# Patient Record
Sex: MALE | Race: WHITE | NOT HISPANIC OR LATINO | Employment: OTHER | URBAN - METROPOLITAN AREA
[De-identification: names, ages, dates, MRNs, and addresses within clinical notes are randomized per-mention and may not be internally consistent; named-entity substitution may affect disease eponyms.]

---

## 2017-04-15 ENCOUNTER — HOSPITAL ENCOUNTER (EMERGENCY)
Facility: HOSPITAL | Age: 72
Discharge: HOME | End: 2017-04-16
Attending: EMERGENCY MEDICINE
Payer: MEDICARE

## 2017-04-15 DIAGNOSIS — I10 ESSENTIAL HYPERTENSION: Primary | ICD-10-CM

## 2017-04-15 PROCEDURE — 83880 ASSAY OF NATRIURETIC PEPTIDE: CPT

## 2017-04-15 PROCEDURE — 93005 ELECTROCARDIOGRAM TRACING: CPT

## 2017-04-15 PROCEDURE — 99284 EMERGENCY DEPT VISIT MOD MDM: CPT | Mod: 25

## 2017-04-15 PROCEDURE — 84484 ASSAY OF TROPONIN QUANT: CPT

## 2017-04-15 PROCEDURE — 85025 COMPLETE CBC W/AUTO DIFF WBC: CPT

## 2017-04-15 PROCEDURE — 96374 THER/PROPH/DIAG INJ IV PUSH: CPT

## 2017-04-15 PROCEDURE — 96375 TX/PRO/DX INJ NEW DRUG ADDON: CPT

## 2017-04-15 PROCEDURE — 80053 COMPREHEN METABOLIC PANEL: CPT

## 2017-04-15 PROCEDURE — 93010 ELECTROCARDIOGRAM REPORT: CPT | Mod: ,,, | Performed by: INTERNAL MEDICINE

## 2017-04-15 RX ORDER — EZETIMIBE 10 MG/1
10 TABLET ORAL DAILY
COMMUNITY
End: 2018-08-08 | Stop reason: SDUPTHER

## 2017-04-15 RX ORDER — IRBESARTAN 300 MG/1
300 TABLET ORAL NIGHTLY
COMMUNITY
End: 2018-08-08 | Stop reason: SDUPTHER

## 2017-04-15 RX ORDER — LEVOTHYROXINE SODIUM 25 UG/1
25 TABLET ORAL DAILY
COMMUNITY
End: 2018-11-23 | Stop reason: SDUPTHER

## 2017-04-15 NOTE — ED AVS SNAPSHOT
[facility]  [address] [place] [address]               Julius Baker   4/15/2017 11:06 PM   ED    Description:  Male : 1945   Department:  [facility]           Your Care was Coordinated By:     Provider Role From To    Latanya Vega MD Attending Provider 04/15/17 9676 --      Reason for Visit     Hypertension           Diagnoses this Visit        Comments    Essential hypertension    -  Primary       ED Disposition     None           To Do List           Follow-up Information     Follow up with Darrion Delarosa MD In 2 days.    Specialty:  Family Medicine    Contact information:    [address]  [place] [address]  [phone]         These Medications        Disp Refills Start End    carvedilol (COREG) 6.25 MG tablet 60 tablet 0 2017    Take 1 tablet (6.25 mg total) by mouth 2 (two) times daily with meals. - Oral      [facility]     [facility] -  Assistance  Unless otherwise directed by your provider, please contact [facility], our nurse care line that is available for  assistance.     Registered nurses in the [facility] provide: appointment scheduling, clinical advisement, health education, and other advisory services.  Call: [phone] (toll free)               Medications           Message regarding Medications     Verify the changes and/or additions to your medication regime listed below are the same as discussed with your clinician today.  If any of these changes or additions are incorrect, please notify your healthcare provider.        START taking these NEW medications        Refills    carvedilol (COREG) 6.25 MG tablet 0    Sig: Take 1 tablet (6.25 mg total) by mouth 2 (two) times daily with meals.    Class: Print    Route: Oral      These medications were administered today        Dose Freq    hydrALAZINE injection 10 mg 10 mg ED 1 Time    Sig: Inject  "0.5 mLs (10 mg total) into the vein ED 1 Time.    Class: Normal    Route: Intravenous    metoprolol injection 5 mg 5 mg ED 1 Time    Sig: Inject 5 mLs (5 mg total) into the vein ED 1 Time.    Class: Normal    Route: Intravenous           Verify that the below list of medications is an accurate representation of the medications you are currently taking.  If none reported, the list may be blank. If incorrect, please contact your healthcare provider. Carry this list with you in case of emergency.           Current Medications     ezetimibe (ZETIA) 10 mg tablet Take 10 mg by mouth once daily.    irbesartan (AVAPRO) 300 MG tablet Take 300 mg by mouth every evening.    levothyroxine (SYNTHROID) 25 MCG tablet Take 25 mcg by mouth once daily.    carvedilol (COREG) 6.25 MG tablet Take 1 tablet (6.25 mg total) by mouth 2 (two) times daily with meals.           Clinical Reference Information           Your Vitals Were     BP Pulse Temp Resp Height Weight    178/89 71 98 °F (36.7 °C) (Oral) 16 5' 10" (1.778 m) 113.4 kg (250 lb)    SpO2 BMI             100% 35.87 kg/m2         Allergies as of 4/16/2017     No Known Allergies      Immunizations Administered on Date of Encounter - 4/16/2017     None      ED Micro, Lab, POCT     Start Ordered       Status Ordering Provider    04/15/17 2343 04/15/17 2342  Troponin I  STAT      Final result     04/15/17 2342 04/15/17 2342  CBC auto differential  STAT      Final result     04/15/17 2342 04/15/17 2342  Comprehensive metabolic panel  STAT      Final result     04/15/17 2342 04/15/17 2342  Brain natriuretic peptide  STAT      Final result       ED Imaging Orders     Start Ordered       Status Ordering Provider    04/16/17 0003 04/16/17 0002  CT Head Without Contrast  1 time imaging      Final result     04/16/17 0002 04/16/17 0002  X-Ray Chest PA And Lateral  1 time imaging      Final result         Discharge Instructions         Eating Heart-Healthy Foods  Eating has a big impact on " your heart health. In fact, eating healthier can improve several of your heart risks at once. For instance, it helps you manage weight, cholesterol, and blood pressure. Here are ideas to help you make heart-healthy changes without giving up all the foods and flavors you love.  Getting started  · Talk with your health care provider about eating plans, such as the DASH or Mediterranean diet. You may also be referred to a dietitian.  · Change a few things at a time. Give yourself time to get used to a few eating changes before adding more.  · Work to create a tasty, healthy eating plan that you can stick to for the rest of your life.    Goals for healthy eating  Below are some tips to improve your eating habits:  · Limit saturated fats and trans fats. Saturated fats raise your levels of cholesterol, so keep these fats to a minimum. They are found in foods such as fatty meats, whole milk, cheese, and palm and coconut oils. Avoid trans fats because they lower good cholesterol as well as raise bad cholesterol. Trans fats are most often found in processed foods.  · Reduce sodium (salt) intake. Eating too much salt may increase your blood pressure. Limit your sodium intake to 2,300 milligrams (mg) per day, or less if your health care provider recommends it. Dining out less often and eating fewer processed foods are two great ways to decrease the amount of salt you consume.  · Managing calories. A calorie is a unit of energy. Your body burns calories for fuel, but if you eat more calories than your body burns, the extras are stored as fat. Your health care provider can help you create a diet plan to manage your calories. This will likely include eating healthier foods as well as exercising regularly. To help you track your progress, keep a diary to record what you eat and how often you exercise.  Choose the right foods  Aim to make these foods staples of your diet. If you have diabetes, you may have different recommendations  than what is listed here:  · Fruits and vegetable provide plenty of nutrients without a lot of calories. At meals, fill half your plate with these foods. Split the other half of your plate between whole grains and lean protein.  · Whole grains are high in fiber and rich in vitamins and nutrients. Good choices include whole-wheat bread, pasta, and brown rice.  · Lean proteins give you nutrition with less fat. Good choices include fish, skinless chicken, and beans.  · Low-fat or nonfat dairy provides nutrients without a lot of fat. Try low-fat or nonfat milk, cheese, or yogurt.  · Healthy fats can be good for you in small amounts. These are unsaturated fats, such as olive oil, nuts, and fish. Try to have at least 2 servings per week of fatty fish such as salmon, sardines, mackerel, rainbow trout, and albacore tuna. These contain omega-3 fatty acids, which are good for your heart. Flaxseed is another source of a heart-healthy fat.  More on heart healthy eating    Read food labels  Healthy eating starts at the grocery store. Be sure to pay attention to food labels on packaged foods. Look for products that are high in fiber and protein, and low in saturated fat, cholesterol, and sodium. Avoid products that contain trans fat. And pay close attention to serving size. For instance, if you plan to eat two servings, double all the numbers on the label.  Prepare food right  A key part of healthy cooking is cutting down on added fat and salt. Look on the internet for lower-fat, lower-sodium recipes. Also, try these tips:  · Remove fat from meat and skin from poultry before cooking.  · Skim fat from the surface of soups and sauces.  · Broil, boil, bake, steam, grill, and microwave food without added fats.  · Choose ingredients that spice up your food without adding calories, fat, or sodium. Try these items: horseradish, hot sauce, lemon, mustard, nonfat salad dressings, and vinegar. For salt-free herbs and spices, try basil,  cilantro, cinnamon, pepper, and rosemary.  Date Last Reviewed: 6/25/2015  © 8153-2051 [employer]. [address] [place] [address]. All rights reserved. This information is not intended as a substitute for professional medical care. Always follow your healthcare professional's instructions.          Step-by-Step  Checking Your Blood Pressure    Date Last Reviewed: 4/27/2016  © 1148-6492 [employer]. [address] [place] [address]. All rights reserved. This information is not intended as a substitute for professional medical care. Always follow your healthcare professional's instructions.          Discharge Instructions: Taking Your Blood Pressure  Blood pressure is the force of blood as it moves from the heart through the blood vessels. You can take your own blood pressure reading using a digital monitor. Take readings as often as your healthcare provider instructs. Take your readings each time in the same way, using the same arm. Here are guidelines for taking your blood pressure.  The American Heart Association (AHA) recommends purchasing a blood pressure monitor that is validated and approved by the Association for the Advancement of Medical Instrumentation, the [place] Hypertension Society, and the International Protocol for the Validation of Automated BP Measuring Devices. If the blood pressure monitor is for a senior adult, a pregnant woman, or a child, make certain it is validated for use with such a population. For the most reliable readings, the AHA recommends an automatic, cuff-style, upper arm (bicep) monitor. The readings from finger and wrist monitors are not as reliable as the upper arm monitor.        Step 1. Relax    · Wait at least a half hour after smoking, eating, or exercising. Do not drink coffee, tea, soda, or other caffeinated beverages before checking your blood pressure.   · Sit comfortably at a table. Place the monitor near you.  · Rest for a  few minutes before you begin.        Step 2. Wrap the cuff    · Place your arm on the table, palm up. Put your arm in a position that is level with your heart. Wrap the cuff around your upper arm, about an inch above your elbow. Its best to wrap the cuff on bare skin, not over clothing.  · Make sure your cuff fits. If it doesnt wrap around your upper arm, order a larger cuff. A cuff that is too large or too small can result in an inaccurate blood pressure reading.           Step 3. Inflate the cuff    · Pump the cuff until the scale reads 200. If you have a self-inflating cuff, push the button that starts the pump.  · The cuff will tighten, then loosen.  · The numbers will change. When they stop changing, your blood pressure reading will appear.  · If you get a reading that is too high or too low for you, relax for a few minutes. Then do the test again.    Step 4. Write down the results  · Write down your blood pressure numbers. Aaron the date and time. Keep your results in one place, such as a notebook.  · Remove the cuff from your arm. Turn off the machine.  · Take the readings with you to your medical appointments.  · If you start a new blood pressure medicine, or change a blood pressure medicine dose, note the day you started the new drug or dosage on your blood pressure recording sheet. This will help your healthcare provider monitor the effect of medication changes.     Date Last Reviewed: 4/27/2016  © 8218-5898 [employer]. [address] [place] [address], [place] [address]. All rights reserved. This information is not intended as a substitute for professional medical care. Always follow your healthcare professional's instructions.          Out of Control High Blood Pressure (Established)    Your blood pressure was unusually high today. This can occur if youve missed doses of your blood pressure medicine. Or it can happen if you are taking other medicines. These include some asthma inhalers,  decongestants, diet pills, and street drugs like cocaine and amphetamine.  Other causes include:  · Weight gain  · More salt in your diet  · Smoking  · Caffeine  Your blood pressure can also rise if you are emotionally upset or in intense pain. It may go back to normal after a period of rest.  A blood pressure reading is made up of 2 numbers. There is a top number over a bottom number. The top number is the systolic pressure. The bottom number is the diastolic pressure. A normal blood pressure is less than 120 over less than 80. High blood pressure (hypertension) is when the top number is 140 or higher. Or it is when the bottom number is 90 or higher. To be high blood pressure, the numbers must be higher when tested over a period of time. The blood pressures between normal and hypertension are called prehypertension.  Home care  Its important to take steps to lower your blood pressure. If you are taking blood pressure medicine, the guidelines below may help you need less or no medicines in the future.  · Begin a weight-loss program if you are overweight.  · Cut back on the amount of salt in your diet:  ¨ Avoid high-salt foods like olives, pickles, smoked meats, and salted potato chips.  ¨ Dont add salt to your food at the table.  ¨ Use only small amounts of salt when cooking.  · Begin an exercise program. Talk with your health care provider about what exercise program is best for you. It doesnt have to be difficult. Even brisk walking for 20 minutes 3 times a week is a good form of exercise.  · Avoid medicines that have heart stimulants in them. This includes many cold and sinus decongestant pills and sprays, as well as diet pills. Check the warnings about hypertension on the label. Stimulants such as amphetamine or cocaine could be lethal for someone with hypertension. Never take these.  · Limit how much caffeine you drink. Or switch to noncaffeinated beverages.  · Stop smoking. If you are a long-time smoker,  this can be hard. Enroll in a stop-smoking program to make it more likely that you will succeed. Talk with your provider about ways to quit.  · Learn how to handle stress better. This is an important part of any program to lower blood pressure. Learn ways to relax. These include meditation, yoga, and biofeedback.  · If medicines were prescribed, take them exactly as directed. Missing doses may cause your blood pressure to get out of control.  · Consider buying an automatic blood pressure machine. These are available at many drugstores. Use this to monitor your blood pressure. Report the results to your provider.  Follow-up care  Regular visits to your own health care provider for blood pressure and medicine checks are an important part of your care. Make a follow-up appointment as directed.  When to seek medical advice  Call your health care provider right away if any of these occur:  · Chest, arm, shoulder, neck, or upper back pain  · Shortness of breath  · Severe headache  · Throbbing or rushing sound in the ears  · Nosebleed  · Extreme drowsiness, confusion, or fainting  · Dizziness or dizziness with spinning sensation (vertigo)  · Weakness in an arm or leg or on one side of the face  · Difficulty speaking or seeing   Date Last Reviewed: 11/25/2014  © 6707-1793 [employer]. [address] [place] [address], [place] [address]. All rights reserved. This information is not intended as a substitute for professional medical care. Always follow your healthcare professional's instructions.          Discharge References/Attachments     DIET, LOW-SALT (ENGLISH)      [facility] Sign-Up     Activating your [facility] account is as easy as 1-2-3!     1) Visit [email or web address], select Sign Up Now, enter this activation code and your date of birth, then select Next.  [email or web address]  Expires: 5/31/2017  3:11 AM      2) Create a username and password to use when you visit [facility] in the future and select a security question  in case you lose your password and select Next.    3) Enter your e-mail address and click Sign Up!    Additional Information  If you have questions, please e-mail [email or web address] or call [phone] to talk to our [facility] staff. Remember, [facility] is NOT to be used for urgent needs. For medical emergencies, dial 911.          [facility] complies with applicable Federal civil rights laws and does not discriminate on the basis of race, color, national origin, age, disability, or sex.        Language Assistance Services     ATTENTION: Language assistance services are available, free of charge. Please call [phone].      ATENCIÓN: Si habla eddie, tiene a nelson disposición servicios gratuitos de asistencia lingüística. Llame al [phone].     CHÚ Ý: N?u b?n nói Ti?ng Vi?t, có các d?ch v? h? tr? ngôn ng? mi?n phí dành cho b?n. G?i s? [phone].

## 2017-04-16 VITALS
SYSTOLIC BLOOD PRESSURE: 178 MMHG | BODY MASS INDEX: 35.79 KG/M2 | WEIGHT: 250 LBS | OXYGEN SATURATION: 100 % | TEMPERATURE: 98 F | DIASTOLIC BLOOD PRESSURE: 89 MMHG | HEIGHT: 70 IN | RESPIRATION RATE: 16 BRPM | HEART RATE: 71 BPM

## 2017-04-16 LAB
ALBUMIN SERPL BCP-MCNC: 4.1 G/DL
ALP SERPL-CCNC: 64 U/L
ALT SERPL W/O P-5'-P-CCNC: 24 U/L
ANION GAP SERPL CALC-SCNC: 12 MMOL/L
AST SERPL-CCNC: 22 U/L
BASOPHILS # BLD AUTO: 0.02 K/UL
BASOPHILS NFR BLD: 0.2 %
BILIRUB SERPL-MCNC: 1.5 MG/DL
BNP SERPL-MCNC: 27 PG/ML
BUN SERPL-MCNC: 14 MG/DL
CALCIUM SERPL-MCNC: 9.8 MG/DL
CHLORIDE SERPL-SCNC: 105 MMOL/L
CO2 SERPL-SCNC: 22 MMOL/L
CREAT SERPL-MCNC: 0.9 MG/DL
DIFFERENTIAL METHOD: ABNORMAL
EOSINOPHIL # BLD AUTO: 0.3 K/UL
EOSINOPHIL NFR BLD: 3.5 %
ERYTHROCYTE [DISTWIDTH] IN BLOOD BY AUTOMATED COUNT: 13.3 %
EST. GFR  (AFRICAN AMERICAN): >60 ML/MIN/1.73 M^2
EST. GFR  (NON AFRICAN AMERICAN): >60 ML/MIN/1.73 M^2
GLUCOSE SERPL-MCNC: 91 MG/DL
HCT VFR BLD AUTO: 42.6 %
HGB BLD-MCNC: 14.7 G/DL
LYMPHOCYTES # BLD AUTO: 2.1 K/UL
LYMPHOCYTES NFR BLD: 24 %
MCH RBC QN AUTO: 30.8 PG
MCHC RBC AUTO-ENTMCNC: 34.5 %
MCV RBC AUTO: 89 FL
MONOCYTES # BLD AUTO: 1 K/UL
MONOCYTES NFR BLD: 11.6 %
NEUTROPHILS # BLD AUTO: 5.2 K/UL
NEUTROPHILS NFR BLD: 60.4 %
PLATELET # BLD AUTO: 268 K/UL
PMV BLD AUTO: 9.1 FL
POTASSIUM SERPL-SCNC: 4 MMOL/L
PROT SERPL-MCNC: 7.2 G/DL
RBC # BLD AUTO: 4.78 M/UL
SODIUM SERPL-SCNC: 139 MMOL/L
TROPONIN I SERPL DL<=0.01 NG/ML-MCNC: <0.006 NG/ML
WBC # BLD AUTO: 8.61 K/UL

## 2017-04-16 PROCEDURE — 63600175 PHARM REV CODE 636 W HCPCS: Performed by: EMERGENCY MEDICINE

## 2017-04-16 PROCEDURE — 25000003 PHARM REV CODE 250: Performed by: EMERGENCY MEDICINE

## 2017-04-16 RX ORDER — CARVEDILOL 6.25 MG/1
6.25 TABLET ORAL 2 TIMES DAILY WITH MEALS
Qty: 60 TABLET | Refills: 0 | Status: ON HOLD | OUTPATIENT
Start: 2017-04-16 | End: 2018-05-21 | Stop reason: CLARIF

## 2017-04-16 RX ORDER — METOPROLOL TARTRATE 1 MG/ML
5 INJECTION, SOLUTION INTRAVENOUS
Status: COMPLETED | OUTPATIENT
Start: 2017-04-16 | End: 2017-04-16

## 2017-04-16 RX ORDER — HYDRALAZINE HYDROCHLORIDE 20 MG/ML
10 INJECTION INTRAMUSCULAR; INTRAVENOUS
Status: COMPLETED | OUTPATIENT
Start: 2017-04-16 | End: 2017-04-16

## 2017-04-16 RX ADMIN — METOPROLOL TARTRATE 5 MG: 5 INJECTION INTRAVENOUS at 01:04

## 2017-04-16 RX ADMIN — HYDRALAZINE HYDROCHLORIDE 10 MG: 20 INJECTION INTRAMUSCULAR; INTRAVENOUS at 12:04

## 2017-04-16 NOTE — DISCHARGE INSTRUCTIONS
Eating Heart-Healthy Foods  Eating has a big impact on your heart health. In fact, eating healthier can improve several of your heart risks at once. For instance, it helps you manage weight, cholesterol, and blood pressure. Here are ideas to help you make heart-healthy changes without giving up all the foods and flavors you love.  Getting started  · Talk with your health care provider about eating plans, such as the DASH or Mediterranean diet. You may also be referred to a dietitian.  · Change a few things at a time. Give yourself time to get used to a few eating changes before adding more.  · Work to create a tasty, healthy eating plan that you can stick to for the rest of your life.    Goals for healthy eating  Below are some tips to improve your eating habits:  · Limit saturated fats and trans fats. Saturated fats raise your levels of cholesterol, so keep these fats to a minimum. They are found in foods such as fatty meats, whole milk, cheese, and palm and coconut oils. Avoid trans fats because they lower good cholesterol as well as raise bad cholesterol. Trans fats are most often found in processed foods.  · Reduce sodium (salt) intake. Eating too much salt may increase your blood pressure. Limit your sodium intake to 2,300 milligrams (mg) per day, or less if your health care provider recommends it. Dining out less often and eating fewer processed foods are two great ways to decrease the amount of salt you consume.  · Managing calories. A calorie is a unit of energy. Your body burns calories for fuel, but if you eat more calories than your body burns, the extras are stored as fat. Your health care provider can help you create a diet plan to manage your calories. This will likely include eating healthier foods as well as exercising regularly. To help you track your progress, keep a diary to record what you eat and how often you exercise.  Choose the right foods  Aim to make these foods staples of your diet. If  you have diabetes, you may have different recommendations than what is listed here:  · Fruits and vegetable provide plenty of nutrients without a lot of calories. At meals, fill half your plate with these foods. Split the other half of your plate between whole grains and lean protein.  · Whole grains are high in fiber and rich in vitamins and nutrients. Good choices include whole-wheat bread, pasta, and brown rice.  · Lean proteins give you nutrition with less fat. Good choices include fish, skinless chicken, and beans.  · Low-fat or nonfat dairy provides nutrients without a lot of fat. Try low-fat or nonfat milk, cheese, or yogurt.  · Healthy fats can be good for you in small amounts. These are unsaturated fats, such as olive oil, nuts, and fish. Try to have at least 2 servings per week of fatty fish such as salmon, sardines, mackerel, rainbow trout, and albacore tuna. These contain omega-3 fatty acids, which are good for your heart. Flaxseed is another source of a heart-healthy fat.  More on heart healthy eating    Read food labels  Healthy eating starts at the grocery store. Be sure to pay attention to food labels on packaged foods. Look for products that are high in fiber and protein, and low in saturated fat, cholesterol, and sodium. Avoid products that contain trans fat. And pay close attention to serving size. For instance, if you plan to eat two servings, double all the numbers on the label.  Prepare food right  A key part of healthy cooking is cutting down on added fat and salt. Look on the internet for lower-fat, lower-sodium recipes. Also, try these tips:  · Remove fat from meat and skin from poultry before cooking.  · Skim fat from the surface of soups and sauces.  · Broil, boil, bake, steam, grill, and microwave food without added fats.  · Choose ingredients that spice up your food without adding calories, fat, or sodium. Try these items: horseradish, hot sauce, lemon, mustard, nonfat salad dressings,  and vinegar. For salt-free herbs and spices, try basil, cilantro, cinnamon, pepper, and rosemary.  Date Last Reviewed: 6/25/2015  © 2114-2842 [employer]. [address] [place] [address]. All rights reserved. This information is not intended as a substitute for professional medical care. Always follow your healthcare professional's instructions.          Step-by-Step  Checking Your Blood Pressure    Date Last Reviewed: 4/27/2016  © 5751-1959 [employer]. [address] [place] [address]. All rights reserved. This information is not intended as a substitute for professional medical care. Always follow your healthcare professional's instructions.          Discharge Instructions: Taking Your Blood Pressure  Blood pressure is the force of blood as it moves from the heart through the blood vessels. You can take your own blood pressure reading using a digital monitor. Take readings as often as your healthcare provider instructs. Take your readings each time in the same way, using the same arm. Here are guidelines for taking your blood pressure.  The American Heart Association (AHA) recommends purchasing a blood pressure monitor that is validated and approved by the Association for the Advancement of Medical Instrumentation, the [place] Hypertension Society, and the International Protocol for the Validation of Automated BP Measuring Devices. If the blood pressure monitor is for a senior adult, a pregnant woman, or a child, make certain it is validated for use with such a population. For the most reliable readings, the AHA recommends an automatic, cuff-style, upper arm (bicep) monitor. The readings from finger and wrist monitors are not as reliable as the upper arm monitor.        Step 1. Relax    · Wait at least a half hour after smoking, eating, or exercising. Do not drink coffee, tea, soda, or other caffeinated beverages before checking your blood pressure.   · Sit  comfortably at a table. Place the monitor near you.  · Rest for a few minutes before you begin.        Step 2. Wrap the cuff    · Place your arm on the table, palm up. Put your arm in a position that is level with your heart. Wrap the cuff around your upper arm, about an inch above your elbow. Its best to wrap the cuff on bare skin, not over clothing.  · Make sure your cuff fits. If it doesnt wrap around your upper arm, order a larger cuff. A cuff that is too large or too small can result in an inaccurate blood pressure reading.           Step 3. Inflate the cuff    · Pump the cuff until the scale reads 200. If you have a self-inflating cuff, push the button that starts the pump.  · The cuff will tighten, then loosen.  · The numbers will change. When they stop changing, your blood pressure reading will appear.  · If you get a reading that is too high or too low for you, relax for a few minutes. Then do the test again.    Step 4. Write down the results  · Write down your blood pressure numbers. Aaron the date and time. Keep your results in one place, such as a notebook.  · Remove the cuff from your arm. Turn off the machine.  · Take the readings with you to your medical appointments.  · If you start a new blood pressure medicine, or change a blood pressure medicine dose, note the day you started the new drug or dosage on your blood pressure recording sheet. This will help your healthcare provider monitor the effect of medication changes.     Date Last Reviewed: 4/27/2016  © 6057-9372 [employer]. [address] [place] [address], [place] [address]. All rights reserved. This information is not intended as a substitute for professional medical care. Always follow your healthcare professional's instructions.          Out of Control High Blood Pressure (Established)    Your blood pressure was unusually high today. This can occur if youve missed doses of your blood pressure medicine. Or it can happen if you are  taking other medicines. These include some asthma inhalers, decongestants, diet pills, and street drugs like cocaine and amphetamine.  Other causes include:  · Weight gain  · More salt in your diet  · Smoking  · Caffeine  Your blood pressure can also rise if you are emotionally upset or in intense pain. It may go back to normal after a period of rest.  A blood pressure reading is made up of 2 numbers. There is a top number over a bottom number. The top number is the systolic pressure. The bottom number is the diastolic pressure. A normal blood pressure is less than 120 over less than 80. High blood pressure (hypertension) is when the top number is 140 or higher. Or it is when the bottom number is 90 or higher. To be high blood pressure, the numbers must be higher when tested over a period of time. The blood pressures between normal and hypertension are called prehypertension.  Home care  Its important to take steps to lower your blood pressure. If you are taking blood pressure medicine, the guidelines below may help you need less or no medicines in the future.  · Begin a weight-loss program if you are overweight.  · Cut back on the amount of salt in your diet:  ¨ Avoid high-salt foods like olives, pickles, smoked meats, and salted potato chips.  ¨ Dont add salt to your food at the table.  ¨ Use only small amounts of salt when cooking.  · Begin an exercise program. Talk with your health care provider about what exercise program is best for you. It doesnt have to be difficult. Even brisk walking for 20 minutes 3 times a week is a good form of exercise.  · Avoid medicines that have heart stimulants in them. This includes many cold and sinus decongestant pills and sprays, as well as diet pills. Check the warnings about hypertension on the label. Stimulants such as amphetamine or cocaine could be lethal for someone with hypertension. Never take these.  · Limit how much caffeine you drink. Or switch to noncaffeinated  beverages.  · Stop smoking. If you are a long-time smoker, this can be hard. Enroll in a stop-smoking program to make it more likely that you will succeed. Talk with your provider about ways to quit.  · Learn how to handle stress better. This is an important part of any program to lower blood pressure. Learn ways to relax. These include meditation, yoga, and biofeedback.  · If medicines were prescribed, take them exactly as directed. Missing doses may cause your blood pressure to get out of control.  · Consider buying an automatic blood pressure machine. These are available at many [employer]. Use this to monitor your blood pressure. Report the results to your provider.  Follow-up care  Regular visits to your own health care provider for blood pressure and medicine checks are an important part of your care. Make a follow-up appointment as directed.  When to seek medical advice  Call your health care provider right away if any of these occur:  · Chest, arm, shoulder, neck, or upper back pain  · Shortness of breath  · Severe headache  · Throbbing or rushing sound in the ears  · Nosebleed  · Extreme drowsiness, confusion, or fainting  · Dizziness or dizziness with spinning sensation (vertigo)  · Weakness in an arm or leg or on one side of the face  · Difficulty speaking or seeing   Date Last Reviewed: 11/25/2014  © 3510-9659 [employer]. [address] [place] [address], [place] [address]. All rights reserved. This information is not intended as a substitute for professional medical care. Always follow your healthcare professional's instructions.

## 2017-04-16 NOTE — ED NOTES
Patient identifiers for Wharton Black checked and correct.  LOC: The patient is awake, alert and aware of environment with an appropriate affect, the patient is oriented x 3 and speaking appropriately.  APPEARANCE: Patient uncomfortable, patient is clean and well groomed, patient's clothing are properly fastened.  SKIN: The skin is warm and dry, patient has normal skin turgor and moist mucus membranes, skin intact, no breakdown or brusing noted.  MUSKULOSKELETAL: Patient moving all extremities well, no obvious swelling or deformities noted.  RESPIRATORY: Airway is open and patent, respirations are spontaneous, patient has a normal effort and rate.  CARDIAC: Patient has a normal rate and rhythm, no periphreal edema noted, capillary refill < 3 seconds.

## 2017-04-17 DIAGNOSIS — I10 HTN (HYPERTENSION): Primary | ICD-10-CM

## 2017-04-26 NOTE — ED PROVIDER NOTES
Encounter Date: 4/15/2017       History     Chief Complaint   Patient presents with    Hypertension     Has taken BP multiple times tonight and found to be elevated. c/o feeling flushed. No dizziness, nosebleeds, pain. States PCP has been talking recently about changing meds. Diet recently high in sodium.      Review of patient's allergies indicates:  No Known Allergies  HPI Comments: This is a 72 y/o M who presents to the ED c/o hypertension tonight took BP several times and increased.  Reports increased stress recently, his PCP told him that he thought he would need to increase meds at last visit but has not done so yet.  Denies headache.  Reports feeling flushed.  Denies nausea, vomiting, visual changes, chest pain or shortness of breath.  No focal numbness or weakness.     The history is provided by the patient.     Past Medical History:   Diagnosis Date    Hypertension     Thyroid disease      Past Surgical History:   Procedure Laterality Date    KNEE ARTHROSCOPY Bilateral     uvula removal       History reviewed. No pertinent family history.  Social History   Substance Use Topics    Smoking status: Never Smoker    Smokeless tobacco: None    Alcohol use Yes     Review of Systems   Constitutional: Negative for fever.   HENT: Negative for sore throat.    Respiratory: Negative for shortness of breath.    Cardiovascular: Negative for chest pain.   Gastrointestinal: Negative for nausea.   Genitourinary: Negative for dysuria.   Musculoskeletal: Negative for back pain.   Skin: Negative for rash.   Neurological: Negative for weakness.   Hematological: Does not bruise/bleed easily.   All other systems reviewed and are negative.      Physical Exam   Initial Vitals   BP Pulse Resp Temp SpO2   04/15/17 2301 04/15/17 2301 04/15/17 2301 04/15/17 2301 04/15/17 2301   252/121 83 15 97.6 °F (36.4 °C) 96 %     Physical Exam    Nursing note and vitals reviewed.  Constitutional: He appears well-developed and  well-nourished.   HENT:   Head: Normocephalic and atraumatic.   Eyes: Conjunctivae and EOM are normal. Pupils are equal, round, and reactive to light.   Neck: Normal range of motion. Neck supple.   Cardiovascular: Normal rate, regular rhythm, normal heart sounds and intact distal pulses. Exam reveals no gallop and no friction rub.    No murmur heard.  Pulmonary/Chest: Breath sounds normal. No stridor. No respiratory distress. He has no wheezes. He has no rhonchi. He has no rales. He exhibits no tenderness.   Abdominal: Soft. Bowel sounds are normal. He exhibits no distension. There is no tenderness. There is no rebound and no guarding.   Musculoskeletal: Normal range of motion.   Neurological: He is alert and oriented to person, place, and time. He has normal strength. No cranial nerve deficit or sensory deficit. He displays a negative Romberg sign. Coordination and gait normal. GCS eye subscore is 4. GCS verbal subscore is 5. GCS motor subscore is 6.   Reflex Scores:       Tricep reflexes are 2+ on the right side and 2+ on the left side.       Bicep reflexes are 2+ on the right side and 2+ on the left side.       Brachioradialis reflexes are 2+ on the right side and 2+ on the left side.       Patellar reflexes are 2+ on the right side and 2+ on the left side.       Achilles reflexes are 2+ on the right side and 2+ on the left side.  Skin: Skin is warm and dry.   Psychiatric: He has a normal mood and affect.         ED Course   Procedures  Labs Reviewed   CBC W/ AUTO DIFFERENTIAL - Abnormal; Notable for the following:        Result Value    MPV 9.1 (*)     All other components within normal limits   COMPREHENSIVE METABOLIC PANEL - Abnormal; Notable for the following:     CO2 22 (*)     Total Bilirubin 1.5 (*)     All other components within normal limits   B-TYPE NATRIURETIC PEPTIDE   TROPONIN I        Imaging Results         CT Head Without Contrast (Final result) Result time:  04/16/17 01:05:56    Final result by  Vicky Duran MD (04/16/17 01:05:56)    Impression:      No CT evidence of acute intracranial abnormality.  Clinical correlation and further evaluation as warranted.      Electronically signed by: VICKY DURAN  Date:     04/16/17  Time:    01:05     Narrative:    Exam: [id number]  04/16/17  00:53:44 [id number] ([facility]) : CT HEAD WITHOUT CONTRAST    Technique:    Multiple contiguous axial images of the brain were obtained from base to the vertex without the use of intravenous contrast. Sagittal and coronal reconstruction images were formatted in postprocessing.    Comparison:     None     Findings:      There is no acute intracranial hemorrhage, hydrocephalus, midline shift or mass effect. Gray-white matter differentiation appears maintained. The basal cisterns are patent. The mastoid air cells are essentially clear.  Paranasal sinuses are clear of acute process noting possible use retention cyst versus polyp in a hypoplastic right maxillary sinus. The visualized bones of the calvarium demonstrate no acute osseous abnormality.            X-Ray Chest PA And Lateral (Final result) Result time:  04/16/17 01:08:57    Final result by Harris Pierre MD (04/16/17 01:08:57)    Impression:       No acute process.            Electronically signed by: HARRIS PIERRE MD  Date:     04/16/17  Time:    01:08     Narrative:    Exam: [id number]  04/16/17  00:53:36 IMG36 ([facility]) : XR CHEST PA AND LATERAL    Technique:    Frontal and lateral chest x-ray    Comparison:    9/16/16    Findings:      Monitoring EKG leads are present.  The trachea is unremarkable.  There is stable prominence of the cardiomediastinal silhouette.  The hemidiaphragms are unremarkable.  There is no evidence of free air beneath the hemidiaphragms.  There are no pleural effusions.  There is no evidence of pneumothorax.  No airspace opacities are present.There are degenerative changes in the osseous structures.                 Medical Decision Making:   Initial Assessment:    Elevated blood pressure and flushed feeling.  Very elevated BP.  Will w/u for hypertensive urgency/emergency and slowly lower BP, monitor closely   Differential Diagnosis:   Hypertension, hypertensive urgency or emergency, electrolyte abnormality, anxiety, ICH, renovascular disease, secondary HTN, medication side effect   ED Management:  Patient's workup unremarkable and BP slowly improved with resolution of symptoms.  Recommended close outpatient follow up.                    ED Course     Clinical Impression:   The encounter diagnosis was Essential hypertension.    Disposition:   Disposition: Discharged  Condition: Stable       Latanya Vega MD  04/26/17 1526

## 2018-05-07 ENCOUNTER — ANESTHESIA EVENT (OUTPATIENT)
Dept: SURGERY | Facility: OTHER | Age: 73
End: 2018-05-07
Payer: MEDICARE

## 2018-05-07 ENCOUNTER — HOSPITAL ENCOUNTER (OUTPATIENT)
Dept: PREADMISSION TESTING | Facility: OTHER | Age: 73
Discharge: HOME | End: 2018-05-07
Attending: ORTHOPAEDIC SURGERY
Payer: MEDICARE

## 2018-05-07 VITALS
BODY MASS INDEX: 36.51 KG/M2 | RESPIRATION RATE: 16 BRPM | HEART RATE: 68 BPM | OXYGEN SATURATION: 97 % | HEIGHT: 70 IN | WEIGHT: 255 LBS | TEMPERATURE: 99 F | DIASTOLIC BLOOD PRESSURE: 90 MMHG | SYSTOLIC BLOOD PRESSURE: 162 MMHG

## 2018-05-07 DIAGNOSIS — S83.241A ACUTE MEDIAL MENISCAL TEAR, RIGHT, INITIAL ENCOUNTER: ICD-10-CM

## 2018-05-07 DIAGNOSIS — S83.241A ACUTE MEDIAL MENISCUS TEAR OF RIGHT KNEE, INITIAL ENCOUNTER: Primary | ICD-10-CM

## 2018-05-07 LAB
ANION GAP SERPL CALC-SCNC: 11 MMOL/L
BUN SERPL-MCNC: 15 MG/DL
CALCIUM SERPL-MCNC: 10 MG/DL
CHLORIDE SERPL-SCNC: 103 MMOL/L
CO2 SERPL-SCNC: 25 MMOL/L
CREAT SERPL-MCNC: 0.8 MG/DL
EST. GFR  (AFRICAN AMERICAN): >60 ML/MIN/1.73 M^2
EST. GFR  (NON AFRICAN AMERICAN): >60 ML/MIN/1.73 M^2
GLUCOSE SERPL-MCNC: 96 MG/DL
HCT VFR BLD AUTO: 45.2 %
HGB BLD-MCNC: 15.3 G/DL
POTASSIUM SERPL-SCNC: 4.2 MMOL/L
SODIUM SERPL-SCNC: 139 MMOL/L

## 2018-05-07 PROCEDURE — 80048 BASIC METABOLIC PNL TOTAL CA: CPT

## 2018-05-07 PROCEDURE — 36415 COLL VENOUS BLD VENIPUNCTURE: CPT

## 2018-05-07 PROCEDURE — 85018 HEMOGLOBIN: CPT

## 2018-05-07 PROCEDURE — 85014 HEMATOCRIT: CPT

## 2018-05-07 RX ORDER — ALBUTEROL SULFATE 90 UG/1
2 AEROSOL, METERED RESPIRATORY (INHALATION) EVERY 6 HOURS PRN
COMMUNITY
End: 2023-04-27

## 2018-05-07 RX ORDER — CHLORTHALIDONE 25 MG/1
25 TABLET ORAL DAILY
COMMUNITY
End: 2019-02-12 | Stop reason: SDUPTHER

## 2018-05-07 RX ORDER — FAMOTIDINE 20 MG/1
20 TABLET, FILM COATED ORAL
Status: CANCELLED | OUTPATIENT
Start: 2018-05-07 | End: 2018-05-07

## 2018-05-07 RX ORDER — SODIUM CHLORIDE, SODIUM LACTATE, POTASSIUM CHLORIDE, CALCIUM CHLORIDE 600; 310; 30; 20 MG/100ML; MG/100ML; MG/100ML; MG/100ML
INJECTION, SOLUTION INTRAVENOUS CONTINUOUS
Status: CANCELLED | OUTPATIENT
Start: 2018-05-07

## 2018-05-07 RX ORDER — ALBUTEROL SULFATE 0.83 MG/ML
2.5 SOLUTION RESPIRATORY (INHALATION)
Status: CANCELLED | OUTPATIENT
Start: 2018-05-07 | End: 2018-05-07

## 2018-05-07 RX ORDER — LIDOCAINE HYDROCHLORIDE 10 MG/ML
0.5 INJECTION, SOLUTION EPIDURAL; INFILTRATION; INTRACAUDAL; PERINEURAL ONCE
Status: CANCELLED | OUTPATIENT
Start: 2018-05-07 | End: 2018-05-07

## 2018-05-07 RX ORDER — NEBIVOLOL 10 MG/1
10 TABLET ORAL DAILY
COMMUNITY
End: 2018-08-08 | Stop reason: SDUPTHER

## 2018-05-07 RX ORDER — DEXTROMETHORPHAN HYDROBROMIDE AND GUAIFENESIN 10; 200 MG/1; MG/1
CAPSULE, GELATIN COATED ORAL
COMMUNITY
End: 2021-11-10

## 2018-05-07 NOTE — ANESTHESIA PREPROCEDURE EVALUATION
05/07/2018  Julius Baker is a 72 y.o., male.    Anesthesia Evaluation    I have reviewed the Patient Summary Reports.    I have reviewed the Nursing Notes.   I have reviewed the Medications.     Review of Systems  Anesthesia Hx:  Denies Family Hx of Anesthesia complications.   Denies Personal Hx of Anesthesia complications.   Social:  Non-Smoker    Hematology/Oncology:  Hematology Normal   Oncology Normal     EENT/Dental:EENT/Dental Normal   Cardiovascular:   Exercise tolerance: good Hypertension    Pulmonary:   Denies Asthma. (rare inhaler??) Sleep Apnea, CPAP    Renal/:  Renal/ Normal     Hepatic/GI:   GERD    Musculoskeletal:  Musculoskeletal Normal    Neurological:  Neurology Normal    Endocrine:  Endocrine Normal    Dermatological:  Skin Normal    Psych:  Psychiatric Normal           Physical Exam  General:  Obesity    Airway/Jaw/Neck:  Airway Findings: Mouth Opening: Normal Mallampati: III  TM Distance: 4 - 6 cm  Jaw/Neck Findings:  Neck ROM: Normal ROM      Dental:  Dental Findings: In tact        Mental Status:  Mental Status Findings:  Cooperative, Alert and Oriented         Anesthesia Plan  Type of Anesthesia, risks & benefits discussed:  Anesthesia Type:  general  Patient's Preference:   Intra-op Monitoring Plan: standard ASA monitors  Intra-op Monitoring Plan Comments:   Post Op Pain Control Plan: multimodal analgesia  Post Op Pain Control Plan Comments:   Induction:   IV  Beta Blocker:         Informed Consent: Patient understands risks and agrees with Anesthesia plan.  Questions answered. Anesthesia consent signed with patient.  ASA Score: 3     Day of Surgery Review of History & Physical:    H&P update referred to the surgeon.     Anesthesia Plan Notes: Labs ordered today, need review        Ready For Surgery From Anesthesia Perspective.

## 2018-05-07 NOTE — DISCHARGE INSTRUCTIONS
PRE-ADMIT TESTING -  [phone]    [address]  [place] [facility]          Your surgery has been scheduled at [facility]. We are pleased to have the opportunity to serve you. For Further Information please call [phone].    On the day of surgery please report to the Information Desk on the 1st floor.    · CONTACT YOUR PHYSICIAN'S OFFICE THE DAY PRIOR TO YOUR SURGERY TO OBTAIN YOUR ARRIVAL TIME.     · The evening before surgery do not eat anything after 9 p.m. ( this includes hard candy, chewing gum and mints).  You may only have GATORADE, POWERADE AND WATER  from 9 p.m. until you leave your home.   DO NOT DRINK ANY LIQUIDS ON THE WAY TO THE HOSPITAL.      SPECIAL MEDICATION INSTRUCTIONS: TAKE medications checked off by the Anesthesiologist on your Medication List.    Angiogram Patients: Take medications as instructed by your physician, including aspirin.     Surgery Patients:    If you take ASPIRIN - Your PHYSICIAN/SURGEON will need to inform you IF/OR when you need to stop taking aspirin prior to your surgery.     Do Not take any medications containing IBUPROFEN.  Do Not Wear any make-up or dark nail polish   (especially eye make-up) to surgery. If you come to surgery with makeup on you will be required to remove the makeup or nail polish.    Do not shave your surgical area at least 5 days prior to your surgery. The surgical prep will be performed at the hospital according to Infection Control regulations.    Leave all valuables at home.   Do Not wear any jewelry or watches, including any metal in body piercings.  Contact Lens must be removed before surgery. Either do not wear the contact lens or bring a case and solution for storage.  Please bring a container for eyeglasses or dentures as required.  Bring any paperwork your physician has provided, such as consent forms,  history and physicals, doctor's orders, etc.   Bring comfortable clothes that are loose fitting to wear upon  discharge. Take into consideration the type of surgery being performed.  Maintain your diet as advised per your physician the day prior to surgery.      Adequate rest the night before surgery is advised.   Park in the Parking lot behind the hospital or in the [facility] across the street from the parking lot. Parking is complimentary.  If you will be discharged the same day as your procedure, please arrange for a responsible adult to drive you home or to accompany you if traveling by taxi.   YOU WILL NOT BE PERMITTED TO DRIVE OR TO LEAVE THE HOSPITAL ALONE AFTER SURGERY.   It is strongly recommended that you arrange for someone to remain with you for the first 24 hrs following your surgery.       Thank you for your cooperation.  The Staff of [facility].        Bathing Instructions                                                                 Please shower the evening before and morning of your procedure with    ANTIBACTERIAL SOAP. ( DIAL, etc )  Concentrate on the surgical area   for at least 3 minutes and rinse completely. Dry off as usual.   Do not use any deodorant, powder, body lotions, perfume, after shave or    cologne.

## 2018-05-21 ENCOUNTER — HOSPITAL ENCOUNTER (OUTPATIENT)
Facility: OTHER | Age: 73
Discharge: HOME | End: 2018-05-21
Attending: ORTHOPAEDIC SURGERY | Admitting: ORTHOPAEDIC SURGERY
Payer: MEDICARE

## 2018-05-21 ENCOUNTER — ANESTHESIA (OUTPATIENT)
Dept: SURGERY | Facility: OTHER | Age: 73
End: 2018-05-21
Payer: MEDICARE

## 2018-05-21 VITALS
DIASTOLIC BLOOD PRESSURE: 72 MMHG | SYSTOLIC BLOOD PRESSURE: 168 MMHG | BODY MASS INDEX: 36.51 KG/M2 | TEMPERATURE: 98 F | RESPIRATION RATE: 16 BRPM | OXYGEN SATURATION: 96 % | WEIGHT: 255 LBS | HEART RATE: 82 BPM | HEIGHT: 70 IN

## 2018-05-21 DIAGNOSIS — S83.241A ACUTE MEDIAL MENISCUS TEAR OF RIGHT KNEE, INITIAL ENCOUNTER: ICD-10-CM

## 2018-05-21 DIAGNOSIS — S83.241A ACUTE MEDIAL MENISCAL TEAR, RIGHT, INITIAL ENCOUNTER: ICD-10-CM

## 2018-05-21 PROCEDURE — 25000242 PHARM REV CODE 250 ALT 637 W/ HCPCS: Performed by: ANESTHESIOLOGY

## 2018-05-21 PROCEDURE — 25000003 PHARM REV CODE 250: Performed by: ANESTHESIOLOGY

## 2018-05-21 PROCEDURE — 71000033 HC RECOVERY, INTIAL HOUR: Performed by: ORTHOPAEDIC SURGERY

## 2018-05-21 PROCEDURE — 36000710: Performed by: ORTHOPAEDIC SURGERY

## 2018-05-21 PROCEDURE — 63600175 PHARM REV CODE 636 W HCPCS: Performed by: ORTHOPAEDIC SURGERY

## 2018-05-21 PROCEDURE — 71000016 HC POSTOP RECOV ADDL HR: Performed by: ORTHOPAEDIC SURGERY

## 2018-05-21 PROCEDURE — 25000003 PHARM REV CODE 250: Performed by: ORTHOPAEDIC SURGERY

## 2018-05-21 PROCEDURE — 63600175 PHARM REV CODE 636 W HCPCS: Performed by: NURSE ANESTHETIST, CERTIFIED REGISTERED

## 2018-05-21 PROCEDURE — 71000015 HC POSTOP RECOV 1ST HR: Performed by: ORTHOPAEDIC SURGERY

## 2018-05-21 PROCEDURE — 27201423 OPTIME MED/SURG SUP & DEVICES STERILE SUPPLY: Performed by: ORTHOPAEDIC SURGERY

## 2018-05-21 PROCEDURE — 94640 AIRWAY INHALATION TREATMENT: CPT

## 2018-05-21 PROCEDURE — 36000711: Performed by: ORTHOPAEDIC SURGERY

## 2018-05-21 PROCEDURE — 37000009 HC ANESTHESIA EA ADD 15 MINS: Performed by: ORTHOPAEDIC SURGERY

## 2018-05-21 PROCEDURE — 25000003 PHARM REV CODE 250: Performed by: NURSE ANESTHETIST, CERTIFIED REGISTERED

## 2018-05-21 PROCEDURE — 37000008 HC ANESTHESIA 1ST 15 MINUTES: Performed by: ORTHOPAEDIC SURGERY

## 2018-05-21 RX ORDER — MORPHINE SULFATE 10 MG/ML
INJECTION, SOLUTION INTRAMUSCULAR; INTRAVENOUS
Status: DISCONTINUED | OUTPATIENT
Start: 2018-05-21 | End: 2018-05-21 | Stop reason: HOSPADM

## 2018-05-21 RX ORDER — FENTANYL CITRATE 50 UG/ML
25 INJECTION, SOLUTION INTRAMUSCULAR; INTRAVENOUS EVERY 5 MIN PRN
Status: DISCONTINUED | OUTPATIENT
Start: 2018-05-21 | End: 2018-05-21 | Stop reason: HOSPADM

## 2018-05-21 RX ORDER — FENTANYL CITRATE 50 UG/ML
INJECTION, SOLUTION INTRAMUSCULAR; INTRAVENOUS
Status: DISCONTINUED | OUTPATIENT
Start: 2018-05-21 | End: 2018-05-21

## 2018-05-21 RX ORDER — ACETAMINOPHEN 10 MG/ML
INJECTION, SOLUTION INTRAVENOUS
Status: DISCONTINUED | OUTPATIENT
Start: 2018-05-21 | End: 2018-05-21

## 2018-05-21 RX ORDER — ONDANSETRON 2 MG/ML
INJECTION INTRAMUSCULAR; INTRAVENOUS
Status: DISCONTINUED | OUTPATIENT
Start: 2018-05-21 | End: 2018-05-21

## 2018-05-21 RX ORDER — SODIUM CHLORIDE 0.9 % (FLUSH) 0.9 %
5 SYRINGE (ML) INJECTION
Status: DISCONTINUED | OUTPATIENT
Start: 2018-05-21 | End: 2018-05-21 | Stop reason: HOSPADM

## 2018-05-21 RX ORDER — BUPIVACAINE HCL/EPINEPHRINE 0.25-.0005
VIAL (ML) INJECTION
Status: DISCONTINUED | OUTPATIENT
Start: 2018-05-21 | End: 2018-05-21 | Stop reason: HOSPADM

## 2018-05-21 RX ORDER — ONDANSETRON 4 MG/1
8 TABLET, ORALLY DISINTEGRATING ORAL EVERY 8 HOURS PRN
Qty: 20 TABLET | Refills: 0 | Status: SHIPPED | OUTPATIENT
Start: 2018-05-21 | End: 2018-05-31

## 2018-05-21 RX ORDER — OXYCODONE HYDROCHLORIDE 5 MG/1
5 TABLET ORAL ONCE
Status: COMPLETED | OUTPATIENT
Start: 2018-05-21 | End: 2018-05-21

## 2018-05-21 RX ORDER — PROPOFOL 10 MG/ML
VIAL (ML) INTRAVENOUS
Status: DISCONTINUED | OUTPATIENT
Start: 2018-05-21 | End: 2018-05-21

## 2018-05-21 RX ORDER — ONDANSETRON 2 MG/ML
4 INJECTION INTRAMUSCULAR; INTRAVENOUS DAILY PRN
Status: DISCONTINUED | OUTPATIENT
Start: 2018-05-21 | End: 2018-05-21 | Stop reason: HOSPADM

## 2018-05-21 RX ORDER — MEPERIDINE HYDROCHLORIDE 50 MG/ML
12.5 INJECTION INTRAMUSCULAR; INTRAVENOUS; SUBCUTANEOUS ONCE AS NEEDED
Status: DISCONTINUED | OUTPATIENT
Start: 2018-05-21 | End: 2018-05-21 | Stop reason: HOSPADM

## 2018-05-21 RX ORDER — HYDROCODONE BITARTRATE AND ACETAMINOPHEN 5; 325 MG/1; MG/1
1 TABLET ORAL EVERY 6 HOURS PRN
Qty: 30 TABLET | Refills: 0 | Status: SHIPPED | OUTPATIENT
Start: 2018-05-21 | End: 2018-05-31

## 2018-05-21 RX ORDER — CEFAZOLIN SODIUM 1 G/3ML
3 INJECTION, POWDER, FOR SOLUTION INTRAMUSCULAR; INTRAVENOUS
Status: DISCONTINUED | OUTPATIENT
Start: 2018-05-21 | End: 2018-05-21 | Stop reason: HOSPADM

## 2018-05-21 RX ORDER — HYDROCODONE BITARTRATE AND ACETAMINOPHEN 5; 325 MG/1; MG/1
1 TABLET ORAL EVERY 4 HOURS PRN
Status: DISCONTINUED | OUTPATIENT
Start: 2018-05-21 | End: 2018-05-21 | Stop reason: HOSPADM

## 2018-05-21 RX ORDER — FAMOTIDINE 20 MG/1
20 TABLET, FILM COATED ORAL
Status: COMPLETED | OUTPATIENT
Start: 2018-05-21 | End: 2018-05-21

## 2018-05-21 RX ORDER — MIDAZOLAM HYDROCHLORIDE 1 MG/ML
INJECTION INTRAMUSCULAR; INTRAVENOUS
Status: DISCONTINUED | OUTPATIENT
Start: 2018-05-21 | End: 2018-05-21

## 2018-05-21 RX ORDER — OXYCODONE HYDROCHLORIDE 5 MG/1
5 TABLET ORAL
Status: DISCONTINUED | OUTPATIENT
Start: 2018-05-21 | End: 2018-05-21 | Stop reason: HOSPADM

## 2018-05-21 RX ORDER — SODIUM CHLORIDE, SODIUM LACTATE, POTASSIUM CHLORIDE, CALCIUM CHLORIDE 600; 310; 30; 20 MG/100ML; MG/100ML; MG/100ML; MG/100ML
INJECTION, SOLUTION INTRAVENOUS CONTINUOUS
Status: DISCONTINUED | OUTPATIENT
Start: 2018-05-21 | End: 2018-05-21 | Stop reason: HOSPADM

## 2018-05-21 RX ORDER — LIDOCAINE HYDROCHLORIDE 10 MG/ML
0.5 INJECTION, SOLUTION EPIDURAL; INFILTRATION; INTRACAUDAL; PERINEURAL ONCE
Status: DISCONTINUED | OUTPATIENT
Start: 2018-05-21 | End: 2018-05-21 | Stop reason: HOSPADM

## 2018-05-21 RX ORDER — DEXTROSE MONOHYDRATE AND SODIUM CHLORIDE 5; .45 G/100ML; G/100ML
INJECTION, SOLUTION INTRAVENOUS CONTINUOUS
Status: DISCONTINUED | OUTPATIENT
Start: 2018-05-21 | End: 2018-05-21 | Stop reason: HOSPADM

## 2018-05-21 RX ORDER — HYDROCODONE BITARTRATE AND ACETAMINOPHEN 5; 325 MG/1; MG/1
1 TABLET ORAL EVERY 6 HOURS PRN
Qty: 40 TABLET | Refills: 0 | Status: SHIPPED | OUTPATIENT
Start: 2018-05-21 | End: 2018-09-15

## 2018-05-21 RX ORDER — GLYCOPYRROLATE 0.2 MG/ML
INJECTION INTRAMUSCULAR; INTRAVENOUS
Status: DISCONTINUED | OUTPATIENT
Start: 2018-05-21 | End: 2018-05-21

## 2018-05-21 RX ORDER — LIDOCAINE HCL/PF 100 MG/5ML
SYRINGE (ML) INTRAVENOUS
Status: DISCONTINUED | OUTPATIENT
Start: 2018-05-21 | End: 2018-05-21

## 2018-05-21 RX ORDER — METHYLPREDNISOLONE ACETATE 40 MG/ML
INJECTION, SUSPENSION INTRA-ARTICULAR; INTRALESIONAL; INTRAMUSCULAR; SOFT TISSUE
Status: DISCONTINUED | OUTPATIENT
Start: 2018-05-21 | End: 2018-05-21 | Stop reason: HOSPADM

## 2018-05-21 RX ORDER — EPHEDRINE SULFATE 50 MG/ML
INJECTION, SOLUTION INTRAVENOUS
Status: DISCONTINUED | OUTPATIENT
Start: 2018-05-21 | End: 2018-05-21

## 2018-05-21 RX ORDER — HYDROCODONE BITARTRATE AND ACETAMINOPHEN 10; 325 MG/1; MG/1
1 TABLET ORAL EVERY 4 HOURS PRN
Status: DISCONTINUED | OUTPATIENT
Start: 2018-05-21 | End: 2018-05-21 | Stop reason: HOSPADM

## 2018-05-21 RX ORDER — ALBUTEROL SULFATE 0.83 MG/ML
2.5 SOLUTION RESPIRATORY (INHALATION)
Status: COMPLETED | OUTPATIENT
Start: 2018-05-21 | End: 2018-05-21

## 2018-05-21 RX ORDER — ATROPINE SULFATE 0.4 MG/ML
INJECTION, SOLUTION ENDOTRACHEAL; INTRAMEDULLARY; INTRAMUSCULAR; INTRAVENOUS; SUBCUTANEOUS
Status: DISCONTINUED | OUTPATIENT
Start: 2018-05-21 | End: 2018-05-21

## 2018-05-21 RX ORDER — SODIUM CHLORIDE 0.9 % (FLUSH) 0.9 %
3 SYRINGE (ML) INJECTION
Status: DISCONTINUED | OUTPATIENT
Start: 2018-05-21 | End: 2018-05-21 | Stop reason: HOSPADM

## 2018-05-21 RX ADMIN — EPHEDRINE SULFATE 5 MG: 50 INJECTION INTRAMUSCULAR; INTRAVENOUS; SUBCUTANEOUS at 03:05

## 2018-05-21 RX ADMIN — ONDANSETRON 4 MG: 2 INJECTION INTRAMUSCULAR; INTRAVENOUS at 03:05

## 2018-05-21 RX ADMIN — FENTANYL CITRATE 50 MCG: 50 INJECTION, SOLUTION INTRAMUSCULAR; INTRAVENOUS at 03:05

## 2018-05-21 RX ADMIN — LIDOCAINE HYDROCHLORIDE 75 MG: 20 INJECTION, SOLUTION INTRAVENOUS at 03:05

## 2018-05-21 RX ADMIN — OXYCODONE HYDROCHLORIDE 5 MG: 5 TABLET ORAL at 04:05

## 2018-05-21 RX ADMIN — ATROPINE SULFATE 0.2 MG: 0.4 INJECTION, SOLUTION INTRAMUSCULAR; INTRAVENOUS; SUBCUTANEOUS at 03:05

## 2018-05-21 RX ADMIN — MIDAZOLAM HYDROCHLORIDE 2 MG: 1 INJECTION, SOLUTION INTRAMUSCULAR; INTRAVENOUS at 03:05

## 2018-05-21 RX ADMIN — OXYCODONE HYDROCHLORIDE 5 MG: 5 TABLET ORAL at 05:05

## 2018-05-21 RX ADMIN — ALBUTEROL SULFATE 2.5 MG: 2.5 SOLUTION RESPIRATORY (INHALATION) at 12:05

## 2018-05-21 RX ADMIN — ACETAMINOPHEN 1000 MG: 10 INJECTION, SOLUTION INTRAVENOUS at 03:05

## 2018-05-21 RX ADMIN — SODIUM CHLORIDE, SODIUM LACTATE, POTASSIUM CHLORIDE, AND CALCIUM CHLORIDE: 600; 310; 30; 20 INJECTION, SOLUTION INTRAVENOUS at 02:05

## 2018-05-21 RX ADMIN — GLYCOPYRROLATE 0.2 MG: 0.2 INJECTION, SOLUTION INTRAMUSCULAR; INTRAVENOUS at 03:05

## 2018-05-21 RX ADMIN — CEFAZOLIN 3 G: 330 INJECTION, POWDER, FOR SOLUTION INTRAMUSCULAR; INTRAVENOUS at 03:05

## 2018-05-21 RX ADMIN — FAMOTIDINE 20 MG: 20 TABLET ORAL at 12:05

## 2018-05-21 RX ADMIN — ATROPINE SULFATE 0.1 MG: 0.4 INJECTION, SOLUTION INTRAMUSCULAR; INTRAVENOUS; SUBCUTANEOUS at 03:05

## 2018-05-21 RX ADMIN — CARBOXYMETHYLCELLULOSE SODIUM 2 DROP: 2.5 SOLUTION/ DROPS OPHTHALMIC at 03:05

## 2018-05-21 RX ADMIN — PROPOFOL 200 MG: 10 INJECTION, EMULSION INTRAVENOUS at 03:05

## 2018-05-21 NOTE — ANESTHESIA POSTPROCEDURE EVALUATION
"Anesthesia Post Evaluation    Patient: Julius Baker    Procedure(s) Performed: Procedure(s) (LRB):  ARTHROSCOPY-KNEE (Right)    Final Anesthesia Type: general  Patient location during evaluation: PACU  Patient participation: Yes- Able to Participate  Level of consciousness: oriented and awake  Post-procedure vital signs: reviewed and stable  Pain management: adequate  Airway patency: patent  PONV status at discharge: No PONV  Anesthetic complications: no      Cardiovascular status: hemodynamically stable  Respiratory status: unassisted, spontaneous ventilation and room air  Hydration status: euvolemic  Follow-up not needed.        Visit Vitals  BP (!) 182/87 (BP Location: Right arm, Patient Position: Lying)   Pulse 79   Temp 36.4 °C (97.6 °F) (Oral)   Resp 16   Ht 5' 10" (1.778 m)   Wt 115.7 kg (255 lb)   SpO2 95%   BMI 36.59 kg/m²       Pain/Kirti Score: Pain Assessment Performed: Yes (5/21/2018  3:45 PM)  Presence of Pain: denies (5/21/2018  4:31 PM)  Pain Rating Prior to Med Admin: 3 (5/21/2018  5:58 PM)  Kirti Score: 8 (5/21/2018  4:00 PM)      "

## 2018-05-21 NOTE — TRANSFER OF CARE
"Anesthesia Transfer of Care Note    Patient: Julius Baker    Procedure(s) Performed: Procedure(s) (LRB):  ARTHROSCOPY-KNEE (Right)    Patient location: PACU    Anesthesia Type: general    Transport from OR: Transported from OR on 2-3 L/min O2 by NC with adequate spontaneous ventilation    Post pain: adequate analgesia    Post assessment: no apparent anesthetic complications    Post vital signs: stable    Level of consciousness: awake, alert and oriented    Nausea/Vomiting: no nausea/vomiting    Complications: none    Transfer of care protocol was followed      Last vitals:   Visit Vitals  BP (!) 186/84 (BP Location: Left arm, Patient Position: Sitting)   Pulse 64   Temp 36.8 °C (98.3 °F) (Oral)   Resp 18   Ht 5' 10" (1.778 m)   Wt 115.7 kg (255 lb)   SpO2 100%   BMI 36.59 kg/m²     "

## 2018-05-21 NOTE — PLAN OF CARE
Julius Baker has met all discharge criteria from Phase II. Vital Signs are stable, ambulating  without difficulty. Discharge instructions given, patient verbalized understanding. Discharged from facility via wheelchair in stable condition.

## 2018-05-21 NOTE — OP NOTE
DATE OF PROCEDURE:  05/21/2018    PREOPERATIVE DIAGNOSIS:  Medial and lateral meniscal tear, right knee.    POSTOPERATIVE DIAGNOSES:  1.  Medial and lateral meniscal tear, right knee.  2.  Grade II/III chondromalacia of trochlea, right knee.  3.  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    PROCEDURES PERFORMED:  1.  Arthroscopy with partial medial and lateral meniscectomy, right knee.  2.  Chondroplasty of medial femoral condyle, right knee.    SURGEON:  Canelo Altman MD.    ASSISTANT:  [facility]    ANESTHESIA:  General.    ESTIMATED BLOOD LOSS:  Minimal.    OPERATIVE PROCEDURE:  After appropriate consents were signed, the patient   understood and accepted all risks and complications.  He was brought to the   Operating Room and underwent general anesthesia.  Tourniquet was applied to   proximal right thigh and right lower extremity was then prepped and draped in a   sterile manner.  After exsanguination of Esmarch bandage, tourniquet was   inflated to 300 mmHg.  Standard superior and medial inflow was established, and   the knee was inflated with saline.  Anterolateral portal was established and the   scope was introduced.  Inspection of the patellofemoral joint demonstrated   grade III changes of the trochlea and grade I changes of the patella.  There   were no loose bodies.  Inspection of the medial compartment demonstrated   degenerative tearing of the medial meniscus.  An anterior medial portal was   established with a spinal needle and a probe was introduced.  Using straight and   upbiting basket forceps and the shaver, the medial meniscus was trimmed back to   a stable rim.  There were grade II changes of chondromalacia of the medial   femoral condyle with some loose articular cartilage.  A chondroplasty was   performed with the shaver.    Inspection of the intercondylar notch demonstrated an intact anterior cruciate   ligament.  Inspection of the lateral  compartment demonstrated some degenerative   peripheral tearing of the lateral meniscus, which was removed with straight   biting basket forceps and the shaver.  Knee was then irrigated out copiously   with saline and then 10 mL of 0.25% Marcaine with 1 mL of Depo-Medrol were   injected into the knee.  The portals were closed with 4-0 nylon suture and a   sterile compressive dressing was applied.  Tourniquet was deflated.  The patient   was extubated and brought to Recovery Room in good condition.      SACHA  dd: 05/21/2018 15:54:00 (CDT)  td: 05/21/2018 18:53:32 (CDT)  Doc ID   #[id number]  Job ID #[id number]    CC:

## 2018-05-21 NOTE — INTERVAL H&P NOTE
The patient has been examined and the H&P has been reviewed:    I concur with the findings and no changes have occurred since H&P was written.    Anesthesia/Surgery risks, benefits and alternative options discussed and understood by patient/family.          Active Hospital Problems    Diagnosis  POA    Acute medial meniscal tear, right, initial encounter [S88.215A]  Yes      Resolved Hospital Problems    Diagnosis Date Resolved POA   No resolved problems to display.

## 2018-05-21 NOTE — DISCHARGE INSTRUCTIONS
Discharge Instructions for Knee Arthroscopy  You had knee arthroscopy. This surgical procedure uses small incisions to locate, identify, and treat problems inside the knee. These problems include loose bodies, meniscal tears, bone spurs, osteochondritis dissecans (OCD), and synovitis. Below are tips to help speed your recovery from surgery.  Activity  · Dont drive until your doctor says its OK. And never drive while taking opioid pain medicine.  · Remember to take pain medicines as directed; dont wait for the pain to get bad. And don't drink alcohol while taking pain medicines.  · Follow weight-bearing instructions given by your doctor. He or she may require you to use crutches to keep weight off your knee.  · Slowly bend and straighten your affected leg as far as you can, unless your doctor tells you otherwise. Do this several times a day.  · Rest your knee by lying down and putting pillows under it for the first 3 days after surgery. Keep your ankle elevated above the level of your heart. This helps keep swelling down.  · Follow your doctors instructions about wearing and caring for a brace, immobilizer, or elastic dressing.  · Point and flex your foot, and rotate your ankle as much as possible during the first few weeks following surgery. Also, wiggle your toes as much as possible.  Incision care  · Check your incision daily for redness, tenderness, or drainage.  · Dont be alarmed if there is some bruising, slight swelling of the knee, or a small amount of blood on the bandage.  · Adjust the bandage or brace as needed. It should feel supportive on your knee, but not too tight.   · Dont soak your incision in water (no hot tubs, bathtubs, swimming pools) until your doctor says its OK.  · Wait 2 day(s) after your surgery to begin showering. Then shower as needed. Cover your knee with plastic to keep the dressing or brace dry. Once your dressing is removed, follow your doctors instructions for care of the  wound. And sit on a shower stool so that you dont fall while showering.  · Use an ice pack or bag of frozen peas--or something similar--wrapped in a thin towel to reduce the swelling. Keep the foot elevated while you ice the knee. Apply the ice pack for 20 minutes; then remove it for 20 minutes. Repeat as needed. Icing helps reduce swelling.  Other precautions  · Arrange your household to keep the items you need within reach.  · Remove throw rugs, electrical cords, and anything else that may cause you to fall.  · Use nonslip bath mats, grab bars, an elevated toilet seat, and a shower chair in your bathroom.  · Use a cane, crutches, a walker, or handrails until your balance, flexibility, and strength improve, and you can put weight on your leg. And remember to ask for help from others when you need it.  · Free up your hands so that you can use them to keep balance. Use a bridgett pack, apron, or pockets to carry things.  Follow-up  Make a follow-up appointment as directed by your doctor.     When to seek medical attention  Call 911 right away if you have any of the following:  · Chest pain  · Shortness of breath  · Severe nausea  Otherwise, call your doctor immediately if you have any of the following:  · Pain that is not relieved by medicine or rest  · Continued bleeding through the bandage  · Tingling, numbness, or coldness in your foot or leg  · Fever above 100.4°F (38.0°C) or shaking chills  · Excessive swelling, increased redness, or any drainage around the incision  · Swelling, tenderness, or pain in your leg      Date Last Reviewed: 11/16/2015  © 9869-0182 [employer]. [address] [place] [address], [place] [address]. All rights reserved. This information is not intended as a substitute for professional medical care. Always follow your healthcare professional's instructions.          Anesthesia: After Your Surgery  Youve just had surgery. During surgery, you received medication called anesthesia to keep  you comfortable and pain-free. After surgery, you may experience some pain or nausea. This is common. Here are some tips for feeling better and recovering after surgery.    Going home  Your doctor or nurse will show you how to take care of yourself when you go home. He or she will also answer your questions. Have an adult family member or friend drive you home. For the first 24 hours after your surgery:  · Do not drive or use heavy equipment.  · Do not make important decisions or sign legal documents.  · Avoid alcohol.  · Have someone stay with you, if needed. He or she can watch for problems and help keep you safe.  Be sure to keep all follow-up appointments with your doctor. And rest after your procedure for as long as your doctor tells you to.    Coping with pain  If you have pain after surgery, pain medication will help you feel better. Take it as directed, before pain becomes severe. Also, ask your doctor or pharmacist about other ways to control pain, such as with heat, ice, and relaxation. And follow any other instructions your surgeon or nurse gives you.    Tips for taking pain medication  To get the best relief possible, remember these points:  · Pain medications can upset your stomach. Taking them with a little food may help.  · Most pain relievers taken by mouth need at least 20 to 30 minutes to take effect.  · Taking medication on a schedule can help you remember to take it. Try to time your medication so that you can take it before beginning an activity, such as dressing, walking, or sitting down for dinner.  · Constipation is a common side effect of pain medications. Contact your doctor before taking any medications like laxatives or stool softeners to help relieve constipation. Also ask about any dietary restrictions, because drinking lots of fluids and eating foods like fruits and vegetables that are high in fiber can also help. Remember, dont take laxatives unless your surgeon has prescribed  them.  · Mixing alcohol and pain medication can cause dizziness and slow your breathing. It can even be fatal. Dont drink alcohol while taking pain medication.  · Pain medication can slow your reflexes. Dont drive or operate machinery while taking pain medication.  If your health care provider tells you to take acetaminophen to help relieve your pain, ask him or her how much you are supposed to take each day. (Acetaminophen is the generic name for Tylenol and other brand-name pain relievers.) Acetaminophen or other pain relievers may interact with your prescription medicines or other over-the-counter (OTC) drugs. Some prescription medications contain acetaminophen along with other active ingredients. Using both prescription and OTC acetaminophen for pain can cause you to overdose. The FDA recommends that you read the labels on your OTC medications carefully. This will help you to clearly understand the list of active ingredients, dosing instructions, and any warnings. It may also help you avoid taking too much acetaminophen. If you have questions or don't understand the information, ask your pharmacist or health care provider to explain it to you before you take the OTC medication.    Managing nausea  Some people have an upset stomach after surgery. This is often due to anesthesia, pain, pain medications, or the stress of surgery. The following tips will help you manage nausea and get good nutrition as you recover. If you were on a special diet before surgery, ask your doctor if you should follow it during recovery. These tips may help:  · Dont push yourself to eat. Your body will tell you when to eat and how much.  · Start off with clear liquids and soup. They are easier to digest.  · Progress to semi-solid foods (mashed potatoes, applesauce, and gelatin) as you feel ready.  · Slowly move to solid foods. Dont eat fatty, rich, or spicy foods at first.  · Dont force yourself to have three large meals a day.  Instead, eat smaller amounts more often.  · Take pain medications with a small amount of solid food, such as crackers or toast to avoid nausea.      Call your surgeon if  · You still have pain an hour after taking medication (it may not be strong enough).  · You feel too sleepy, dizzy, or groggy (medication may be too strong).  · You have side effects like nausea, vomiting, or skin changes (rash, itching, or hives).   © 2068-6088 [employer]. [address] [place] [address], [place] [address]. All rights reserved. This information is not intended as a substitute for professional medical care. Always follow your healthcare professional's instructions.

## 2018-05-26 NOTE — DISCHARGE SUMMARY
DATE OF ADMISSION:  05/21/2018    DATE OF DISCHARGE:  05/21/2018    DIAGNOSIS:  Medial and lateral meniscal tear, right knee.    PROCEDURE:  1.  Arthroscopy with partial medial and lateral meniscectomy, right knee.  2.  Chondroplasty of medial femoral condyle, right knee.    This is a 72-year-old female admitted for arthroscopy of the right knee for   partial medial and lateral meniscal tear.  He underwent the procedure without   difficulty.  I will see him back in 2 weeks for followup.      SACHA  dd: 05/25/2018 11:10:48 (CDT)  td: 05/26/2018 02:21:53 (CDT)  Doc ID   #[id number]  Job ID #[id number]    CC:

## 2018-05-31 ENCOUNTER — OFFICE VISIT (OUTPATIENT)
Dept: INTERNAL MEDICINE | Facility: CLINIC | Age: 73
End: 2018-05-31
Payer: MEDICARE

## 2018-05-31 VITALS
BODY MASS INDEX: 36.83 KG/M2 | OXYGEN SATURATION: 96 % | SYSTOLIC BLOOD PRESSURE: 132 MMHG | HEIGHT: 70 IN | HEART RATE: 60 BPM | DIASTOLIC BLOOD PRESSURE: 62 MMHG | WEIGHT: 257.25 LBS

## 2018-05-31 DIAGNOSIS — E66.9 OBESITY (BMI 35.0-39.9 WITHOUT COMORBIDITY): ICD-10-CM

## 2018-05-31 DIAGNOSIS — Z76.89 ENCOUNTER TO ESTABLISH CARE: ICD-10-CM

## 2018-05-31 DIAGNOSIS — Z00.00 ANNUAL PHYSICAL EXAM: Primary | ICD-10-CM

## 2018-05-31 PROCEDURE — 99999 PR PBB SHADOW E&M-EST. PATIENT-LVL III: CPT | Mod: PBBFAC,,, | Performed by: INTERNAL MEDICINE

## 2018-05-31 PROCEDURE — 99213 OFFICE O/P EST LOW 20 MIN: CPT | Mod: PBBFAC,PO | Performed by: INTERNAL MEDICINE

## 2018-05-31 PROCEDURE — 99203 OFFICE O/P NEW LOW 30 MIN: CPT | Mod: S$PBB,,, | Performed by: INTERNAL MEDICINE

## 2018-05-31 NOTE — PROGRESS NOTES
Subjective:       Patient ID: Julius Baker is a 72 y.o. male.    Chief Complaint: Establish Care    HPI Mr. Baker is a 72-year-old male with hypertension, sleep apnea, hyperlipidemia and hypothyroidism who presents to establish [facility] and for annual exam.  He is a former patient of Dr. Darrion Delarosa and is transitioning his care here today.    For treatment of his hypertension, he has been maintained on chlorthalidone, bystolic and irbesartan. He has been on this regimen for about one year. For a time he had stopped the chlorthalidone due to increased urination, but he has since restarted within the last few days.  He has chronic visual disturbance secondary to a bilateral cataract removal performed by Dr. Rai.  He is currently following with a retina specialist Dr. Lars Chau Junior for treatment.  He is currently taking Zetia for treatment of hyperlipidemia.  He reports that he has failed many and multiple statin medications in the past.  For treatment of his sleep apnea, he reports that he is compliant with using his CPAP every night.  Patient is taking levothyroxin, but he is unsure when his last TSH was checked.  Of note patient recently had arthroscopic surgery performed on the right knee for a torn meniscus.    Review of Systems   Constitutional: Negative for activity change and unexpected weight change.   HENT: Negative for hearing loss and trouble swallowing.    Eyes: Positive for visual disturbance. Negative for discharge.   Respiratory: Negative for chest tightness and wheezing.    Cardiovascular: Negative for chest pain and palpitations.   Gastrointestinal: Negative for blood in stool, constipation, diarrhea and vomiting.   Endocrine: Positive for polyuria. Negative for polydipsia.   Genitourinary: Positive for urgency. Negative for difficulty urinating and hematuria.   Musculoskeletal: Negative for joint swelling.   Neurological: Negative for weakness and headaches.    Psychiatric/Behavioral: Negative for dysphoric mood.       Objective:      Physical Exam   Constitutional: He is oriented to person, place, and time. He appears well-developed and well-nourished. No distress.   HENT:   Head: Normocephalic and atraumatic.   Eyes: Conjunctivae and EOM are normal.   Cardiovascular: Normal rate, regular rhythm, normal heart sounds and intact distal pulses.  Exam reveals no gallop and no friction rub.    No murmur heard.  Pulmonary/Chest: Effort normal and breath sounds normal. No respiratory distress. He has no wheezes. He has no rales.   Abdominal: Soft. Bowel sounds are normal. He exhibits no distension and no mass. There is no tenderness. There is no rebound and no guarding.   Musculoskeletal: He exhibits no edema or deformity.   3 small surgical incisions noted on the right knee, closed with sutures.  Appears to be well healing.  No signs of infection.   Neurological: He is alert and oriented to person, place, and time.   Skin: Skin is warm and dry. He is not diaphoretic.   Psychiatric: He has a normal mood and affect. His behavior is normal. Judgment and thought content normal.   Vitals reviewed.      Assessment:       1. Annual physical exam    2. Encounter to establish care    3. Obesity (BMI 35.0-39.9 without comorbidity)        Plan:     #1 annual exam  We will obtain records from Dr. Zavala in order to determine what labs, if any, the patient needs at this time.  Patient does not want a colonoscopy or fit kit testing.  He believes he is up-to-date with vaccinations, except for possibly the shingles vaccine.  He does not want the shingrix vaccine at this time.  He will let me know if he changes his mind.    #2 obesity BMI 36.92  Discussed the importance of proper diet (one limited in red meats and starches, and filled with lean protein, fruits, vegetables) and proper exercise.  Patient is currently limited in regards to exercise after his knee surgery.  But he does plan to go  to physical therapy soon.    RTC 6 months and PRN

## 2018-06-01 DIAGNOSIS — Z11.59 NEED FOR HEPATITIS C SCREENING TEST: ICD-10-CM

## 2018-06-01 DIAGNOSIS — Z12.11 COLON CANCER SCREENING: ICD-10-CM

## 2018-06-11 ENCOUNTER — PATIENT MESSAGE (OUTPATIENT)
Dept: INTERNAL MEDICINE | Facility: CLINIC | Age: 73
End: 2018-06-11

## 2018-06-18 ENCOUNTER — PATIENT MESSAGE (OUTPATIENT)
Dept: INTERNAL MEDICINE | Facility: CLINIC | Age: 73
End: 2018-06-18

## 2018-06-21 ENCOUNTER — PATIENT MESSAGE (OUTPATIENT)
Dept: INTERNAL MEDICINE | Facility: CLINIC | Age: 73
End: 2018-06-21

## 2018-06-21 NOTE — TELEPHONE ENCOUNTER
Spoke to pt who adv he is at dr. Altman's office right now waiting to be seen, I adv pt to let me know how it turns out! Pt verbalizes understanding!

## 2018-06-21 NOTE — TELEPHONE ENCOUNTER
If he is concerned about blood clot, he needs to be seen in urgent care or ER. Please call him about this. Thanks

## 2018-06-22 ENCOUNTER — PATIENT MESSAGE (OUTPATIENT)
Dept: FAMILY MEDICINE | Facility: CLINIC | Age: 73
End: 2018-06-22

## 2018-06-22 DIAGNOSIS — E78.5 HYPERLIPIDEMIA, UNSPECIFIED HYPERLIPIDEMIA TYPE: Primary | ICD-10-CM

## 2018-06-22 DIAGNOSIS — R73.03 PREDIABETES: ICD-10-CM

## 2018-06-25 ENCOUNTER — PATIENT MESSAGE (OUTPATIENT)
Dept: INTERNAL MEDICINE | Facility: CLINIC | Age: 73
End: 2018-06-25

## 2018-06-25 ENCOUNTER — TELEPHONE (OUTPATIENT)
Dept: INTERNAL MEDICINE | Facility: CLINIC | Age: 73
End: 2018-06-25

## 2018-06-25 NOTE — TELEPHONE ENCOUNTER
----- Message from Sophia Herman MD sent at 6/22/2018  5:09 PM CDT -----  Please get Mr. Baker scheduled for a lab draw (lipids, A1c). Thanks

## 2018-06-29 DIAGNOSIS — Z11.59 NEED FOR HEPATITIS C SCREENING TEST: ICD-10-CM

## 2018-07-02 ENCOUNTER — LAB VISIT (OUTPATIENT)
Dept: LAB | Facility: HOSPITAL | Age: 73
End: 2018-07-02
Attending: INTERNAL MEDICINE
Payer: MEDICARE

## 2018-07-02 DIAGNOSIS — E78.5 HYPERLIPIDEMIA, UNSPECIFIED HYPERLIPIDEMIA TYPE: ICD-10-CM

## 2018-07-02 DIAGNOSIS — R73.03 PREDIABETES: ICD-10-CM

## 2018-07-02 LAB
CHOLEST SERPL-MCNC: 262 MG/DL
CHOLEST/HDLC SERPL: 4.6 {RATIO}
ESTIMATED AVG GLUCOSE: 117 MG/DL
HBA1C MFR BLD HPLC: 5.7 %
HDLC SERPL-MCNC: 57 MG/DL
HDLC SERPL: 21.8 %
LDLC SERPL CALC-MCNC: 180.8 MG/DL
NONHDLC SERPL-MCNC: 205 MG/DL
TRIGL SERPL-MCNC: 121 MG/DL

## 2018-07-02 PROCEDURE — 80061 LIPID PANEL: CPT

## 2018-07-02 PROCEDURE — 36415 COLL VENOUS BLD VENIPUNCTURE: CPT | Mod: PO

## 2018-07-02 PROCEDURE — 83036 HEMOGLOBIN GLYCOSYLATED A1C: CPT

## 2018-07-05 ENCOUNTER — TELEPHONE (OUTPATIENT)
Dept: INTERNAL MEDICINE | Facility: CLINIC | Age: 73
End: 2018-07-05

## 2018-07-05 ENCOUNTER — PATIENT MESSAGE (OUTPATIENT)
Dept: INTERNAL MEDICINE | Facility: CLINIC | Age: 73
End: 2018-07-05

## 2018-07-05 NOTE — TELEPHONE ENCOUNTER
----- Message from Sophia Herman MD sent at 7/5/2018  2:21 PM CDT -----  Please see if you can schedule Mr. Baker in a follow up appt with me. It's for follow up of high cholesterol. I would like to discuss our options for further evaluation and treatment with him  Thanks

## 2018-07-06 ENCOUNTER — PATIENT MESSAGE (OUTPATIENT)
Dept: INTERNAL MEDICINE | Facility: CLINIC | Age: 73
End: 2018-07-06

## 2018-07-16 ENCOUNTER — OFFICE VISIT (OUTPATIENT)
Dept: INTERNAL MEDICINE | Facility: CLINIC | Age: 73
End: 2018-07-16
Payer: MEDICARE

## 2018-07-16 VITALS
HEIGHT: 70 IN | WEIGHT: 256.63 LBS | SYSTOLIC BLOOD PRESSURE: 132 MMHG | HEART RATE: 52 BPM | OXYGEN SATURATION: 98 % | DIASTOLIC BLOOD PRESSURE: 76 MMHG | BODY MASS INDEX: 36.74 KG/M2

## 2018-07-16 DIAGNOSIS — E66.01 SEVERE OBESITY (BMI 35.0-39.9) WITH COMORBIDITY: ICD-10-CM

## 2018-07-16 DIAGNOSIS — E78.00 PURE HYPERCHOLESTEROLEMIA: Primary | ICD-10-CM

## 2018-07-16 DIAGNOSIS — R73.03 PREDIABETES: ICD-10-CM

## 2018-07-16 DIAGNOSIS — R19.7 DIARRHEA, UNSPECIFIED TYPE: ICD-10-CM

## 2018-07-16 PROCEDURE — 99213 OFFICE O/P EST LOW 20 MIN: CPT | Mod: S$PBB,,, | Performed by: INTERNAL MEDICINE

## 2018-07-16 PROCEDURE — 99999 PR PBB SHADOW E&M-EST. PATIENT-LVL III: CPT | Mod: PBBFAC,,, | Performed by: INTERNAL MEDICINE

## 2018-07-16 PROCEDURE — 99213 OFFICE O/P EST LOW 20 MIN: CPT | Mod: PBBFAC,PO | Performed by: INTERNAL MEDICINE

## 2018-07-16 NOTE — PROGRESS NOTES
Subjective:       Patient ID: Julius Baker is a 72 y.o. male.    Chief Complaint: Hyperlipidemia    HPI Mr. Baker is a 72 year old male with a history of hyperlipidemia who presents for follow-up of hyperlipidemia.  Today, we discussed his history of hyperlipidemia and is currently results.  The patient states that he has had high LDL cholesterol in the past.  But has never been as high as it is currently (currently his LDL is 180).  He has been tried on 6 or 7 statin medications in the past.  He states that each and every statin even at a very low dose caused extreme muscle cramping and pain.  He is currently taking Zetia and has not noticed any issues with muscle cramping or pain on this medication.  The patient does not want to try another statin medication due to the severe adverse reactions associated with past use.  Patient also states he has had some episodes of diarrhea after eating out at restaurants.  He has not experienced any of this while eating at home.    Review of Systems   Constitutional: Negative for activity change and unexpected weight change.   HENT: Negative for hearing loss, rhinorrhea and trouble swallowing.    Eyes: Negative for discharge and visual disturbance.   Respiratory: Negative for chest tightness and wheezing.    Cardiovascular: Negative for chest pain and palpitations.   Gastrointestinal: Positive for diarrhea (with restaurant meals). Negative for blood in stool, constipation and vomiting.   Endocrine: Negative for polydipsia and polyuria.   Genitourinary: Negative for difficulty urinating, hematuria and urgency.   Musculoskeletal: Positive for arthralgias (chronic). Negative for joint swelling.   Neurological: Negative for weakness and headaches.   Psychiatric/Behavioral: Negative for confusion and dysphoric mood.       Objective:      Physical Exam   Constitutional: He is oriented to person, place, and time. He appears well-developed and well-nourished. No distress.    HENT:   Head: Normocephalic and atraumatic.   Eyes: Conjunctivae and EOM are normal.   Neurological: He is alert and oriented to person, place, and time.   Skin: Skin is warm and dry. He is not diaphoretic.   Psychiatric: He has a normal mood and affect. His behavior is normal. Judgment and thought content normal.   Vitals reviewed.      Assessment:       1. Pure hypercholesterolemia    2. Prediabetes    3. Diarrhea, unspecified type    4. Severe obesity (BMI 35.0-39.9) with comorbidity        Plan:        #1.  Hypercholesterolemia  Discussed with patient that statins are the most appropriate and useful medicines for reducing LDL cholesterol.  However due to his statin intolerance, the patient does not want to begin a statin today.  He prefers to work on diet, exercise, and weight loss first.  Patient will improve diet by decreasing sugars and starches, and increasing lean protein, fruits, and vegetables.  He will exercise by walking at least 30 minutes per day for at least 5 days per week.  We will recheck his lipid profile in 3 months.  If no improvement, we will attempt to start pravastatin at low dose.    #2 prediabetes  Patient will improve diet and exercise as above  Recheck A1c in 3 months.    #3 diarrhea  Discussed with patient this is likely due to spices and other changes in food preparation performed at a restaurant that may not be done at home.  Advised patient to stop eating out at restaurants and to prepare meals at home.     #4 severe obesity with comorbidity  Patient will improve diet and exercise as above    Return to clinic in 3 months

## 2018-08-08 ENCOUNTER — PATIENT MESSAGE (OUTPATIENT)
Dept: INTERNAL MEDICINE | Facility: CLINIC | Age: 73
End: 2018-08-08

## 2018-08-08 RX ORDER — EZETIMIBE 10 MG/1
10 TABLET ORAL DAILY
Qty: 90 TABLET | Refills: 1 | Status: SHIPPED | OUTPATIENT
Start: 2018-08-08 | End: 2018-08-08 | Stop reason: SDUPTHER

## 2018-08-08 RX ORDER — NEBIVOLOL 10 MG/1
10 TABLET ORAL DAILY
Qty: 90 TABLET | Refills: 1 | Status: SHIPPED | OUTPATIENT
Start: 2018-08-08 | End: 2019-04-03 | Stop reason: SDUPTHER

## 2018-08-08 RX ORDER — NEBIVOLOL 10 MG/1
10 TABLET ORAL DAILY
Qty: 90 TABLET | Refills: 1 | Status: SHIPPED | OUTPATIENT
Start: 2018-08-08 | End: 2018-08-08 | Stop reason: SDUPTHER

## 2018-08-08 RX ORDER — EZETIMIBE 10 MG/1
10 TABLET ORAL DAILY
Qty: 90 TABLET | Refills: 1 | Status: SHIPPED | OUTPATIENT
Start: 2018-08-08 | End: 2018-10-19

## 2018-08-08 RX ORDER — IRBESARTAN 300 MG/1
300 TABLET ORAL NIGHTLY
Qty: 90 TABLET | Refills: 1 | Status: SHIPPED | OUTPATIENT
Start: 2018-08-08 | End: 2018-08-08 | Stop reason: SDUPTHER

## 2018-08-08 RX ORDER — IRBESARTAN 300 MG/1
300 TABLET ORAL NIGHTLY
Qty: 90 TABLET | Refills: 1 | Status: SHIPPED | OUTPATIENT
Start: 2018-08-08 | End: 2019-01-22 | Stop reason: ALTCHOICE

## 2018-08-20 ENCOUNTER — PATIENT MESSAGE (OUTPATIENT)
Dept: INTERNAL MEDICINE | Facility: CLINIC | Age: 73
End: 2018-08-20

## 2018-08-21 ENCOUNTER — PATIENT MESSAGE (OUTPATIENT)
Dept: INTERNAL MEDICINE | Facility: CLINIC | Age: 73
End: 2018-08-21

## 2018-08-21 NOTE — TELEPHONE ENCOUNTER
"I am not sure what he is talking about regarding "generic" medicine. The medicines all default to generic.   To answer his second part, it is working for his BP so I recommend he keep taking it. Thanks"

## 2018-08-22 ENCOUNTER — TELEPHONE (OUTPATIENT)
Dept: FAMILY MEDICINE | Facility: CLINIC | Age: 73
End: 2018-08-22

## 2018-08-22 NOTE — TELEPHONE ENCOUNTER
Left message instructing pt to call office regarding scheduling appt with Dr. Herman so she can discuss his concerns.

## 2018-08-22 NOTE — TELEPHONE ENCOUNTER
Mr. Baker has many concerns surrounding his medication. In addition, it sounds like he may want to switch medications. Therefore I recommend he come and see me in clinic so that we can discuss all of this. Thanks

## 2018-09-15 ENCOUNTER — OFFICE VISIT (OUTPATIENT)
Dept: URGENT CARE | Facility: CLINIC | Age: 73
End: 2018-09-15
Payer: MEDICARE

## 2018-09-15 VITALS
RESPIRATION RATE: 18 BRPM | OXYGEN SATURATION: 96 % | HEIGHT: 70 IN | SYSTOLIC BLOOD PRESSURE: 154 MMHG | WEIGHT: 247 LBS | HEART RATE: 66 BPM | TEMPERATURE: 99 F | DIASTOLIC BLOOD PRESSURE: 89 MMHG | BODY MASS INDEX: 35.36 KG/M2

## 2018-09-15 DIAGNOSIS — L02.91 ABSCESS: Primary | ICD-10-CM

## 2018-09-15 PROCEDURE — 99214 OFFICE O/P EST MOD 30 MIN: CPT | Mod: 25,S$GLB,, | Performed by: NURSE PRACTITIONER

## 2018-09-15 PROCEDURE — 10060 I&D ABSCESS SIMPLE/SINGLE: CPT | Mod: S$GLB,,, | Performed by: NURSE PRACTITIONER

## 2018-09-15 RX ORDER — SULFAMETHOXAZOLE AND TRIMETHOPRIM 800; 160 MG/1; MG/1
1 TABLET ORAL 2 TIMES DAILY
Qty: 20 TABLET | Refills: 0 | Status: SHIPPED | OUTPATIENT
Start: 2018-09-15 | End: 2018-09-25

## 2018-09-15 NOTE — PROGRESS NOTES
"Subjective:       Patient ID: Julius Baker is a 72 y.o. male.    Vitals:  height is 5' 10" (1.778 m) and weight is 112 kg (247 lb). His temperature is 98.9 °F (37.2 °C). His blood pressure is 154/89 (abnormal) and his pulse is 66. His respiration is 18 and oxygen saturation is 96%.     Chief Complaint: Abscess    Abscess   Chronicity:  NewProgression Since Onset: worsening  Abscess location: Lt side of chest.  Associated Symptoms: no fever, no chills  Characteristics: draining, painful and redness    Pain Scale:  6/10  Treatments Tried:  NSAIDs and warm compresses  Relieved by:  NSAIDs and warm compresses  Worsened by:  Nothing    Review of Systems   Constitution: Negative for chills and fever.   HENT: Negative for sore throat.    Respiratory: Negative for shortness of breath.    Skin: Negative for itching and rash.   Musculoskeletal: Negative for joint pain.       Objective:      Physical Exam   Constitutional: He is oriented to person, place, and time. He appears well-developed and well-nourished.   HENT:   Head: Normocephalic and atraumatic.   Right Ear: External ear normal.   Left Ear: External ear normal.   Nose: Nose normal.   Eyes: EOM are normal.   Neck: Normal range of motion. Neck supple.   Pulmonary/Chest: Effort normal. No respiratory distress.   Musculoskeletal: Normal range of motion.   Neurological: He is alert and oriented to person, place, and time.   Skin: Skin is warm and dry.        Psychiatric: He has a normal mood and affect. His behavior is normal. Judgment and thought content normal.   Vitals reviewed.          Assessment:       1. Abscess        Plan:         Abscess  -     Incision & Drainage    Other orders  -     sulfamethoxazole-trimethoprim 800-160mg (BACTRIM DS) 800-160 mg Tab; Take 1 tablet by mouth 2 (two) times daily. for 10 days  Dispense: 20 tablet; Refill: 0    Incision & Drainage  Date/Time: 9/15/2018 9:40 AM  Performed by: Annette Clifton NP  Authorized by: Annette HERNANDEZ " "STEVIE Clifton     Time out: Immediately prior to procedure a "time out" was called to verify the correct patient, procedure, equipment, support staff and site/side marked as required.      Type:  Abscess  Body area:  Trunk  Location details:  Left breast  Anesthesia:  Local infiltration  Local anesthetic: lidocaine 1% without epinephrine  Anesthetic total (ml): 3.  Scalpel size:  15  Incision type:  Single straight  Complexity:  Simple  Drainage:  Pus and serosanguinous  Drainage amount:  Moderate  Packing material:  1/2 in gauze  Patient tolerance:  Patient tolerated the procedure well with no immediate complications        Patient Instructions     Return in 2 days for wound check and packing changed   Take antibiotics 2 times/day  Tylenol/motrin for pain unless you are allergic    Abscess (Antibiotic Treatment Only)  An abscess (sometimes called a boil) happens when bacteria get trapped under the skin and start to grow. Pus forms inside the abscess as the body responds to the bacteria. An abscess can happen with an insect bite, ingrown hair, blocked oil gland, pimple, cyst, or puncture wound.  In the early stages, your wound may be red and tender. For this stage, you may get antibiotics. If the abscess does not get better with antibiotics, it will need to be drained with a small cut.  Home care  These tips will help you care for your abscess at home:  · Soak the wound in hot water or apply hot packs (small towel soaked in hot water) to the area for 20 minutes at a time. Do this 3 to 4 times a day.  · Do not cut, squeeze, or pop the boil yourself.  · Apply antibiotic cream or ointment to the skin 3 to 4 times a day, unless something else was prescribed. Some ointments include an antibiotic plus a pain reliever.  · If your doctor prescribed antibiotics, do not stop taking them until you have finished the medicine or the doctor tells you to stop.  · You may use an over-the-counter pain medicine to control pain, unless " another pain medicine was prescribed. If you have chronic liver or kidney disease or ever had a stomach ulcer or gastrointestinal bleeding, talk with your doctor before using these any of these.  Follow-up care  Follow up with your healthcare provider, or as advised. Check your wound each day for the signs of worsening infection listed below.  When to seek medical advice  Get prompt medical attention if any of these occur:  · An increase in redness or swelling  · Red streaks in the skin leading away from the abscess  · An increase in local pain or swelling  · Fever of 100.4ºF (38ºC) or higher, or as directed by your healthcare provider  · Pus or fluid coming from the abscess  · Boil returns after getting better  Date Last Reviewed: 9/1/2016  © 1203-2067 [employer]. [address] [place] [address], [place] [address]. All rights reserved. This information is not intended as a substitute for professional medical care. Always follow your healthcare professional's instructions.

## 2018-09-15 NOTE — PATIENT INSTRUCTIONS
Return in 2 days for wound check and packing changed   Take antibiotics 2 times/day  Tylenol/motrin for pain unless you are allergic    Abscess (Antibiotic Treatment Only)  An abscess (sometimes called a boil) happens when bacteria get trapped under the skin and start to grow. Pus forms inside the abscess as the body responds to the bacteria. An abscess can happen with an insect bite, ingrown hair, blocked oil gland, pimple, cyst, or puncture wound.  In the early stages, your wound may be red and tender. For this stage, you may get antibiotics. If the abscess does not get better with antibiotics, it will need to be drained with a small cut.  Home care  These tips will help you care for your abscess at home:  · Soak the wound in hot water or apply hot packs (small towel soaked in hot water) to the area for 20 minutes at a time. Do this 3 to 4 times a day.  · Do not cut, squeeze, or pop the boil yourself.  · Apply antibiotic cream or ointment to the skin 3 to 4 times a day, unless something else was prescribed. Some ointments include an antibiotic plus a pain reliever.  · If your doctor prescribed antibiotics, do not stop taking them until you have finished the medicine or the doctor tells you to stop.  · You may use an over-the-counter pain medicine to control pain, unless another pain medicine was prescribed. If you have chronic liver or kidney disease or ever had a stomach ulcer or gastrointestinal bleeding, talk with your doctor before using these any of these.  Follow-up care  Follow up with your healthcare provider, or as advised. Check your wound each day for the signs of worsening infection listed below.  When to seek medical advice  Get prompt medical attention if any of these occur:  · An increase in redness or swelling  · Red streaks in the skin leading away from the abscess  · An increase in local pain or swelling  · Fever of 100.4ºF (38ºC) or higher, or as directed by your healthcare provider  · Pus  or fluid coming from the abscess  · Boil returns after getting better  Date Last Reviewed: 9/1/2016  © 4330-1808 [employer]. [address] [place] [address], [place] [address]. All rights reserved. This information is not intended as a substitute for professional medical care. Always follow your healthcare professional's instructions.

## 2018-09-15 NOTE — PROCEDURES
"Incision & Drainage  Date/Time: 9/15/2018 9:40 AM  Performed by: Annette Clifton NP  Authorized by: Annette Clifton NP     Time out: Immediately prior to procedure a "time out" was called to verify the correct patient, procedure, equipment, support staff and site/side marked as required.      Type:  Abscess  Body area:  Trunk  Location details:  Left breast  Anesthesia:  Local infiltration  Local anesthetic: lidocaine 1% without epinephrine  Anesthetic total (ml): 3.  Scalpel size:  15  Incision type:  Single straight  Complexity:  Simple  Drainage:  Pus and serosanguinous  Drainage amount:  Moderate  Packing material:  1/2 in gauze  Patient tolerance:  Patient tolerated the procedure well with no immediate complications      "
Patient

## 2018-09-18 ENCOUNTER — TELEPHONE (OUTPATIENT)
Dept: URGENT CARE | Facility: CLINIC | Age: 73
End: 2018-09-18

## 2018-10-15 ENCOUNTER — LAB VISIT (OUTPATIENT)
Dept: LAB | Facility: HOSPITAL | Age: 73
End: 2018-10-15
Attending: INTERNAL MEDICINE
Payer: MEDICARE

## 2018-10-15 DIAGNOSIS — E78.00 PURE HYPERCHOLESTEROLEMIA: ICD-10-CM

## 2018-10-15 DIAGNOSIS — R73.03 PREDIABETES: ICD-10-CM

## 2018-10-15 LAB
CHOLEST SERPL-MCNC: 228 MG/DL
CHOLEST/HDLC SERPL: 4.2 {RATIO}
ESTIMATED AVG GLUCOSE: 105 MG/DL
HBA1C MFR BLD HPLC: 5.3 %
HDLC SERPL-MCNC: 54 MG/DL
HDLC SERPL: 23.7 %
LDLC SERPL CALC-MCNC: 146 MG/DL
NONHDLC SERPL-MCNC: 174 MG/DL
TRIGL SERPL-MCNC: 140 MG/DL

## 2018-10-15 PROCEDURE — 36415 COLL VENOUS BLD VENIPUNCTURE: CPT | Mod: PO

## 2018-10-15 PROCEDURE — 80061 LIPID PANEL: CPT

## 2018-10-15 PROCEDURE — 83036 HEMOGLOBIN GLYCOSYLATED A1C: CPT

## 2018-10-19 ENCOUNTER — TELEPHONE (OUTPATIENT)
Dept: INTERNAL MEDICINE | Facility: CLINIC | Age: 73
End: 2018-10-19

## 2018-10-19 ENCOUNTER — OFFICE VISIT (OUTPATIENT)
Dept: INTERNAL MEDICINE | Facility: CLINIC | Age: 73
End: 2018-10-19
Payer: MEDICARE

## 2018-10-19 VITALS
BODY MASS INDEX: 35.63 KG/M2 | OXYGEN SATURATION: 97 % | HEART RATE: 63 BPM | TEMPERATURE: 98 F | WEIGHT: 248.88 LBS | DIASTOLIC BLOOD PRESSURE: 70 MMHG | SYSTOLIC BLOOD PRESSURE: 140 MMHG | HEIGHT: 70 IN

## 2018-10-19 DIAGNOSIS — E66.01 SEVERE OBESITY (BMI 35.0-35.9 WITH COMORBIDITY): ICD-10-CM

## 2018-10-19 DIAGNOSIS — L02.91 ABSCESS: ICD-10-CM

## 2018-10-19 DIAGNOSIS — M19.90 ARTHRITIS: ICD-10-CM

## 2018-10-19 DIAGNOSIS — M54.50 ACUTE BILATERAL LOW BACK PAIN WITHOUT SCIATICA: ICD-10-CM

## 2018-10-19 DIAGNOSIS — E78.5 HYPERLIPIDEMIA LDL GOAL <100: Primary | ICD-10-CM

## 2018-10-19 PROCEDURE — 99999 PR PBB SHADOW E&M-EST. PATIENT-LVL IV: CPT | Mod: PBBFAC,,, | Performed by: INTERNAL MEDICINE

## 2018-10-19 PROCEDURE — 99214 OFFICE O/P EST MOD 30 MIN: CPT | Mod: PBBFAC,PO | Performed by: INTERNAL MEDICINE

## 2018-10-19 PROCEDURE — 99214 OFFICE O/P EST MOD 30 MIN: CPT | Mod: S$PBB,,, | Performed by: INTERNAL MEDICINE

## 2018-10-19 RX ORDER — PRAVASTATIN SODIUM 10 MG/1
10 TABLET ORAL DAILY
Qty: 30 TABLET | Refills: 3 | Status: SHIPPED | OUTPATIENT
Start: 2018-10-19 | End: 2018-11-14

## 2018-10-19 RX ORDER — DICLOFENAC SODIUM 10 MG/G
2 GEL TOPICAL DAILY
Qty: 1 TUBE | Refills: 2 | Status: SHIPPED | OUTPATIENT
Start: 2018-10-19 | End: 2019-05-23 | Stop reason: SDUPTHER

## 2018-10-19 NOTE — PROGRESS NOTES
Subjective:       Patient ID: Julius Baker is a 72 y.o. male.    Chief Complaint: Follow-up    HPI Mr Baker is a 72-year-old male with hyperlipidemia who presents for follow-up of hyperlipidemia.  Patient reports that he was recently seen at an urgent care where he was diagnosed with an abscess.  An incision and drainage was performed.  He was prescribed Bactrim.  His symptoms did not resolve and he was seen by his dermatologist.  He was in prescribed doxycycline which caused itching and diarrhea.  He discontinued the doxycycline.  He has also been using an ointment in his nose as instructed.  The abscess has healed and is no longer painful or red.      The patient has been taking Zetia for treatment of elevated cholesterol levels.  He states that he has tried multiple statin medications in the past; all resulting in unbearable side effects.      Patient further reports that he is having some issues with his left eye due to cataract.  He sees doctor Reese Min for this issue.      Patient is having right knee pain. He had an arthroscopy performed.  It was discovered that he has some meniscal tears as well as arthritis.  He is wondering what can be done about his pain currently, with the exception of knee replacement.      Finally the patient reports lower back pain. He has seen Orthopedics for some time.  He would like to try something different such as physical therapy for this bilateral lower back pain.    Review of Systems   Eyes: Positive for visual disturbance (cataracts).   Musculoskeletal: Positive for arthralgias and back pain.   Skin:        Abscess on chest       Objective:      Physical Exam   Constitutional: He is oriented to person, place, and time. He appears well-developed and well-nourished. No distress.   HENT:   Head: Normocephalic and atraumatic.   Eyes: Conjunctivae and EOM are normal.   Cardiovascular: Normal rate, regular rhythm, normal heart sounds and intact distal pulses. Exam  reveals no gallop and no friction rub.   No murmur heard.  Pulmonary/Chest: Effort normal and breath sounds normal. No stridor. No respiratory distress. He has no wheezes. He has no rales.   Musculoskeletal: He exhibits no edema or deformity.   Neurological: He is alert and oriented to person, place, and time.   Skin: Skin is warm and dry. He is not diaphoretic.   Psychiatric: He has a normal mood and affect. His behavior is normal. Judgment and thought content normal.   Vitals reviewed.      Assessment:       1. Hyperlipidemia LDL goal <100    2. Abscess    3. Arthritis    4. Acute bilateral low back pain without sciatica    5. Severe obesity (BMI 35.0-35.9 with comorbidity)        Plan:     1.  Hyperlipidemia LDL goal less than 100  Patient's 10 year ASCVD risk is 27%.  I recommend trying a statin medication again.  Discontinuing Zetia.  Starting pravastatin at low-dose today.  If patient again has side effects, he can stop at any time. And we will restart zetia. He is aware of this.    2.  Abscess  Resolved    3.  Arthritis  Prescribing Voltaren gel for relief of pain    4.  Acute bilateral low back pain  Physical therapy ordered    5.  Severe obesity  Patient has lost 15 lb since his last visit with me.  Encouraged continued diet, exercise and weight loss.    Return to clinic in 3 months.

## 2018-10-19 NOTE — TELEPHONE ENCOUNTER
Called patient, all instructions are on his visit summary from today. Patient verbally understood.    ----- Message from Sisi Cabrera sent at 10/19/2018  2:44 PM CDT -----  No. [phone]   Patient has questions regarding discontinuing Ezetimibe 10mg.  Please call.

## 2018-11-01 ENCOUNTER — PATIENT MESSAGE (OUTPATIENT)
Dept: INTERNAL MEDICINE | Facility: CLINIC | Age: 73
End: 2018-11-01

## 2018-11-03 ENCOUNTER — PATIENT MESSAGE (OUTPATIENT)
Dept: INTERNAL MEDICINE | Facility: CLINIC | Age: 73
End: 2018-11-03

## 2018-11-05 NOTE — TELEPHONE ENCOUNTER
Mr. Baker has multiple questions and concerns. I recommend he see me in a clinic appt so we can address all this. In addition, he will need to do lab work to determine if he is to stay on the thyroxine 25 mcg tablets or if he needs dose adjustment, Thanks.

## 2018-11-07 ENCOUNTER — PATIENT MESSAGE (OUTPATIENT)
Dept: INTERNAL MEDICINE | Facility: CLINIC | Age: 73
End: 2018-11-07

## 2018-11-14 ENCOUNTER — CLINICAL SUPPORT (OUTPATIENT)
Dept: REHABILITATION | Facility: HOSPITAL | Age: 73
End: 2018-11-14
Payer: MEDICARE

## 2018-11-14 DIAGNOSIS — Z74.09 IMPAIRED MOBILITY AND ACTIVITIES OF DAILY LIVING: ICD-10-CM

## 2018-11-14 DIAGNOSIS — M54.50 ACUTE BILATERAL LOW BACK PAIN WITHOUT SCIATICA: Primary | ICD-10-CM

## 2018-11-14 DIAGNOSIS — Z78.9 IMPAIRED MOBILITY AND ACTIVITIES OF DAILY LIVING: ICD-10-CM

## 2018-11-14 DIAGNOSIS — E78.5 HYPERLIPIDEMIA LDL GOAL <100: ICD-10-CM

## 2018-11-14 DIAGNOSIS — M53.86 DECREASED ROM OF LUMBAR SPINE: ICD-10-CM

## 2018-11-14 PROCEDURE — 97110 THERAPEUTIC EXERCISES: CPT | Mod: PN

## 2018-11-14 PROCEDURE — G8979 MOBILITY GOAL STATUS: HCPCS | Mod: CK,PN

## 2018-11-14 PROCEDURE — 97161 PT EVAL LOW COMPLEX 20 MIN: CPT | Mod: PN

## 2018-11-14 PROCEDURE — G8978 MOBILITY CURRENT STATUS: HCPCS | Mod: CK,PN

## 2018-11-14 RX ORDER — PRAVASTATIN SODIUM 10 MG/1
10 TABLET ORAL DAILY
Qty: 30 TABLET | Refills: 3 | Status: SHIPPED | OUTPATIENT
Start: 2018-11-14 | End: 2018-11-19 | Stop reason: SDUPTHER

## 2018-11-14 NOTE — PLAN OF CARE
TIME RECORD    Date: 11/14/2018    Start Time:  1415  Stop Time:  1455    PROCEDURES:    TIMED  Procedure Min.   TE 15                     UNTIMED  Procedure Min.   IE 25         Total Timed Minutes:  15  Total Timed Units:  1  Total Untimed Units:  1  Charges Billed/# of units:  LCE-1, TE-1    [facility]    PHYSICAL THERAPY EVALUATION  Onset Date: ~1 month ago  Medical Diagnosis: M54.5 (ICD-10-CM) - Acute bilateral low back pain without sciatica  Treatment Diagnosis:   1. Acute bilateral low back pain without sciatica     2. Decreased ROM of lumbar spine     3. Impaired mobility and activities of daily living       Physician: Sophia Herman MD  Past Medical History:   Diagnosis Date    Cataract     Hyperlipidemia     Hypertension     Sleep apnea     SOB (shortness of breath)     feels allergy related    Thyroid disease      Precautions: Standard  Prior Therapy: PT for low back pain at [facility]  Medications: Julius Baker has a current medication list which includes the following prescription(s): albuterol, aspirin-sod bicarb-citric acid, chlorthalidone, dextromethorphan-guaifenesin, diclofenac sodium, diphenhydramine-acetaminophen, irbesartan, lactobacillus acidophilus, levothyroxine, multivitamin, nebivolol, and pravastatin.  Nutrition:  Obese  History of Present Illness: Acute B low back pain  Prior Level of Function: Independent  Social History: Pt retired within the last 6 years  Place of Residence (Steps/Adaptations): Pt lives with wife who is having lower back pain. Pt reports increased household responsibilities as a result, performing the majority of cooking and cleaning.  Functional Deficits Leading to Referral/Nature of Injury: Pain with dynamic standing tasks and walking  Patient Therapy Goals: Eliminate pain, restore normal function    Subjective     Julius Baker reports back pain beginning ~1 month ago with increased demand of household  "activities and and worsening after getting a new mattress 2 weeks ago.     Pain:  Location: B low back  Description: Ache  Activities Which Increase Pain: Standing>15 minutes, walking>15-20 minutes  Activities Which Decrease Pain: Sitting and lying down  Pain Scale: 2/10 at best 5/10 now  7/10 at worst    Objective     Posture: Standing: lower cervical flexion and upper cervical flexion; B scapular abduction L>R  Palpation: TTP to B QL, piriformis, gluteus minimis; TTP to R gluteus medus; TTP directly over spinous processes with grade I-III  from T12-L5, greatest at L2; TTP with PA to sacrum  Range of Motion/Strength:    Lumbar AROM: Pain/Dysfunction with Movement:   Flexion Minimal loss, no pain   Extension Moderate loss, no pain   Right side bending Moderate loss, no pain   Left side bending Moderate loss, compression on L side of waist   Right rotation 80%, "tightness" in trunk   Left rotation 80%, "tightness" in trunk     LE MMTs  Hip flexion: 4+/5 B  Hip extension: 4-/5 B  Hip abduction: 4/5 B  Knee flexion: 5/5 B  Knee extension: 5/5 B  Ankle DF: 4+/5 B  Ankle PF: 5/5 B    Flexibility: Impaired HS and gastroc flexibility  Gait: no AD  Analysis: L>R toe out  Bed Mobility:Independent  Transfers: Independent  Special Tests: Slump (-) B for concordant pain (+) B for impaired HS and gastroc flexibility  SLR (-) B for concordant pain (+) B for impaired HS and gastroc flexibility  SI compression (-) B for pain  SI distraction (-) for pain  TERRA (-) B for pain (+) R for impaired flexibility and knee pain  Other: FairTA activation statically in supine    CMS Impairment/Limitation/Restriction for FOTO Lumbar Spine Survey    Therapist reviewed FOTO scores for Julius Baker on 11/14/2018.   FOTO documents entered into [employer] - see Media section.    Limitation Score: 58%  Category: Mobility    Current : CK = at least 40% but < 60% impaired, limited or restricted  Goal: CK = at least 40% but < 60% impaired, " "limited or restricted       TREATMENT     Time In: 1440  Time Out: 1455    PT Evaluation Completed? Yes  Discussed Plan of Care with patient: Yes    Julius received 15 minutes of therapeutic exercise & instruction including:    Date 11/14/18   Visit  Exp 12/31/18 1/18   POC 1/14/19    Gcode 1/10   FOTO 1/5   Cap visit  Cap total 113.2  113.2       Stretches:    Supine HS x30" B   Supine piriformis x30" B   Seated trunk flexion/sidebend 5"x2 ea       Supine:    LTR 5"x5 B with arms overhead   TAs 5"x10   March x10 B   Bridge        Standing:    Lats    Rows    Pallof Press    Leg Press          Initials CC     Written Home Exercises Provided: See EMR in Patient Instructions for HEP given: Yes  Julius demo good understanding of the education provided. Patient demo good return demo of skill of exercises.    Assessment     Initial Assessment (Pertinent finding, problem list and factors affecting outcome): Pt is a 72 yo male presenting to PT with signs and symptoms consistent with possible mechanical stresses to thoracolumbar region with sustained postures during dynamic standing tasks. Core strength impaired which likely contributes. Pt would benefit from skilled PT to address limitations and increase functional mobility.    History  Co-morbidities and personal factors that may impact the plan of care Co-morbidities:   Arthritis, Back pain, BMI over 30, High Blood Pressure, Kidney, Bladder, Prostate or  Urination Problems    Personal Factors:   no deficits     high   Examination  Body Structures and Functions, activity limitations and participation restrictions that may impact the plan of care Body Regions:   back  lower extremities    Body Systems:    gross symmetry  ROM  strength  gross coordinated movement  gait  motor control    Participation Restrictions:   Community ambulation    Activity limitations:   Learning and applying knowledge  no deficits    General Tasks and Commands  no deficits    Communication  no " deficits    Mobility  walking    Self care  no deficits    Domestic Life  shopping  cooking  doing house work (cleaning house, washing dishes, laundry)  assisting others    Interactions/Relationships  family relationships    Life Areas  no deficits    Community and Social Life  community life  recreation and leisure         high   Clinical Presentation stable and uncomplicated low   Decision Making/ Complexity Score: low     Rehab Potiential: excellent  Spiritual/Cultural Needs: none  Barriers to Rehab: none  Long Term Goals (8 Weeks):   1. Pt will be compliant with HEP to supplement PT in decreasing pain with functional mobility.  2. Pt will improve FOTO score to </= 42% limited to decrease perceived limitation with maintaining/changing body position  3. Pt will perform and hold TA good contraction during repetitive hip hinges to improve core strength for household tasks  4. Pt will improve 9090 HS length to mild impairment to improve flexibility for functional tasks.  5. Pt will report no pain household tasks to promote functional QOL.    Plan     Certification Period: 11/14/18 to 1/14/19  Recommended Treatment Plan: 1 times per week for 8 weeks: Gait Training, Manual Therapy, Moist Heat/ Ice, Neuromuscular Re-ed, Patient Education, Therapeutic Activites and Therapeutic Exercise  Other Recommendations: modalities prn, ASTYM prn, kinesiotape prn, Functional Dry Needling prn       Sierra Acosta, PT  11/14/2018      I CERTIFY THE NEED FOR THESE SERVICES FURNISHED UNDER THIS PLAN OF TREATMENT AND WHILE UNDER MY CARE    Physician's comments: ________________________________________________________________________________________________________________________________________________      Physician's Name: ___________________________________

## 2018-11-19 ENCOUNTER — TELEPHONE (OUTPATIENT)
Dept: INTERNAL MEDICINE | Facility: CLINIC | Age: 73
End: 2018-11-19

## 2018-11-19 DIAGNOSIS — E78.5 HYPERLIPIDEMIA LDL GOAL <100: ICD-10-CM

## 2018-11-19 DIAGNOSIS — E03.9 HYPOTHYROIDISM, UNSPECIFIED TYPE: Primary | ICD-10-CM

## 2018-11-19 RX ORDER — PRAVASTATIN SODIUM 10 MG/1
10 TABLET ORAL DAILY
Qty: 90 TABLET | Refills: 3 | Status: SHIPPED | OUTPATIENT
Start: 2018-11-19 | End: 2019-04-05

## 2018-11-19 NOTE — TELEPHONE ENCOUNTER
Following up on a request call from a supervisor.  Spoke to Mr. Baker about and informed him his prescription Pravastatin was sent to [facility] on 11/14/18. Also Mr. Baker requested Thyroid blood work to be ordered. Dr. Herman has placed this order for Mr. Baker. Informed Mr. Baker he can come anytime to get his blood work completed and we will schedule a follow up appt for him to discuss his lab results and any medication changes they may need to be done. Verbalized understanding. Pt was pleasant and I apologized for any inconvenience.

## 2018-11-20 ENCOUNTER — LAB VISIT (OUTPATIENT)
Dept: LAB | Facility: HOSPITAL | Age: 73
End: 2018-11-20
Attending: INTERNAL MEDICINE
Payer: MEDICARE

## 2018-11-20 DIAGNOSIS — E03.9 HYPOTHYROIDISM, UNSPECIFIED TYPE: ICD-10-CM

## 2018-11-20 LAB
T4 FREE SERPL-MCNC: 0.98 NG/DL
TSH SERPL DL<=0.005 MIU/L-ACNC: 4.43 UIU/ML

## 2018-11-20 PROCEDURE — 84443 ASSAY THYROID STIM HORMONE: CPT

## 2018-11-20 PROCEDURE — 84439 ASSAY OF FREE THYROXINE: CPT

## 2018-11-20 PROCEDURE — 36415 COLL VENOUS BLD VENIPUNCTURE: CPT | Mod: PO

## 2018-11-23 ENCOUNTER — PATIENT MESSAGE (OUTPATIENT)
Dept: INTERNAL MEDICINE | Facility: CLINIC | Age: 73
End: 2018-11-23

## 2018-11-23 DIAGNOSIS — E03.9 HYPOTHYROIDISM, UNSPECIFIED TYPE: Primary | ICD-10-CM

## 2018-11-23 RX ORDER — LEVOTHYROXINE SODIUM 25 UG/1
25 TABLET ORAL DAILY
Qty: 90 TABLET | Refills: 3 | Status: SHIPPED | OUTPATIENT
Start: 2018-11-23 | End: 2018-12-17 | Stop reason: SDUPTHER

## 2018-11-26 ENCOUNTER — DOCUMENTATION ONLY (OUTPATIENT)
Dept: REHABILITATION | Facility: HOSPITAL | Age: 73
End: 2018-11-26

## 2018-11-26 DIAGNOSIS — Z74.09 IMPAIRED MOBILITY AND ACTIVITIES OF DAILY LIVING: ICD-10-CM

## 2018-11-26 DIAGNOSIS — M53.86 DECREASED ROM OF LUMBAR SPINE: ICD-10-CM

## 2018-11-26 DIAGNOSIS — Z78.9 IMPAIRED MOBILITY AND ACTIVITIES OF DAILY LIVING: ICD-10-CM

## 2018-11-26 DIAGNOSIS — M54.50 ACUTE BILATERAL LOW BACK PAIN WITHOUT SCIATICA: ICD-10-CM

## 2018-11-26 NOTE — PROGRESS NOTES
Face to Face PTA Conference performed with Radha Garcia PTA, Amrita Puri PTA, Skinny Thomas PTA Aaron oRmero PTA regarding patient's current status, overall progress, and plan of care    Sierra Acosta, PT    Face to face meeting completed with Sierra Acosta PT regarding current status and progress of   [place] [facility] .  Skinny Thomas, PTA     Face to face meeting completed with Sierra Acosta PT regarding current status and progress of   [place] [facility] .  Aaron Romero PTA    Face to face meeting completed with Sierra Acosta  PT regarding current status and progress of   [place] [facility] .  Radha Garcia PTA    Face to face meeting completed with Sierra Acosta. PT regarding current status and progress of   [place] [facility] .  Amrita Puri PTA

## 2018-11-27 ENCOUNTER — CLINICAL SUPPORT (OUTPATIENT)
Dept: REHABILITATION | Facility: HOSPITAL | Age: 73
End: 2018-11-27
Payer: MEDICARE

## 2018-11-27 DIAGNOSIS — M53.86 DECREASED ROM OF LUMBAR SPINE: ICD-10-CM

## 2018-11-27 DIAGNOSIS — Z78.9 IMPAIRED MOBILITY AND ACTIVITIES OF DAILY LIVING: ICD-10-CM

## 2018-11-27 DIAGNOSIS — M54.50 ACUTE BILATERAL LOW BACK PAIN WITHOUT SCIATICA: ICD-10-CM

## 2018-11-27 DIAGNOSIS — Z74.09 IMPAIRED MOBILITY AND ACTIVITIES OF DAILY LIVING: ICD-10-CM

## 2018-11-27 PROCEDURE — 97110 THERAPEUTIC EXERCISES: CPT | Mod: PN

## 2018-11-27 NOTE — PROGRESS NOTES
"DAILY TREATMENT NOTE    DATE: 11/27/2018    Start Time:  1:00  Stop Time:  2:00    PROCEDURES:    TIMED  Procedure Min.   TE 30   TE sup 30                 UNTIMED  Procedure Min.             Total Timed Minutes:  60  Total Timed Units:  30  Total Untimed Units:  0  Charges Billed/# of units:  2 (2-TE)      Progress/Current Status    Subjective:     Patient ID: Julius Baker is a 73 y.o. male.  Diagnosis: No diagnosis found.  Pain: 0 /10  No pain currently. "Iv'e been feeling much better. The exercises work."    Objective:   Treatment initiated with supine TE      Date 11/27/18 11/14/18   Visit  Exp 12/31/18 2/18 1/18   POC 1/14/19      Gcode 2/10 1/10   FOTO 2/5 1/5   Cap visit  Cap total 60.64  173.84 113.2  113.2          Stretches:      Supine HS 3x30" B x30" B   Supine piriformis 3x30" B x30" B   Seated trunk flexion/sidebend 5"x2  Ea. 5"x2 ea          Supine:      LTR 5"x10 B w/UE overhead 5"x5 B with arms overhead   TAs 5"x10 5"x10   March 1'x2 trials x10 B   Bridge 2x10 w/ball     Iso Add w/ball +TA  5"x10     Standing:      Lats RTB 2x10 B     Rows RTB 2x10 B     Pallof Press RTB 5"x10     Leg Press 2x10 50# DL              Initials MB 1/6 CC          Assessment:     Mr. Baker tolerated interventions well. Good HEP compliance.     Patient Education/Response:     Mr Baker verbalized good understanding    Plans and Goals:     Rehab Potiential: excellent  Spiritual/Cultural Needs: none  Barriers to Rehab: none  Long Term Goals (8 Weeks):   1. Pt will be compliant with HEP to supplement PT in decreasing pain with functional mobility.  2. Pt will improve FOTO score to </= 42% limited to decrease perceived limitation with maintaining/changing body position  3. Pt will perform and hold TA good contraction during repetitive hip hinges to improve core strength for household tasks  4. Pt will improve 9090 HS length to mild impairment to improve flexibility for functional tasks.  5. Pt will report no pain " household tasks to promote functional QOL.     Certification Period: 11/14/18 to 1/14/19  Recommended Treatment Plan: 1 times per week for 8 weeks: Gait Training, Manual Therapy, Moist Heat/ Ice, Neuromuscular Re-ed, Patient Education, Therapeutic Activites and Therapeutic Exercise  Other Recommendations: modalities prn, ASTYM prn, kinesiotape prn, Functional Dry Needling prn

## 2018-12-06 ENCOUNTER — CLINICAL SUPPORT (OUTPATIENT)
Dept: REHABILITATION | Facility: HOSPITAL | Age: 73
End: 2018-12-06
Payer: MEDICARE

## 2018-12-06 DIAGNOSIS — M53.86 DECREASED ROM OF LUMBAR SPINE: ICD-10-CM

## 2018-12-06 DIAGNOSIS — Z78.9 IMPAIRED MOBILITY AND ACTIVITIES OF DAILY LIVING: ICD-10-CM

## 2018-12-06 DIAGNOSIS — Z74.09 IMPAIRED MOBILITY AND ACTIVITIES OF DAILY LIVING: ICD-10-CM

## 2018-12-06 DIAGNOSIS — M54.50 ACUTE BILATERAL LOW BACK PAIN WITHOUT SCIATICA: ICD-10-CM

## 2018-12-06 PROCEDURE — 97110 THERAPEUTIC EXERCISES: CPT | Mod: PN

## 2018-12-06 NOTE — PROGRESS NOTES
"DAILY TREATMENT NOTE    DATE: 12/6/2018    Start Time: 1301  Stop Time: 1345    PROCEDURES:    TIMED  Procedure Min.   TE 40   TE supervised 4                 UNTIMED  Procedure Min.             Total Timed Minutes: 40  Total Timed Units:  3  Total Untimed Units: 0  Charges Billed/# of units: TE-3      Progress/Current Status    Subjective:     Patient ID: Julius Baker is a 73 y.o. male.  Diagnosis:   1. Decreased ROM of lumbar spine     2. Acute bilateral low back pain without sciatica     3. Impaired mobility and activities of daily living       Pain: 0/10    Pt reports pain ~4-5/10 this morning. However some days recently pt reports no pain. Since start of care, pt reports 30% improvement in pain.     Objective:     Therapeutic exercise to improve core and LE strength and LE flexibility for 4 minutes supervised and 40 minutes 1:1 including: exercises per log below.    Date 12/6/18 11/27/18 11/14/18   Visit  Exp 12/31/18 3/18 2/18 1/18   POC 1/14/19       Gcode 3/10 2/10 1/10   FOTO 3/5 2/5 1/5   Cap visit  Cap total 90.94  264.78 60.64  173.84 113.2  113.2           Stretches:       Supine HS w/ strap 3x30" B 3x30" B x30" B   Supine piriformis 3x30" B 3x30" B x30" B   Seated trunk flexion/sidebend 5"x10 ea 5"x2  Ea. 5"x2 ea           Supine:       DKC Next     LTR 5"x10 B w/UE overhead 5"x10 B w/UE overhead 5"x5 B with arms overhead   TAs 5"x20 focus on breathing  5"x10 5"x10   March 5x20" focus on breathing 1'x2 trials x10 B   Bridge 3" hold 2x10 DL 2x10 w/ball           Seated      March on SB Next           Standing:       Lats 10# x15 RTB 2x10 B     Rows 7# x15 RTB 2x10 B     Pallof Press 7# x10 B RTB 5"x10     Leg Press 7.0 3x10 DL 2x10 50# DL               Initials CC MB 1/6 CC      Assessment:     Pt with fair TA activation when focusing on breathing simultaneously to muscle activation.    Patient Education/Response:     Continue HEP.  Pt verbalized understanding    Plans and Goals:     Continue " POC.    Long Term Goals (8 Weeks):   1. Pt will be compliant with HEP to supplement PT in decreasing pain with functional mobility.  2. Pt will improve FOTO score to </= 42% limited to decrease perceived limitation with maintaining/changing body position  3. Pt will perform and hold TA good contraction during repetitive hip hinges to improve core strength for household tasks  4. Pt will improve 9090 HS length to mild impairment to improve flexibility for functional tasks.  5. Pt will report no pain household tasks to promote functional QOL.

## 2018-12-10 ENCOUNTER — CLINICAL SUPPORT (OUTPATIENT)
Dept: REHABILITATION | Facility: HOSPITAL | Age: 73
End: 2018-12-10
Payer: MEDICARE

## 2018-12-10 DIAGNOSIS — M54.50 ACUTE BILATERAL LOW BACK PAIN WITHOUT SCIATICA: ICD-10-CM

## 2018-12-10 DIAGNOSIS — M53.86 DECREASED ROM OF LUMBAR SPINE: ICD-10-CM

## 2018-12-10 DIAGNOSIS — Z74.09 IMPAIRED MOBILITY AND ACTIVITIES OF DAILY LIVING: ICD-10-CM

## 2018-12-10 DIAGNOSIS — Z78.9 IMPAIRED MOBILITY AND ACTIVITIES OF DAILY LIVING: ICD-10-CM

## 2018-12-10 PROCEDURE — 97140 MANUAL THERAPY 1/> REGIONS: CPT | Mod: PN

## 2018-12-10 PROCEDURE — 97110 THERAPEUTIC EXERCISES: CPT | Mod: PN

## 2018-12-10 NOTE — PROGRESS NOTES
"DAILY TREATMENT NOTE    DATE: 12/10/2018    Start Time: 1400  Stop Time: 1455    PROCEDURES:    TIMED  Procedure Min.   TE 12   TE supervised 28   MT 15             UNTIMED  Procedure Min.             Total Timed Minutes: 27  Total Timed Units:  2  Total Untimed Units: 2  Charges Billed/# of units: TE-1, MT-1      Progress/Current Status    Subjective:     Patient ID: Julius Baker is a 73 y.o. male.  Diagnosis:   1. Decreased ROM of lumbar spine     2. Acute bilateral low back pain without sciatica     3. Impaired mobility and activities of daily living       Pain: 4/10 R side of low back    Pt reports pain this morning continuing to start of visit.     Objective:     Therapeutic exercise to improve core and LE strength and LE flexibility for 28 minutes supervised and 12 minutes 1:1 including:   Palpation: T8-10 FRSR (flexion rotation sidebent right) dysfunction  Exercise log below    Manual therapy for 15 minutes including: STM and MFR to R thoracolumbar paraspinals in L sidelying;   MET to correct FRSR dysfunction to T8-10 in sitting, 2 trials 8" holds x 3 reps each      Date 12/10/18 12/6/18 11/27/18 11/14/18   Visit  Exp 12/31/18 4/18 3/18 2/18 1/18   POC 1/14/19        Gcode 4/10 3/10 2/10 1/10   FOTO 4/5 next 3/5 2/5 1/5   Cap visit  Cap total 57.78  322.56 90.94  264.78 60.64  173.84 113.2  113.2            Stretches:        Supine HS w/ strap 3x30" B 3x30" B 3x30" B x30" B   Supine piriformis 3x30" B 3x30" B 3x30" B x30" B   Seated trunk flexion/sidebend 5"x10 ea 5"x10 ea 5"x2  Ea. 5"x2 ea            Supine:        DKC c/ SB 5"x20 Next     LTR 5"x10 B w/UE overhead 5"x10 B w/UE overhead 5"x10 B w/UE overhead 5"x5 B with arms overhead   TAs 5"x20 focus on breathing  5"x20 focus on breathing  5"x10 5"x10   March 5"x20 focus on breathing  5x20" focus on breathing 1'x2 trials x10 B   Bridge 3" hold 2x10 DL 3" hold 2x10 DL 2x10 w/ball            Seated       March on SB 2x10 B, 1-no UE support Next        " "    Standing:        Lats 7# 2x10 B 10# x15 RTB 2x10 B     Rows 7# 2x10 B 7# x15 RTB 2x10 B     Pallof Press 7# x10 B 7# x10 B RTB 5"x10     Leg Press 6.0 3x10 DL 7.0 3x10 DL 2x10 50# DL                Initials CC CC MB 1/6 CC      Assessment:     R FRSR dysfunction to lower thoracic spine indicating barrier of movement in extension and L rotation and sidebending. Improved following MET in addition to eliminated pain. Fair performance of march on SB.      Patient Education/Response:     Continue HEP. Pt verbalized understanding    Plans and Goals:     Continue POC. Assess spinal symmetry in sitting. Progress seated --> standing core strength.    Long Term Goals (8 Weeks):   1. Pt will be compliant with HEP to supplement PT in decreasing pain with functional mobility.  2. Pt will improve FOTO score to </= 42% limited to decrease perceived limitation with maintaining/changing body position  3. Pt will perform and hold TA good contraction during repetitive hip hinges to improve core strength for household tasks  4. Pt will improve 9090 HS length to mild impairment to improve flexibility for functional tasks.  5. Pt will report no pain household tasks to promote functional QOL.     "

## 2018-12-16 ENCOUNTER — PATIENT MESSAGE (OUTPATIENT)
Dept: INTERNAL MEDICINE | Facility: CLINIC | Age: 73
End: 2018-12-16

## 2018-12-16 ENCOUNTER — HOSPITAL ENCOUNTER (EMERGENCY)
Facility: HOSPITAL | Age: 73
Discharge: HOME | End: 2018-12-16
Attending: EMERGENCY MEDICINE
Payer: MEDICARE

## 2018-12-16 VITALS
BODY MASS INDEX: 34.79 KG/M2 | SYSTOLIC BLOOD PRESSURE: 133 MMHG | DIASTOLIC BLOOD PRESSURE: 71 MMHG | TEMPERATURE: 98 F | WEIGHT: 243 LBS | HEIGHT: 70 IN | HEART RATE: 51 BPM | RESPIRATION RATE: 25 BRPM | OXYGEN SATURATION: 97 %

## 2018-12-16 DIAGNOSIS — S00.93XA CONTUSION OF HEAD, UNSPECIFIED PART OF HEAD, INITIAL ENCOUNTER: Primary | ICD-10-CM

## 2018-12-16 DIAGNOSIS — R42 DIZZINESS: ICD-10-CM

## 2018-12-16 DIAGNOSIS — E03.9 ACQUIRED HYPOTHYROIDISM: ICD-10-CM

## 2018-12-16 PROCEDURE — 93010 ELECTROCARDIOGRAM REPORT: CPT | Mod: ,,, | Performed by: INTERNAL MEDICINE

## 2018-12-16 PROCEDURE — 93005 ELECTROCARDIOGRAM TRACING: CPT

## 2018-12-16 PROCEDURE — 99284 EMERGENCY DEPT VISIT MOD MDM: CPT | Mod: 25

## 2018-12-16 NOTE — ED PROVIDER NOTES
"Encounter Date: 12/16/2018    SCRIBE #1 NOTE: I, Stacie Osborne, am scribing for, and in the presence of,  Dr. Toro. I have scribed the entire note.       History     Chief Complaint   Patient presents with    Fall     pt states he was sitting on the side of his bathtub soaking his feet on Thursday when he stood up and fell. Unsure if he passed out. States he has been having mildly worse vision in his left eye since the fall, but has been having issues with his left eye since having cataract surgery. Also c/o intermittent dizziness and "feeling woozy" since the fall. Also c/o mild right hip pain and right knee pain and mild left temple tenderness     Cranks Mahesh Baker is a 73 y.o. male who  has a past medical history of Cataract, Hyperlipidemia, Hypertension, Sleep apnea, SOB (shortness of breath), and Thyroid disease.    The patient presents to the ED due to light headedness. He reports onset of symptoms was yesterday. The patient describes the sensation as "feeling woozy". He states he again felt light headed today. The symptoms improve once the patient starts moving. He denies experiencing similar symptoms in the past. Prior to onset of symptoms the patient had a fall.  The patient notes he was in the bathroom soaking is feet in the tub while sitting on the edge of the tub when he fell. He denies any LOC associated with the fall. The patient admitted to nursing staff he had pain in the left temple, right knee and right hip since the fall. However, the patient does not admit to any pain in these areas currently. As per medical record the patient is not on blood thinners or history of bleeding disorder        The history is provided by the patient.     Review of patient's allergies indicates:   Allergen Reactions    Doxycycline Diarrhea     itching     Past Medical History:   Diagnosis Date    Cataract     Hyperlipidemia     Hypertension     Sleep apnea     SOB (shortness of breath)     feels allergy " related    Thyroid disease      Past Surgical History:   Procedure Laterality Date    ARTHROSCOPY-KNEE Right 5/21/2018    Performed by Canelo [facility]man [facility] at [facility]    CATARACT EXTRACTION, BILATERAL Bilateral     EYE SURGERY      KNEE ARTHROSCOPY Bilateral     uvula removal       Family History   Problem Relation Age of Onset    Diabetes Mother     Hypertension Mother     Arthritis Mother      Social History     Tobacco Use    Smoking status: Never Smoker    Smokeless tobacco: Never Used   Substance Use Topics    Alcohol use: Yes     Alcohol/week: 7.2 oz     Types: 12 Standard drinks or equivalent per week     Comment: Rum and coke 2-3 drinks 3-4 times/week    Drug use: No     Review of Systems   Constitutional: Negative for chills and fever.   HENT: Negative for congestion, ear pain, rhinorrhea and sore throat.         Left temple pain   Respiratory: Negative for cough, shortness of breath and wheezing.    Cardiovascular: Negative for chest pain and palpitations.   Gastrointestinal: Negative for abdominal pain, diarrhea, nausea and vomiting.   Genitourinary: Negative for dysuria and hematuria.   Musculoskeletal: Negative for back pain, myalgias and neck pain.        Right hip and knee pain.    Skin: Negative for rash.   Neurological: Positive for light-headedness. Negative for dizziness, weakness and headaches.   Psychiatric/Behavioral: Negative for confusion.   All other systems reviewed and are negative.      Physical Exam     Initial Vitals [12/16/18 1306]   BP Pulse Resp Temp SpO2   (!) 143/80 (!) 57 18 98.1 °F (36.7 °C) 98 %      MAP       --         Physical Exam    Nursing note and vitals reviewed.  Constitutional: He appears well-developed and well-nourished. He is not diaphoretic. No distress.   HENT:   Head: Normocephalic and atraumatic.   Mouth/Throat: Oropharynx is clear and moist.   Eyes: Conjunctivae and EOM are normal.   Neck: Normal range of motion. Neck supple.   Cardiovascular: Normal  rate and regular rhythm. Exam reveals no gallop and no friction rub.    Murmur heard.   Systolic murmur is present.  Left carotid bruit   Pulmonary/Chest: Breath sounds normal. He has no wheezes. He has no rhonchi. He has no rales.   Abdominal: Soft. There is no tenderness. There is no rebound and no guarding.   Musculoskeletal: Normal range of motion. He exhibits no edema or tenderness.   Lymphadenopathy:     He has no cervical adenopathy.   Neurological: He is alert and oriented to person, place, and time. He has normal strength. No cranial nerve deficit or sensory deficit. GCS score is 15. GCS eye subscore is 4. GCS verbal subscore is 5. GCS motor subscore is 6.   Normal gait   Skin: Skin is warm and dry. No rash noted.   Superficial abrasion to left forearm         ED Course   Procedures  Labs Reviewed - No data to display  EKG Readings: (Independently Interpreted)   1438: Sinus bradycardia with a rate of 51. Normal intervals. No STEMI       Imaging Results          CT Head Without Contrast (Final result)  Result time 12/16/18 15:42:17    Final result by Nora Moulton MD (12/16/18 15:42:17)                 Impression:      Unremarkable noncontrast CT head specifically without evidence for acute intracranial hemorrhage.  Clinical correlation and further evaluation as warranted.      Electronically signed by: Nora Moulton MD  Date:    12/16/2018  Time:    15:42             Narrative:    EXAMINATION:  CT HEAD WITHOUT CONTRAST    CLINICAL HISTORY:  fall;    TECHNIQUE:  Multiple sequential 5 mm axial images of the head without contrast.  Coronal and sagittal reformatted imaging from the axial acquisition.    COMPARISON:  04/16/2017    FINDINGS:  There is no evidence for acute intracranial hemorrhage or sulcal effacement.  The ventricles are normal in size without hydrocephalus.  There is no midline shift or mass effect.  Visualized paranasal sinuses and mastoid air cells are clear.                                "  Medical Decision Making:   Initial Assessment:   The patient presents to the ED due to light headedness  Differential Diagnosis:   Fall, orthostatic hypotension, syncope  Independently Interpreted Test(s):   I have ordered and independently interpreted EKG Reading(s) - see prior notes  Clinical Tests:   Radiological Study: Ordered and Reviewed  Medical Tests: Ordered and Reviewed  ED Management:  3:50 PM  Patient doing well, no focal neurologic deficits, normal gait, no dizziness complaint since once this morning, negative tilt on orthostatics, patient non-toxic in appearance, no eivdence of life threatening disease process that requires admission or further work-up                   ED Course as of Dec 16 1416   Sun Dec 16, 2018   1303 Sort note: Julius Aragon Black nontoxic/afebrile 73 y.o.  presented to the ED with c/o fall on Thursday to the left side, hitting face and knee.  No LOC patient patient "was not normal"; stunned.  Patient felling dizzy with decreased vision to left eye.  H/o cataracts and compromised vision to this eye previously.       Patient seen and medically screened by Physician assistant in Sort process due to ED crowding.  Appropriate tests and/or medications ordered.  Care transferred to an alternate provider when patient was placed in an Exam Room from the [facility] for physical exam, additional orders, and disposition. AHM    [AM]      ED Course User Index  [AM] Nani Bullock PA-C     Clinical Impression:     1. Contusion of head, unspecified part of head, initial encounter    2. Dizziness           I, Dr. Aaron Toro, personally performed the services described in this documentation. All medical record entries made by the scribe were at my direction and in my presence.  I have reviewed the chart and agree that the record reflects my personal performance and is accurate and complete                       Aaron Toro MD  12/16/18 4130    "

## 2018-12-17 ENCOUNTER — CLINICAL SUPPORT (OUTPATIENT)
Dept: REHABILITATION | Facility: HOSPITAL | Age: 73
End: 2018-12-17
Payer: MEDICARE

## 2018-12-17 DIAGNOSIS — M54.50 ACUTE BILATERAL LOW BACK PAIN WITHOUT SCIATICA: ICD-10-CM

## 2018-12-17 DIAGNOSIS — Z74.09 IMPAIRED MOBILITY AND ACTIVITIES OF DAILY LIVING: ICD-10-CM

## 2018-12-17 DIAGNOSIS — M53.86 DECREASED ROM OF LUMBAR SPINE: ICD-10-CM

## 2018-12-17 DIAGNOSIS — E03.9 HYPOTHYROIDISM, UNSPECIFIED TYPE: ICD-10-CM

## 2018-12-17 DIAGNOSIS — Z78.9 IMPAIRED MOBILITY AND ACTIVITIES OF DAILY LIVING: ICD-10-CM

## 2018-12-17 PROCEDURE — 97110 THERAPEUTIC EXERCISES: CPT | Mod: PN

## 2018-12-17 PROCEDURE — 97140 MANUAL THERAPY 1/> REGIONS: CPT | Mod: PN

## 2018-12-17 RX ORDER — LEVOTHYROXINE SODIUM 25 UG/1
25 TABLET ORAL DAILY
Qty: 90 TABLET | Refills: 3 | Status: SHIPPED | OUTPATIENT
Start: 2018-12-17 | End: 2020-01-02

## 2018-12-17 NOTE — PROGRESS NOTES
"DAILY TREATMENT NOTE    DATE: 12/17/2018    Start Time: 1405  Stop Time: 1452    PROCEDURES:    TIMED  Procedure Min.   TE 10   TE supervised 24   MT 13             UNTIMED  Procedure Min.             Total Timed Minutes: 23  Total Timed Units:  2  Total Untimed Units: 2  Charges Billed/# of units: TE-1, MT-1      Progress/Current Status    Subjective:     Patient ID: Julius Baker is a 73 y.o. male.  Diagnosis:   1. Decreased ROM of lumbar spine     2. Acute bilateral low back pain without sciatica     3. Impaired mobility and activities of daily living       Pain: 4-5/10 R side of low back    Pt reports falling out of tub on Thursday night, hitting L side of forehead and R hip and knee. Pt reports blurry vision, dizziness and nauseous on Saturday night and Sunday morning. Pt went to ED Sunday at noon; pt cleared for LE fractures and concussion or brain bleed. Pt informed by ED MD that Bystolic, which he takes for high blood pressure, can cause dizziness with quick changes in position. Pt requests if he can have light session in terms of exercises performed. Towards end of session pt reports BP feels high and forgot to take medication today.    Objective:     Therapeutic exercise to improve core and LE strength and LE flexibility for 24 minutes supervised and 10 minutes 1:1 including:   Palpation: T10 FRSR (flexion rotation sidebent right) dysfunction; L3 FRSL (flexion rotation sidebent left) dysfunction  Lumbar AROM: Side bend minimal loss B, rotation minimal loss B  BP: seated R brachial, 144/71 mmHg  Exercise log below    Manual therapy for 13 minutes including:  MET to correct FRSR dysfunction to T10 in sitting, 8" holds x 3 reps  Assessment of active lumbar rotation and side bending; reports of L sided low back pain with L rotation  MET to correct FRSL dysfunction to L3 in sitting, 8" holds x 3 reps  Assessment of active lumbar rotation and side bending; reports of no pain    Date 12/17/18 12/10/18 " "12/6/18 11/27/18 11/14/18   Visit  Exp 12/31/18 5/18 4/18 3/18 2/18 1/18   POC 1/14/19         Gcode 5/10 4/10 3/10 2/10 1/10   FOTO 5/5 next 4/5 next 3/5 2/5 1/5   Cap visit  Cap total 57.78  380.34 57.78  322.56 90.94  264.78 60.64  173.84 113.2  113.2             Stretches:         Supine HS w/ strap 3x30" B 3x30" B 3x30" B 3x30" B x30" B   Supine piriformis 3x30" B 3x30" B 3x30" B 3x30" B x30" B   Seated trunk flexion/sidebend 5"x10 ea 5"x10 ea 5"x10 ea 5"x2  Ea. 5"x2 ea             Supine:         DKC c/ SB 5"x20 5"x20 Next     LTR 5"x10 B w/UE overhead 5"x10 B w/UE overhead 5"x10 B w/UE overhead 5"x10 B w/UE overhead 5"x5 B with arms overhead   TAs 10"x20 focus on breathing  5"x20 focus on breathing  5"x20 focus on breathing  5"x10 5"x10   Bridge 3" hold 2x10 DL  ^next 3" hold 2x10 DL 3" hold 2x10 DL 2x10 w/ball             Seated        March on SB 3x10 B, B UE support 2x10 B, 1-no UE support Next             Standing:         Lats Held next 7# 2x10 B 10# x15 RTB 2x10 B     Rows Held next 7# 2x10 B 7# x15 RTB 2x10 B     Pallof Press Held next 7# x10 B 7# x10 B RTB 5"x10     Leg Press Held next 6.0 3x10 DL 7.0 3x10 DL 2x10 50# DL                 Initials CC CC CC MB 1/6 CC      Assessment:     T10 FRSR and L3 FRSL dysfunction indicating barrier of movement in extension for both, L rotation and side bending to T10 and R rotation and side bending to L3. Improved following MET and eliminated pain during L side bend. Session limited per pt request to keep exercises light after fall late last week. BP insignificant.    Patient Education/Response:     Inform therapist and tech if he experiences any dizziness, blurred vision, nausea or other atypical symptoms during session. Encouraged pt to take BP medication as prescribed. Continue HEP as tolerated. Pt verbalized understanding    Plans and Goals:     Continue POC. Resume and progress standing exercise next.     Long Term Goals (8 Weeks):   1. Pt will be compliant " with HEP to supplement PT in decreasing pain with functional mobility.  2. Pt will improve FOTO score to </= 42% limited to decrease perceived limitation with maintaining/changing body position  3. Pt will perform and hold TA good contraction during repetitive hip hinges to improve core strength for household tasks  4. Pt will improve 9090 HS length to mild impairment to improve flexibility for functional tasks.  5. Pt will report no pain household tasks to promote functional QOL.

## 2018-12-17 NOTE — ED NOTES
APPEARANCE: Alert, oriented and in no acute distress.  CARDIAC: Normal rate and rhythm, no murmur heard.   PERIPHERAL VASCULAR: peripheral pulses present. Normal cap refill. No edema. Warm to touch.    RESPIRATORY:Normal rate and effort, breath sounds clear bilaterally throughout chest. Respirations are equal and unlabored no obvious signs of distress.  GASTRO: soft, bowel sounds normal, no tenderness, no abdominal distention.  MUSC: Full ROM. No bony tenderness or soft tissue tenderness. No obvious deformity.  SKIN: Skin is warm and dry, normal skin turgor, mucous membranes moist. L forearm skin tear, abrasion L temporal region.  NEURO: 5/5 strength major flexors/extensors bilaterally. Sensory intact to light touch bilaterally. [place] coma scale: eyes open spontaneously-4, oriented & converses-5, obeys commands-6. No neurological abnormalities.   MENTAL STATUS: awake, alert and aware of environment. Wife reports patient fell today and possibly lost consciousness  EYE: PERRL, both eyes: pupils brisk and reactive to light. Normal size.  ENT: EARS: no obvious drainage. NOSE: no active bleeding.

## 2018-12-18 ENCOUNTER — DOCUMENTATION ONLY (OUTPATIENT)
Dept: REHABILITATION | Facility: HOSPITAL | Age: 73
End: 2018-12-18

## 2018-12-18 DIAGNOSIS — M53.86 DECREASED ROM OF LUMBAR SPINE: ICD-10-CM

## 2018-12-18 DIAGNOSIS — Z74.09 IMPAIRED MOBILITY AND ACTIVITIES OF DAILY LIVING: ICD-10-CM

## 2018-12-18 DIAGNOSIS — Z78.9 IMPAIRED MOBILITY AND ACTIVITIES OF DAILY LIVING: ICD-10-CM

## 2018-12-18 DIAGNOSIS — M54.50 ACUTE BILATERAL LOW BACK PAIN WITHOUT SCIATICA: ICD-10-CM

## 2018-12-18 NOTE — PROGRESS NOTES
Face to Face PTA Conference performed with Radha Garcia PTA, Amrita Puri PTA, Skinny Thomas PTA Aaron Romero PTA regarding patient's current status, overall progress, and plan of care    Sierra Acosta, LEE ANN Romero PTA    Face to face meeting completed with Sierra Acosta PT regarding current status and progress of   [facility] .  Radha Garcia PTA    Face to face meeting completed with Sierra Acosta PT regarding current status and progress of   [facility] .  NOE Milian PTA

## 2018-12-28 ENCOUNTER — PATIENT MESSAGE (OUTPATIENT)
Dept: INTERNAL MEDICINE | Facility: CLINIC | Age: 73
End: 2018-12-28

## 2019-01-07 ENCOUNTER — PATIENT MESSAGE (OUTPATIENT)
Dept: INTERNAL MEDICINE | Facility: CLINIC | Age: 74
End: 2019-01-07

## 2019-01-07 ENCOUNTER — NURSE TRIAGE (OUTPATIENT)
Dept: ADMINISTRATIVE | Facility: CLINIC | Age: 74
End: 2019-01-07

## 2019-01-07 NOTE — TELEPHONE ENCOUNTER
Called and spoke w/ patient. Scheduled appt 1/10/19 @ 9:00 am for dizziness. Patient verbalized understanding.

## 2019-01-07 NOTE — TELEPHONE ENCOUNTER
Reason for Disposition   Lightheadedness (dizziness) present now, after 2 hours of rest and fluids    Protocols used: ST DIZZINESS-A-[place]  Mr. Baker states he has been experiencing constant dizziness. Patient believes dizziness is related to his blood pressure medication. Mr. Baker states she was seen in the ED due to a fall recently and his diastolic BP was 54. Patient is requesting to be seen today or advised by Dr. Herman. He was unable to take his blood pressure at home due to faulty BP monitor.

## 2019-01-09 ENCOUNTER — CLINICAL SUPPORT (OUTPATIENT)
Dept: REHABILITATION | Facility: HOSPITAL | Age: 74
End: 2019-01-09
Payer: MEDICARE

## 2019-01-09 DIAGNOSIS — M53.86 DECREASED ROM OF LUMBAR SPINE: ICD-10-CM

## 2019-01-09 DIAGNOSIS — Z78.9 IMPAIRED MOBILITY AND ACTIVITIES OF DAILY LIVING: ICD-10-CM

## 2019-01-09 DIAGNOSIS — M54.50 ACUTE BILATERAL LOW BACK PAIN WITHOUT SCIATICA: ICD-10-CM

## 2019-01-09 DIAGNOSIS — Z74.09 IMPAIRED MOBILITY AND ACTIVITIES OF DAILY LIVING: ICD-10-CM

## 2019-01-09 PROCEDURE — 97140 MANUAL THERAPY 1/> REGIONS: CPT | Mod: PN

## 2019-01-09 PROCEDURE — 97110 THERAPEUTIC EXERCISES: CPT | Mod: PN

## 2019-01-09 NOTE — PROGRESS NOTES
"DAILY TREATMENT NOTE    DATE: 1/9/2019    Start Time: 1104  Stop Time: 1155    PROCEDURES:    TIMED  Procedure Min.   TE 8   TE supervised 28   MT 15             UNTIMED  Procedure Min.             Total Timed Minutes: 23  Total Timed Units:  2  Total Untimed Units: 2  Charges Billed/# of units: TE-1, MT-1    Progress/Current Status    Subjective:     Patient ID: Julius Baker is a 73 y.o. male.  Diagnosis:   1. Decreased ROM of lumbar spine     2. Acute bilateral low back pain without sciatica     3. Impaired mobility and activities of daily living       Pain: 0/10 R side of low back    Pt reports intermittent pain yesterday rated as 2/10 at highest. Pt reports sometimes he wakes up in the morning without pain and sometimes he does, unable to identify pattern associated with this. Has visit with PCP to address dizziness, thinks there is overlap in his medications.     Objective:     Therapeutic exercise to improve core and LE strength and LE flexibility for 8 minutes 1:1 and 28 minutes supervised including:   Palpation: T10 FRSR (flexion rotation sidebent right) dysfunction; L3 FRSL (flexion rotation sidebent left) dysfunction  Lumbar AROM: Flexion minimal loss, extension moderated loss, side bend minimal loss B, rotation minimal loss B  Exercise log below    Manual therapy for 15 minutes including:  MET to correct FRSR dysfunction to T10 in sitting, 8" holds x 3 reps, 2 trials  Assessment of active lumbar rotation and side bending; reports of L sided low back pain with L rotation  Static and dynamic cupping to R side of middle to lower back in L sidelying    Visit  Exp 12/31/18 6/18   POC 1/14/19    FOTO 6/10        Stretches:    Supine HS w/ strap 3x30" B   Supine piriformis 3x30" B   Seated trunk flexion/sidebend 5"x10 ea        Supine:    DKC c/ SB 5"x20   LTR 5"x10 B w/UE overhead   TAs 10"x20 focus on breathing   Bridge 3" hold 3x10 DL   Open books 2x10 B       Seated    Thoracic extension against " "bolster 3"x15   March on SB 3x10 B, B UE support       Standing:    Lats 7# 2x10 B   Rows 7# 2x10 B   Pallof Press 7# x10 B   Leg Press 7.0 3x10 B          Initials CC      Assessment:     T10 FRSR dysfunction indicating barrier of movement in extension and L rotation and side bending. Increased soft tissue restrictions around R lower thoracic and upper lumbar area improved with cupping. No pain with ROM following MT. Continued difficulty maintaining balance with seated marches on [place] ball. Few rest breaks during session due to dizziness.     Patient Education/Response:     Inform therapist and tech if he experiences any dizziness, blurred vision, nausea or other atypical symptoms during session. Pt verbalized understanding. Pt given updated HEP including TA activation, brace marching seated on stable and unstable surface, piriformis stretch, HS stretch, bridges, seated trunk flexion and sidebend stretch, lats, pallof press. Pt demonstrated understanding.    Plans and Goals:     Continue POC. Assess response to cupping next.    Long Term Goals (8 Weeks):   1. Pt will be compliant with HEP to supplement PT in decreasing pain with functional mobility.  2. Pt will improve FOTO score to </= 42% limited to decrease perceived limitation with maintaining/changing body position  3. Pt will perform and hold TA good contraction during repetitive hip hinges to improve core strength for household tasks  4. Pt will improve 9090 HS length to mild impairment to improve flexibility for functional tasks.  5. Pt will report no pain household tasks to promote functional QOL.     "

## 2019-01-10 ENCOUNTER — OFFICE VISIT (OUTPATIENT)
Dept: INTERNAL MEDICINE | Facility: CLINIC | Age: 74
End: 2019-01-10
Payer: MEDICARE

## 2019-01-10 ENCOUNTER — LAB VISIT (OUTPATIENT)
Dept: LAB | Facility: HOSPITAL | Age: 74
End: 2019-01-10
Attending: INTERNAL MEDICINE
Payer: MEDICARE

## 2019-01-10 VITALS
WEIGHT: 259.25 LBS | SYSTOLIC BLOOD PRESSURE: 128 MMHG | HEART RATE: 65 BPM | OXYGEN SATURATION: 95 % | DIASTOLIC BLOOD PRESSURE: 68 MMHG | HEIGHT: 70 IN | BODY MASS INDEX: 37.11 KG/M2

## 2019-01-10 DIAGNOSIS — R42 DIZZINESS: ICD-10-CM

## 2019-01-10 DIAGNOSIS — I10 ESSENTIAL HYPERTENSION: ICD-10-CM

## 2019-01-10 DIAGNOSIS — R68.89 ABNORMAL BODY TEMPERATURE: ICD-10-CM

## 2019-01-10 DIAGNOSIS — E78.5 HYPERLIPIDEMIA, UNSPECIFIED HYPERLIPIDEMIA TYPE: Chronic | ICD-10-CM

## 2019-01-10 DIAGNOSIS — G47.33 OBSTRUCTIVE SLEEP APNEA SYNDROME: ICD-10-CM

## 2019-01-10 LAB
BASOPHILS # BLD AUTO: 0.06 K/UL
BASOPHILS NFR BLD: 0.8 %
CHOLEST SERPL-MCNC: 224 MG/DL
CHOLEST/HDLC SERPL: 4.8 {RATIO}
DIFFERENTIAL METHOD: ABNORMAL
EOSINOPHIL # BLD AUTO: 0.3 K/UL
EOSINOPHIL NFR BLD: 3.6 %
ERYTHROCYTE [DISTWIDTH] IN BLOOD BY AUTOMATED COUNT: 13.2 %
HCT VFR BLD AUTO: 44.1 %
HDLC SERPL-MCNC: 47 MG/DL
HDLC SERPL: 21 %
HGB BLD-MCNC: 14.7 G/DL
IMM GRANULOCYTES # BLD AUTO: 0.12 K/UL
IMM GRANULOCYTES NFR BLD AUTO: 1.6 %
LDLC SERPL CALC-MCNC: 143.2 MG/DL
LYMPHOCYTES # BLD AUTO: 1.7 K/UL
LYMPHOCYTES NFR BLD: 23 %
MCH RBC QN AUTO: 31.7 PG
MCHC RBC AUTO-ENTMCNC: 33.3 G/DL
MCV RBC AUTO: 95 FL
MONOCYTES # BLD AUTO: 0.8 K/UL
MONOCYTES NFR BLD: 10.2 %
NEUTROPHILS # BLD AUTO: 4.5 K/UL
NEUTROPHILS NFR BLD: 60.8 %
NONHDLC SERPL-MCNC: 177 MG/DL
NRBC BLD-RTO: 0 /100 WBC
PLATELET # BLD AUTO: 298 K/UL
PMV BLD AUTO: 9.8 FL
RBC # BLD AUTO: 4.63 M/UL
TRIGL SERPL-MCNC: 169 MG/DL
WBC # BLD AUTO: 7.42 K/UL

## 2019-01-10 PROCEDURE — 99214 OFFICE O/P EST MOD 30 MIN: CPT | Mod: PBBFAC,PO | Performed by: INTERNAL MEDICINE

## 2019-01-10 PROCEDURE — 36415 COLL VENOUS BLD VENIPUNCTURE: CPT | Mod: PO

## 2019-01-10 PROCEDURE — 99214 PR OFFICE/OUTPT VISIT, EST, LEVL IV, 30-39 MIN: ICD-10-PCS | Mod: S$PBB,,, | Performed by: INTERNAL MEDICINE

## 2019-01-10 PROCEDURE — 85025 COMPLETE CBC W/AUTO DIFF WBC: CPT

## 2019-01-10 PROCEDURE — 99214 OFFICE O/P EST MOD 30 MIN: CPT | Mod: S$PBB,,, | Performed by: INTERNAL MEDICINE

## 2019-01-10 PROCEDURE — 80061 LIPID PANEL: CPT

## 2019-01-10 PROCEDURE — 99999 PR PBB SHADOW E&M-EST. PATIENT-LVL IV: ICD-10-PCS | Mod: PBBFAC,,, | Performed by: INTERNAL MEDICINE

## 2019-01-10 PROCEDURE — 99999 PR PBB SHADOW E&M-EST. PATIENT-LVL IV: CPT | Mod: PBBFAC,,, | Performed by: INTERNAL MEDICINE

## 2019-01-10 RX ORDER — EZETIMIBE 10 MG/1
10 TABLET ORAL DAILY
COMMUNITY
End: 2019-01-10

## 2019-01-10 RX ORDER — ACETAMINOPHEN 500 MG
TABLET ORAL
Qty: 1 EACH | Refills: 0 | Status: SHIPPED | OUTPATIENT
Start: 2019-01-10 | End: 2021-11-01

## 2019-01-10 NOTE — PROGRESS NOTES
Subjective:       Patient ID: Julius Baker is a 73 y.o. male.    Chief Complaint: Dizziness ([id number])    HPI Mr. Baker is a 73-year-old male with essential hypertension, hyperlipidemia who presents with a chief complaint of dizziness.  Patient states that on December 16th he began to have dizziness.  This resulted in him falling down and injuring his head and arm.  He went to the emergency department for evaluation of this.  Dizziness has been intermittent, it is not constant, nor is it occurring currently.  The last episode of dizziness occurred yesterday.  Typically, it occurs every day.  It started a couple of weeks ago on the day of the emergency room visit.  Patient also reports sleepiness.  He has been having an afternoon nap almost daily recently, and this is new for him.  He is compliant with wearing his CPAP machine which he has had for the last approximately 20 years.  Although he does report that he needs to have his CPAP machine evaluated.  The patient reports that his dizziness is mostly with movement, but has occurred at times with sitting.  Movement makes it worse.  Drinking some water has helped to resolve the dizziness in the past.  Dizziness last for seconds before dissipating.  Patient is followed by an ENT.  He denies any recent issues with hearing loss or tinnitus.  Also reports that his body temperature has been going up and down at home.  Patient has questions about his medications today.  He has been taking Zetia and pravastatin.  However he was unsure if he should be taking the Zetia as we had discussed at last visit that we would discontinue Zetia and take pravastatin alone.    Review of Systems   Constitutional:        Up and down body temp at home   HENT: Negative for hearing loss and tinnitus.    Neurological: Positive for dizziness.       Objective:      Physical Exam   Constitutional: He is oriented to person, place, and time. He appears well-developed and well-nourished. No  distress.   HENT:   Head: Normocephalic and atraumatic.   Right Ear: Tympanic membrane, external ear and ear canal normal.   Left Ear: Tympanic membrane, external ear and ear canal normal.   Nose: Nose normal.   Mouth/Throat: Oropharynx is clear and moist. No oropharyngeal exudate.   Eyes: Conjunctivae and EOM are normal.   Cardiovascular: Normal rate, regular rhythm and intact distal pulses. Exam reveals no gallop and no friction rub.   No murmur heard.  Pulmonary/Chest: Effort normal and breath sounds normal. No stridor. No respiratory distress. He has no wheezes. He has no rales.   Neurological: He is alert and oriented to person, place, and time. He displays normal reflexes. No cranial nerve deficit. He exhibits normal muscle tone. Coordination normal.   Skin: Skin is warm and dry. He is not diaphoretic.   Psychiatric: He has a normal mood and affect. His behavior is normal. Judgment and thought content normal.   Vitals reviewed.      Assessment:       1. Dizziness    2. Essential hypertension    3. Hyperlipidemia, unspecified hyperlipidemia type    4. Abnormal body temperature    5. Obstructive sleep apnea syndrome        Plan:     1.  Dizziness  No abnormalities found on neurological exam today.  Suspect due to dehydration, low blood pressure, or middle ear dysfunction  Recommend patient check his blood pressure during these episodes of dizziness.  I have given him a prescription for a blood pressure monitor so that he may do this  Also recommend follow up with his ENT for further evaluation    2.  Essential hypertension  Stable, well controlled  Continue current medications  Monitor blood pressure at home, once daily and with symptoms of dizziness    3.  Hyperlipidemia  Checking lipid profile today.  (Of note, patient has been on both Zetia and pravastatin recently).  Discontinue Zetia  Continue pravastatin    4.  Abnormal body temperature  CBC today    5.  Sleep apnea  Reading Sleepiness Scale score was  5  Recommend follow-up with his sleep medicine doctor to discuss possible adjustments for his CPAP machine as the patient has been trying to get this adjusted for some time.    Return to clinic in 3 months

## 2019-01-10 NOTE — PATIENT INSTRUCTIONS
Please monitor your blood pressure once daily and when you feel dizziness.    If your blood pressure reads a systolic/top number less than 100 or a diastolic/bottom number less than 60, please alert Dr. Herman     Please also follow up with your ENT for this issue of dizziness.

## 2019-01-15 ENCOUNTER — PATIENT MESSAGE (OUTPATIENT)
Dept: INTERNAL MEDICINE | Facility: CLINIC | Age: 74
End: 2019-01-15

## 2019-01-21 ENCOUNTER — PATIENT MESSAGE (OUTPATIENT)
Dept: INTERNAL MEDICINE | Facility: CLINIC | Age: 74
End: 2019-01-21

## 2019-01-21 DIAGNOSIS — I10 HYPERTENSION, UNSPECIFIED TYPE: Primary | ICD-10-CM

## 2019-01-22 ENCOUNTER — PATIENT MESSAGE (OUTPATIENT)
Dept: INTERNAL MEDICINE | Facility: CLINIC | Age: 74
End: 2019-01-22

## 2019-01-22 RX ORDER — LOSARTAN POTASSIUM 100 MG/1
100 TABLET ORAL DAILY
Qty: 30 TABLET | Refills: 0 | Status: SHIPPED | OUTPATIENT
Start: 2019-01-22 | End: 2019-02-12 | Stop reason: SDUPTHER

## 2019-01-22 NOTE — TELEPHONE ENCOUNTER
Current ARB is recalled. Switched to losartan.     Sent message to PCP and staff so they could call patient and let him know.

## 2019-01-30 ENCOUNTER — CLINICAL SUPPORT (OUTPATIENT)
Dept: REHABILITATION | Facility: HOSPITAL | Age: 74
End: 2019-01-30
Payer: MEDICARE

## 2019-01-30 DIAGNOSIS — M54.50 ACUTE BILATERAL LOW BACK PAIN WITHOUT SCIATICA: ICD-10-CM

## 2019-01-30 DIAGNOSIS — Z74.09 IMPAIRED MOBILITY AND ACTIVITIES OF DAILY LIVING: ICD-10-CM

## 2019-01-30 DIAGNOSIS — M53.86 DECREASED ROM OF LUMBAR SPINE: ICD-10-CM

## 2019-01-30 DIAGNOSIS — Z78.9 IMPAIRED MOBILITY AND ACTIVITIES OF DAILY LIVING: ICD-10-CM

## 2019-01-30 PROCEDURE — 97110 THERAPEUTIC EXERCISES: CPT | Mod: PN

## 2019-01-30 PROCEDURE — G8979 MOBILITY GOAL STATUS: HCPCS | Mod: CK,PN

## 2019-01-30 PROCEDURE — G8980 MOBILITY D/C STATUS: HCPCS | Mod: CJ,PN

## 2019-01-30 NOTE — PLAN OF CARE
Outpatient Therapy Updated Plan of Care     Visit Date: 1/30/2019  Name: Julius Baker  Clinic Number: [id number]    Therapy Diagnosis:   Encounter Diagnoses   Name Primary?    Decreased ROM of lumbar spine     Acute bilateral low back pain without sciatica     Impaired mobility and activities of daily living      Physician: Sophia Herman MD    Physician Orders: PT Eval and Treat  edical Diagnosis: M54.5 (ICD-10-CM) - Low back pain  Evaluation Date: 11/14/2018    Total Visits Received: 7    Current Certification Period:  11/14/18 to 1/14/19  Precautions:  Standard   Visits from Evaluation Date:  7  Functional Level Prior to Evaluation:  Independent     Subjective     Update: Pt reports 90-95% improvement since start of care. Improvements include pain and pain free function. Pt ready for today to be his last visit.     Objective     Update:   Posture: Standing: lower cervical flexion and upper cervical flexion  Palpation: TTP directly over spinous processes with grade I-III  from T3-4  Range of Motion/Strength:     Lumbar AROM: Pain/Dysfunction with Movement:   Flexion Minimal loss, no pain   Extension Minimal loss, no pain   Right side bending Minimal loss, no pain   Left side bending Minimal loss, no pain   Right rotation 100%, no pain   Left rotation 100%, no pain      LE MMTs  Hip flexion: 4+/5 R, 4/5 L  Hip extension: 4/5 B  Hip abduction: 4+/5 B   Knee flexion: 5/5 B  Knee extension: 5/5 R, 4+/5 L  Ankle DF: 4+/5 B  Ankle PF: 5/5 B     Flexibility: Impaired HS and gastroc flexibility  Gait: no AD  Analysis: WNL  Bed Mobility:Independent  Transfers: Independent  Other: Good TA activation statically in supine, sitting and standing; good TA activation while marching in supine, sitting, and standing; good TA activation while performing 10 hip hinges in standing     CMS Impairment/Limitation/Restriction for FOTO Lumbar Spine Survey     Therapist reviewed FOTO scores for Julius Baker on  11/14/2018.   FOTO documents entered into [facility] - see Media section.     Limitation Score: 28%  Category: Mobility      Assessment     Since beginning PT, pt has been seen 7 times since initial evaluation on 11/14/18. Overall, he has made good, steady progress with his PT treatments and has worked hard towards all of his PT goals as evidenced by subjective and objective improvements. Since attending PT he has reached most of his PT goals. He will be discharged with an updated HEP and was instructed to contact us with any other questions or concerns. Pt agreeable to d/c.    Goals:  Long Term Goals (8 Weeks):   1. Pt will be compliant with HEP to supplement PT in decreasing pain with functional mobility. MET  2. Pt will improve FOTO score to </= 42% limited to decrease perceived limitation with maintaining/changing body position MET  3. Pt will perform and hold TA good contraction during repetitive hip hinges to improve core strength for household tasks MET  4. Pt will improve 9090 HS length to mild impairment to improve flexibility for functional tasks. MET  5. Pt will report no pain household tasks to promote functional QOL. MET    Plan     Updated Certification Period: 1/30/2019 to 1/30/2019  Recommended Treatment Plan: HEP  Other Recommendations: join a gym    Sierra Acosta, PT  1/30/2019      I CERTIFY THE NEED FOR THESE SERVICES FURNISHED UNDER THIS PLAN OF TREATMENT AND WHILE UNDER MY CARE    Physician's comments:        Physician's Signature: ___________________________________________________

## 2019-01-30 NOTE — PROGRESS NOTES
"                            Physical Therapy Daily Treatment Note     Name: Julius [facility] Number: [id number]    Therapy Diagnosis:   Encounter Diagnoses   Name Primary?    Decreased ROM of lumbar spine     Acute bilateral low back pain without sciatica     Impaired mobility and activities of daily living      Physician: Sophia Herman MD    Visit Date: 1/30/2019  Physician Orders: PT Eval and Treat  Medical Diagnosis: M54.5 (ICD-10-CM) - Low back pain  Evaluation Date: 11/14/2018  Authorization Period Expiration: 12/31/2019  Plan of Care Certification Period: 1/14/2019 update date next  Visit #/Visits authorized: 2/50  FOTO: done     Time In: 0905  Time Out: 0932  Total Billable Time: 27 minutes    Precautions: Standard    Subjective     Pt reports: improved low back pain today. No longer feeling ill.   He was compliant with home exercise program.  Response to previous treatment: decreased pain  Functional change: improved mobility    Pain: 0/10  Location: B low back    Objective     Julius received therapeutic exercises to develop strength, endurance, posture and core stabilization for 27 minutes including:  Objective measurements (See Updated POC in Treatment section)    Supine:   TA   10"x10  Brace march  x10 B     Seated:  TA   10"x10  Brace march  x10 B     Standing:   TA   10"x10  Brace march  x10 B  Brace hip hinge x10 c/ dowel    Home Exercises Provided and Patient Education Provided     Education provided:   - Continue updated HEP including: updated HEP including TA activation, brace marching seated on stable and unstable surface, piriformis stretch, HS stretch, bridges, seated trunk flexion and sidebend stretch, lats, pallof press.    Written Home Exercises Provided: Yes, two copies printed for patient today  Exercises were reviewed and Juliuswas able to demonstrate them prior to the end of the session.  Julius demonstrated good  understanding of the education provided.     See EMR " under Patient Instructions under Notes section for exercises provided 1/9/2019.    Assessment     See Updated POC in Treatment section     Plan     See Updated POC in Treatment section     Sierra Acosta, PT, DPT

## 2019-02-12 DIAGNOSIS — I10 HYPERTENSION, UNSPECIFIED TYPE: ICD-10-CM

## 2019-02-13 RX ORDER — LOSARTAN POTASSIUM 100 MG/1
100 TABLET ORAL DAILY
Qty: 90 TABLET | Refills: 3 | Status: SHIPPED | OUTPATIENT
Start: 2019-02-13 | End: 2020-01-21

## 2019-02-13 RX ORDER — CHLORTHALIDONE 25 MG/1
25 TABLET ORAL DAILY
Qty: 90 TABLET | Refills: 3 | Status: SHIPPED | OUTPATIENT
Start: 2019-02-13 | End: 2020-04-01

## 2019-02-18 ENCOUNTER — PATIENT MESSAGE (OUTPATIENT)
Dept: INTERNAL MEDICINE | Facility: CLINIC | Age: 74
End: 2019-02-18

## 2019-02-25 RX ORDER — NEBIVOLOL HYDROCHLORIDE 10 MG/1
TABLET ORAL
Qty: 90 TABLET | Refills: 1 | Status: SHIPPED | OUTPATIENT
Start: 2019-02-25 | End: 2019-10-06 | Stop reason: SDUPTHER

## 2019-04-01 ENCOUNTER — TELEPHONE (OUTPATIENT)
Dept: INTERNAL MEDICINE | Facility: CLINIC | Age: 74
End: 2019-04-01

## 2019-04-01 NOTE — TELEPHONE ENCOUNTER
Spoke with patient and wants his f/u appt changed to same day he comes in for lab. Appt changed. Patient voices understanding.

## 2019-04-01 NOTE — TELEPHONE ENCOUNTER
----- Message from Betina Alfonso sent at 4/1/2019  2:40 PM CDT -----  Contact: Self/ [phone]  Type:  Patient Needs a Callback    Who Called:Patient  Who Left Message for Patient:  Why does patient need a callback?:Patient is confused as to why he can't be seen for his office visit the same day he is getting labs done  Would the patient rather a call back or a response via [facility]? Callback  Best Call Back Number: [phone]  Additional Information:

## 2019-04-03 ENCOUNTER — LAB VISIT (OUTPATIENT)
Dept: LAB | Facility: HOSPITAL | Age: 74
End: 2019-04-03
Attending: INTERNAL MEDICINE
Payer: MEDICARE

## 2019-04-03 ENCOUNTER — OFFICE VISIT (OUTPATIENT)
Dept: INTERNAL MEDICINE | Facility: CLINIC | Age: 74
End: 2019-04-03
Payer: MEDICARE

## 2019-04-03 VITALS
BODY MASS INDEX: 36.61 KG/M2 | DIASTOLIC BLOOD PRESSURE: 80 MMHG | SYSTOLIC BLOOD PRESSURE: 126 MMHG | OXYGEN SATURATION: 96 % | TEMPERATURE: 99 F | HEART RATE: 53 BPM | WEIGHT: 255.75 LBS | HEIGHT: 70 IN

## 2019-04-03 DIAGNOSIS — E78.5 HYPERLIPIDEMIA, UNSPECIFIED HYPERLIPIDEMIA TYPE: Primary | ICD-10-CM

## 2019-04-03 DIAGNOSIS — G47.33 OBSTRUCTIVE SLEEP APNEA SYNDROME: ICD-10-CM

## 2019-04-03 DIAGNOSIS — L60.0 INGROWN TOENAIL OF RIGHT FOOT: ICD-10-CM

## 2019-04-03 DIAGNOSIS — I10 ESSENTIAL HYPERTENSION: ICD-10-CM

## 2019-04-03 DIAGNOSIS — E78.5 HYPERLIPIDEMIA, UNSPECIFIED HYPERLIPIDEMIA TYPE: Chronic | ICD-10-CM

## 2019-04-03 LAB
ALBUMIN SERPL BCP-MCNC: 4.1 G/DL (ref 3.5–5.2)
ALP SERPL-CCNC: 69 U/L (ref 55–135)
ALT SERPL W/O P-5'-P-CCNC: 30 U/L (ref 10–44)
ANION GAP SERPL CALC-SCNC: 11 MMOL/L (ref 8–16)
AST SERPL-CCNC: 28 U/L (ref 10–40)
BILIRUB SERPL-MCNC: 1.5 MG/DL (ref 0.1–1)
BUN SERPL-MCNC: 20 MG/DL (ref 8–23)
CALCIUM SERPL-MCNC: 9.7 MG/DL (ref 8.7–10.5)
CHLORIDE SERPL-SCNC: 103 MMOL/L (ref 95–110)
CHOLEST SERPL-MCNC: 255 MG/DL (ref 120–199)
CHOLEST/HDLC SERPL: 5.3 {RATIO} (ref 2–5)
CO2 SERPL-SCNC: 26 MMOL/L (ref 23–29)
CREAT SERPL-MCNC: 1 MG/DL (ref 0.5–1.4)
EST. GFR  (AFRICAN AMERICAN): >60 ML/MIN/1.73 M^2
EST. GFR  (NON AFRICAN AMERICAN): >60 ML/MIN/1.73 M^2
GLUCOSE SERPL-MCNC: 103 MG/DL (ref 70–110)
HDLC SERPL-MCNC: 48 MG/DL (ref 40–75)
HDLC SERPL: 18.8 % (ref 20–50)
LDLC SERPL CALC-MCNC: 173 MG/DL (ref 63–159)
NONHDLC SERPL-MCNC: 207 MG/DL
POTASSIUM SERPL-SCNC: 4 MMOL/L (ref 3.5–5.1)
PROT SERPL-MCNC: 7.2 G/DL (ref 6–8.4)
SODIUM SERPL-SCNC: 140 MMOL/L (ref 136–145)
TRIGL SERPL-MCNC: 170 MG/DL (ref 30–150)

## 2019-04-03 PROCEDURE — 99999 PR PBB SHADOW E&M-EST. PATIENT-LVL IV: CPT | Mod: PBBFAC,,, | Performed by: INTERNAL MEDICINE

## 2019-04-03 PROCEDURE — 36415 COLL VENOUS BLD VENIPUNCTURE: CPT | Mod: PO

## 2019-04-03 PROCEDURE — 99214 PR OFFICE/OUTPT VISIT, EST, LEVL IV, 30-39 MIN: ICD-10-PCS | Mod: S$PBB,,, | Performed by: INTERNAL MEDICINE

## 2019-04-03 PROCEDURE — 80061 LIPID PANEL: CPT

## 2019-04-03 PROCEDURE — 99214 OFFICE O/P EST MOD 30 MIN: CPT | Mod: S$PBB,,, | Performed by: INTERNAL MEDICINE

## 2019-04-03 PROCEDURE — 99214 OFFICE O/P EST MOD 30 MIN: CPT | Mod: PBBFAC,PO | Performed by: INTERNAL MEDICINE

## 2019-04-03 PROCEDURE — 80053 COMPREHEN METABOLIC PANEL: CPT

## 2019-04-03 PROCEDURE — 99999 PR PBB SHADOW E&M-EST. PATIENT-LVL IV: ICD-10-PCS | Mod: PBBFAC,,, | Performed by: INTERNAL MEDICINE

## 2019-04-03 NOTE — PROGRESS NOTES
Subjective:       Patient ID: Julius Baker is a 73 y.o. male.    Chief Complaint: Shortness of Breath and Pain (right great toe)    HPI Mr. Baker is a 73-year-old male with essential hypertension, hyperlipidemia, and obstructive sleep apnea who presents for follow-up of hyperlipidemia.  He is currently taking pravastatin.  He denies having any problems or side effects from the pravastatin.  He is trying to exercise more but is having some body aches with this.  We discussed that he can use over-the-counter Tylenol or ibuprofen for relief.  He also plans to go back to a steam room.  In regards to his hypertension, patient states that he quit his chlorthalidone about 1 month ago and he was off of it for a few weeks.  He has since restarted the chlorthalidone and did take his medication today.  Reports having some swelling in his legs while off the chlorthalidone.  He also has some pain.  Pain located in the big toe of the right foot.  It was not constant.  It comes and goes.  He has had a couple of episodes of it.  He thinks this may be due to an ingrown toenail.  He also had some associated tingling in the right foot that has since resolved.  He has not yet had a chance to talk to Sleep Medicine about adjusting his CPAP.    Of note, his wife has recently undergone a knee replacement and had a provoked DVT as a complication.  He also had a friend recently diagnosed with inoperable brain cancer.    Review of Systems   Cardiovascular: Negative for leg swelling.   Musculoskeletal:        Right foot pain and tingling    Body aches (see HPI)     All other systems reviewed and are negative.      Objective:      Physical Exam   Constitutional: He is oriented to person, place, and time. He appears well-developed and well-nourished. No distress.   HENT:   Head: Normocephalic and atraumatic.   Eyes: Conjunctivae and EOM are normal.   Cardiovascular: Normal rate, regular rhythm, normal heart sounds and intact distal  pulses. Exam reveals no gallop and no friction rub.   No murmur heard.  Pulmonary/Chest: Effort normal and breath sounds normal. No stridor. No respiratory distress. He has no wheezes. He has no rales.   Musculoskeletal: He exhibits no edema or deformity.   No visible edema, ecchymosis, or deformity of right ankle or foot noted. Mild erythema around the great toe of the right foot.  Toenails cut short.  Appears to have ingrown toenail of the great toe.  Sensation of foot intact with monofilament testing.  2+ dorsalis pedis pulse.   Neurological: He is alert and oriented to person, place, and time.   Skin: Skin is warm and dry. He is not diaphoretic.   Psychiatric: He has a normal mood and affect. His behavior is normal. Judgment and thought content normal.   Vitals reviewed.      Assessment:       1. Hyperlipidemia, unspecified hyperlipidemia type    2. Essential hypertension    3. Obstructive sleep apnea syndrome    4. Ingrown toenail of right foot        Plan:     1.  Hyperlipidemia  Lipid profile pending  Continue pravastatin at this time    2.  Essential hypertension  Stable, well controlled  Continue current medications  CMP pending    3.  Obstructive sleep apnea  Referral placed to Sleep Medicine    4.  Ingrown toenail of right foot  Discussed with patient that he can soak the foot in warm Epsom water 3Xs daily and gently pry skin away from corner of toe. If no relief after one week, he will let me know and I will refer to podiatry for toenail cutting    RTC 3 months, JENNIFER

## 2019-04-04 ENCOUNTER — PATIENT MESSAGE (OUTPATIENT)
Dept: INTERNAL MEDICINE | Facility: CLINIC | Age: 74
End: 2019-04-04

## 2019-04-05 DIAGNOSIS — E78.5 HYPERLIPIDEMIA, UNSPECIFIED HYPERLIPIDEMIA TYPE: Primary | ICD-10-CM

## 2019-04-05 RX ORDER — PRAVASTATIN SODIUM 20 MG/1
20 TABLET ORAL DAILY
Qty: 90 TABLET | Refills: 3 | Status: SHIPPED | OUTPATIENT
Start: 2019-04-05 | End: 2019-05-06 | Stop reason: SDUPTHER

## 2019-04-13 ENCOUNTER — PATIENT MESSAGE (OUTPATIENT)
Dept: INTERNAL MEDICINE | Facility: CLINIC | Age: 74
End: 2019-04-13

## 2019-04-24 ENCOUNTER — DOCUMENTATION ONLY (OUTPATIENT)
Dept: ADMINISTRATIVE | Facility: HOSPITAL | Age: 74
End: 2019-04-24

## 2019-04-24 NOTE — PROGRESS NOTES
Outgoing call to patient regarding health maintenance - [place]-rectal screening. Patient states would like a fitkit sent to him. Patient informed the kit has to be dated and mailed within 24 hours of obtaining sample.

## 2019-05-02 ENCOUNTER — LAB VISIT (OUTPATIENT)
Dept: LAB | Facility: HOSPITAL | Age: 74
End: 2019-05-02
Attending: INTERNAL MEDICINE
Payer: MEDICARE

## 2019-05-02 DIAGNOSIS — Z12.11 COLON CANCER SCREENING: ICD-10-CM

## 2019-05-02 PROCEDURE — 82274 ASSAY TEST FOR BLOOD FECAL: CPT

## 2019-05-03 ENCOUNTER — PATIENT MESSAGE (OUTPATIENT)
Dept: INTERNAL MEDICINE | Facility: CLINIC | Age: 74
End: 2019-05-03

## 2019-05-03 LAB — HEMOCCULT STL QL IA: NEGATIVE

## 2019-05-06 DIAGNOSIS — E78.5 HYPERLIPIDEMIA, UNSPECIFIED HYPERLIPIDEMIA TYPE: ICD-10-CM

## 2019-05-07 RX ORDER — PRAVASTATIN SODIUM 20 MG/1
20 TABLET ORAL DAILY
Qty: 90 TABLET | Refills: 3 | Status: SHIPPED | OUTPATIENT
Start: 2019-05-07 | End: 2020-06-19

## 2019-05-20 ENCOUNTER — OFFICE VISIT (OUTPATIENT)
Dept: SLEEP MEDICINE | Facility: CLINIC | Age: 74
End: 2019-05-20
Payer: MEDICARE

## 2019-05-20 VITALS
HEIGHT: 70 IN | DIASTOLIC BLOOD PRESSURE: 80 MMHG | HEART RATE: 88 BPM | SYSTOLIC BLOOD PRESSURE: 152 MMHG | BODY MASS INDEX: 38.08 KG/M2 | WEIGHT: 266 LBS

## 2019-05-20 DIAGNOSIS — G47.33 OSA (OBSTRUCTIVE SLEEP APNEA): Primary | ICD-10-CM

## 2019-05-20 PROCEDURE — 99999 PR PBB SHADOW E&M-EST. PATIENT-LVL III: ICD-10-PCS | Mod: PBBFAC,,, | Performed by: PSYCHIATRY & NEUROLOGY

## 2019-05-20 PROCEDURE — 99213 OFFICE O/P EST LOW 20 MIN: CPT | Mod: PBBFAC,PO | Performed by: PSYCHIATRY & NEUROLOGY

## 2019-05-20 PROCEDURE — 99204 PR OFFICE/OUTPT VISIT, NEW, LEVL IV, 45-59 MIN: ICD-10-PCS | Mod: S$PBB,,, | Performed by: PSYCHIATRY & NEUROLOGY

## 2019-05-20 PROCEDURE — 99204 OFFICE O/P NEW MOD 45 MIN: CPT | Mod: S$PBB,,, | Performed by: PSYCHIATRY & NEUROLOGY

## 2019-05-20 PROCEDURE — 99999 PR PBB SHADOW E&M-EST. PATIENT-LVL III: CPT | Mod: PBBFAC,,, | Performed by: PSYCHIATRY & NEUROLOGY

## 2019-05-20 NOTE — PROGRESS NOTES
Julius Baker  was seen at the request of  Sophia Herman MD for sleep evaluation.    05/20/2019 INITIAL HISTORY OF PRESENT ILLNESS:  Julius Baker is a 73 y.o. male is here to be evaluated for a sleep disorder.       CHIEF COMPLAINT:      The patient's complaints include excessive daytime sleepiness, excessive daytime fatigue, snoring,  witnessed breathing pauses,  gasping for air in sleep and interrupted sleep .  He has been successfully using CPAP 8 cm for all theese years - ,Benefiting from CPAP use in terms of sleep continuity and daytime sleepiness.     He is on his 3rd machine now - Most recent Resmed Airview - humidifier has a burnt hole.     EPWORTH SLEEPINESS SCALE 5/15/2019   Sitting and reading 1   Watching TV 1   Sitting, inactive in a public place (e.g. a theatre or a meeting) 0   As a passenger in a car for an hour without a break 1   Lying down to rest in the afternoon when circumstances permit 2   Sitting and talking to someone 0   Sitting quietly after a lunch without alcohol 1   In a car, while stopped for a few minutes in traffic 0   Total score 6       PHQ9 5/15/2019   Little interest or pleasure in doing things: Nearly every day   Feeling down, depressed or hopeless: More than half the days   Trouble falling asleep, staying asleep, or sleeping too much: Nearly every day   Feeling tired or having little energy: Nearly every day   Poor appetite or overeating: More than half the days   Feeling bad about yourself- or that you are a failure or have let yourself or family down More than half the days   Trouble concentrating on things, such as reading the newspaper or watching television: More than half the days   Moving or speaking so slowly that other people could have noticed. Or the opposite- being so fidgety or restless that you have been moving around a lot more than usual: More than half the days   Thoughts that you would be better off dead or hurting yourself in some way: Not at  all   If you indicated you have experienced any of the aforementioned problems, how difficult have these problems made it for you to do your work, take care of things at home or get along with other people? Somewhat difficult   Total Score 19     GAD7 5/15/2019   Feeling nervous, anxious, on edge More than half the days   Not being able to stop or control worrying Nearly everyday   Worrying too much about different things Nearly everyday   Trouble relaxing Nearly everyday   Being so restless that its hard to sit still Nearly everyday   Becoming easily annoyed or irritable Nearly everyday   Feeling afraid as if something awful might happen Nearly everyday   If you marked you are experiencing any of the aforementioned problems, how difficult have these made it for you to do your work, take care of things at home, or get along with other people? Somewhat difficult   TRUPTI-7 Score 20         SLEEP ROUTINE AND LIFESTYLE 05/20/2019 :  Sleep Clinic New Patient 5/15/2019   What time do you go to bed on a week day? (Give a range) 11:00pm-1:00am   What time do you go to bed on a day off? (Give a range) 11:00pm-1:00am   How long does it take you to fall asleep? (Give a range) 10 minutes   On average, how many times per night do you wake up? three   How long does it take you to fall back into sleep? (Give a range) 5 minutes   What time do you wake up to start your day on a week day? (Give a range) 6:00-7:00am   What time do you wake up to start your day on a day off? (Give a range) 6:00-7:00am   What time do you get out of bed? (Give a range) 6:00-7:00am   On average, how many hours do you sleep? 6-7 hours   On average, how many naps do you take per day? 1-2   Rate your sleep quality from 0 to 5 (0-poor, 5-great). 3   Have you experienced:  Weight gain?   How much weight have you lost or gained (in lbs.) in the last year? 10-15 pounds; both lost and regained.   On average, how many times per night do you go to the bathroom?   three-four   Have you ever had a sleep study/CPAP machine/surgery for sleep apnea? Yes   Have you ever had a CPAP machine for sleep apnea? Yes   Have you ever had surgery for sleep apnea? Yes       Sleep Clinic ROS  5/15/2019   Difficulty breathing through the nose?  No   Sore throat or dry mouth in the morning? Yes   Irregular or very fast heart beat?  Sometimes   Shortness of breath?  Sometimes   Acid reflux? No   Body aches and pains?  Yes   Morning headaches? Yes   Dizziness? Yes   Mood changes?  Yes   Do you exercise?  Sometimes   Do you feel like moving your legs a lot?  No          Denies symptoms concerning for parasomnia      The patient never had tonsillectomy, adenoidectomy or UPPP      Social History:      Occupation:retired  Bed partner: yes  Exercise routine: no  Caffeine:       PREVIOUS SLEEP STUDIES:     PSG 4/7/7: YANDEL Significant Obstructive sleep apnea (YANDEL) - 8 cm H2O was recommended        DME:       PAST MEDICAL HISTORY:    Active Ambulatory Problems     Diagnosis Date Noted    Acute medial meniscal tear, right, initial encounter 05/21/2018    Sleep apnea     Essential hypertension     Hyperlipidemia     Acquired hypothyroidism      Resolved Ambulatory Problems     Diagnosis Date Noted    Decreased ROM of lumbar spine 11/14/2018    Acute bilateral low back pain without sciatica 11/14/2018    Impaired mobility and activities of daily living 11/14/2018     Past Medical History:   Diagnosis Date    Acquired hypothyroidism     Cataract     Hyperlipidemia     Hypertension     Sleep apnea     SOB (shortness of breath)                 PAST SURGICAL HISTORY:    Past Surgical History:   Procedure Laterality Date    ARTHROSCOPY-KNEE Right 5/21/2018    [facility]    CATARACT EXTRACTION, BILATERAL Bilateral     EYE SURGERY      KNEE ARTHROSCOPY Bilateral     uvula removal           FAMILY HISTORY:                Family History   Problem Relation Age of  Onset    Diabetes Mother     Hypertension Mother     Arthritis Mother        SOCIAL HISTORY:          Tobacco:   Social History     Tobacco Use   Smoking Status Never Smoker   Smokeless Tobacco Never Used       alcohol use:    Social History     Substance and Sexual Activity   Alcohol Use Yes    Alcohol/week: 7.2 oz    Types: 12 Standard drinks or equivalent per week    Comment: Rum and coke 2-3 drinks 3-4 times/week                   ALLERGIES:    Review of patient's allergies indicates:   Allergen Reactions    Doxycycline Diarrhea     itching    Adhesive Rash       CURRENT MEDICATIONS:    Current Outpatient Medications   Medication Sig Dispense Refill    albuterol (PROAIR HFA) 90 mcg/actuation inhaler Inhale 2 puffs into the lungs every 6 (six) hours as needed for Wheezing. Rescue      aspirin-sod bicarb-citric acid (GUALBERTO-SELTZER) 324 mg TbEF Take 325 mg by mouth every 6 (six) hours as needed.       blood pressure monitor (BLOOD PRESSURE KIT) Kit Please supply patient with 1 blood pressure monitor so that he can check his blood pressure once daily.  Please do not supply wrist monitor. 1 each 0    BYSTOLIC 10 mg Tab TAKE 1 TABLET DAILY 90 tablet 1    chlorthalidone (HYGROTEN) 25 MG Tab Take 1 tablet (25 mg total) by mouth once daily. 90 tablet 3    dextromethorphan-guaifenesin (CORICIDIN HBP)  mg Cap Take by mouth.      diclofenac sodium (VOLTAREN) 1 % Gel Apply 2 g topically once daily. 1 Tube 2    diphenhydramine-acetaminophen (TYLENOL PM)  mg Tab Take 1 tablet by mouth nightly as needed.      levothyroxine (SYNTHROID) 25 MCG tablet Take 1 tablet (25 mcg total) by mouth once daily. 90 tablet 3    multivitamin capsule Take 1 capsule by mouth once daily.      pravastatin (PRAVACHOL) 20 MG tablet Take 1 tablet (20 mg total) by mouth once daily. 90 tablet 3    losartan (COZAAR) 100 MG tablet Take 1 tablet (100 mg total) by mouth once daily. 90 tablet 3     No current  "facility-administered medications for this visit.                       REVIEW OF SYSTEMS:     PHYSICAL EXAM:  BP (!) 152/80 (BP Location: Left arm, Patient Position: Sitting, BP Method: Large (Manual))   Pulse 88   Ht 5' 10" (1.778 m)   Wt 120.7 kg (266 lb)   BMI 38.17 kg/m²   GENERAL: Normal development, well groomed.  HEENT:   HEENT:  Conjunctivae are non-erythematous; Pupils equal, round, and reactive to light; Nose is symmetrical; Nasal mucosa is pink and moist; Dentition is fair; No TMJ tenderness; Jaw opening and protrusion without click and without discomfort.  NECK: Supple. Neck circumference is  inches. No thyromegaly. No palpable nodes.     SKIN: On face and neck: No abrasions, no rashes, no lesions.  No subcutaneous nodules are palpable.  RESPIRATORY: Chest is clear to auscultation.  Normal chest expansion and non-labored breathing at rest.  CARDIOVASCULAR: Normal S1, S2.  No murmurs, gallops or rubs. No carotid bruits bilaterally.  No edema. No clubbing. No cyanosis.    NEURO: Oriented to time, place and person. Normal attention span and concentration. Gait normal.    PSYCH: Affect is full. Mood is normal  MUSCULOSKELETAL: Moves 4 extremities. Gait normal.         Using [facility]: yes      ASSESSMENT:    1. Sleep Apnea NEC - previously diagnosed gus - baseline N/A.. The patient symptomatically has  excessive daytime sleepiness, snoring,  witnessed breathing pauses, excessive daytime fatigue, gasping for air in sleep, interrupted sleep and nocturia  with exam findings of "a crowded oral airway and elevated body mass index. The patient has medical co-morbidities of hyperlipidemia and hypertension,  which can be worsened by GUS. Benefiting from CPAP use in terms of sleep continuity and daytime sleepiness.   Compliant.          PLAN:    I will change CPAP 8 cm to APAP 8-15 cm due to sufficient wt gain.      More than 15 minutes of this 30 minutes visit was spent in counseling: during our discussion " today, we talked about the etiology of  YANDEL as well as the potential ramifications of untreated sleep apnea, which could include daytime sleepiness, hypertension, heart disease and/or stroke.  We discussed potential treatment options, which could include weight loss, body positioning, continuous positive airway pressure (CPAP), or referral for surgical consideration. Meanwhile, he  is urged to avoid supine sleep, weight gain and alcoholic beverages since all of these can worsen YANDEL.     Precautions: The patient was advised to abstain from driving should he feel sleepy or drowsy.    Follow up: MD/NP  after the sleep study has been completed.     Thank you for allowing me the opportunity to participate in the care of your patient.    This visit summary will be sent to referring provider via inbasket

## 2019-05-20 NOTE — LETTER
May 22, 2019      Sophia Herman MD  [address]  [place] [address]           [facility]  [address] [place] [address]  Phone: [phone]  Fax: [phone]          Patient: Julius Baker   MR Number: [id number]   YOB: 1945   Date of Visit: 5/20/2019       Dear Dr. Sophia Herman:    Thank you for referring Julius Baker to me for evaluation. Attached you will find relevant portions of my assessment and plan of care.    If you have questions, please do not hesitate to call me. I look forward to following Julius Baker along with you.    Sincerely,    Viola Mehta MD    Enclosure  CC:  No Recipients    If you would like to receive this communication electronically, please contact [email or web address] or [phone] to request more information on [employer] Link access.    For providers and/or their staff who would like to refer a patient to [facility], please contact us through our one-stop-shop provider referral line, [facility] , at [phone].    If you feel you have received this communication in error or would no longer like to receive these types of communications, please e-mail [email or web address]

## 2019-05-23 DIAGNOSIS — M19.90 ARTHRITIS: ICD-10-CM

## 2019-05-23 RX ORDER — DICLOFENAC SODIUM 10 MG/G
2 GEL TOPICAL DAILY
Qty: 1 TUBE | Refills: 0 | Status: SHIPPED | OUTPATIENT
Start: 2019-05-23 | End: 2019-09-18 | Stop reason: SDUPTHER

## 2019-05-28 ENCOUNTER — OFFICE VISIT (OUTPATIENT)
Dept: INTERNAL MEDICINE | Facility: CLINIC | Age: 74
End: 2019-05-28
Payer: MEDICARE

## 2019-05-28 VITALS
SYSTOLIC BLOOD PRESSURE: 126 MMHG | HEIGHT: 70 IN | DIASTOLIC BLOOD PRESSURE: 76 MMHG | OXYGEN SATURATION: 95 % | WEIGHT: 263.69 LBS | HEART RATE: 51 BPM | TEMPERATURE: 98 F | BODY MASS INDEX: 37.75 KG/M2

## 2019-05-28 DIAGNOSIS — I10 ESSENTIAL HYPERTENSION: ICD-10-CM

## 2019-05-28 DIAGNOSIS — S76.011A MUSCLE STRAIN OF RIGHT GLUTEAL REGION, INITIAL ENCOUNTER: Primary | ICD-10-CM

## 2019-05-28 PROCEDURE — 99999 PR PBB SHADOW E&M-EST. PATIENT-LVL III: CPT | Mod: PBBFAC,,, | Performed by: INTERNAL MEDICINE

## 2019-05-28 PROCEDURE — 99999 PR PBB SHADOW E&M-EST. PATIENT-LVL III: ICD-10-PCS | Mod: PBBFAC,,, | Performed by: INTERNAL MEDICINE

## 2019-05-28 PROCEDURE — 99213 OFFICE O/P EST LOW 20 MIN: CPT | Mod: PBBFAC,PO | Performed by: INTERNAL MEDICINE

## 2019-05-28 PROCEDURE — 99214 OFFICE O/P EST MOD 30 MIN: CPT | Mod: S$PBB,,, | Performed by: INTERNAL MEDICINE

## 2019-05-28 PROCEDURE — 99214 PR OFFICE/OUTPT VISIT, EST, LEVL IV, 30-39 MIN: ICD-10-PCS | Mod: S$PBB,,, | Performed by: INTERNAL MEDICINE

## 2019-05-28 NOTE — PROGRESS NOTES
"Subjective:       Patient ID: Julius Baker is a 73 y.o. male.    Chief Complaint: Pain (left leg x 3 days/pulled leg up and felt something pop-now bruising)    HPI  Pt c/o 3 days of pain in his RUE s/p doing some stretching.  Felt a "pop" immediately and started seeing ecchymoses the next day.  Has much less pain today.  Review of Systems   All other systems reviewed and are negative.      Objective:      Physical Exam   Constitutional: He appears well-developed. No distress.   HENT:   Head: Normocephalic.   Eyes: EOM are normal. No scleral icterus.   Neck: Normal range of motion. No tracheal deviation present.   Cardiovascular: Normal rate, regular rhythm and intact distal pulses.   Pulmonary/Chest: Effort normal. No respiratory distress.   Abdominal: He exhibits no distension.   Musculoskeletal: Normal range of motion. He exhibits tenderness (R gluteals, upper quad). He exhibits no edema.   Neurological: He is alert.   Skin: Skin is warm and dry. No rash noted. He is not diaphoretic. No erythema.   Psychiatric: He has a normal mood and affect. His behavior is normal.   Vitals reviewed.      Assessment:       1. Muscle strain of right gluteal region, initial encounter    2. Essential hypertension        Plan:       Julius was seen today for pain.    Diagnoses and all orders for this visit:    Muscle strain of right gluteal region, initial encounter   voltaren gel prn    Essential hypertension   Well-cont    Follow up if symptoms worsen or fail to improve.      "

## 2019-06-07 ENCOUNTER — PATIENT MESSAGE (OUTPATIENT)
Dept: FAMILY MEDICINE | Facility: CLINIC | Age: 74
End: 2019-06-07

## 2019-06-07 ENCOUNTER — TELEPHONE (OUTPATIENT)
Dept: FAMILY MEDICINE | Facility: CLINIC | Age: 74
End: 2019-06-07

## 2019-06-07 NOTE — TELEPHONE ENCOUNTER
Sophia,    Please see this pt. Email dated 6/5/19. Thanks. Rafa Contreras M.D.  [facility]

## 2019-06-13 ENCOUNTER — PATIENT MESSAGE (OUTPATIENT)
Dept: INTERNAL MEDICINE | Facility: CLINIC | Age: 74
End: 2019-06-13

## 2019-06-17 ENCOUNTER — PATIENT MESSAGE (OUTPATIENT)
Dept: INTERNAL MEDICINE | Facility: CLINIC | Age: 74
End: 2019-06-17

## 2019-06-18 ENCOUNTER — PATIENT MESSAGE (OUTPATIENT)
Dept: INTERNAL MEDICINE | Facility: CLINIC | Age: 74
End: 2019-06-18

## 2019-07-13 ENCOUNTER — NURSE TRIAGE (OUTPATIENT)
Dept: ADMINISTRATIVE | Facility: CLINIC | Age: 74
End: 2019-07-13

## 2019-07-24 ENCOUNTER — TELEPHONE (OUTPATIENT)
Dept: ADMINISTRATIVE | Facility: HOSPITAL | Age: 74
End: 2019-07-24

## 2019-09-13 ENCOUNTER — PATIENT MESSAGE (OUTPATIENT)
Dept: INTERNAL MEDICINE | Facility: CLINIC | Age: 74
End: 2019-09-13

## 2019-09-18 ENCOUNTER — PATIENT MESSAGE (OUTPATIENT)
Dept: INTERNAL MEDICINE | Facility: CLINIC | Age: 74
End: 2019-09-18

## 2019-09-18 ENCOUNTER — OFFICE VISIT (OUTPATIENT)
Dept: INTERNAL MEDICINE | Facility: CLINIC | Age: 74
End: 2019-09-18
Payer: MEDICARE

## 2019-09-18 VITALS
TEMPERATURE: 98 F | DIASTOLIC BLOOD PRESSURE: 80 MMHG | HEART RATE: 59 BPM | WEIGHT: 250.44 LBS | OXYGEN SATURATION: 95 % | BODY MASS INDEX: 35.85 KG/M2 | HEIGHT: 70 IN | SYSTOLIC BLOOD PRESSURE: 142 MMHG

## 2019-09-18 DIAGNOSIS — F41.1 GENERALIZED ANXIETY DISORDER: ICD-10-CM

## 2019-09-18 DIAGNOSIS — E03.9 HYPOTHYROIDISM, UNSPECIFIED TYPE: ICD-10-CM

## 2019-09-18 DIAGNOSIS — R79.89 ABNORMAL CBC: ICD-10-CM

## 2019-09-18 DIAGNOSIS — M19.90 ARTHRITIS: ICD-10-CM

## 2019-09-18 DIAGNOSIS — Z00.00 ANNUAL PHYSICAL EXAM: ICD-10-CM

## 2019-09-18 DIAGNOSIS — F51.04 PSYCHOPHYSIOLOGICAL INSOMNIA: ICD-10-CM

## 2019-09-18 DIAGNOSIS — R73.03 PREDIABETES: ICD-10-CM

## 2019-09-18 DIAGNOSIS — Z11.59 NEED FOR HEPATITIS C SCREENING TEST: ICD-10-CM

## 2019-09-18 DIAGNOSIS — E78.5 HYPERLIPIDEMIA, UNSPECIFIED HYPERLIPIDEMIA TYPE: ICD-10-CM

## 2019-09-18 PROCEDURE — 99214 OFFICE O/P EST MOD 30 MIN: CPT | Mod: PBBFAC,PO | Performed by: INTERNAL MEDICINE

## 2019-09-18 PROCEDURE — 99999 PR PBB SHADOW E&M-EST. PATIENT-LVL IV: ICD-10-PCS | Mod: PBBFAC,,, | Performed by: INTERNAL MEDICINE

## 2019-09-18 PROCEDURE — 99214 PR OFFICE/OUTPT VISIT, EST, LEVL IV, 30-39 MIN: ICD-10-PCS | Mod: S$PBB,,, | Performed by: INTERNAL MEDICINE

## 2019-09-18 PROCEDURE — 99999 PR PBB SHADOW E&M-EST. PATIENT-LVL IV: CPT | Mod: PBBFAC,,, | Performed by: INTERNAL MEDICINE

## 2019-09-18 PROCEDURE — 99214 OFFICE O/P EST MOD 30 MIN: CPT | Mod: S$PBB,,, | Performed by: INTERNAL MEDICINE

## 2019-09-18 RX ORDER — DICLOFENAC SODIUM 10 MG/G
2 GEL TOPICAL DAILY
Qty: 1 TUBE | Refills: 6 | Status: SHIPPED | OUTPATIENT
Start: 2019-09-18 | End: 2021-04-05

## 2019-09-18 RX ORDER — ESCITALOPRAM OXALATE 5 MG/1
TABLET ORAL
Qty: 30 TABLET | Refills: 1 | Status: SHIPPED | OUTPATIENT
Start: 2019-09-18 | End: 2019-10-11 | Stop reason: ALTCHOICE

## 2019-09-18 RX ORDER — TRAZODONE HYDROCHLORIDE 50 MG/1
TABLET ORAL
Qty: 90 TABLET | Refills: 3 | Status: SHIPPED | OUTPATIENT
Start: 2019-09-18 | End: 2020-08-04 | Stop reason: SDUPTHER

## 2019-09-18 NOTE — PATIENT INSTRUCTIONS
Escitalopram tablets  What is this medicine?  ESCITALOPRAM (es sye HELEN oh pram) is used to treat depression and certain types of anxiety.  How should I use this medicine?  Take this medicine by mouth with a glass of water. Follow the directions on the prescription label. You can take it with or without food. If it upsets your stomach, take it with food. Take your medicine at regular intervals. Do not take it more often than directed. Do not stop taking this medicine suddenly except upon the advice of your doctor. Stopping this medicine too quickly may cause serious side effects or your condition may worsen.  A special MedGuide will be given to you by the pharmacist with each prescription and refill. Be sure to read this information carefully each time.  Talk to your pediatrician regarding the use of this medicine in children. Special care may be needed.  What side effects may I notice from receiving this medicine?  Side effects that you should report to your doctor or health care professional as soon as possible:  · allergic reactions like skin rash, itching or hives, swelling of the face, lips, or tongue  · confusion  · feeling faint or lightheaded, falls  · fast talking and excited feelings or actions that are out of control  · hallucination, loss of contact with reality  · seizures  · suicidal thoughts or other mood changes  · unusual bleeding or bruising  Side effects that usually do not require medical attention (report to your doctor or health care professional if they continue or are bothersome):  · blurred vision  · changes in appetite  · change in sex drive or performance  · headache  · increased sweating  · nausea  What may interact with this medicine?  Do not take this medicine with any of the following medications:  · certain medicines for fungal infections like fluconazole, itraconazole, ketoconazole, posaconazole,  voriconazole  · cisapride  · citalopram  · dofetilide  · dronedarone  · linezolid  · MAOIs like Carbex, Eldepryl, Marplan, Nardil, and Parnate  · methylene blue (injected into a vein)  · pimozide  · thioridazine  · ziprasidone  This medicine may also interact with the following medications:  · alcohol  · aspirin and aspirin-like medicines  · carbamazepine  · certain medicines for depression, anxiety, or psychotic disturbances  · certain medicines for migraine headache like almotriptan, eletriptan, frovatriptan, naratriptan, rizatriptan, sumatriptan, zolmitriptan  · certain medicines for sleep  · certain medicines that treat or prevent blood clots like warfarin, enoxaparin, dalteparin  · cimetidine  · diuretics  · fentanyl  · furazolidone  · isoniazid  · lithium  · metoprolol  · NSAIDs, medicines for pain and inflammation, like ibuprofen or naproxen  · other medicines that prolong the QT interval (cause an abnormal heart rhythm)  · procarbazine  · rasagiline  · supplements like Merle's wort, kava kava, valerian  · tramadol  · tryptophan  What if I miss a dose?  If you miss a dose, take it as soon as you can. If it is almost time for your next dose, take only that dose. Do not take double or extra doses.  Where should I keep my medicine?  Keep out of reach of children.  Store at room temperature between 15 and 30 degrees C (59 and 86 degrees F). Throw away any unused medicine after the expiration date.  What should I tell my health care provider before I take this medicine?  They need to know if you have any of these conditions:  · bipolar disorder or a family history of bipolar disorder  · diabetes  · glaucoma  · heart disease  · kidney or liver disease  · receiving electroconvulsive therapy  · seizures (convulsions)  · suicidal thoughts, plans, or attempt by you or a family member  · an unusual or allergic reaction to escitalopram, the related drug citalopram, other medicines, foods, dyes, or  preservatives  · pregnant or trying to become pregnant  · breast-feeding  What should I watch for while using this medicine?  Tell your doctor if your symptoms do not get better or if they get worse. Visit your doctor or health care professional for regular checks on your progress. Because it may take several weeks to see the full effects of this medicine, it is important to continue your treatment as prescribed by your doctor.  Patients and their families should watch out for new or worsening thoughts of suicide or depression. Also watch out for sudden changes in feelings such as feeling anxious, agitated, panicky, irritable, hostile, aggressive, impulsive, severely restless, overly excited and hyperactive, or not being able to sleep. If this happens, especially at the beginning of treatment or after a change in dose, call your health care professional.  You may get drowsy or dizzy. Do not drive, use machinery, or do anything that needs mental alertness until you know how this medicine affects you. Do not stand or sit up quickly, especially if you are an older patient. This reduces the risk of dizzy or fainting spells. Alcohol may interfere with the effect of this medicine. Avoid alcoholic drinks.  Your mouth may get dry. Chewing sugarless gum or sucking hard candy, and drinking plenty of water may help. Contact your doctor if the problem does not go away or is severe.  NOTE:This sheet is a summary. It may not cover all possible information. If you have questions about this medicine, talk to your doctor, pharmacist, or health care provider. Copyright© 2017 [employer]        Trazodone tablets  What is this medicine?  TRAZODONE (TRAZ oh done) is used to treat depression.  How should I use this medicine?  Take this medicine by mouth with a glass of water. Follow the directions on the prescription label. Take this medicine shortly after a meal or a light snack. Take your medicine at regular intervals. Do not take  your medicine more often than directed. Do not stop taking this medicine suddenly except upon the advice of your doctor. Stopping this medicine too quickly may cause serious side effects or your condition may worsen.  A special MedGuide will be given to you by the pharmacist with each prescription and refill. Be sure to read this information carefully each time.  Talk to your pediatrician regarding the use of this medicine in children. Special care may be needed.  What side effects may I notice from receiving this medicine?  Side effects that you should report to your doctor or health care professional as soon as possible:  · allergic reactions like skin rash, itching or hives, swelling of the face, lips, or tongue  · fast, irregular heartbeat  · feeling faint or lightheaded, falls  · painful erections or other sexual dysfunction  · suicidal thoughts or other mood changes  · trembling  Side effects that usually do not require medical attention (report to your doctor or health care professional if they continue or are bothersome):  · constipation  · headache  · muscle aches or pains  · nausea, vomiting  · unusually weak or tired  What may interact with this medicine?  Do not take this medicine with any of the following medications:  · certain medicines for fungal infections like fluconazole, itraconazole, ketoconazole, posaconazole, voriconazole  · cisapride  · dofetilide  · dronedarone  · linezolid  · MAOIs like Carbex, Eldepryl, Marplan, Nardil, and Parnate  · mesoridazine  · methylene blue (injected into a vein)  · pimozide  · saquinavir  · thioridazine  · ziprasidone  This medicine may also interact with the following medications:  · alcohol  · antiviral medicines for HIV or AIDS  · aspirin and aspirin-like medicines  · barbiturates like phenobarbital  · certain medicines for blood pressure, heart disease, irregular heart beat  · certain medicines for depression, anxiety, or psychotic disturbances  · certain  medicines for migraine headache like almotriptan, eletriptan, frovatriptan, naratriptan, rizatriptan, sumatriptan, zolmitriptan  · certain medicines for seizures like carbamazepine and phenytoin  · certain medicines for sleep  · certain medicines that treat or prevent blood clots like dalteparin, enoxaparin, warfarin  · digoxin  · fentanyl  · lithium  · NSAIDS, medicines for pain and inflammation, like ibuprofen or naproxen  · other medicines that prolong the QT interval (cause an abnormal heart rhythm)  · rasagiline  · supplements like [place]'s wort, kava kava, valerian  · tramadol  · tryptophan  What if I miss a dose?  If you miss a dose, take it as soon as you can. If it is almost time for your next dose, take only that dose. Do not take double or extra doses.  Where should I keep my medicine?  Keep out of the reach of children.  Store at room temperature between 15 and 30 degrees C (59 to 86 degrees F). Protect from light. Keep container tightly closed. Throw away any unused medicine after the expiration date.  What should I tell my health care provider before I take this medicine?  They need to know if you have any of these conditions:  · attempted suicide or thinking about it  · bipolar disorder  · bleeding problems  · glaucoma  · heart disease, or previous heart attack  · irregular heart beat  · kidney or liver disease  · low levels of sodium in the blood  · an unusual or allergic reaction to trazodone, other medicines, foods, dyes or preservatives  · pregnant or trying to get pregnant  · breast-feeding  What should I watch for while using this medicine?  Tell your doctor if your symptoms do not get better or if they get worse. Visit your doctor or health care professional for regular checks on your progress. Because it may take several weeks to see the full effects of this medicine, it is important to continue your treatment as prescribed by your doctor.  Patients and their families should watch out for  new or worsening thoughts of suicide or depression. Also watch out for sudden changes in feelings such as feeling anxious, agitated, panicky, irritable, hostile, aggressive, impulsive, severely restless, overly excited and hyperactive, or not being able to sleep. If this happens, especially at the beginning of treatment or after a change in dose, call your health care professional.  You may get drowsy or dizzy. Do not drive, use machinery, or do anything that needs mental alertness until you know how this medicine affects you. Do not stand or sit up quickly, especially if you are an older patient. This reduces the risk of dizzy or fainting spells. Alcohol may interfere with the effect of this medicine. Avoid alcoholic drinks.  This medicine may cause dry eyes and blurred vision. If you wear contact lenses you may feel some discomfort. Lubricating drops may help. See your eye doctor if the problem does not go away or is severe.  Your mouth may get dry. Chewing sugarless gum, sucking hard candy and drinking plenty of water may help. Contact your doctor if the problem does not go away or is severe.  NOTE:This sheet is a summary. It may not cover all possible information. If you have questions about this medicine, talk to your doctor, pharmacist, or health care provider. Copyright© 2017 [employer]

## 2019-09-18 NOTE — PROGRESS NOTES
Subjective:       Patient ID: Julius Baker is a 73 y.o. male.    Chief Complaint: No chief complaint on file.    HPI Mr Baker is a 73 year old male with HLD, prediabetes, and arthritis who presents for routine follow up of general medical conditions. He has multiple life stressors. His daughter's  . She remarried but the new  has Crohns. They recently went to [place] for the Crohns. Then daughter left the  and came back down with his granddaughter .  His son-in-law is at [facility] in is unsure about whether not he should go through with the surgery that is upcoming.  Patient reports that he has not had sex with his wife for 12 years. She is not interested. He thinks she might have Alzheimers. She often forgets things and is confused. Her doctor however does not think she has dementia. They have been going to couples therapy for 2 years but with no improvement in the relationship. He did some individual counseling with their counselor but this only helped temporarily.  He does not think that he is depressed.  He does have difficulty sleeping at night.  He has been using alcohol to help him go to sleep.  He knows that this is a bad habit he is to get away from this.  He tried using Tylenol p.m. but it made him too sleepy during the day.  He has never been diagnosed with anxiety or depression, nor received treatment for these illnesses. He is concerned that he may have IBS. But he also realizes that his bowel issues may be due to his emotions.     Review of Systems   Psychiatric/Behavioral: The patient is nervous/anxious.    All other systems reviewed and are negative.      Objective:      Physical Exam   Constitutional: He is oriented to person, place, and time. He appears well-developed and well-nourished. No distress.   HENT:   Head: Normocephalic and atraumatic.   Right Ear: External ear normal.   Left Ear: External ear normal.   Nose: Nose normal.   Eyes: Conjunctivae and EOM  are normal.   Cardiovascular: Normal rate, regular rhythm, normal heart sounds and intact distal pulses. Exam reveals no gallop and no friction rub.   No murmur heard.  Pulmonary/Chest: Effort normal and breath sounds normal. No stridor. No respiratory distress. He has no wheezes. He has no rales.   Neurological: He is alert and oriented to person, place, and time.   Skin: Skin is warm and dry. He is not diaphoretic.   Psychiatric: He has a normal mood and affect. His behavior is normal. Judgment and thought content normal.   Vitals reviewed.      Assessment:       1. Generalized anxiety disorder    2. Psychophysiological insomnia    3. Arthritis    4. Annual physical exam    5. Need for hepatitis C screening test    6. Hyperlipidemia, unspecified hyperlipidemia type    7. Prediabetes    8. Abnormal CBC    9. Hypothyroidism, unspecified type        Plan:     1.  Generalized anxiety disorder  TRUPTI-7 score 21 pts indicating severe anxiety  Starting lexapro 5 mg daily  Recommend patient pursue regular individual counseling for himself.  We have given him the contact information for the Psychology Department.  I have also placed a referral to Psychology.    2.  Insomnia  Trazodone 25-50 mg p.o. nightly as needed for sleep    3.  Arthritis  Stable at this time  Refill of diclofenac given    4.  Annual exam  Pre labs ordered for annual exam    5.  Need for hepatitis C screening test  Hepatitis C antibody ordered    6.  Hyperlipidemia  Recheck lipid profile return visit    7.  Pre diabetes  Recheck A1c on return visit    8.  Abnormal CBC  Recheck CBC on return visit    9. Hypothyroidism  Recheck TSH on return visit    25 minutes spent in room face to face with patient, with >50% of that time spent in discussion of anxiety diagnosis and treatment plan    RTC 6 weeks for annual exam and f/u anxiety

## 2019-09-19 ENCOUNTER — PATIENT OUTREACH (OUTPATIENT)
Dept: ADMINISTRATIVE | Facility: OTHER | Age: 74
End: 2019-09-19

## 2019-09-23 ENCOUNTER — PATIENT MESSAGE (OUTPATIENT)
Dept: INTERNAL MEDICINE | Facility: CLINIC | Age: 74
End: 2019-09-23

## 2019-09-23 ENCOUNTER — OFFICE VISIT (OUTPATIENT)
Dept: OPHTHALMOLOGY | Facility: CLINIC | Age: 74
End: 2019-09-23
Payer: MEDICARE

## 2019-09-23 DIAGNOSIS — Z98.41 STATUS POST CATARACT EXTRACTION AND INSERTION OF INTRAOCULAR LENS, RIGHT: ICD-10-CM

## 2019-09-23 DIAGNOSIS — H35.372 EPIRETINAL MEMBRANE (ERM) OF LEFT EYE: Primary | ICD-10-CM

## 2019-09-23 DIAGNOSIS — H26.492 POSTERIOR CAPSULAR OPACIFICATION VISUALLY SIGNIFICANT, LEFT EYE: ICD-10-CM

## 2019-09-23 DIAGNOSIS — Z96.1 STATUS POST CATARACT EXTRACTION AND INSERTION OF INTRAOCULAR LENS, LEFT: ICD-10-CM

## 2019-09-23 DIAGNOSIS — Z96.1 STATUS POST CATARACT EXTRACTION AND INSERTION OF INTRAOCULAR LENS, RIGHT: ICD-10-CM

## 2019-09-23 DIAGNOSIS — H52.7 REFRACTIVE ERRORS: ICD-10-CM

## 2019-09-23 DIAGNOSIS — Z98.42 STATUS POST CATARACT EXTRACTION AND INSERTION OF INTRAOCULAR LENS, LEFT: ICD-10-CM

## 2019-09-23 DIAGNOSIS — H53.8 BLURRED VISION, LEFT EYE: ICD-10-CM

## 2019-09-23 PROCEDURE — 92134 CPTRZ OPH DX IMG PST SGM RTA: CPT | Mod: PBBFAC | Performed by: OPHTHALMOLOGY

## 2019-09-23 PROCEDURE — 92134 POSTERIOR SEGMENT OCT RETINA (OCULAR COHERENCE TOMOGRAPHY)-BOTH EYES: ICD-10-PCS | Mod: 26,S$PBB,, | Performed by: OPHTHALMOLOGY

## 2019-09-23 PROCEDURE — 92025 COMPUTERIZED CORNEAL TOPOGRAPHY: ICD-10-PCS | Mod: 26,S$PBB,, | Performed by: OPHTHALMOLOGY

## 2019-09-23 PROCEDURE — 92025 CPTRIZED CORNEAL TOPOGRAPHY: CPT | Mod: PBBFAC | Performed by: OPHTHALMOLOGY

## 2019-09-23 PROCEDURE — 99999 PR PBB SHADOW E&M-EST. PATIENT-LVL III: ICD-10-PCS | Mod: PBBFAC,,, | Performed by: OPHTHALMOLOGY

## 2019-09-23 PROCEDURE — 99213 OFFICE O/P EST LOW 20 MIN: CPT | Mod: PBBFAC | Performed by: OPHTHALMOLOGY

## 2019-09-23 PROCEDURE — 99999 PR PBB SHADOW E&M-EST. PATIENT-LVL III: CPT | Mod: PBBFAC,,, | Performed by: OPHTHALMOLOGY

## 2019-09-23 PROCEDURE — 92004 COMPRE OPH EXAM NEW PT 1/>: CPT | Mod: S$PBB,,, | Performed by: OPHTHALMOLOGY

## 2019-09-23 PROCEDURE — 92004 PR EYE EXAM, NEW PATIENT,COMPREHESV: ICD-10-PCS | Mod: S$PBB,,, | Performed by: OPHTHALMOLOGY

## 2019-09-24 ENCOUNTER — PATIENT MESSAGE (OUTPATIENT)
Dept: OPHTHALMOLOGY | Facility: CLINIC | Age: 74
End: 2019-09-24

## 2019-09-24 ENCOUNTER — PATIENT MESSAGE (OUTPATIENT)
Dept: INTERNAL MEDICINE | Facility: CLINIC | Age: 74
End: 2019-09-24

## 2019-09-25 NOTE — PROGRESS NOTES
"HPI     Concerns About Ocular Health      Additional comments: OS post cataract sx               Comments     Ref. By: Self / Patient was last seen By Dr. Corey Sanford M.D on   (02/04/2019)    ERM OS  Retinal Hole OS  Floaters OS  Posterior Vit Detachment OU  Diplopia OU   HTN Ret OU  HARRISON OU  PCIOL OU- Dr. Choi, TORIC OD, monofocal OS    Current Gtt: OTC Tears      Patient reports: that about 2 years ago he had Cataract Surgery in both   eyes. OD went well after surgery, but OS had some complication. Left side   of vision in OS is blurry/ Hazy with black floaters and squiggly lines no   flashes of light. Patients reports that vision with glasses is good OS but   with out not so much vs. The vision in OD is good with and without   glasses.              Last edited by Cami Hopper MD on 9/25/2019  1:53 PM. (History)            Assessment /Plan     For exam results, see Encounter Report.    Epiretinal membrane (ERM) of left eye  -     Posterior Segment OCT Retina-Both eyes    Blurred vision, left eye  -     Computerized corneal topography    Posterior capsular opacification visually significant, left eye    Status post cataract extraction and insertion of intraocular lens, right    Status post cataract extraction and insertion of intraocular lens, left    Refractive errors      PCIOL OU- Dr. Choi, TORIC OD, monofocal OS    Pt unhappy with Vasc OS - distance VA blurred sc, clears up with correction.      Explained that refractive outcome of OS left him myopic, uncertain if this was on purpose for monoVA or not.    Other factors that may be contributing to "hazy" VA include:  PCO (temporally) and ERM --> however  VA disturbances from PCO / ERm would NOT go away cc.    Pt can try to adapt to monoVA, wear CL OS, glasses FT, vs. IOL exchange OS.    Pt to request old records.                 "

## 2019-10-04 ENCOUNTER — PATIENT MESSAGE (OUTPATIENT)
Dept: INTERNAL MEDICINE | Facility: CLINIC | Age: 74
End: 2019-10-04

## 2019-10-04 ENCOUNTER — PATIENT MESSAGE (OUTPATIENT)
Dept: OPHTHALMOLOGY | Facility: CLINIC | Age: 74
End: 2019-10-04

## 2019-10-06 DIAGNOSIS — I10 ESSENTIAL HYPERTENSION: Primary | ICD-10-CM

## 2019-10-07 RX ORDER — NEBIVOLOL HYDROCHLORIDE 10 MG/1
TABLET ORAL
Qty: 90 TABLET | Refills: 0 | Status: SHIPPED | OUTPATIENT
Start: 2019-10-07 | End: 2020-01-13 | Stop reason: SDUPTHER

## 2019-10-11 RX ORDER — FLUOXETINE 10 MG/1
10 CAPSULE ORAL DAILY
Qty: 90 CAPSULE | Refills: 0 | Status: SHIPPED | OUTPATIENT
Start: 2019-10-11 | End: 2019-11-01

## 2019-10-15 ENCOUNTER — TELEPHONE (OUTPATIENT)
Dept: INTERNAL MEDICINE | Facility: CLINIC | Age: 74
End: 2019-10-15

## 2019-10-15 NOTE — TELEPHONE ENCOUNTER
----- Message from Monse Aly sent at 10/15/2019 12:51 PM CDT -----  Contact: Self  Pt is calling to speak with Staff regarding whether he can take a specific medication with his prescriptions?  He is requesting a returned call for response.    He can be reached at [phone].    Thank you.

## 2019-10-15 NOTE — TELEPHONE ENCOUNTER
Spoke with pt informed per Dr. Herman he can take pain medication (Advil and Aleve) while on Prozac and it will not have an interaction. Pt voiced understanding

## 2019-10-18 ENCOUNTER — PATIENT MESSAGE (OUTPATIENT)
Dept: INTERNAL MEDICINE | Facility: CLINIC | Age: 74
End: 2019-10-18

## 2019-10-24 ENCOUNTER — PATIENT MESSAGE (OUTPATIENT)
Dept: OPHTHALMOLOGY | Facility: CLINIC | Age: 74
End: 2019-10-24

## 2019-10-25 ENCOUNTER — TELEPHONE (OUTPATIENT)
Dept: OPHTHALMOLOGY | Facility: CLINIC | Age: 74
End: 2019-10-25

## 2019-10-25 NOTE — TELEPHONE ENCOUNTER
Pt send msg in epic and unable to adapt to monoVA.  Would like to discuss IOL exchange to have both eyes for distance.      I recommend CL trial first - OS for distance, before resorting to surgery.    Please call pt and schedule eval with optom for CL trail OS - distance target. Thanks.

## 2019-10-28 ENCOUNTER — TELEPHONE (OUTPATIENT)
Dept: OPHTHALMOLOGY | Facility: CLINIC | Age: 74
End: 2019-10-28

## 2019-10-28 ENCOUNTER — PATIENT OUTREACH (OUTPATIENT)
Dept: ADMINISTRATIVE | Facility: OTHER | Age: 74
End: 2019-10-28

## 2019-10-28 ENCOUNTER — LAB VISIT (OUTPATIENT)
Dept: LAB | Facility: HOSPITAL | Age: 74
End: 2019-10-28
Attending: INTERNAL MEDICINE
Payer: MEDICARE

## 2019-10-28 DIAGNOSIS — Z11.59 NEED FOR HEPATITIS C SCREENING TEST: ICD-10-CM

## 2019-10-28 DIAGNOSIS — Z00.00 ANNUAL PHYSICAL EXAM: ICD-10-CM

## 2019-10-28 DIAGNOSIS — E78.5 HYPERLIPIDEMIA, UNSPECIFIED HYPERLIPIDEMIA TYPE: ICD-10-CM

## 2019-10-28 DIAGNOSIS — E03.9 HYPOTHYROIDISM, UNSPECIFIED TYPE: ICD-10-CM

## 2019-10-28 DIAGNOSIS — R73.03 PREDIABETES: ICD-10-CM

## 2019-10-28 DIAGNOSIS — R79.89 ABNORMAL CBC: ICD-10-CM

## 2019-10-28 LAB
ALBUMIN SERPL BCP-MCNC: 4.2 G/DL (ref 3.5–5.2)
ALP SERPL-CCNC: 76 U/L (ref 55–135)
ALT SERPL W/O P-5'-P-CCNC: 25 U/L (ref 10–44)
ANION GAP SERPL CALC-SCNC: 9 MMOL/L (ref 8–16)
AST SERPL-CCNC: 21 U/L (ref 10–40)
BASOPHILS # BLD AUTO: 0.06 K/UL (ref 0–0.2)
BASOPHILS NFR BLD: 0.7 % (ref 0–1.9)
BILIRUB SERPL-MCNC: 1.6 MG/DL (ref 0.1–1)
BUN SERPL-MCNC: 19 MG/DL (ref 8–23)
CALCIUM SERPL-MCNC: 9.8 MG/DL (ref 8.7–10.5)
CHLORIDE SERPL-SCNC: 100 MMOL/L (ref 95–110)
CHOLEST SERPL-MCNC: 217 MG/DL (ref 120–199)
CHOLEST/HDLC SERPL: 5.3 {RATIO} (ref 2–5)
CO2 SERPL-SCNC: 27 MMOL/L (ref 23–29)
CREAT SERPL-MCNC: 1.1 MG/DL (ref 0.5–1.4)
DIFFERENTIAL METHOD: ABNORMAL
EOSINOPHIL # BLD AUTO: 0.3 K/UL (ref 0–0.5)
EOSINOPHIL NFR BLD: 3.4 % (ref 0–8)
ERYTHROCYTE [DISTWIDTH] IN BLOOD BY AUTOMATED COUNT: 12.9 % (ref 11.5–14.5)
EST. GFR  (AFRICAN AMERICAN): >60 ML/MIN/1.73 M^2
EST. GFR  (NON AFRICAN AMERICAN): >60 ML/MIN/1.73 M^2
ESTIMATED AVG GLUCOSE: 108 MG/DL (ref 68–131)
GLUCOSE SERPL-MCNC: 97 MG/DL (ref 70–110)
HBA1C MFR BLD HPLC: 5.4 % (ref 4–5.6)
HCT VFR BLD AUTO: 44.3 % (ref 40–54)
HCV AB SERPL QL IA: NEGATIVE
HDLC SERPL-MCNC: 41 MG/DL (ref 40–75)
HDLC SERPL: 18.9 % (ref 20–50)
HGB BLD-MCNC: 14.7 G/DL (ref 14–18)
IMM GRANULOCYTES # BLD AUTO: 0.06 K/UL (ref 0–0.04)
IMM GRANULOCYTES NFR BLD AUTO: 0.7 % (ref 0–0.5)
LDLC SERPL CALC-MCNC: 146.2 MG/DL (ref 63–159)
LYMPHOCYTES # BLD AUTO: 2 K/UL (ref 1–4.8)
LYMPHOCYTES NFR BLD: 24.5 % (ref 18–48)
MCH RBC QN AUTO: 31.1 PG (ref 27–31)
MCHC RBC AUTO-ENTMCNC: 33.2 G/DL (ref 32–36)
MCV RBC AUTO: 94 FL (ref 82–98)
MONOCYTES # BLD AUTO: 0.8 K/UL (ref 0.3–1)
MONOCYTES NFR BLD: 10.1 % (ref 4–15)
NEUTROPHILS # BLD AUTO: 5 K/UL (ref 1.8–7.7)
NEUTROPHILS NFR BLD: 60.6 % (ref 38–73)
NONHDLC SERPL-MCNC: 176 MG/DL
NRBC BLD-RTO: 0 /100 WBC
PLATELET # BLD AUTO: 354 K/UL (ref 150–350)
PMV BLD AUTO: 9.8 FL (ref 9.2–12.9)
POTASSIUM SERPL-SCNC: 4.2 MMOL/L (ref 3.5–5.1)
PROT SERPL-MCNC: 7.4 G/DL (ref 6–8.4)
RBC # BLD AUTO: 4.72 M/UL (ref 4.6–6.2)
SODIUM SERPL-SCNC: 136 MMOL/L (ref 136–145)
TRIGL SERPL-MCNC: 149 MG/DL (ref 30–150)
TSH SERPL DL<=0.005 MIU/L-ACNC: 3.26 UIU/ML (ref 0.4–4)
WBC # BLD AUTO: 8.24 K/UL (ref 3.9–12.7)

## 2019-10-28 PROCEDURE — 86803 HEPATITIS C AB TEST: CPT

## 2019-10-28 PROCEDURE — 80053 COMPREHEN METABOLIC PANEL: CPT

## 2019-10-28 PROCEDURE — 84443 ASSAY THYROID STIM HORMONE: CPT

## 2019-10-28 PROCEDURE — 85025 COMPLETE CBC W/AUTO DIFF WBC: CPT

## 2019-10-28 PROCEDURE — 80061 LIPID PANEL: CPT

## 2019-10-28 PROCEDURE — 83036 HEMOGLOBIN GLYCOSYLATED A1C: CPT

## 2019-10-28 PROCEDURE — 36415 COLL VENOUS BLD VENIPUNCTURE: CPT | Mod: PO

## 2019-10-29 ENCOUNTER — TELEPHONE (OUTPATIENT)
Dept: OPTOMETRY | Facility: CLINIC | Age: 74
End: 2019-10-29

## 2019-10-29 ENCOUNTER — TELEPHONE (OUTPATIENT)
Dept: OPHTHALMOLOGY | Facility: CLINIC | Age: 74
End: 2019-10-29

## 2019-10-29 ENCOUNTER — PATIENT MESSAGE (OUTPATIENT)
Dept: OPTOMETRY | Facility: CLINIC | Age: 74
End: 2019-10-29

## 2019-10-29 NOTE — TELEPHONE ENCOUNTER
Returned pt call. LM ----- Message from Ofe Vazquez sent at 10/29/2019 11:13 AM CDT -----  Type:  Patient Returning Call    Who Called: pt  Who Left Message for Patient: Linda Burgos  Does the patient know what this is regarding?:rescheduling appt  Would the patient rather a call back or a response via [facility]? c/b  Best Call Back Number: pt states he will try to resched another time and refused to leave contact information  Additional Information:

## 2019-10-30 ENCOUNTER — TELEPHONE (OUTPATIENT)
Dept: OPTOMETRY | Facility: CLINIC | Age: 74
End: 2019-10-30

## 2019-10-30 NOTE — TELEPHONE ENCOUNTER
Pt currently has mono va from cataract sx. Coming in for refraction and trial DVO w/ contacts per Ward . Pt will call back  To scheduled appt ----- Message from Ofe Vazquez sent at 10/30/2019 12:59 PM CDT -----  Who Called: pt  Who Left Message for Patient: Linda Burgos  Does the patient know what this is regarding?:rescheduling appt  Would the patient rather a call back or a response via [facility]? c/b  Best Call Back Number: [phone]  Additional Information:

## 2019-11-01 ENCOUNTER — OFFICE VISIT (OUTPATIENT)
Dept: INTERNAL MEDICINE | Facility: CLINIC | Age: 74
End: 2019-11-01
Payer: MEDICARE

## 2019-11-01 VITALS
WEIGHT: 244.69 LBS | OXYGEN SATURATION: 97 % | SYSTOLIC BLOOD PRESSURE: 120 MMHG | DIASTOLIC BLOOD PRESSURE: 66 MMHG | HEART RATE: 49 BPM | HEIGHT: 70 IN | BODY MASS INDEX: 35.03 KG/M2

## 2019-11-01 DIAGNOSIS — F32.A ANXIETY AND DEPRESSION: Primary | ICD-10-CM

## 2019-11-01 DIAGNOSIS — F41.9 ANXIETY AND DEPRESSION: Primary | ICD-10-CM

## 2019-11-01 DIAGNOSIS — R42 DIZZINESS: ICD-10-CM

## 2019-11-01 PROCEDURE — 99999 PR PBB SHADOW E&M-EST. PATIENT-LVL III: CPT | Mod: PBBFAC,,, | Performed by: INTERNAL MEDICINE

## 2019-11-01 PROCEDURE — 99999 PR PBB SHADOW E&M-EST. PATIENT-LVL III: ICD-10-PCS | Mod: PBBFAC,,, | Performed by: INTERNAL MEDICINE

## 2019-11-01 PROCEDURE — 99214 OFFICE O/P EST MOD 30 MIN: CPT | Mod: S$PBB,,, | Performed by: INTERNAL MEDICINE

## 2019-11-01 PROCEDURE — 99214 PR OFFICE/OUTPT VISIT, EST, LEVL IV, 30-39 MIN: ICD-10-PCS | Mod: S$PBB,,, | Performed by: INTERNAL MEDICINE

## 2019-11-01 PROCEDURE — 99213 OFFICE O/P EST LOW 20 MIN: CPT | Mod: PBBFAC,PO | Performed by: INTERNAL MEDICINE

## 2019-11-01 RX ORDER — FLUOXETINE 10 MG/1
TABLET ORAL
Qty: 90 TABLET | Refills: 2 | Status: SHIPPED | OUTPATIENT
Start: 2019-11-01 | End: 2020-07-16 | Stop reason: SDUPTHER

## 2019-11-01 NOTE — PROGRESS NOTES
Subjective:       Patient ID: Julius Baker is a 74 y.o. male.    Chief Complaint: Blood Pressure Check and Medication Refill    HPI Mr. Baker is a 74 year old male with anxiety and depression who presents due to concern for low blood pressure and possible reaction to Prozac.  At his last visit with me, he was started on Prozac 10 mg p.o. daily.  He reports that it has improved his mood.  But he thinks that may have also caused itching in his hands and feet.  He has also been experiencing dizziness/lightheadedness since he started the Prozac.  Onset was around the time he started Prozac, approximately 1 month ago.  It is intermittent in duration.  It is not constant.  No associated symptoms.  It is worsened by going from a sitting to standing position.  He also reports decreased libido since starting Prozac.    Review of Systems   Neurological: Positive for dizziness and light-headedness.   All other systems reviewed and are negative.      Objective:      Physical Exam   Constitutional: He is oriented to person, place, and time. He appears well-developed and well-nourished. No distress.   HENT:   Head: Normocephalic and atraumatic.   Right Ear: External ear normal.   Left Ear: External ear normal.   Nose: Nose normal.   Eyes: Conjunctivae and EOM are normal.   Cardiovascular: Normal rate, regular rhythm, normal heart sounds and intact distal pulses. Exam reveals no gallop and no friction rub.   No murmur heard.  Pulmonary/Chest: Effort normal and breath sounds normal. No stridor. No respiratory distress. He has no wheezes. He has no rales.   Neurological: He is alert and oriented to person, place, and time.   Skin: Skin is warm and dry. He is not diaphoretic.   Psychiatric: He has a normal mood and affect. His behavior is normal. Judgment and thought content normal.   Vitals reviewed.      Assessment:       1. Anxiety and depression    2. Dizziness        Plan:     Orthostatic pressures performed in clinic  and are as follows:  Lyin/66, pulse 50.  Sittin/78.  Pulse of 50.  Standin/78.  Pulse is 49.  Suspect that patient's current complaints of dizziness/lightheadedness may be due to adjusting to his new medication (Prozac).  Will reduce his dose by half.  Patient will take Prozac 5 mg p.o. daily.  We do not want to discontinue the Prozac as it has made great improvements in his anxiety and depression symptoms. Will re-assess at next visit. Offered to schedule next visit today but patient states that he must check with his wife before scheduling another appt.         Julius was seen today for blood pressure check and medication refill.    Diagnoses and all orders for this visit:    Anxiety and depression  -     FLUoxetine 10 MG Tab; Take one half tablet PO daily    Dizziness    RTC PRN

## 2019-11-04 ENCOUNTER — PATIENT OUTREACH (OUTPATIENT)
Dept: ADMINISTRATIVE | Facility: OTHER | Age: 74
End: 2019-11-04

## 2019-11-05 ENCOUNTER — OFFICE VISIT (OUTPATIENT)
Dept: OPTOMETRY | Facility: CLINIC | Age: 74
End: 2019-11-05
Payer: MEDICARE

## 2019-11-05 DIAGNOSIS — H26.492 LEFT POSTERIOR CAPSULAR OPACIFICATION: Primary | ICD-10-CM

## 2019-11-05 PROCEDURE — 99999 PR PBB SHADOW E&M-EST. PATIENT-LVL III: CPT | Mod: PBBFAC,,, | Performed by: OPTOMETRIST

## 2019-11-05 PROCEDURE — 92012 PR EYE EXAM, EST PATIENT,INTERMED: ICD-10-PCS | Mod: S$PBB,,, | Performed by: OPTOMETRIST

## 2019-11-05 PROCEDURE — 99999 PR PBB SHADOW E&M-EST. PATIENT-LVL III: ICD-10-PCS | Mod: PBBFAC,,, | Performed by: OPTOMETRIST

## 2019-11-05 PROCEDURE — 92012 INTRM OPH EXAM EST PATIENT: CPT | Mod: S$PBB,,, | Performed by: OPTOMETRIST

## 2019-11-05 PROCEDURE — 99213 OFFICE O/P EST LOW 20 MIN: CPT | Mod: PBBFAC,PO | Performed by: OPTOMETRIST

## 2019-11-05 NOTE — PROGRESS NOTES
HPI     Pt is in for contact fitting for DVO after IOL exchange       Last edited by Linda Burgos on 11/5/2019 11:15 AM. (History)            Assessment /Plan     For exam results, see Encounter Report.    Left posterior capsular opacification      Pt is not interested in DVO correction. The patient states that he sees blurry on the left side or temporally of his visual field of the OS. Pt has PCO noted temporally OS even w/undilated pupil. This would correlate w/c/o blurry vision. Pt can read J1 with OS and doesn't want to lose having MV correction. He simply just wants his vision to be clearer. Pt would likely benefit more from YAG opposed to CL fitting. Pt sees Dr Sanford and associate every 6 mo and states that those visits have been fine. Refer to Dr Hopper.

## 2019-11-17 ENCOUNTER — PATIENT OUTREACH (OUTPATIENT)
Dept: ADMINISTRATIVE | Facility: OTHER | Age: 74
End: 2019-11-17

## 2019-11-19 ENCOUNTER — OFFICE VISIT (OUTPATIENT)
Dept: OPHTHALMOLOGY | Facility: CLINIC | Age: 74
End: 2019-11-19
Payer: MEDICARE

## 2019-11-19 DIAGNOSIS — H53.8 BLURRED VISION, LEFT EYE: ICD-10-CM

## 2019-11-19 DIAGNOSIS — Z96.1 STATUS POST CATARACT EXTRACTION AND INSERTION OF INTRAOCULAR LENS, RIGHT: ICD-10-CM

## 2019-11-19 DIAGNOSIS — H26.492 POSTERIOR CAPSULAR OPACIFICATION VISUALLY SIGNIFICANT, LEFT EYE: ICD-10-CM

## 2019-11-19 DIAGNOSIS — H35.372 EPIRETINAL MEMBRANE (ERM) OF LEFT EYE: Primary | ICD-10-CM

## 2019-11-19 DIAGNOSIS — Z98.41 STATUS POST CATARACT EXTRACTION AND INSERTION OF INTRAOCULAR LENS, RIGHT: ICD-10-CM

## 2019-11-19 PROCEDURE — 99999 PR PBB SHADOW E&M-EST. PATIENT-LVL III: CPT | Mod: PBBFAC,,, | Performed by: OPHTHALMOLOGY

## 2019-11-19 PROCEDURE — 92012 INTRM OPH EXAM EST PATIENT: CPT | Mod: S$PBB,,, | Performed by: OPHTHALMOLOGY

## 2019-11-19 PROCEDURE — 92012 PR EYE EXAM, EST PATIENT,INTERMED: ICD-10-PCS | Mod: S$PBB,,, | Performed by: OPHTHALMOLOGY

## 2019-11-19 PROCEDURE — 99213 OFFICE O/P EST LOW 20 MIN: CPT | Mod: PBBFAC | Performed by: OPHTHALMOLOGY

## 2019-11-19 PROCEDURE — 99999 PR PBB SHADOW E&M-EST. PATIENT-LVL III: ICD-10-PCS | Mod: PBBFAC,,, | Performed by: OPHTHALMOLOGY

## 2019-11-19 NOTE — LETTER
November 20, 2019      Cierra Bradford, OD  [address]  [facility] [address]           [facility]  [address]  [facility] [address]  Phone: [phone]  Fax: [phone]          Patient: Julius Baker   MR Number: [id number]   YOB: 1945   Date of Visit: 11/19/2019       Dear Dr. Cierra Bradford:    Thank you for referring Julius Baker to me for evaluation. Attached you will find relevant portions of my assessment and plan of care.    If you have questions, please do not hesitate to call me. I look forward to following Julius Baker along with you.    Sincerely,    Cami Hopper MD    Enclosure  CC:  No Recipients    If you would like to receive this communication electronically, please contact [email or web address] or [phone] to request more information on [facility] Link access.    For providers and/or their staff who would like to refer a patient to [facility], please contact us through our one-stop-shop provider referral line, [facility], at [phone].    If you feel you have received this communication in error or would no longer like to receive these types of communications, please e-mail [email or web address]

## 2019-11-20 NOTE — PROGRESS NOTES
"HPI     Ref. By: Self / Patient was last seen By Dr. Corey Sanford M.D on   (02/04/2019)    ERM OS  Retinal Hole OS  Floaters OS  Posterior Vit Detachment OU  Diplopia OU   HTN Ret OU  HARRISON OU  PCIOL OU- Dr. Choi, TORIC OD, monofocal OS    Current Gtt: OTC Tears      Pt here to discuss options of possible YAG OS per Dr Bradford.  Pt states   he would like to defer the laser today.  Pt denies changes since last   visit OU.     Last edited by Pam Farias MA on 11/19/2019  3:34 PM.   (History)            Assessment /Plan     For exam results, see Encounter Report.    Epiretinal membrane (ERM) of left eye    Blurred vision, left eye    Posterior capsular opacification visually significant, left eye    Status post cataract extraction and insertion of intraocular lens, right        PCIOL OU- Dr. Choi, TORIC OD, monofocal OS    Pt unhappy with Vasc OS - distance VA blurred sc, clears up with correction.      Explained that refractive outcome of OS left him myopic, per old notes - this was goal of sx = monoVA.    Other factors that may be contributing to "hazy" VA include:  PCO (temporally) and ERM --> however  VA disturbances from PCO / ERm would NOT go away cc.    Discussed options with pt -- he understands that YAG PCO OS may help with blurred VA temporally however would not negate the need to wear glasses to correct for distance.      Plan for yag cap OS Next.                   "

## 2019-12-14 ENCOUNTER — PATIENT MESSAGE (OUTPATIENT)
Dept: OPHTHALMOLOGY | Facility: CLINIC | Age: 74
End: 2019-12-14

## 2019-12-16 ENCOUNTER — TELEPHONE (OUTPATIENT)
Dept: OPHTHALMOLOGY | Facility: CLINIC | Age: 74
End: 2019-12-16

## 2019-12-16 NOTE — TELEPHONE ENCOUNTER
Unable to reach- no answer     ----- Message from Dann George sent at 12/16/2019  1:22 PM CST -----  Type:  Patient Returning Call    Who Called: Pt    Who Left Message for Patient: Eusebia     Does the patient know what this is regarding?: Yes    Would the patient rather a call back or a response via [facility]? Callback    Best Call Back Number: [phone]     Additional Information: Pt stated he will call after the new year to reschedule

## 2020-01-02 DIAGNOSIS — E03.9 HYPOTHYROIDISM, UNSPECIFIED TYPE: ICD-10-CM

## 2020-01-02 RX ORDER — LEVOTHYROXINE SODIUM 25 UG/1
TABLET ORAL
Qty: 90 TABLET | Refills: 4 | Status: SHIPPED | OUTPATIENT
Start: 2020-01-02 | End: 2021-03-29

## 2020-01-06 ENCOUNTER — TELEPHONE (OUTPATIENT)
Dept: OPHTHALMOLOGY | Facility: CLINIC | Age: 75
End: 2020-01-06

## 2020-01-06 NOTE — TELEPHONE ENCOUNTER
Spoke to mr steele to arrange appt   yag cap   01/21     ----- Message from Jerald Pittman sent at 1/6/2020  2:55 PM CST -----  Contact: pt @ [phone]  Pt states he's returning a call to the clinic about scheduling an appt w/ the doctor

## 2020-01-13 ENCOUNTER — PATIENT MESSAGE (OUTPATIENT)
Dept: INTERNAL MEDICINE | Facility: CLINIC | Age: 75
End: 2020-01-13

## 2020-01-13 DIAGNOSIS — I10 ESSENTIAL HYPERTENSION: ICD-10-CM

## 2020-01-14 DIAGNOSIS — I10 ESSENTIAL HYPERTENSION: ICD-10-CM

## 2020-01-14 RX ORDER — NEBIVOLOL 10 MG/1
10 TABLET ORAL DAILY
Qty: 90 TABLET | Refills: 0 | Status: SHIPPED | OUTPATIENT
Start: 2020-01-14 | End: 2021-11-10

## 2020-01-14 RX ORDER — NEBIVOLOL 10 MG/1
10 TABLET ORAL DAILY
Qty: 90 TABLET | Refills: 3 | Status: SHIPPED | OUTPATIENT
Start: 2020-01-14 | End: 2021-02-22

## 2020-01-14 NOTE — TELEPHONE ENCOUNTER
----- Message from Tamie Bell sent at 1/14/2020 12:26 PM CST -----  Contact: patient  Type:  RX Refill Request    Who Called: Moravian  Refill or New Rx: refill  RX Name and Strength: BYSTOLIC 10 mg Tab  How is the patient currently taking it? (ex. 1XDay): 1Xday  Is this a 30 day or 90 day RX: 90 day  Preferred Pharmacy with phone number: [facility] - [address]  Local or Mail Order: Local  Ordering Provider: Batsheva  Would the patient rather a call back or a response via [facility]?  Call back   Best Call Back Number: [phone]  Additional Information:  Please call patient with the status of this request as he states he has sent several messages using his [employer] account and none has been addressed as of today

## 2020-01-15 ENCOUNTER — TELEPHONE (OUTPATIENT)
Dept: OPHTHALMOLOGY | Facility: CLINIC | Age: 75
End: 2020-01-15

## 2020-01-21 DIAGNOSIS — I10 HYPERTENSION, UNSPECIFIED TYPE: ICD-10-CM

## 2020-01-21 RX ORDER — LOSARTAN POTASSIUM 100 MG/1
TABLET ORAL
Qty: 90 TABLET | Refills: 4 | Status: SHIPPED | OUTPATIENT
Start: 2020-01-21 | End: 2020-04-27 | Stop reason: SDUPTHER

## 2020-01-27 ENCOUNTER — TELEPHONE (OUTPATIENT)
Dept: OPHTHALMOLOGY | Facility: CLINIC | Age: 75
End: 2020-01-27

## 2020-01-27 NOTE — TELEPHONE ENCOUNTER
Spoke to pt to clarify his yag procedure  Moved from 02/17 at 1100 to 02/04 at 1100   Pt has conflict on 02/17

## 2020-02-03 ENCOUNTER — LAB VISIT (OUTPATIENT)
Dept: LAB | Facility: HOSPITAL | Age: 75
End: 2020-02-03
Attending: INTERNAL MEDICINE
Payer: MEDICARE

## 2020-02-03 ENCOUNTER — OFFICE VISIT (OUTPATIENT)
Dept: INTERNAL MEDICINE | Facility: CLINIC | Age: 75
End: 2020-02-03
Payer: MEDICARE

## 2020-02-03 ENCOUNTER — HOSPITAL ENCOUNTER (OUTPATIENT)
Dept: RADIOLOGY | Facility: HOSPITAL | Age: 75
Discharge: HOME | End: 2020-02-03
Attending: INTERNAL MEDICINE
Payer: MEDICARE

## 2020-02-03 VITALS
SYSTOLIC BLOOD PRESSURE: 126 MMHG | OXYGEN SATURATION: 94 % | HEART RATE: 64 BPM | DIASTOLIC BLOOD PRESSURE: 60 MMHG | BODY MASS INDEX: 36.51 KG/M2 | WEIGHT: 255.06 LBS | HEIGHT: 70 IN

## 2020-02-03 DIAGNOSIS — N50.811 PAIN IN RIGHT TESTICLE: Primary | ICD-10-CM

## 2020-02-03 DIAGNOSIS — Z12.5 ENCOUNTER FOR SCREENING FOR MALIGNANT NEOPLASM OF PROSTATE: ICD-10-CM

## 2020-02-03 DIAGNOSIS — R68.89 COLD INTOLERANCE: ICD-10-CM

## 2020-02-03 DIAGNOSIS — R35.0 FREQUENT URINATION: ICD-10-CM

## 2020-02-03 DIAGNOSIS — N50.811 PAIN IN RIGHT TESTICLE: ICD-10-CM

## 2020-02-03 LAB
BASOPHILS # BLD AUTO: 0.04 K/UL (ref 0–0.2)
BASOPHILS NFR BLD: 0.5 % (ref 0–1.9)
BILIRUB UR QL STRIP: NEGATIVE
CLARITY UR REFRACT.AUTO: CLEAR
COLOR UR AUTO: YELLOW
DIFFERENTIAL METHOD: ABNORMAL
EOSINOPHIL # BLD AUTO: 0.2 K/UL (ref 0–0.5)
EOSINOPHIL NFR BLD: 2.7 % (ref 0–8)
ERYTHROCYTE [DISTWIDTH] IN BLOOD BY AUTOMATED COUNT: 13.4 % (ref 11.5–14.5)
ERYTHROCYTE [SEDIMENTATION RATE] IN BLOOD BY WESTERGREN METHOD: 23 MM/HR (ref 0–23)
GLUCOSE UR QL STRIP: NEGATIVE
HCT VFR BLD AUTO: 44.9 % (ref 40–54)
HGB BLD-MCNC: 14.8 G/DL (ref 14–18)
HGB UR QL STRIP: NEGATIVE
IMM GRANULOCYTES # BLD AUTO: 0.05 K/UL (ref 0–0.04)
IMM GRANULOCYTES NFR BLD AUTO: 0.6 % (ref 0–0.5)
KETONES UR QL STRIP: NEGATIVE
LEUKOCYTE ESTERASE UR QL STRIP: NEGATIVE
LYMPHOCYTES # BLD AUTO: 1.3 K/UL (ref 1–4.8)
LYMPHOCYTES NFR BLD: 17.1 % (ref 18–48)
MCH RBC QN AUTO: 31.3 PG (ref 27–31)
MCHC RBC AUTO-ENTMCNC: 33 G/DL (ref 32–36)
MCV RBC AUTO: 95 FL (ref 82–98)
MONOCYTES # BLD AUTO: 1 K/UL (ref 0.3–1)
MONOCYTES NFR BLD: 12.3 % (ref 4–15)
NEUTROPHILS # BLD AUTO: 5.2 K/UL (ref 1.8–7.7)
NEUTROPHILS NFR BLD: 66.8 % (ref 38–73)
NITRITE UR QL STRIP: NEGATIVE
NRBC BLD-RTO: 0 /100 WBC
PH UR STRIP: 7 [PH] (ref 5–8)
PLATELET # BLD AUTO: 290 K/UL (ref 150–350)
PMV BLD AUTO: 10.2 FL (ref 9.2–12.9)
PROT UR QL STRIP: NEGATIVE
RBC # BLD AUTO: 4.73 M/UL (ref 4.6–6.2)
SP GR UR STRIP: 1.01 (ref 1–1.03)
URN SPEC COLLECT METH UR: NORMAL
WBC # BLD AUTO: 7.8 K/UL (ref 3.9–12.7)

## 2020-02-03 PROCEDURE — 76870 US EXAM SCROTUM: CPT | Mod: 26,,, | Performed by: RADIOLOGY

## 2020-02-03 PROCEDURE — 87086 URINE CULTURE/COLONY COUNT: CPT

## 2020-02-03 PROCEDURE — 85025 COMPLETE CBC W/AUTO DIFF WBC: CPT

## 2020-02-03 PROCEDURE — 99213 OFFICE O/P EST LOW 20 MIN: CPT | Mod: PBBFAC,PO,25 | Performed by: INTERNAL MEDICINE

## 2020-02-03 PROCEDURE — 81003 URINALYSIS AUTO W/O SCOPE: CPT

## 2020-02-03 PROCEDURE — 99214 OFFICE O/P EST MOD 30 MIN: CPT | Mod: S$PBB,,, | Performed by: INTERNAL MEDICINE

## 2020-02-03 PROCEDURE — 85652 RBC SED RATE AUTOMATED: CPT

## 2020-02-03 PROCEDURE — 36415 COLL VENOUS BLD VENIPUNCTURE: CPT | Mod: PO

## 2020-02-03 PROCEDURE — 86140 C-REACTIVE PROTEIN: CPT

## 2020-02-03 PROCEDURE — 84153 ASSAY OF PSA TOTAL: CPT

## 2020-02-03 PROCEDURE — 84443 ASSAY THYROID STIM HORMONE: CPT

## 2020-02-03 PROCEDURE — 99214 PR OFFICE/OUTPT VISIT, EST, LEVL IV, 30-39 MIN: ICD-10-PCS | Mod: S$PBB,,, | Performed by: INTERNAL MEDICINE

## 2020-02-03 PROCEDURE — 99999 PR PBB SHADOW E&M-EST. PATIENT-LVL III: CPT | Mod: PBBFAC,,, | Performed by: INTERNAL MEDICINE

## 2020-02-03 PROCEDURE — 99999 PR PBB SHADOW E&M-EST. PATIENT-LVL III: ICD-10-PCS | Mod: PBBFAC,,, | Performed by: INTERNAL MEDICINE

## 2020-02-03 PROCEDURE — 76870 US SCROTUM AND TESTICLES: ICD-10-PCS | Mod: 26,,, | Performed by: RADIOLOGY

## 2020-02-03 PROCEDURE — 76870 US EXAM SCROTUM: CPT | Mod: TC

## 2020-02-03 RX ORDER — LEVOFLOXACIN 500 MG/1
500 TABLET, FILM COATED ORAL DAILY
Qty: 10 TABLET | Refills: 0 | Status: SHIPPED | OUTPATIENT
Start: 2020-02-03 | End: 2020-02-14 | Stop reason: SDUPTHER

## 2020-02-03 RX ORDER — CIPROFLOXACIN 500 MG/1
500 TABLET ORAL 2 TIMES DAILY
Qty: 14 TABLET | Refills: 0 | Status: SHIPPED | OUTPATIENT
Start: 2020-02-03 | End: 2020-02-03

## 2020-02-03 NOTE — PROGRESS NOTES
Patient ID: Julius Baker is a 74 y.o. male.    Chief Complaint: Penis Pain (on right side) and Urinary Frequency (Pt states that he goes over a dozen times a day)    JOAN Madrid is a 74 y.o. male who presents today with chief complaint of pain. Pain is located in the area of the right side of the penis and right testicle.  Onset was Thursday morning.  He woke up in pain. His pain increased in intensity and it was worse with Friday.  He tried Sitz baths, stretching, and walking, but nothing helped to relieve his pain.  He also tried Advil, but with no relief.  Currently nothing is making the pain better.  Lying down makes the pain worse.  He denies any associated symptoms of penile discharge, fever, abnormalities of the left testicle or left side of the penis.  He has not had any new sexual partners.  He also reports associated frequent urination and feeling like he does not completely empty his bladder.  Finally reports that he is often very cold at night.    Review of Systems   Endocrine: Positive for cold intolerance.   Genitourinary:        See  HPI   All other systems reviewed and are negative.        Objective:     Vitals:    02/03/20 1455   BP: 126/60   Pulse: 64        Physical Exam   Constitutional: He is oriented to person, place, and time. He appears well-developed and well-nourished. No distress.   HENT:   Head: Normocephalic and atraumatic.   Right Ear: External ear normal.   Left Ear: External ear normal.   Nose: Nose normal.   Eyes: Conjunctivae and EOM are normal.   Genitourinary:         Genitourinary Comments: Mild erythema of right scrotum.  No masses palpated.  There was some tenderness on palpation of the area.  No penile discharges or lesions noted. No inguinal lymphadenopathy.   Neurological: He is alert and oriented to person, place, and time.   Skin: Skin is warm and dry. He is not diaphoretic.   Psychiatric: He has a normal mood and affect. His behavior is normal. Judgment and  thought content normal.   Vitals reviewed.      Assessment:       1. Pain in right testicle    2. Frequent urination    3. Encounter for screening for malignant neoplasm of prostate     4. Cold intolerance        Plan:       Possible epididymitis. Obtaining labs, urine tests and imaging as below. Treat with levaquin 500 mg PO daily X 10 days. Return for worsening symptoms    Pain in right testicle  -     Cancel: US Scrotum And Testicles; Future; Expected date: 02/03/2020  -     CBC auto differential; Future; Expected date: 02/03/2020  -     Sedimentation rate; Future; Expected date: 02/03/2020  -     C-reactive protein; Future; Expected date: 02/03/2020  -     Discontinue: ciprofloxacin HCl (CIPRO) 500 MG tablet; Take 1 tablet (500 mg total) by mouth 2 (two) times daily. for 7 days  Dispense: 14 tablet; Refill: 0  -     US Scrotum And Testicles; Future; Expected date: 02/03/2020  -     levoFLOXacin (LEVAQUIN) 500 MG tablet; Take 1 tablet (500 mg total) by mouth once daily. for 10 days  Dispense: 10 tablet; Refill: 0    Frequent urination  -     Urinalysis; Future; Expected date: 02/03/2020  -     Urine culture  -     PSA, Screening; Future; Expected date: 02/03/2020  -     US Scrotum And Testicles; Future; Expected date: 02/03/2020  -     levoFLOXacin (LEVAQUIN) 500 MG tablet; Take 1 tablet (500 mg total) by mouth once daily. for 10 days  Dispense: 10 tablet; Refill: 0    Encounter for screening for malignant neoplasm of prostate   -     PSA, Screening; Future; Expected date: 02/03/2020    Cold intolerance  -     TSH; Future; Expected date: 02/03/2020        RTC PRN    Warning signs discussed, patient to call with any further issues or worsening of symptoms.       Parts of the above note were dictated using a voice dictation software. Please excuse any grammatical or typographical errors.

## 2020-02-04 ENCOUNTER — PROCEDURE VISIT (OUTPATIENT)
Dept: OPHTHALMOLOGY | Facility: CLINIC | Age: 75
End: 2020-02-04
Payer: MEDICARE

## 2020-02-04 ENCOUNTER — TELEPHONE (OUTPATIENT)
Dept: INTERNAL MEDICINE | Facility: CLINIC | Age: 75
End: 2020-02-04

## 2020-02-04 DIAGNOSIS — R35.0 FREQUENT URINATION: Primary | ICD-10-CM

## 2020-02-04 DIAGNOSIS — H26.492 POSTERIOR CAPSULAR OPACIFICATION VISUALLY SIGNIFICANT, LEFT EYE: Primary | ICD-10-CM

## 2020-02-04 LAB
BACTERIA UR CULT: NORMAL
BACTERIA UR CULT: NORMAL
COMPLEXED PSA SERPL-MCNC: 4.7 NG/ML (ref 0–4)
CRP SERPL-MCNC: 3 MG/L (ref 0–8.2)
TSH SERPL DL<=0.005 MIU/L-ACNC: 3.46 UIU/ML (ref 0.4–4)

## 2020-02-04 PROCEDURE — 66821 AFTER CATARACT LASER SURGERY: CPT | Mod: PBBFAC,LT | Performed by: OPHTHALMOLOGY

## 2020-02-04 PROCEDURE — 66821 YAG CAPSULOTOMY - OS - LEFT EYE: ICD-10-PCS | Mod: S$PBB,LT,, | Performed by: OPHTHALMOLOGY

## 2020-02-04 PROCEDURE — 92014 COMPRE OPH EXAM EST PT 1/>: CPT | Mod: S$PBB,57,, | Performed by: OPHTHALMOLOGY

## 2020-02-04 PROCEDURE — 92014 PR EYE EXAM, EST PATIENT,COMPREHESV: ICD-10-PCS | Mod: S$PBB,57,, | Performed by: OPHTHALMOLOGY

## 2020-02-04 RX ORDER — HYDROCODONE BITARTRATE AND ACETAMINOPHEN 5; 325 MG/1; MG/1
TABLET ORAL
COMMUNITY
Start: 2020-01-16 | End: 2021-03-10

## 2020-02-04 RX ORDER — AMOXICILLIN AND CLAVULANATE POTASSIUM 875; 125 MG/1; MG/1
TABLET, FILM COATED ORAL DAILY
COMMUNITY
Start: 2020-01-16 | End: 2021-11-01

## 2020-02-04 NOTE — TELEPHONE ENCOUNTER
Called patient to inform of result notes. Informed patient that his urinalysis was normal, PSA was slightly elevated (likely due to his age, per Dr. Herman,) and that his ultrasound showed benign cysts and spermatoceles. Patient verbalized understanding. Also noted that per Dr. Herman that he should continue with his antibiotics due do a possible infection in the epididymis, and that a referral has been placed for a urology appointment to further discuss the frequent urination. Patient verbalized understanding once more.

## 2020-02-04 NOTE — TELEPHONE ENCOUNTER
----- Message from Shruthi Box sent at 2/4/2020 12:42 PM CST -----  Contact: Self [phone]  Patient is returning your call.  Please advise.

## 2020-02-04 NOTE — TELEPHONE ENCOUNTER
----- Message from Sophia Herman MD sent at 2/4/2020  9:42 AM CST -----  Please call the patient regarding his labs and imaging. His urinalysis was normal. PSA (prostate blood test) slightly elevated but likely normal for his age. Ultrasound showed only benign cysts and spermatoceles (collections of sperm and fluid). I recommend he finish the antibiotics as prescribed for possible infection in the epididymis (head of the testicle). And I recommend he follow up with urology for the issue of frequent urination (I have placed a referral for him). Thank you!

## 2020-02-04 NOTE — PROGRESS NOTES
HPI     Ref. By: Self / Patient was last seen By Dr. Corey Sanford M.D on   (02/04/2019)    ERM OS  Retinal Hole OS  Floaters OS  Posterior Vit Detachment OU  Diplopia OU   HTN Ret OU  HARRISON OU  PCIOL OU- Dr. Choi, TORIC OD, monofocal OS    Current Gtt: OTC Tears      Pt here today for YAG OS. Patient states VA OS still blurry. No other   ocular complaints.     Last edited by Jackie Diaz on 2/4/2020 11:30 AM. (History)            Assessment /Plan     For exam results, see Encounter Report.    Posterior capsular opacification visually significant, left eye  -     Yag Capsulotomy - OS - Left Eye      Visually significant posterior capsular opacity present.    - discussed risks, benefits, and alternatives to laser surgery - pt wishes to proceed with yag laser  - Informed consent obtained and correct eye(s) verified with patient.  - Intraocular Pressure to be taken 10- 30 minutes post procedure.   - PF QID x 4 days then d/c  - f/up as scheduled    DIAGNOSIS: Visually significant posterior capsular opacity    PROCEDURE: YAG Laser Capsulotomy     COMPLICATIONS: none     DESCRIPTION OF PROCEDURE IN DETAIL:  1 drop of topical Proparacaine and Iopidine instilled, and eye(s) dilated with 1% Tropicamide 2.5% Phenylephrine. YAG laser applied to posterior capsule in cruciate pattern.      DISPOSITION:  Patient tolerated procedure well.      Will call pt next wk to ensure doing well s/p yag cap

## 2020-02-05 ENCOUNTER — PATIENT OUTREACH (OUTPATIENT)
Dept: ADMINISTRATIVE | Facility: OTHER | Age: 75
End: 2020-02-05

## 2020-02-06 ENCOUNTER — OFFICE VISIT (OUTPATIENT)
Dept: UROLOGY | Facility: CLINIC | Age: 75
End: 2020-02-06
Payer: MEDICARE

## 2020-02-06 VITALS
HEIGHT: 70 IN | BODY MASS INDEX: 35.98 KG/M2 | SYSTOLIC BLOOD PRESSURE: 163 MMHG | DIASTOLIC BLOOD PRESSURE: 76 MMHG | HEART RATE: 52 BPM | WEIGHT: 251.31 LBS

## 2020-02-06 DIAGNOSIS — N40.1 BPH WITH URINARY OBSTRUCTION: Primary | ICD-10-CM

## 2020-02-06 DIAGNOSIS — N13.8 BPH WITH URINARY OBSTRUCTION: Primary | ICD-10-CM

## 2020-02-06 DIAGNOSIS — R35.0 FREQUENT URINATION: ICD-10-CM

## 2020-02-06 PROCEDURE — 99213 OFFICE O/P EST LOW 20 MIN: CPT | Mod: PBBFAC | Performed by: UROLOGY

## 2020-02-06 PROCEDURE — 99999 PR PBB SHADOW E&M-EST. PATIENT-LVL III: CPT | Mod: PBBFAC,,, | Performed by: UROLOGY

## 2020-02-06 PROCEDURE — 99999 PR PBB SHADOW E&M-EST. PATIENT-LVL III: ICD-10-PCS | Mod: PBBFAC,,, | Performed by: UROLOGY

## 2020-02-06 PROCEDURE — 99204 PR OFFICE/OUTPT VISIT, NEW, LEVL IV, 45-59 MIN: ICD-10-PCS | Mod: S$PBB,,, | Performed by: UROLOGY

## 2020-02-06 PROCEDURE — 99204 OFFICE O/P NEW MOD 45 MIN: CPT | Mod: S$PBB,,, | Performed by: UROLOGY

## 2020-02-06 NOTE — LETTER
February 6, 2020        Sophia Herman MD  [address] [address]             [facility]  [address]  [place] [address]  Phone: [phone]   Patient: Julius Baker   MR Number: [id number]   YOB: 1945   Date of Visit: 2/6/2020       Dear Dr. Herman:    Thank you for referring Julius Baker to me for evaluation. Attached you will find relevant portions of my assessment and plan of care.    If you have questions, please do not hesitate to call me. I look forward to following Julius Baker along with you.    Sincerely,      Estiven Worthy MD            CC  No Recipients    Enclosure

## 2020-02-06 NOTE — PROGRESS NOTES
CHIEF COMPLAINT:    Mr. Baker is a 74 y.o. male presenting for a consultation at the request of Dr. Batsheva MD. Patient presents with urinary frequency.    PRESENTING ILLNESS:    Julius Baker is a 74 y.o. male with a >2 month history of urinary frequency.  He was diagnosed with prostatitis by his PCP and started on levaquin.    He has LUTS.  Has frequency and urgency, urinating 12+ times a day. Nocturia x2.  + incomplete emptying.  + decreased FOS. Is not on medication for this.  No dysuria, hematuria, flank/suprapubic pain.   He does not drink much caffeine.   No constipation.    He denies ED.      REVIEW OF SYSTEMS:    Julius Baker denies headache, blurred vision, fever, nausea, vomiting, chills, abdominal pain, chest pain, sore throat, bleeding per rectum, cough, SOB, recent loss of consciousness, recent mental status changes, seizures, dizziness, or upper or lower extremity weakness.    LISE  1. 4  2. 5  3. 5  4. 5  5. 5     PATIENT HISTORY:    Past Medical History:   Diagnosis Date    Acquired hypothyroidism     Cataract     Hyperlipidemia     Hypertension     Sleep apnea     SOB (shortness of breath)     feels allergy related       Past Surgical History:   Procedure Laterality Date    CATARACT EXTRACTION, BILATERAL Bilateral     EYE SURGERY      KNEE ARTHROSCOPY Bilateral     uvula removal         Family History   Problem Relation Age of Onset    Diabetes Mother     Hypertension Mother     Arthritis Mother        Social History     Socioeconomic History    Marital status:      Spouse name: Not on file    Number of children: Not on file    Years of education: Not on file    Highest education level: Not on file   Occupational History    Not on file   Social Needs    Financial resource strain: Not hard at all    Food insecurity:     Worry: Never true     Inability: Never true    Transportation needs:     Medical: No     Non-medical: No   Tobacco Use    Smoking  status: Never Smoker    Smokeless tobacco: Never Used   Substance and Sexual Activity    Alcohol use: Yes     Alcohol/week: 12.0 standard drinks     Types: 12 Standard drinks or equivalent per week     Frequency: 4 or more times a week     Drinks per session: 3 or 4     Binge frequency: Less than monthly     Comment: Cb and coke 2-3 drinks 3-4 times/week    Drug use: No    Sexual activity: Not Currently     Partners: Female   Lifestyle    Physical activity:     Days per week: 0 days     Minutes per session: 0 min    Stress: Rather much   Relationships    Social connections:     Talks on phone: Three times a week     Gets together: Once a week     Attends Zoroastrian service: Not on file     Active member of club or organization: Yes     Attends meetings of clubs or organizations: 1 to 4 times per year     Relationship status:    Other Topics Concern    Not on file   Social History Narrative    Not on file       Allergies:  Doxycycline and Adhesive    Medications:    Current Outpatient Medications:     albuterol (PROAIR HFA) 90 mcg/actuation inhaler, Inhale 2 puffs into the lungs every 6 (six) hours as needed for Wheezing. Rescue, Disp: , Rfl:     amoxicillin-clavulanate 875-125mg (AUGMENTIN) 875-125 mg per tablet, once daily., Disp: , Rfl:     aspirin-sod bicarb-citric acid (GUALBERTO-SELTZER) 324 mg TbEF, Take 325 mg by mouth every 6 (six) hours as needed. , Disp: , Rfl:     blood pressure monitor (BLOOD PRESSURE KIT) Kit, Please supply patient with 1 blood pressure monitor so that he can check his blood pressure once daily.  Please do not supply wrist monitor., Disp: 1 each, Rfl: 0    chlorthalidone (HYGROTEN) 25 MG Tab, Take 1 tablet (25 mg total) by mouth once daily., Disp: 90 tablet, Rfl: 3    dextromethorphan-guaifenesin (CORICIDIN HBP)  mg Cap, Take by mouth., Disp: , Rfl:     diclofenac sodium (VOLTAREN) 1 % Gel, Apply 2 g topically once daily., Disp: 1 Tube, Rfl: 6     diphenhydramine-acetaminophen (TYLENOL PM)  mg Tab, Take 1 tablet by mouth nightly as needed., Disp: , Rfl:     FLUoxetine 10 MG Tab, Take one half tablet PO daily, Disp: 90 tablet, Rfl: 2    HYDROcodone-acetaminophen (NORCO) 5-325 mg per tablet, , Disp: , Rfl:     levoFLOXacin (LEVAQUIN) 500 MG tablet, Take 1 tablet (500 mg total) by mouth once daily. for 10 days, Disp: 10 tablet, Rfl: 0    losartan (COZAAR) 100 MG tablet, TAKE 1 TABLET DAILY, Disp: 90 tablet, Rfl: 4    multivitamin capsule, Take 1 capsule by mouth once daily., Disp: , Rfl:     nebivolol (BYSTOLIC) 10 MG Tab, Take 1 tablet (10 mg total) by mouth once daily., Disp: 90 tablet, Rfl: 3    nebivolol (BYSTOLIC) 10 MG Tab, Take 1 tablet (10 mg total) by mouth once daily., Disp: 90 tablet, Rfl: 0    pravastatin (PRAVACHOL) 20 MG tablet, Take 1 tablet (20 mg total) by mouth once daily., Disp: 90 tablet, Rfl: 3    SYNTHROID 25 mcg tablet, TAKE 1 TABLET DAILY, Disp: 90 tablet, Rfl: 4    traZODone (DESYREL) 50 MG tablet, Take 1/2 to 1 whole tablet PO nightly as needed for sleep, Disp: 90 tablet, Rfl: 3    PHYSICAL EXAMINATION:    The patient generally appears in good health, is appropriately interactive, and is in no apparent distress.     Eyes: anicteric sclerae, moist conjunctivae; no lid-lag; PERRLA     HENT: Atraumatic; oropharynx clear with moist mucous membranes and no mucosal ulcerations;normal hard and soft palate.  No evidence of lymphadenopathy.    Neck: Trachea midline.  No thyromegaly.    Musculoskeletal: No abnormal gait.    Skin: No lesions.    Mental: Cooperative with normal affect.  Is oriented to time, place, and person.    Neuro: Grossly intact.    Chest: Normal inspiratory effort.   No accessory muscles.  No audible wheezes.  Respirations symmetric on inspiration and expiration.    Heart: Regular rhythm.      Abdomen:  Soft, non-tender. No masses or organomegaly. Bladder is not palpable. No evidence of flank discomfort. No  evidence of inguinal hernia.    Genitourinary: The penis is circumcised with no evidence of plaques or induration. The urethral meatus is normal. The testes, epididymides, and cord structures are normal in size and contour bilaterally. The scrotum is normal in size and contour.    Normal anal sphincter tone. No rectal mass.    The prostate is >50 g. Normal landmarks. Lateral sulci. Median furrow intact.  No nodularity or induration. Seminal vesicles are normal.    Extremities: No clubbing, cyanosis, or edema      LABS:    Urine dipstick - negative for all components  Lab Results   Component Value Date    PSA 4.7 (H) 02/03/2020       IMPRESSION:    Encounter Diagnoses   Name Primary?    BPH with urinary obstruction Yes    Frequent urination          PLAN:    1. Discussed options for his LUTS.  He'd like to observe them for now.  2. Discussed that his PSA was borderline elevated.  However, it is not recommended to check PSA's at his age, and it was checked during a period of acute inflammation.  Will recheck in 8 weeks to see if it returns to baseline.  3. RTC 8 weeks with an MISA.  He may consider medical therapy for his LUTS at that time.    Copy to:

## 2020-02-11 ENCOUNTER — TELEPHONE (OUTPATIENT)
Dept: OPHTHALMOLOGY | Facility: CLINIC | Age: 75
End: 2020-02-11

## 2020-02-13 ENCOUNTER — PATIENT MESSAGE (OUTPATIENT)
Dept: INTERNAL MEDICINE | Facility: CLINIC | Age: 75
End: 2020-02-13

## 2020-02-14 DIAGNOSIS — N50.811 PAIN IN RIGHT TESTICLE: ICD-10-CM

## 2020-02-14 DIAGNOSIS — R35.0 FREQUENT URINATION: ICD-10-CM

## 2020-02-14 RX ORDER — LEVOFLOXACIN 500 MG/1
500 TABLET, FILM COATED ORAL DAILY
Qty: 10 TABLET | Refills: 0 | Status: SHIPPED | OUTPATIENT
Start: 2020-02-14 | End: 2020-02-24

## 2020-03-19 ENCOUNTER — PATIENT MESSAGE (OUTPATIENT)
Dept: INTERNAL MEDICINE | Facility: CLINIC | Age: 75
End: 2020-03-19

## 2020-04-01 RX ORDER — CHLORTHALIDONE 25 MG/1
TABLET ORAL
Qty: 90 TABLET | Refills: 3 | Status: SHIPPED | OUTPATIENT
Start: 2020-04-01 | End: 2021-03-29

## 2020-04-23 ENCOUNTER — PATIENT MESSAGE (OUTPATIENT)
Dept: ADMINISTRATIVE | Facility: HOSPITAL | Age: 75
End: 2020-04-23

## 2020-04-27 ENCOUNTER — PATIENT MESSAGE (OUTPATIENT)
Dept: INTERNAL MEDICINE | Facility: CLINIC | Age: 75
End: 2020-04-27

## 2020-04-27 DIAGNOSIS — I10 HYPERTENSION, UNSPECIFIED TYPE: ICD-10-CM

## 2020-04-27 RX ORDER — LOSARTAN POTASSIUM 100 MG/1
100 TABLET ORAL DAILY
Qty: 90 TABLET | Refills: 4 | Status: SHIPPED | OUTPATIENT
Start: 2020-04-27 | End: 2020-08-23 | Stop reason: SDUPTHER

## 2020-05-22 DIAGNOSIS — Z12.11 COLON CANCER SCREENING: ICD-10-CM

## 2020-06-29 ENCOUNTER — PATIENT OUTREACH (OUTPATIENT)
Dept: ADMINISTRATIVE | Facility: HOSPITAL | Age: 75
End: 2020-06-29

## 2020-06-29 NOTE — PROGRESS NOTES
Outreach regarding Colorectal screening Kit - [employer] Fitkit - LVM -Reminded the patient to write the collection date on the specimen tube. Portal message sent

## 2020-07-15 ENCOUNTER — PATIENT OUTREACH (OUTPATIENT)
Dept: ADMINISTRATIVE | Facility: OTHER | Age: 75
End: 2020-07-15

## 2020-07-16 DIAGNOSIS — F41.9 ANXIETY AND DEPRESSION: ICD-10-CM

## 2020-07-16 DIAGNOSIS — F32.A ANXIETY AND DEPRESSION: ICD-10-CM

## 2020-07-16 RX ORDER — FLUOXETINE 10 MG/1
TABLET ORAL
Qty: 90 TABLET | Refills: 2 | Status: SHIPPED | OUTPATIENT
Start: 2020-07-16 | End: 2021-09-03

## 2020-07-18 ENCOUNTER — LAB VISIT (OUTPATIENT)
Dept: LAB | Facility: HOSPITAL | Age: 75
End: 2020-07-18
Attending: INTERNAL MEDICINE
Payer: MEDICARE

## 2020-07-18 DIAGNOSIS — Z12.11 COLON CANCER SCREENING: ICD-10-CM

## 2020-07-18 PROCEDURE — 82274 ASSAY TEST FOR BLOOD FECAL: CPT

## 2020-07-20 ENCOUNTER — OFFICE VISIT (OUTPATIENT)
Dept: OPHTHALMOLOGY | Facility: CLINIC | Age: 75
End: 2020-07-20
Payer: MEDICARE

## 2020-07-20 DIAGNOSIS — H53.8 BLURRED VISION, LEFT EYE: Primary | ICD-10-CM

## 2020-07-20 DIAGNOSIS — H52.7 REFRACTIVE ERRORS: ICD-10-CM

## 2020-07-20 DIAGNOSIS — H35.372 EPIRETINAL MEMBRANE (ERM) OF LEFT EYE: ICD-10-CM

## 2020-07-20 PROCEDURE — 99999 PR PBB SHADOW E&M-EST. PATIENT-LVL III: ICD-10-PCS | Mod: PBBFAC,,, | Performed by: OPHTHALMOLOGY

## 2020-07-20 PROCEDURE — 99213 OFFICE O/P EST LOW 20 MIN: CPT | Mod: PBBFAC | Performed by: OPHTHALMOLOGY

## 2020-07-20 PROCEDURE — 99999 PR PBB SHADOW E&M-EST. PATIENT-LVL III: CPT | Mod: PBBFAC,,, | Performed by: OPHTHALMOLOGY

## 2020-07-20 PROCEDURE — 92014 COMPRE OPH EXAM EST PT 1/>: CPT | Mod: S$PBB,,, | Performed by: OPHTHALMOLOGY

## 2020-07-20 PROCEDURE — 92014 PR EYE EXAM, EST PATIENT,COMPREHESV: ICD-10-PCS | Mod: S$PBB,,, | Performed by: OPHTHALMOLOGY

## 2020-07-20 NOTE — PROGRESS NOTES
HPI     Ref. By: Self / Patient was last seen By Dr. Corey Sanford M.D on   (02/04/2019)     ERM OS   Retinal Hole OS   Floaters OS   Posterior Vit Detachment OU   HTN Ret OU   HARRISON OU   PCIOL OU- Dr. Choi, TORIC OD, monofocal OS --> monoVA OD distance /   OS near    75 YO male here for concerns with double VA. YAG laser OS helped with   vision. Blurry vision OS. Pt states cat sx went well OD however OS,   unhappy with results. Dr. choi did eye sx. VA fine cc.    Last edited by Cami Hopper MD on 7/20/2020  4:01 PM. (History)            Assessment /Plan     For exam results, see Encounter Report.    Blurred vision, left eye    Refractive errors    Epiretinal membrane (ERM) of left eye      Pt complains of blurred VA sc.  Says VA good cc.  Again, discussed the monoVA target that Dr. Choi did for cat sx suspect this is causing some issues for patient.      Use ATs while at computer. Offered computer glasses - pt says he is okay without them.     F/up optom 1 yr REE

## 2020-07-22 DIAGNOSIS — R19.5 POSITIVE FIT (FECAL IMMUNOCHEMICAL TEST): Primary | ICD-10-CM

## 2020-07-22 LAB — HEMOCCULT STL QL IA: POSITIVE

## 2020-07-23 ENCOUNTER — TELEPHONE (OUTPATIENT)
Dept: NEUROLOGY | Facility: HOSPITAL | Age: 75
End: 2020-07-23

## 2020-07-23 ENCOUNTER — TELEPHONE (OUTPATIENT)
Dept: INTERNAL MEDICINE | Facility: CLINIC | Age: 75
End: 2020-07-23

## 2020-07-23 NOTE — TELEPHONE ENCOUNTER
----- Message from Sophia Herman MD sent at 7/22/2020  8:53 PM CDT -----  Please call the patient and make sure he has read his patient portal message regarding his positive fit kit.  Thank you.

## 2020-07-23 NOTE — TELEPHONE ENCOUNTER
Called patient to inform of abnormal fitkit and ask him to read note from Dr. Herman on fitkit results. No answer, voicemail left.

## 2020-07-24 DIAGNOSIS — Z01.818 PRE-OP TESTING: ICD-10-CM

## 2020-07-24 DIAGNOSIS — Z12.11 SPECIAL SCREENING FOR MALIGNANT NEOPLASMS, COLON: Primary | ICD-10-CM

## 2020-07-24 RX ORDER — POLYETHYLENE GLYCOL 3350, SODIUM SULFATE ANHYDROUS, SODIUM BICARBONATE, SODIUM CHLORIDE, POTASSIUM CHLORIDE 236; 22.74; 6.74; 5.86; 2.97 G/4L; G/4L; G/4L; G/4L; G/4L
4 POWDER, FOR SOLUTION ORAL ONCE
Qty: 4000 ML | Refills: 0 | Status: SHIPPED | OUTPATIENT
Start: 2020-07-24 | End: 2020-07-24

## 2020-07-29 ENCOUNTER — PATIENT OUTREACH (OUTPATIENT)
Dept: ADMINISTRATIVE | Facility: OTHER | Age: 75
End: 2020-07-29

## 2020-07-30 ENCOUNTER — LAB VISIT (OUTPATIENT)
Dept: LAB | Facility: HOSPITAL | Age: 75
End: 2020-07-30
Attending: UROLOGY
Payer: MEDICARE

## 2020-07-30 ENCOUNTER — OFFICE VISIT (OUTPATIENT)
Dept: UROLOGY | Facility: CLINIC | Age: 75
End: 2020-07-30
Payer: MEDICARE

## 2020-07-30 VITALS
WEIGHT: 258.38 LBS | DIASTOLIC BLOOD PRESSURE: 88 MMHG | SYSTOLIC BLOOD PRESSURE: 211 MMHG | HEART RATE: 68 BPM | HEIGHT: 70 IN | BODY MASS INDEX: 36.99 KG/M2

## 2020-07-30 DIAGNOSIS — N13.8 BPH WITH URINARY OBSTRUCTION: ICD-10-CM

## 2020-07-30 DIAGNOSIS — N40.1 BPH WITH URINARY OBSTRUCTION: Primary | ICD-10-CM

## 2020-07-30 DIAGNOSIS — N40.1 BPH WITH URINARY OBSTRUCTION: ICD-10-CM

## 2020-07-30 DIAGNOSIS — N13.8 BPH WITH URINARY OBSTRUCTION: Primary | ICD-10-CM

## 2020-07-30 LAB — COMPLEXED PSA SERPL-MCNC: 6 NG/ML (ref 0–4)

## 2020-07-30 PROCEDURE — 99999 PR PBB SHADOW E&M-EST. PATIENT-LVL IV: ICD-10-PCS | Mod: PBBFAC,,, | Performed by: UROLOGY

## 2020-07-30 PROCEDURE — 84153 ASSAY OF PSA TOTAL: CPT

## 2020-07-30 PROCEDURE — 99214 OFFICE O/P EST MOD 30 MIN: CPT | Mod: PBBFAC | Performed by: UROLOGY

## 2020-07-30 PROCEDURE — 99213 OFFICE O/P EST LOW 20 MIN: CPT | Mod: S$PBB,,, | Performed by: UROLOGY

## 2020-07-30 PROCEDURE — 99213 PR OFFICE/OUTPT VISIT, EST, LEVL III, 20-29 MIN: ICD-10-PCS | Mod: S$PBB,,, | Performed by: UROLOGY

## 2020-07-30 PROCEDURE — 99999 PR PBB SHADOW E&M-EST. PATIENT-LVL IV: CPT | Mod: PBBFAC,,, | Performed by: UROLOGY

## 2020-07-30 PROCEDURE — 36415 COLL VENOUS BLD VENIPUNCTURE: CPT

## 2020-07-30 NOTE — PROGRESS NOTES
CHIEF COMPLAINT:    Mr. Baker is a 74 y.o. male presenting with LUTS.    PRESENTING ILLNESS:    Julius Baker is a 74 y.o. male with LUTS.  Has frequency and urgency . Nocturia x2.  + incomplete emptying.  + decreased FOS.  No dysuria, hematuria, flank/suprapubic pain.   He does not drink much caffeine.   No constipation.  He's pleased with how he voids.    He denies ED.      REVIEW OF SYSTEMS:    Julius Baker denies headache, blurred vision, fever, nausea, vomiting, chills, abdominal pain, chest pain, sore throat, bleeding per rectum, cough, SOB, recent loss of consciousness, recent mental status changes, seizures, dizziness, or upper or lower extremity weakness.    LISE  1. 4  2. 5  3. 5  4. 5  5. 5     PATIENT HISTORY:    Past Medical History:   Diagnosis Date    Acquired hypothyroidism     BPH (benign prostatic hyperplasia)     Cataract     Hyperlipidemia     Hypertension     Sleep apnea     SOB (shortness of breath)     feels allergy related       Past Surgical History:   Procedure Laterality Date    CATARACT EXTRACTION, BILATERAL Bilateral     EYE SURGERY      KNEE ARTHROSCOPY Bilateral     uvula removal         Family History   Problem Relation Age of Onset    Diabetes Mother     Hypertension Mother     Arthritis Mother        Social History     Socioeconomic History    Marital status:      Spouse name: Not on file    Number of children: Not on file    Years of education: Not on file    Highest education level: Not on file   Occupational History    Not on file   Social Needs    Financial resource strain: Not hard at all    Food insecurity     Worry: Never true     Inability: Never true    Transportation needs     Medical: No     Non-medical: No   Tobacco Use    Smoking status: Never Smoker    Smokeless tobacco: Never Used   Substance and Sexual Activity    Alcohol use: Yes     Alcohol/week: 12.0 standard drinks     Types: 12 Standard drinks or equivalent per week      Frequency: 4 or more times a week     Drinks per session: 3 or 4     Binge frequency: Less than monthly     Comment: Rum and coke 2-3 drinks 3-4 times/week    Drug use: No    Sexual activity: Not Currently     Partners: Female   Lifestyle    Physical activity     Days per week: 0 days     Minutes per session: 0 min    Stress: Rather much   Relationships    Social connections     Talks on phone: Three times a week     Gets together: Once a week     Attends Anabaptist service: Not on file     Active member of club or organization: Yes     Attends meetings of clubs or organizations: 1 to 4 times per year     Relationship status:    Other Topics Concern    Not on file   Social History Narrative    Not on file       Allergies:  Doxycycline and Adhesive    Medications:    Current Outpatient Medications:     albuterol (PROAIR HFA) 90 mcg/actuation inhaler, Inhale 2 puffs into the lungs every 6 (six) hours as needed for Wheezing. Rescue, Disp: , Rfl:     amoxicillin-clavulanate 875-125mg (AUGMENTIN) 875-125 mg per tablet, once daily., Disp: , Rfl:     aspirin-sod bicarb-citric acid (GUALBERTO-SELTZER) 324 mg TbEF, Take 325 mg by mouth every 6 (six) hours as needed. , Disp: , Rfl:     blood pressure monitor (BLOOD PRESSURE KIT) Kit, Please supply patient with 1 blood pressure monitor so that he can check his blood pressure once daily.  Please do not supply wrist monitor., Disp: 1 each, Rfl: 0    chlorthalidone (HYGROTEN) 25 MG Tab, TAKE 1 TABLET DAILY, Disp: 90 tablet, Rfl: 3    dextromethorphan-guaifenesin (CORICIDIN HBP)  mg Cap, Take by mouth., Disp: , Rfl:     diclofenac sodium (VOLTAREN) 1 % Gel, Apply 2 g topically once daily., Disp: 1 Tube, Rfl: 6    diphenhydramine-acetaminophen (TYLENOL PM)  mg Tab, Take 1 tablet by mouth nightly as needed., Disp: , Rfl:     FLUoxetine 10 MG Tab, Take one half tablet PO daily, Disp: 90 tablet, Rfl: 2    HYDROcodone-acetaminophen (NORCO) 5-325 mg per  tablet, , Disp: , Rfl:     losartan (COZAAR) 100 MG tablet, Take 1 tablet (100 mg total) by mouth once daily., Disp: 90 tablet, Rfl: 4    multivitamin capsule, Take 1 capsule by mouth once daily., Disp: , Rfl:     nebivolol (BYSTOLIC) 10 MG Tab, Take 1 tablet (10 mg total) by mouth once daily., Disp: 90 tablet, Rfl: 3    nebivolol (BYSTOLIC) 10 MG Tab, Take 1 tablet (10 mg total) by mouth once daily., Disp: 90 tablet, Rfl: 0    pravastatin (PRAVACHOL) 20 MG tablet, TAKE 1 TABLET DAILY, Disp: 90 tablet, Rfl: 3    SYNTHROID 25 mcg tablet, TAKE 1 TABLET DAILY, Disp: 90 tablet, Rfl: 4    traZODone (DESYREL) 50 MG tablet, Take 1/2 to 1 whole tablet PO nightly as needed for sleep, Disp: 90 tablet, Rfl: 3    PHYSICAL EXAMINATION:    The patient generally appears in good health, is appropriately interactive, and is in no apparent distress.     Eyes: anicteric sclerae, moist conjunctivae; no lid-lag; PERRLA     HENT: Atraumatic; oropharynx clear with moist mucous membranes and no mucosal ulcerations;normal hard and soft palate.  No evidence of lymphadenopathy.    Neck: Trachea midline.  No thyromegaly.    Musculoskeletal: No abnormal gait.    Skin: No lesions.    Mental: Cooperative with normal affect.  Is oriented to time, place, and person.    Neuro: Grossly intact.    Chest: Normal inspiratory effort.   No accessory muscles.  No audible wheezes.  Respirations symmetric on inspiration and expiration.    Heart: Regular rhythm.      Abdomen:  Soft, non-tender. No masses or organomegaly. Bladder is not palpable. No evidence of flank discomfort. No evidence of inguinal hernia.    Genitourinary: The penis is circumcised with no evidence of plaques or induration. The urethral meatus is normal. The testes, epididymides, and cord structures are normal in size and contour bilaterally. The scrotum is normal in size and contour.    Normal anal sphincter tone. No rectal mass.    The prostate is >50 g. Normal landmarks. Lateral  sulci. Median furrow intact.  No nodularity or induration. Seminal vesicles are normal.    Extremities: No clubbing, cyanosis, or edema      LABS:    Urine dipstick - negative for all components  Lab Results   Component Value Date    PSA 4.7 (H) 02/03/2020       IMPRESSION:    Encounter Diagnoses   Name Primary?    BPH with urinary obstruction Yes         PLAN:    1. Discussed options for his LUTS.  He'd like to observe them for now.  2. Discussed that his PSA was borderline elevated.  However, it is not recommended to check PSA's at his age, and it was checked during a period of acute inflammation.  Will recheck it.  3. RTC 1 year to see an MISA.    Copy to:

## 2020-07-31 ENCOUNTER — TELEPHONE (OUTPATIENT)
Dept: UROLOGY | Facility: CLINIC | Age: 75
End: 2020-07-31

## 2020-07-31 NOTE — TELEPHONE ENCOUNTER
Patient notified that his PSA has remained elevated.  He should come in to discuss. Patient states he will call back to schedule appointment.

## 2020-08-04 ENCOUNTER — OFFICE VISIT (OUTPATIENT)
Dept: INTERNAL MEDICINE | Facility: CLINIC | Age: 75
End: 2020-08-04
Payer: MEDICARE

## 2020-08-04 VITALS
OXYGEN SATURATION: 97 % | TEMPERATURE: 98 F | WEIGHT: 251.56 LBS | SYSTOLIC BLOOD PRESSURE: 150 MMHG | DIASTOLIC BLOOD PRESSURE: 78 MMHG | BODY MASS INDEX: 36.09 KG/M2 | HEART RATE: 61 BPM

## 2020-08-04 DIAGNOSIS — N13.8 BPH WITH URINARY OBSTRUCTION: ICD-10-CM

## 2020-08-04 DIAGNOSIS — E78.5 HYPERLIPIDEMIA, UNSPECIFIED HYPERLIPIDEMIA TYPE: Chronic | ICD-10-CM

## 2020-08-04 DIAGNOSIS — N40.1 BPH WITH URINARY OBSTRUCTION: ICD-10-CM

## 2020-08-04 DIAGNOSIS — I10 ESSENTIAL HYPERTENSION: Chronic | ICD-10-CM

## 2020-08-04 DIAGNOSIS — F51.04 PSYCHOPHYSIOLOGICAL INSOMNIA: ICD-10-CM

## 2020-08-04 PROCEDURE — 99999 PR PBB SHADOW E&M-EST. PATIENT-LVL III: ICD-10-PCS | Mod: PBBFAC,,, | Performed by: INTERNAL MEDICINE

## 2020-08-04 PROCEDURE — 99999 PR PBB SHADOW E&M-EST. PATIENT-LVL III: CPT | Mod: PBBFAC,,, | Performed by: INTERNAL MEDICINE

## 2020-08-04 PROCEDURE — 99213 OFFICE O/P EST LOW 20 MIN: CPT | Mod: PBBFAC,PO | Performed by: INTERNAL MEDICINE

## 2020-08-04 PROCEDURE — 99214 OFFICE O/P EST MOD 30 MIN: CPT | Mod: S$PBB,,, | Performed by: INTERNAL MEDICINE

## 2020-08-04 PROCEDURE — 99214 PR OFFICE/OUTPT VISIT, EST, LEVL IV, 30-39 MIN: ICD-10-PCS | Mod: S$PBB,,, | Performed by: INTERNAL MEDICINE

## 2020-08-04 RX ORDER — TRAZODONE HYDROCHLORIDE 50 MG/1
TABLET ORAL
Qty: 90 TABLET | Refills: 3 | Status: SHIPPED | OUTPATIENT
Start: 2020-08-04 | End: 2021-11-01

## 2020-08-04 NOTE — PROGRESS NOTES
Patient ID: Julius aBker is a 74 y.o. male.    Chief Complaint: Follow-up (Meds)    HPI Julius is a 74 y.o. male who presents today with list of concerns.  He did blood work at a [facility] recently.  He brought those results with him today.  He is concerned about possible fatty liver.  Noted elevated GGT.  But liver enzymes were normal.  He is worried about elevated PSA.  On the blood work at the health fair it was 4.78.  He did blood work again with Urology and it was 6.0.  Urologist wants him to possibly undergo a biopsy.  He is worried about difficulty sleeping.  I asked him if he was taking the trazodone that was prescribed last fall.  Patient seemed unaware of this medication.  On recent blood work, cholesterol was elevated.  He has a prescription for pravastatin.  Blood pressure slightly elevated 150/78 for got to take blood pressure medication today.    Review of Systems   All other systems reviewed and are negative.        Objective:     Vitals:    08/04/20 1131   BP: (!) 150/78   Pulse: 61   Temp: 97.9 °F (36.6 °C)        Physical Exam  Vitals signs reviewed.   Constitutional:       General: He is not in acute distress.     Appearance: Normal appearance. He is obese. He is not ill-appearing, toxic-appearing or diaphoretic.   HENT:      Head: Normocephalic and atraumatic.      Right Ear: External ear normal.      Left Ear: External ear normal.      Nose: Nose normal.   Eyes:      Extraocular Movements: Extraocular movements intact.      Conjunctiva/sclera: Conjunctivae normal.   Pulmonary:      Effort: Pulmonary effort is normal. No respiratory distress.   Skin:     General: Skin is warm and dry.   Neurological:      General: No focal deficit present.      Mental Status: He is oriented to person, place, and time. Mental status is at baseline.   Psychiatric:         Mood and Affect: Mood normal.         Behavior: Behavior normal.         Thought Content: Thought content normal.          Judgment: Judgment normal.         Assessment:       1. Psychophysiological insomnia Active   2. Hyperlipidemia, unspecified hyperlipidemia type Active   3. BPH with urinary obstruction Active   4. Essential hypertension Sub-optimally controlled       Plan:     No concerning findings on liver blood work. Unsure why GGT was checked.     Psychophysiological insomnia  -     traZODone (DESYREL) 50 MG tablet; Take 1/2 to 1 whole tablet PO nightly as needed for sleep  Dispense: 90 tablet; Refill: 3    Hyperlipidemia, unspecified hyperlipidemia type  Comments:  See message to patient. Continue pravastatin at this time.    BPH with urinary obstruction  Comments:  PSA at high end normal range for age. F/u with urology    Essential hypertension  Comments:  Will need to recheck when taking medications        RTC PRN    Warning signs discussed, patient to call with any further issues or worsening of symptoms.       Parts of the above note were dictated using a voice dictation software. Please excuse any grammatical or typographical errors.

## 2020-08-05 DIAGNOSIS — E78.5 HYPERLIPIDEMIA, UNSPECIFIED HYPERLIPIDEMIA TYPE: Primary | ICD-10-CM

## 2020-08-08 ENCOUNTER — LAB VISIT (OUTPATIENT)
Dept: SPORTS MEDICINE | Facility: CLINIC | Age: 75
End: 2020-08-08
Payer: MEDICARE

## 2020-08-08 DIAGNOSIS — Z01.818 PRE-OP TESTING: ICD-10-CM

## 2020-08-08 PROCEDURE — U0003 INFECTIOUS AGENT DETECTION BY NUCLEIC ACID (DNA OR RNA); SEVERE ACUTE RESPIRATORY SYNDROME CORONAVIRUS 2 (SARS-COV-2) (CORONAVIRUS DISEASE [COVID-19]), AMPLIFIED PROBE TECHNIQUE, MAKING USE OF HIGH THROUGHPUT TECHNOLOGIES AS DESCRIBED BY CMS-2020-01-R: HCPCS

## 2020-08-09 LAB — SARS-COV-2 RNA RESP QL NAA+PROBE: NOT DETECTED

## 2020-08-11 ENCOUNTER — ANESTHESIA EVENT (OUTPATIENT)
Dept: ENDOSCOPY | Facility: HOSPITAL | Age: 75
End: 2020-08-11
Payer: MEDICARE

## 2020-08-11 ENCOUNTER — HOSPITAL ENCOUNTER (OUTPATIENT)
Facility: HOSPITAL | Age: 75
Discharge: HOME | End: 2020-08-11
Attending: COLON & RECTAL SURGERY | Admitting: COLON & RECTAL SURGERY
Payer: MEDICARE

## 2020-08-11 ENCOUNTER — ANESTHESIA (OUTPATIENT)
Dept: ENDOSCOPY | Facility: HOSPITAL | Age: 75
End: 2020-08-11
Payer: MEDICARE

## 2020-08-11 VITALS
DIASTOLIC BLOOD PRESSURE: 85 MMHG | HEIGHT: 70 IN | SYSTOLIC BLOOD PRESSURE: 182 MMHG | OXYGEN SATURATION: 100 % | RESPIRATION RATE: 18 BRPM | BODY MASS INDEX: 35.36 KG/M2 | TEMPERATURE: 98 F | WEIGHT: 247 LBS | HEART RATE: 55 BPM

## 2020-08-11 DIAGNOSIS — Z12.11 COLON CANCER SCREENING: Primary | ICD-10-CM

## 2020-08-11 LAB — POCT GLUCOSE: 92 MG/DL (ref 70–110)

## 2020-08-11 PROCEDURE — 37000009 HC ANESTHESIA EA ADD 15 MINS: Performed by: COLON & RECTAL SURGERY

## 2020-08-11 PROCEDURE — 25000003 PHARM REV CODE 250: Performed by: COLON & RECTAL SURGERY

## 2020-08-11 PROCEDURE — E9220 PRA ENDO ANESTHESIA: HCPCS | Mod: PT,,, | Performed by: NURSE ANESTHETIST, CERTIFIED REGISTERED

## 2020-08-11 PROCEDURE — 37000008 HC ANESTHESIA 1ST 15 MINUTES: Performed by: COLON & RECTAL SURGERY

## 2020-08-11 PROCEDURE — 82962 GLUCOSE BLOOD TEST: CPT | Performed by: COLON & RECTAL SURGERY

## 2020-08-11 PROCEDURE — 45385 COLONOSCOPY W/LESION REMOVAL: CPT | Mod: PT,,, | Performed by: COLON & RECTAL SURGERY

## 2020-08-11 PROCEDURE — 88305 TISSUE EXAM BY PATHOLOGIST: CPT | Mod: 26,,, | Performed by: PATHOLOGY

## 2020-08-11 PROCEDURE — 88305 TISSUE EXAM BY PATHOLOGIST: ICD-10-PCS | Mod: 26,,, | Performed by: PATHOLOGY

## 2020-08-11 PROCEDURE — 27201089 HC SNARE, DISP (ANY): Performed by: COLON & RECTAL SURGERY

## 2020-08-11 PROCEDURE — E9220 PRA ENDO ANESTHESIA: ICD-10-PCS | Mod: PT,,, | Performed by: NURSE ANESTHETIST, CERTIFIED REGISTERED

## 2020-08-11 PROCEDURE — 45385 COLONOSCOPY W/LESION REMOVAL: CPT | Performed by: COLON & RECTAL SURGERY

## 2020-08-11 PROCEDURE — 88305 TISSUE EXAM BY PATHOLOGIST: CPT | Mod: 59 | Performed by: PATHOLOGY

## 2020-08-11 PROCEDURE — 45385 PR COLONOSCOPY,REMV LESN,SNARE: ICD-10-PCS | Mod: PT,,, | Performed by: COLON & RECTAL SURGERY

## 2020-08-11 PROCEDURE — 63600175 PHARM REV CODE 636 W HCPCS: Performed by: NURSE ANESTHETIST, CERTIFIED REGISTERED

## 2020-08-11 RX ORDER — LIDOCAINE HYDROCHLORIDE 20 MG/ML
INJECTION INTRAVENOUS
Status: DISCONTINUED | OUTPATIENT
Start: 2020-08-11 | End: 2020-08-11

## 2020-08-11 RX ORDER — PROPOFOL 10 MG/ML
VIAL (ML) INTRAVENOUS
Status: DISCONTINUED | OUTPATIENT
Start: 2020-08-11 | End: 2020-08-11

## 2020-08-11 RX ORDER — PROPOFOL 10 MG/ML
VIAL (ML) INTRAVENOUS CONTINUOUS PRN
Status: DISCONTINUED | OUTPATIENT
Start: 2020-08-11 | End: 2020-08-11

## 2020-08-11 RX ORDER — SODIUM CHLORIDE 9 MG/ML
INJECTION, SOLUTION INTRAVENOUS CONTINUOUS
Status: DISCONTINUED | OUTPATIENT
Start: 2020-08-11 | End: 2020-08-11 | Stop reason: HOSPADM

## 2020-08-11 RX ADMIN — SODIUM CHLORIDE: 0.9 INJECTION, SOLUTION INTRAVENOUS at 09:08

## 2020-08-11 RX ADMIN — PROPOFOL 70 MG: 10 INJECTION, EMULSION INTRAVENOUS at 10:08

## 2020-08-11 RX ADMIN — LIDOCAINE HYDROCHLORIDE 100 MG: 20 INJECTION, SOLUTION INTRAVENOUS at 10:08

## 2020-08-11 RX ADMIN — PROPOFOL 200 MCG/KG/MIN: 10 INJECTION, EMULSION INTRAVENOUS at 10:08

## 2020-08-11 NOTE — TRANSFER OF CARE
"Anesthesia Transfer of Care Note    Patient: Julius Baker    Procedure(s) Performed: Procedure(s) (LRB):  COLONOSCOPY (N/A)    Patient location: OPS    Anesthesia Type: general    Transport from OR: Transported from OR on room air with adequate spontaneous ventilation    Post pain: adequate analgesia    Post assessment: no apparent anesthetic complications and tolerated procedure well    Post vital signs: stable    Level of consciousness: awake, alert and oriented    Nausea/Vomiting: no nausea/vomiting    Complications: none    Transfer of care protocol was followed      Last vitals:   Visit Vitals  BP (!) 197/93 (BP Location: Left arm, Patient Position: Lying)   Pulse (!) 59   Temp 36.8 °C (98.2 °F) (Temporal)   Resp 14   Ht 5' 10" (1.778 m)   Wt 112 kg (247 lb)   SpO2 99%   BMI 35.44 kg/m²     "

## 2020-08-11 NOTE — ANESTHESIA POSTPROCEDURE EVALUATION
Anesthesia Post Evaluation    Patient: Julius Baker    Procedure(s) Performed: Procedure(s) (LRB):  COLONOSCOPY (N/A)    Final Anesthesia Type: general    Patient location during evaluation: PACU  Patient participation: Yes- Able to Participate  Level of consciousness: awake and alert and oriented  Post-procedure vital signs: reviewed and stable  Pain management: adequate  Airway patency: patent    PONV status at discharge: No PONV  Anesthetic complications: no      Cardiovascular status: blood pressure returned to baseline and hemodynamically stable  Respiratory status: unassisted, spontaneous ventilation and room air  Hydration status: euvolemic  Follow-up not needed.          Vitals Value Taken Time   /85 08/11/20 1105   Temp 36.6 °C (97.9 °F) 08/11/20 1035   Pulse 55 08/11/20 1105   Resp 18 08/11/20 1105   SpO2 100 % 08/11/20 1105         Event Time   Out of Recovery 11:07:18         Pain/Kirti Score: Kirti Score: 10 (8/11/2020 11:07 AM)

## 2020-08-11 NOTE — ANESTHESIA PREPROCEDURE EVALUATION
08/11/2020  Julius Baker is a 74 y.o., male.  Past Medical History:   Diagnosis Date    Acquired hypothyroidism     BPH (benign prostatic hyperplasia)     Cataract     Hyperlipidemia     Hypertension     Sleep apnea     SOB (shortness of breath)     feels allergy related     Past Surgical History:   Procedure Laterality Date    CATARACT EXTRACTION, BILATERAL Bilateral     EYE SURGERY      KNEE ARTHROSCOPY Bilateral     uvula removal             Anesthesia Evaluation    I have reviewed the Patient Summary Reports.      I have reviewed the Medications.     Review of Systems  Anesthesia Hx:  No problems with previous Anesthesia  Denies Family Hx of Anesthesia complications.   Denies Personal Hx of Anesthesia complications.   Hematology/Oncology:  Hematology Normal   Oncology Normal     EENT/Dental:EENT/Dental Normal   Cardiovascular:   Hypertension    Pulmonary:   Shortness of breath Sleep Apnea    Renal/:  Renal/ Normal     Hepatic/GI:  Hepatic/GI Normal    Musculoskeletal:  Musculoskeletal Normal    Neurological:  Neurology Normal    Endocrine:   Hypothyroidism    Dermatological:  Skin Normal    Psych:  Psychiatric Normal           Physical Exam  General:  Well nourished    Airway/Jaw/Neck:  Airway Findings: General Airway Assessment: Adult Mallampati: II  Jaw/Neck Findings:  Neck ROM: Normal ROM     Eyes/Ears/Nose:  EYES/EARS/NOSE FINDINGS: Normal   Dental:  Dental Findings: In tact   Chest/Lungs:  Chest/Lungs Findings: Clear to auscultation     Heart/Vascular:  Heart Findings: Rate: Normal  Heart murmur: negative Vascular Findings: Normal    Abdomen:  Abdomen Findings: Normal    Musculoskeletal:  Musculoskeletal Findings: Normal   Skin:  Skin Findings: Normal    Mental Status:  Mental Status Findings:  Alert and Oriented, Cooperative         Anesthesia Plan  Type of Anesthesia, risks &  benefits discussed:  Anesthesia Type:  general  Patient's Preference: general  Intra-op Monitoring Plan: standard ASA monitors  Intra-op Monitoring Plan Comments:   Post Op Pain Control Plan:   Post Op Pain Control Plan Comments:   Induction:   IV  Beta Blocker:  Patient is not currently on a Beta-Blocker (No further documentation required).       Informed Consent: Patient understands risks and agrees with Anesthesia plan.  Questions answered. Anesthesia consent signed with patient.  ASA Score: 2     Day of Surgery Review of History & Physical:  There are no significant changes.  H&P update referred to the provider.         Ready For Surgery From Anesthesia Perspective.

## 2020-08-11 NOTE — H&P
COLONOSCOPY HISTORY AND PHYSICAL EXAM    Procedure : Colonoscopy      INDICATIONS: Positive outpatient FOBT ordered by PCP    Family Hx of CRC: None    Last Colonoscopy:  Approximately 20 years ago  Findings: No pathology per patient       Past Medical History:   Diagnosis Date    Acquired hypothyroidism     BPH (benign prostatic hyperplasia)     Cataract     Hyperlipidemia     Hypertension     Sleep apnea     SOB (shortness of breath)     feels allergy related     Sedation Problems: NO  Family History   Problem Relation Age of Onset    Diabetes Mother     Hypertension Mother     Arthritis Mother      Fam Hx of Sedation Problems: NO  Social History     Socioeconomic History    Marital status:      Spouse name: Not on file    Number of children: Not on file    Years of education: Not on file    Highest education level: Not on file   Occupational History    Not on file   Social Needs    Financial resource strain: Not hard at all    Food insecurity     Worry: Never true     Inability: Never true    Transportation needs     Medical: No     Non-medical: No   Tobacco Use    Smoking status: Never Smoker    Smokeless tobacco: Never Used   Substance and Sexual Activity    Alcohol use: Yes     Alcohol/week: 12.0 standard drinks     Types: 12 Standard drinks or equivalent per week     Frequency: 4 or more times a week     Drinks per session: 3 or 4     Binge frequency: Less than monthly     Comment: Rum and coke 2-3 drinks 3-4 times/week    Drug use: No    Sexual activity: Not Currently     Partners: Female   Lifestyle    Physical activity     Days per week: 0 days     Minutes per session: 0 min    Stress: Rather much   Relationships    Social connections     Talks on phone: Three times a week     Gets together: Once a week     Attends Voodoo service: Not on file     Active member of club or organization: Yes     Attends meetings of clubs or organizations: 1 to 4 times per year      "Relationship status:    Other Topics Concern    Not on file   Social History Narrative    Not on file       Review of Systems - Negative except   Respiratory ROS: no dyspnea  Cardiovascular ROS: no exertional chest pain  Gastrointestinal ROS: YES intermittent diarrhea (non-bloody, non-melenic) abdominal discomfort,  NO rectal bleeding  Musculoskeletal ROS: no muscular pain  Neurological ROS: no recent stroke    Physical Exam:  BP (!) 197/93 (BP Location: Left arm, Patient Position: Lying)   Pulse (!) 59   Temp 98.2 °F (36.8 °C) (Temporal)   Resp 14   Ht 5' 10" (1.778 m)   Wt 112 kg (247 lb)   SpO2 99%   BMI 35.44 kg/m²   General: no distress  Head: normocephalic  Mallampati Score   Neck: supple, symmetrical, trachea midline  Lungs:  normal respiratory effort  Heart: regular rate and rhythm  Abdomen: soft, non-tender non-distented; bowel sounds normal; no masses,  no organomegaly  Extremities: no cyanosis or edema, or clubbing    ASA:  II    PLAN  COLONOSCOPY.    SedationPlan :MAC    The details of the procedure, the possible need for biopsy or polypectomy and the potential risks including bleeding, perforation, missed polyps were discussed in detail.      "

## 2020-08-11 NOTE — [employer] PATIENT INSTRUCTIONS
Discharge Summary/Instructions after an Endoscopic Procedure  Patient Name: Julius Baker  Patient MRN: [id number]  Patient YOB: 1945 Tuesday, August 11, 2020  Norris Ríos MD  RESTRICTIONS:  During your procedure today, you received medications for sedation.  These   medications may affect your judgment, balance and coordination.  Therefore,   for 24 hours, you have the following restrictions:   - DO NOT drive a car, operate machinery, make legal/financial decisions,   sign important papers or drink alcohol.    ACTIVITY:  Today: no heavy lifting, straining or running due to procedural   sedation/anesthesia.  The following day: return to full activity including work.  DIET:  Eat and drink normally unless instructed otherwise.     TREATMENT FOR COMMON SIDE EFFECTS:  - Mild abdominal pain, nausea, belching, bloating or excessive gas:  rest,   eat lightly and use a heating pad.  - Sore Throat: treat with throat lozenges and/or gargle with warm salt   water.  - Because air was used during the procedure, expelling large amounts of air   from your rectum or belching is normal.  - If a bowel prep was taken, you may not have a bowel movement for 1-3 days.    This is normal.  SYMPTOMS TO WATCH FOR AND REPORT TO YOUR PHYSICIAN:  1. Abdominal pain or bloating, other than gas cramps.  2. Chest pain.  3. Back pain.  4. Signs of infection such as: chills or fever occurring within 24 hours   after the procedure.  5. Rectal bleeding, which would show as bright red, maroon, or black stools.   (A tablespoon of blood from the rectum is not serious, especially if   hemorrhoids are present.)  6. Vomiting.  7. Weakness or dizziness.  GO DIRECTLY TO THE NEAREST EMERGENCY ROOM IF YOU HAVE ANY OF THE FOLLOWING:      Difficulty breathing              Chills and/or fever over 101 F   Persistent vomiting and/or vomiting blood   Severe abdominal pain   Severe chest pain   Black, tarry stools   Bleeding- more than one tablespoon   Any  other symptom or condition that you feel may need urgent attention  Your doctor recommends these additional instructions:  If any biopsies were taken, your doctors clinic will contact you in 1 to 2   weeks with any results.  - Discharge patient to home (ambulatory).   - Patient has a contact number available for emergencies.  The signs and   symptoms of potential delayed complications were discussed with the   patient.  Return to normal activities tomorrow.  Written discharge   instructions were provided to the patient.   - Resume previous diet.   - Continue present medications.   - Await pathology results.   - Repeat colonoscopy in 3 years for surveillance based on pathology   results.  For questions, problems or results please call your physician - Norris Ríos MD at Work:  [phone].  [facility], EMERGENCY ROOM PHONE NUMBER: [phone]  IF A COMPLICATION OR EMERGENCY SITUATION ARISES AND YOU ARE UNABLE TO REACH   YOUR PHYSICIAN - GO DIRECTLY TO THE EMERGENCY ROOM.  Norris Ríos MD  8/11/2020 10:34:22 AM  This report has been verified and signed electronically.  [employer]

## 2020-08-17 LAB
FINAL PATHOLOGIC DIAGNOSIS: NORMAL
GROSS: NORMAL

## 2020-08-19 DIAGNOSIS — G47.00 INSOMNIA, UNSPECIFIED TYPE: Primary | ICD-10-CM

## 2020-08-19 RX ORDER — ALPRAZOLAM 0.25 MG/1
0.25 TABLET ORAL NIGHTLY PRN
Qty: 90 TABLET | Refills: 0 | Status: SHIPPED | OUTPATIENT
Start: 2020-08-19 | End: 2021-10-25 | Stop reason: SDUPTHER

## 2020-08-23 ENCOUNTER — PATIENT MESSAGE (OUTPATIENT)
Dept: INTERNAL MEDICINE | Facility: CLINIC | Age: 75
End: 2020-08-23

## 2020-08-23 DIAGNOSIS — I10 HYPERTENSION, UNSPECIFIED TYPE: ICD-10-CM

## 2020-08-24 RX ORDER — LOSARTAN POTASSIUM 100 MG/1
100 TABLET ORAL DAILY
Qty: 90 TABLET | Refills: 3 | Status: SHIPPED | OUTPATIENT
Start: 2020-08-24 | End: 2021-10-04 | Stop reason: SDUPTHER

## 2020-09-29 ENCOUNTER — PATIENT MESSAGE (OUTPATIENT)
Dept: INTERNAL MEDICINE | Facility: CLINIC | Age: 75
End: 2020-09-29

## 2020-09-29 ENCOUNTER — PATIENT MESSAGE (OUTPATIENT)
Dept: OTHER | Facility: OTHER | Age: 75
End: 2020-09-29

## 2020-11-03 ENCOUNTER — LAB VISIT (OUTPATIENT)
Dept: LAB | Facility: HOSPITAL | Age: 75
End: 2020-11-03
Attending: INTERNAL MEDICINE
Payer: MEDICARE

## 2020-11-03 DIAGNOSIS — E78.5 HYPERLIPIDEMIA, UNSPECIFIED HYPERLIPIDEMIA TYPE: ICD-10-CM

## 2020-11-03 LAB
CHOLEST SERPL-MCNC: 225 MG/DL (ref 120–199)
CHOLEST/HDLC SERPL: 4.3 {RATIO} (ref 2–5)
HDLC SERPL-MCNC: 52 MG/DL (ref 40–75)
HDLC SERPL: 23.1 % (ref 20–50)
LDLC SERPL CALC-MCNC: 139 MG/DL (ref 63–159)
NONHDLC SERPL-MCNC: 173 MG/DL
TRIGL SERPL-MCNC: 170 MG/DL (ref 30–150)

## 2020-11-03 PROCEDURE — 36415 COLL VENOUS BLD VENIPUNCTURE: CPT | Mod: PO

## 2020-11-03 PROCEDURE — 80061 LIPID PANEL: CPT

## 2020-12-09 ENCOUNTER — PATIENT MESSAGE (OUTPATIENT)
Dept: INTERNAL MEDICINE | Facility: CLINIC | Age: 75
End: 2020-12-09

## 2020-12-11 ENCOUNTER — PATIENT MESSAGE (OUTPATIENT)
Dept: OTHER | Facility: OTHER | Age: 75
End: 2020-12-11

## 2021-01-05 ENCOUNTER — PATIENT MESSAGE (OUTPATIENT)
Dept: INTERNAL MEDICINE | Facility: CLINIC | Age: 76
End: 2021-01-05

## 2021-01-16 ENCOUNTER — IMMUNIZATION (OUTPATIENT)
Dept: INTERNAL MEDICINE | Facility: CLINIC | Age: 76
End: 2021-01-16
Payer: MEDICARE

## 2021-01-16 DIAGNOSIS — Z23 NEED FOR VACCINATION: Primary | ICD-10-CM

## 2021-01-16 PROCEDURE — 91300 COVID-19, MRNA, LNP-S, PF, 30 MCG/0.3 ML DOSE VACCINE: CPT | Mod: PBBFAC | Performed by: FAMILY MEDICINE

## 2021-02-06 ENCOUNTER — IMMUNIZATION (OUTPATIENT)
Dept: INTERNAL MEDICINE | Facility: CLINIC | Age: 76
End: 2021-02-06
Payer: MEDICARE

## 2021-02-06 DIAGNOSIS — Z23 NEED FOR VACCINATION: Primary | ICD-10-CM

## 2021-02-06 PROCEDURE — 91300 COVID-19, MRNA, LNP-S, PF, 30 MCG/0.3 ML DOSE VACCINE: CPT | Mod: PBBFAC | Performed by: FAMILY MEDICINE

## 2021-02-06 PROCEDURE — 0002A COVID-19, MRNA, LNP-S, PF, 30 MCG/0.3 ML DOSE VACCINE: CPT | Mod: PBBFAC | Performed by: FAMILY MEDICINE

## 2021-02-19 ENCOUNTER — PATIENT MESSAGE (OUTPATIENT)
Dept: INTERNAL MEDICINE | Facility: CLINIC | Age: 76
End: 2021-02-19

## 2021-02-22 DIAGNOSIS — N40.0 BENIGN PROSTATIC HYPERPLASIA, UNSPECIFIED WHETHER LOWER URINARY TRACT SYMPTOMS PRESENT: Primary | ICD-10-CM

## 2021-02-22 RX ORDER — TAMSULOSIN HYDROCHLORIDE 0.4 MG/1
0.4 CAPSULE ORAL DAILY
Qty: 90 CAPSULE | Refills: 3 | Status: SHIPPED | OUTPATIENT
Start: 2021-02-22 | End: 2021-07-26

## 2021-03-02 ENCOUNTER — PATIENT MESSAGE (OUTPATIENT)
Dept: INTERNAL MEDICINE | Facility: CLINIC | Age: 76
End: 2021-03-02

## 2021-03-09 ENCOUNTER — PATIENT OUTREACH (OUTPATIENT)
Dept: ADMINISTRATIVE | Facility: OTHER | Age: 76
End: 2021-03-09

## 2021-03-10 ENCOUNTER — OFFICE VISIT (OUTPATIENT)
Dept: OPTOMETRY | Facility: CLINIC | Age: 76
End: 2021-03-10
Payer: MEDICARE

## 2021-03-10 DIAGNOSIS — H43.393 VISUAL FLOATERS, BILATERAL: Primary | ICD-10-CM

## 2021-03-10 DIAGNOSIS — H35.372 EPIRETINAL MEMBRANE (ERM) OF LEFT EYE: ICD-10-CM

## 2021-03-10 DIAGNOSIS — H52.203 MYOPIA WITH ASTIGMATISM AND PRESBYOPIA, BILATERAL: ICD-10-CM

## 2021-03-10 DIAGNOSIS — Z96.1 PSEUDOPHAKIA: ICD-10-CM

## 2021-03-10 DIAGNOSIS — H52.13 MYOPIA WITH ASTIGMATISM AND PRESBYOPIA, BILATERAL: ICD-10-CM

## 2021-03-10 DIAGNOSIS — H52.4 MYOPIA WITH ASTIGMATISM AND PRESBYOPIA, BILATERAL: ICD-10-CM

## 2021-03-10 PROCEDURE — 99999 PR PBB SHADOW E&M-EST. PATIENT-LVL III: ICD-10-PCS | Mod: PBBFAC,,, | Performed by: OPTOMETRIST

## 2021-03-10 PROCEDURE — 99999 PR PBB SHADOW E&M-EST. PATIENT-LVL III: CPT | Mod: PBBFAC,,, | Performed by: OPTOMETRIST

## 2021-03-10 PROCEDURE — 92015 PR REFRACTION: ICD-10-PCS | Mod: ,,, | Performed by: OPTOMETRIST

## 2021-03-10 PROCEDURE — 92015 DETERMINE REFRACTIVE STATE: CPT | Mod: ,,, | Performed by: OPTOMETRIST

## 2021-03-10 PROCEDURE — 92014 COMPRE OPH EXAM EST PT 1/>: CPT | Mod: S$PBB,,, | Performed by: OPTOMETRIST

## 2021-03-10 PROCEDURE — 92014 PR EYE EXAM, EST PATIENT,COMPREHESV: ICD-10-PCS | Mod: S$PBB,,, | Performed by: OPTOMETRIST

## 2021-03-10 PROCEDURE — 99213 OFFICE O/P EST LOW 20 MIN: CPT | Mod: PBBFAC,PO | Performed by: OPTOMETRIST

## 2021-03-15 ENCOUNTER — PATIENT MESSAGE (OUTPATIENT)
Dept: INTERNAL MEDICINE | Facility: CLINIC | Age: 76
End: 2021-03-15

## 2021-03-22 ENCOUNTER — OFFICE VISIT (OUTPATIENT)
Dept: UROLOGY | Facility: CLINIC | Age: 76
End: 2021-03-22
Payer: MEDICARE

## 2021-03-22 VITALS
HEIGHT: 70 IN | HEART RATE: 63 BPM | SYSTOLIC BLOOD PRESSURE: 158 MMHG | BODY MASS INDEX: 35.35 KG/M2 | DIASTOLIC BLOOD PRESSURE: 86 MMHG | WEIGHT: 246.94 LBS

## 2021-03-22 DIAGNOSIS — N13.8 BPH WITH URINARY OBSTRUCTION: ICD-10-CM

## 2021-03-22 DIAGNOSIS — R97.20 ELEVATED PROSTATE SPECIFIC ANTIGEN (PSA): Primary | ICD-10-CM

## 2021-03-22 DIAGNOSIS — N40.1 BPH WITH URINARY OBSTRUCTION: ICD-10-CM

## 2021-03-22 PROBLEM — Z12.11 COLON CANCER SCREENING: Status: RESOLVED | Noted: 2020-08-11 | Resolved: 2021-03-22

## 2021-03-22 PROCEDURE — 99213 OFFICE O/P EST LOW 20 MIN: CPT | Mod: PBBFAC | Performed by: UROLOGY

## 2021-03-22 PROCEDURE — 99214 PR OFFICE/OUTPT VISIT, EST, LEVL IV, 30-39 MIN: ICD-10-PCS | Mod: S$PBB,,, | Performed by: UROLOGY

## 2021-03-22 PROCEDURE — 99999 PR PBB SHADOW E&M-EST. PATIENT-LVL III: ICD-10-PCS | Mod: PBBFAC,,, | Performed by: UROLOGY

## 2021-03-22 PROCEDURE — 99999 PR PBB SHADOW E&M-EST. PATIENT-LVL III: CPT | Mod: PBBFAC,,, | Performed by: UROLOGY

## 2021-03-22 PROCEDURE — 99214 OFFICE O/P EST MOD 30 MIN: CPT | Mod: S$PBB,,, | Performed by: UROLOGY

## 2021-03-22 RX ORDER — CIPROFLOXACIN 500 MG/1
TABLET ORAL
Qty: 1 TABLET | Refills: 0 | Status: SHIPPED | OUTPATIENT
Start: 2021-03-22 | End: 2021-03-22

## 2021-03-22 RX ORDER — LIDOCAINE HYDROCHLORIDE 20 MG/ML
JELLY TOPICAL ONCE
Status: CANCELLED | OUTPATIENT
Start: 2021-03-22 | End: 2021-03-22

## 2021-03-22 RX ORDER — LIDOCAINE HYDROCHLORIDE 10 MG/ML
10 INJECTION INFILTRATION; PERINEURAL ONCE
Status: CANCELLED | OUTPATIENT
Start: 2021-03-22 | End: 2021-03-22

## 2021-03-23 ENCOUNTER — PATIENT MESSAGE (OUTPATIENT)
Dept: UROLOGY | Facility: CLINIC | Age: 76
End: 2021-03-23

## 2021-04-04 ENCOUNTER — PATIENT MESSAGE (OUTPATIENT)
Dept: INTERNAL MEDICINE | Facility: CLINIC | Age: 76
End: 2021-04-04

## 2021-04-04 ENCOUNTER — NURSE TRIAGE (OUTPATIENT)
Dept: ADMINISTRATIVE | Facility: CLINIC | Age: 76
End: 2021-04-04

## 2021-06-03 ENCOUNTER — PATIENT MESSAGE (OUTPATIENT)
Dept: INTERNAL MEDICINE | Facility: CLINIC | Age: 76
End: 2021-06-03

## 2021-06-03 ENCOUNTER — PATIENT MESSAGE (OUTPATIENT)
Dept: UROLOGY | Facility: CLINIC | Age: 76
End: 2021-06-03

## 2021-06-21 ENCOUNTER — PATIENT MESSAGE (OUTPATIENT)
Dept: UROLOGY | Facility: CLINIC | Age: 76
End: 2021-06-21

## 2021-06-23 ENCOUNTER — PATIENT MESSAGE (OUTPATIENT)
Dept: INTERNAL MEDICINE | Facility: CLINIC | Age: 76
End: 2021-06-23

## 2021-06-24 DIAGNOSIS — M54.9 CHRONIC BACK PAIN, UNSPECIFIED BACK LOCATION, UNSPECIFIED BACK PAIN LATERALITY: Primary | ICD-10-CM

## 2021-06-24 DIAGNOSIS — G89.29 CHRONIC BACK PAIN, UNSPECIFIED BACK LOCATION, UNSPECIFIED BACK PAIN LATERALITY: Primary | ICD-10-CM

## 2021-07-08 ENCOUNTER — PATIENT MESSAGE (OUTPATIENT)
Dept: INTERNAL MEDICINE | Facility: CLINIC | Age: 76
End: 2021-07-08

## 2021-07-08 ENCOUNTER — PATIENT MESSAGE (OUTPATIENT)
Dept: OPHTHALMOLOGY | Facility: CLINIC | Age: 76
End: 2021-07-08

## 2021-07-09 ENCOUNTER — TELEPHONE (OUTPATIENT)
Dept: INTERNAL MEDICINE | Facility: CLINIC | Age: 76
End: 2021-07-09

## 2021-07-13 ENCOUNTER — PATIENT MESSAGE (OUTPATIENT)
Dept: OPTOMETRY | Facility: CLINIC | Age: 76
End: 2021-07-13

## 2021-07-13 ENCOUNTER — TELEPHONE (OUTPATIENT)
Dept: OPTOMETRY | Facility: CLINIC | Age: 76
End: 2021-07-13

## 2021-07-13 ENCOUNTER — PES CALL (OUTPATIENT)
Dept: ADMINISTRATIVE | Facility: CLINIC | Age: 76
End: 2021-07-13

## 2021-07-14 ENCOUNTER — OFFICE VISIT (OUTPATIENT)
Dept: OPTOMETRY | Facility: CLINIC | Age: 76
End: 2021-07-14
Payer: MEDICARE

## 2021-07-14 DIAGNOSIS — H04.123 DRY EYE SYNDROME OF BOTH EYES: ICD-10-CM

## 2021-07-14 DIAGNOSIS — H43.393 VISUAL FLOATERS, BILATERAL: Primary | ICD-10-CM

## 2021-07-14 DIAGNOSIS — Z96.1 PSEUDOPHAKIA: ICD-10-CM

## 2021-07-14 PROCEDURE — 92014 PR EYE EXAM, EST PATIENT,COMPREHESV: ICD-10-PCS | Mod: S$PBB,,, | Performed by: OPTOMETRIST

## 2021-07-14 PROCEDURE — 92014 COMPRE OPH EXAM EST PT 1/>: CPT | Mod: S$PBB,,, | Performed by: OPTOMETRIST

## 2021-07-14 PROCEDURE — 99213 OFFICE O/P EST LOW 20 MIN: CPT | Mod: PBBFAC,PO | Performed by: OPTOMETRIST

## 2021-07-14 PROCEDURE — 99999 PR PBB SHADOW E&M-EST. PATIENT-LVL III: ICD-10-PCS | Mod: PBBFAC,,, | Performed by: OPTOMETRIST

## 2021-07-14 PROCEDURE — 99999 PR PBB SHADOW E&M-EST. PATIENT-LVL III: CPT | Mod: PBBFAC,,, | Performed by: OPTOMETRIST

## 2021-07-20 ENCOUNTER — PATIENT MESSAGE (OUTPATIENT)
Dept: UROLOGY | Facility: CLINIC | Age: 76
End: 2021-07-20

## 2021-07-26 ENCOUNTER — OFFICE VISIT (OUTPATIENT)
Dept: UROLOGY | Facility: CLINIC | Age: 76
End: 2021-07-26
Payer: MEDICARE

## 2021-07-26 ENCOUNTER — PATIENT MESSAGE (OUTPATIENT)
Dept: UROLOGY | Facility: CLINIC | Age: 76
End: 2021-07-26

## 2021-07-26 VITALS
HEART RATE: 60 BPM | SYSTOLIC BLOOD PRESSURE: 140 MMHG | DIASTOLIC BLOOD PRESSURE: 77 MMHG | WEIGHT: 251.31 LBS | BODY MASS INDEX: 35.98 KG/M2 | HEIGHT: 70 IN

## 2021-07-26 DIAGNOSIS — N13.8 BPH WITH URINARY OBSTRUCTION: Primary | ICD-10-CM

## 2021-07-26 DIAGNOSIS — R97.20 ELEVATED PROSTATE SPECIFIC ANTIGEN (PSA): ICD-10-CM

## 2021-07-26 DIAGNOSIS — N40.1 BPH WITH URINARY OBSTRUCTION: Primary | ICD-10-CM

## 2021-07-26 PROCEDURE — 99999 PR PBB SHADOW E&M-EST. PATIENT-LVL IV: CPT | Mod: PBBFAC,,, | Performed by: UROLOGY

## 2021-07-26 PROCEDURE — 99214 PR OFFICE/OUTPT VISIT, EST, LEVL IV, 30-39 MIN: ICD-10-PCS | Mod: S$PBB,,, | Performed by: UROLOGY

## 2021-07-26 PROCEDURE — 99214 OFFICE O/P EST MOD 30 MIN: CPT | Mod: S$PBB,,, | Performed by: UROLOGY

## 2021-07-26 PROCEDURE — 99999 PR PBB SHADOW E&M-EST. PATIENT-LVL IV: ICD-10-PCS | Mod: PBBFAC,,, | Performed by: UROLOGY

## 2021-07-26 PROCEDURE — 99214 OFFICE O/P EST MOD 30 MIN: CPT | Mod: PBBFAC | Performed by: UROLOGY

## 2021-07-26 RX ORDER — ALFUZOSIN HYDROCHLORIDE 10 MG/1
10 TABLET, EXTENDED RELEASE ORAL DAILY
Qty: 30 TABLET | Refills: 11 | Status: SHIPPED | OUTPATIENT
Start: 2021-07-26 | End: 2021-10-27 | Stop reason: SDUPTHER

## 2021-08-06 ENCOUNTER — PATIENT MESSAGE (OUTPATIENT)
Dept: UROLOGY | Facility: CLINIC | Age: 76
End: 2021-08-06

## 2021-08-17 ENCOUNTER — PATIENT MESSAGE (OUTPATIENT)
Dept: UROLOGY | Facility: CLINIC | Age: 76
End: 2021-08-17

## 2021-08-24 ENCOUNTER — PATIENT MESSAGE (OUTPATIENT)
Dept: ADMINISTRATIVE | Facility: OTHER | Age: 76
End: 2021-08-24

## 2021-08-24 ENCOUNTER — PATIENT OUTREACH (OUTPATIENT)
Dept: ADMINISTRATIVE | Facility: OTHER | Age: 76
End: 2021-08-24

## 2021-08-25 ENCOUNTER — PATIENT MESSAGE (OUTPATIENT)
Dept: OPTOMETRY | Facility: CLINIC | Age: 76
End: 2021-08-25

## 2021-08-25 ENCOUNTER — OFFICE VISIT (OUTPATIENT)
Dept: OPTOMETRY | Facility: CLINIC | Age: 76
End: 2021-08-25
Payer: MEDICARE

## 2021-08-25 DIAGNOSIS — H04.123 DRY EYE SYNDROME OF BOTH EYES: ICD-10-CM

## 2021-08-25 DIAGNOSIS — Z96.1 PSEUDOPHAKIA: ICD-10-CM

## 2021-08-25 DIAGNOSIS — H43.393 VISUAL FLOATERS, BILATERAL: Primary | ICD-10-CM

## 2021-08-25 PROCEDURE — 92014 COMPRE OPH EXAM EST PT 1/>: CPT | Mod: S$PBB,,, | Performed by: OPTOMETRIST

## 2021-08-25 PROCEDURE — 99212 OFFICE O/P EST SF 10 MIN: CPT | Mod: PBBFAC,PO | Performed by: OPTOMETRIST

## 2021-08-25 PROCEDURE — 92014 PR EYE EXAM, EST PATIENT,COMPREHESV: ICD-10-PCS | Mod: S$PBB,,, | Performed by: OPTOMETRIST

## 2021-08-25 PROCEDURE — 99999 PR PBB SHADOW E&M-EST. PATIENT-LVL II: ICD-10-PCS | Mod: PBBFAC,,, | Performed by: OPTOMETRIST

## 2021-08-25 PROCEDURE — 99999 PR PBB SHADOW E&M-EST. PATIENT-LVL II: CPT | Mod: PBBFAC,,, | Performed by: OPTOMETRIST

## 2021-08-26 ENCOUNTER — PATIENT MESSAGE (OUTPATIENT)
Dept: INTERNAL MEDICINE | Facility: CLINIC | Age: 76
End: 2021-08-26

## 2021-09-03 DIAGNOSIS — F32.A ANXIETY AND DEPRESSION: ICD-10-CM

## 2021-09-03 DIAGNOSIS — F41.9 ANXIETY AND DEPRESSION: ICD-10-CM

## 2021-09-03 RX ORDER — FLUOXETINE 10 MG/1
TABLET ORAL
Qty: 90 TABLET | Refills: 0 | Status: SHIPPED | OUTPATIENT
Start: 2021-09-03 | End: 2021-11-01

## 2021-09-13 ENCOUNTER — PATIENT MESSAGE (OUTPATIENT)
Dept: UROLOGY | Facility: CLINIC | Age: 76
End: 2021-09-13

## 2021-09-22 ENCOUNTER — PATIENT MESSAGE (OUTPATIENT)
Dept: INTERNAL MEDICINE | Facility: CLINIC | Age: 76
End: 2021-09-22

## 2021-09-28 ENCOUNTER — PES CALL (OUTPATIENT)
Dept: ADMINISTRATIVE | Facility: CLINIC | Age: 76
End: 2021-09-28

## 2021-10-04 ENCOUNTER — PATIENT MESSAGE (OUTPATIENT)
Dept: INTERNAL MEDICINE | Facility: CLINIC | Age: 76
End: 2021-10-04

## 2021-10-04 ENCOUNTER — PATIENT MESSAGE (OUTPATIENT)
Dept: ADMINISTRATIVE | Facility: HOSPITAL | Age: 76
End: 2021-10-04

## 2021-10-04 DIAGNOSIS — I10 HYPERTENSION, UNSPECIFIED TYPE: ICD-10-CM

## 2021-10-05 RX ORDER — LOSARTAN POTASSIUM 100 MG/1
100 TABLET ORAL DAILY
Qty: 90 TABLET | Refills: 0 | Status: SHIPPED | OUTPATIENT
Start: 2021-10-05 | End: 2021-12-27

## 2021-10-23 ENCOUNTER — PATIENT MESSAGE (OUTPATIENT)
Dept: INTERNAL MEDICINE | Facility: CLINIC | Age: 76
End: 2021-10-23
Payer: MEDICARE

## 2021-10-23 ENCOUNTER — PATIENT OUTREACH (OUTPATIENT)
Dept: ADMINISTRATIVE | Facility: OTHER | Age: 76
End: 2021-10-23
Payer: MEDICARE

## 2021-10-23 DIAGNOSIS — G47.00 INSOMNIA, UNSPECIFIED TYPE: ICD-10-CM

## 2021-10-25 RX ORDER — ALPRAZOLAM 0.25 MG/1
0.25 TABLET ORAL NIGHTLY PRN
Qty: 90 TABLET | Refills: 0 | Status: SHIPPED | OUTPATIENT
Start: 2021-10-25 | End: 2021-11-10

## 2021-10-27 ENCOUNTER — OFFICE VISIT (OUTPATIENT)
Dept: UROLOGY | Facility: CLINIC | Age: 76
End: 2021-10-27
Payer: MEDICARE

## 2021-10-27 VITALS
HEART RATE: 75 BPM | HEIGHT: 70 IN | BODY MASS INDEX: 35.66 KG/M2 | DIASTOLIC BLOOD PRESSURE: 59 MMHG | WEIGHT: 249.13 LBS | SYSTOLIC BLOOD PRESSURE: 106 MMHG

## 2021-10-27 DIAGNOSIS — R97.20 ELEVATED PROSTATE SPECIFIC ANTIGEN (PSA): ICD-10-CM

## 2021-10-27 DIAGNOSIS — N13.8 BPH WITH URINARY OBSTRUCTION: Primary | ICD-10-CM

## 2021-10-27 DIAGNOSIS — N40.1 BPH WITH URINARY OBSTRUCTION: Primary | ICD-10-CM

## 2021-10-27 PROCEDURE — 99999 PR PBB SHADOW E&M-EST. PATIENT-LVL IV: ICD-10-PCS | Mod: PBBFAC,,, | Performed by: UROLOGY

## 2021-10-27 PROCEDURE — 99214 PR OFFICE/OUTPT VISIT, EST, LEVL IV, 30-39 MIN: ICD-10-PCS | Mod: S$PBB,,, | Performed by: UROLOGY

## 2021-10-27 PROCEDURE — 99999 PR PBB SHADOW E&M-EST. PATIENT-LVL IV: CPT | Mod: PBBFAC,,, | Performed by: UROLOGY

## 2021-10-27 PROCEDURE — 99214 OFFICE O/P EST MOD 30 MIN: CPT | Mod: S$PBB,,, | Performed by: UROLOGY

## 2021-10-27 PROCEDURE — 99214 OFFICE O/P EST MOD 30 MIN: CPT | Mod: PBBFAC | Performed by: UROLOGY

## 2021-10-27 RX ORDER — ALFUZOSIN HYDROCHLORIDE 10 MG/1
10 TABLET, EXTENDED RELEASE ORAL DAILY
Qty: 30 TABLET | Refills: 11 | Status: SHIPPED | OUTPATIENT
Start: 2021-10-27 | End: 2022-04-04 | Stop reason: SDUPTHER

## 2021-10-27 RX ORDER — FINASTERIDE 5 MG/1
5 TABLET, FILM COATED ORAL DAILY
Qty: 30 TABLET | Refills: 11 | Status: SHIPPED | OUTPATIENT
Start: 2021-10-27 | End: 2021-11-10

## 2021-11-01 ENCOUNTER — OFFICE VISIT (OUTPATIENT)
Dept: INTERNAL MEDICINE | Facility: CLINIC | Age: 76
End: 2021-11-01
Payer: MEDICARE

## 2021-11-01 VITALS
OXYGEN SATURATION: 97 % | DIASTOLIC BLOOD PRESSURE: 58 MMHG | HEART RATE: 62 BPM | BODY MASS INDEX: 34.53 KG/M2 | HEIGHT: 70 IN | WEIGHT: 241.19 LBS | SYSTOLIC BLOOD PRESSURE: 100 MMHG

## 2021-11-01 DIAGNOSIS — I10 ESSENTIAL HYPERTENSION: ICD-10-CM

## 2021-11-01 DIAGNOSIS — R42 DIZZINESS: ICD-10-CM

## 2021-11-01 DIAGNOSIS — N40.0 BENIGN PROSTATIC HYPERPLASIA, UNSPECIFIED WHETHER LOWER URINARY TRACT SYMPTOMS PRESENT: ICD-10-CM

## 2021-11-01 DIAGNOSIS — F41.0 GENERALIZED ANXIETY DISORDER WITH PANIC ATTACKS: Primary | ICD-10-CM

## 2021-11-01 DIAGNOSIS — F41.1 GENERALIZED ANXIETY DISORDER WITH PANIC ATTACKS: Primary | ICD-10-CM

## 2021-11-01 PROCEDURE — 99999 PR PBB SHADOW E&M-EST. PATIENT-LVL V: CPT | Mod: PBBFAC,,, | Performed by: INTERNAL MEDICINE

## 2021-11-01 PROCEDURE — 99215 OFFICE O/P EST HI 40 MIN: CPT | Mod: PBBFAC,PO | Performed by: INTERNAL MEDICINE

## 2021-11-01 PROCEDURE — 99214 OFFICE O/P EST MOD 30 MIN: CPT | Mod: S$PBB,,, | Performed by: INTERNAL MEDICINE

## 2021-11-01 PROCEDURE — 99999 PR PBB SHADOW E&M-EST. PATIENT-LVL V: ICD-10-PCS | Mod: PBBFAC,,, | Performed by: INTERNAL MEDICINE

## 2021-11-01 PROCEDURE — 99214 PR OFFICE/OUTPT VISIT, EST, LEVL IV, 30-39 MIN: ICD-10-PCS | Mod: S$PBB,,, | Performed by: INTERNAL MEDICINE

## 2021-11-01 RX ORDER — SERTRALINE HYDROCHLORIDE 25 MG/1
TABLET, FILM COATED ORAL
Qty: 30 TABLET | Refills: 3 | Status: SHIPPED | OUTPATIENT
Start: 2021-11-01 | End: 2021-11-10

## 2021-11-01 RX ORDER — FLUTICASONE PROPIONATE 50 MCG
SPRAY, SUSPENSION (ML) NASAL
COMMUNITY
Start: 2020-11-23 | End: 2021-11-10

## 2021-11-01 RX ORDER — CHLORHEXIDINE GLUCONATE ORAL RINSE 1.2 MG/ML
SOLUTION DENTAL
COMMUNITY
Start: 2021-10-06 | End: 2023-04-27

## 2021-11-03 ENCOUNTER — TELEPHONE (OUTPATIENT)
Dept: UROLOGY | Facility: CLINIC | Age: 76
End: 2021-11-03
Payer: MEDICARE

## 2021-11-08 ENCOUNTER — PATIENT MESSAGE (OUTPATIENT)
Dept: INTERNAL MEDICINE | Facility: CLINIC | Age: 76
End: 2021-11-08
Payer: MEDICARE

## 2021-11-09 DIAGNOSIS — E66.9 OBESITY (BMI 30-39.9): ICD-10-CM

## 2021-11-09 DIAGNOSIS — E78.5 HYPERLIPIDEMIA, UNSPECIFIED HYPERLIPIDEMIA TYPE: ICD-10-CM

## 2021-11-09 DIAGNOSIS — I10 ESSENTIAL HYPERTENSION: ICD-10-CM

## 2021-11-09 DIAGNOSIS — E03.9 HYPOTHYROIDISM, UNSPECIFIED TYPE: ICD-10-CM

## 2021-11-09 DIAGNOSIS — F41.9 ANXIETY: Primary | ICD-10-CM

## 2021-11-09 DIAGNOSIS — R79.89 ABNORMAL CBC MEASUREMENT: ICD-10-CM

## 2021-11-10 ENCOUNTER — OFFICE VISIT (OUTPATIENT)
Dept: INTERNAL MEDICINE | Facility: CLINIC | Age: 76
End: 2021-11-10
Payer: MEDICARE

## 2021-11-10 ENCOUNTER — HOSPITAL ENCOUNTER (OUTPATIENT)
Dept: RADIOLOGY | Facility: HOSPITAL | Age: 76
Discharge: HOME | End: 2021-11-10
Attending: INTERNAL MEDICINE
Payer: MEDICARE

## 2021-11-10 ENCOUNTER — TELEPHONE (OUTPATIENT)
Dept: INTERNAL MEDICINE | Facility: CLINIC | Age: 76
End: 2021-11-10

## 2021-11-10 VITALS
SYSTOLIC BLOOD PRESSURE: 128 MMHG | DIASTOLIC BLOOD PRESSURE: 62 MMHG | WEIGHT: 237.19 LBS | BODY MASS INDEX: 33.96 KG/M2 | HEART RATE: 80 BPM | RESPIRATION RATE: 14 BRPM | TEMPERATURE: 98 F | HEIGHT: 70 IN

## 2021-11-10 DIAGNOSIS — E78.5 DYSLIPIDEMIA: ICD-10-CM

## 2021-11-10 DIAGNOSIS — N13.8 BPH WITH URINARY OBSTRUCTION: ICD-10-CM

## 2021-11-10 DIAGNOSIS — R53.83 OTHER FATIGUE: ICD-10-CM

## 2021-11-10 DIAGNOSIS — R06.02 SOB (SHORTNESS OF BREATH): ICD-10-CM

## 2021-11-10 DIAGNOSIS — I10 ESSENTIAL HYPERTENSION: Primary | ICD-10-CM

## 2021-11-10 DIAGNOSIS — N40.1 BPH WITH URINARY OBSTRUCTION: ICD-10-CM

## 2021-11-10 DIAGNOSIS — E03.9 ACQUIRED HYPOTHYROIDISM: ICD-10-CM

## 2021-11-10 DIAGNOSIS — E74.89 OTHER SPECIFIED DISORDERS OF CARBOHYDRATE METABOLISM: ICD-10-CM

## 2021-11-10 DIAGNOSIS — E66.9 OBESITY, UNSPECIFIED CLASSIFICATION, UNSPECIFIED OBESITY TYPE, UNSPECIFIED WHETHER SERIOUS COMORBIDITY PRESENT: ICD-10-CM

## 2021-11-10 PROCEDURE — 99214 OFFICE O/P EST MOD 30 MIN: CPT | Mod: S$PBB,,, | Performed by: INTERNAL MEDICINE

## 2021-11-10 PROCEDURE — 99999 PR PBB SHADOW E&M-EST. PATIENT-LVL IV: ICD-10-PCS | Mod: PBBFAC,,, | Performed by: INTERNAL MEDICINE

## 2021-11-10 PROCEDURE — 71046 X-RAY EXAM CHEST 2 VIEWS: CPT | Mod: 26,,, | Performed by: RADIOLOGY

## 2021-11-10 PROCEDURE — 71046 X-RAY EXAM CHEST 2 VIEWS: CPT | Mod: TC,PO

## 2021-11-10 PROCEDURE — 99999 PR PBB SHADOW E&M-EST. PATIENT-LVL IV: CPT | Mod: PBBFAC,,, | Performed by: INTERNAL MEDICINE

## 2021-11-10 PROCEDURE — 99214 OFFICE O/P EST MOD 30 MIN: CPT | Mod: PBBFAC,25,PO | Performed by: INTERNAL MEDICINE

## 2021-11-10 PROCEDURE — 71046 XR CHEST PA AND LATERAL: ICD-10-PCS | Mod: 26,,, | Performed by: RADIOLOGY

## 2021-11-10 PROCEDURE — 99214 PR OFFICE/OUTPT VISIT, EST, LEVL IV, 30-39 MIN: ICD-10-PCS | Mod: S$PBB,,, | Performed by: INTERNAL MEDICINE

## 2021-11-10 RX ORDER — CIPROFLOXACIN 500 MG/1
500 TABLET ORAL 2 TIMES DAILY
Qty: 20 TABLET | Refills: 0 | Status: SHIPPED | OUTPATIENT
Start: 2021-11-10 | End: 2022-03-15 | Stop reason: ALTCHOICE

## 2021-11-15 ENCOUNTER — PATIENT MESSAGE (OUTPATIENT)
Dept: INTERNAL MEDICINE | Facility: CLINIC | Age: 76
End: 2021-11-15
Payer: MEDICARE

## 2021-11-17 ENCOUNTER — PATIENT MESSAGE (OUTPATIENT)
Dept: INTERNAL MEDICINE | Facility: CLINIC | Age: 76
End: 2021-11-17
Payer: MEDICARE

## 2021-11-19 ENCOUNTER — PATIENT MESSAGE (OUTPATIENT)
Dept: INTERNAL MEDICINE | Facility: CLINIC | Age: 76
End: 2021-11-19
Payer: MEDICARE

## 2021-11-23 ENCOUNTER — IMMUNIZATION (OUTPATIENT)
Dept: INTERNAL MEDICINE | Facility: CLINIC | Age: 76
End: 2021-11-23
Payer: MEDICARE

## 2021-11-23 DIAGNOSIS — Z23 NEED FOR VACCINATION: Primary | ICD-10-CM

## 2021-11-23 PROCEDURE — 0004A COVID-19, MRNA, LNP-S, PF, 30 MCG/0.3 ML DOSE VACCINE: CPT | Mod: PBBFAC,CV19

## 2021-12-02 ENCOUNTER — PATIENT MESSAGE (OUTPATIENT)
Dept: UROLOGY | Facility: CLINIC | Age: 76
End: 2021-12-02
Payer: MEDICARE

## 2021-12-06 DIAGNOSIS — I10 ESSENTIAL HYPERTENSION: ICD-10-CM

## 2021-12-06 DIAGNOSIS — E78.5 HYPERLIPIDEMIA, UNSPECIFIED HYPERLIPIDEMIA TYPE: ICD-10-CM

## 2021-12-06 RX ORDER — PRAVASTATIN SODIUM 20 MG/1
20 TABLET ORAL NIGHTLY
Qty: 90 TABLET | Refills: 3 | Status: SHIPPED | OUTPATIENT
Start: 2021-12-06 | End: 2023-01-25

## 2021-12-06 RX ORDER — NEBIVOLOL 10 MG/1
10 TABLET ORAL DAILY
Qty: 90 TABLET | Refills: 3 | Status: SHIPPED | OUTPATIENT
Start: 2021-12-06 | End: 2022-08-30 | Stop reason: SDUPTHER

## 2021-12-15 DIAGNOSIS — M19.90 ARTHRITIS: ICD-10-CM

## 2021-12-15 RX ORDER — DICLOFENAC SODIUM 10 MG/G
GEL TOPICAL
Qty: 100 G | Refills: 2 | Status: SHIPPED | OUTPATIENT
Start: 2021-12-15 | End: 2022-03-21

## 2021-12-28 ENCOUNTER — PATIENT MESSAGE (OUTPATIENT)
Dept: INTERNAL MEDICINE | Facility: CLINIC | Age: 76
End: 2021-12-28
Payer: MEDICARE

## 2021-12-28 DIAGNOSIS — G89.29 CHRONIC PAIN OF BOTH KNEES: Primary | ICD-10-CM

## 2021-12-28 DIAGNOSIS — M25.562 CHRONIC PAIN OF BOTH KNEES: Primary | ICD-10-CM

## 2021-12-28 DIAGNOSIS — M25.561 CHRONIC PAIN OF BOTH KNEES: Primary | ICD-10-CM

## 2021-12-29 DIAGNOSIS — G47.00 INSOMNIA, UNSPECIFIED TYPE: ICD-10-CM

## 2021-12-29 RX ORDER — ALPRAZOLAM 0.25 MG/1
TABLET ORAL
Qty: 90 TABLET | Refills: 0 | Status: SHIPPED | OUTPATIENT
Start: 2021-12-29 | End: 2022-07-05

## 2022-01-21 ENCOUNTER — HOSPITAL ENCOUNTER (OUTPATIENT)
Dept: RADIOLOGY | Facility: HOSPITAL | Age: 77
Discharge: HOME | End: 2022-01-21
Attending: INTERNAL MEDICINE
Payer: MEDICARE

## 2022-01-21 DIAGNOSIS — G89.29 CHRONIC PAIN OF BOTH KNEES: ICD-10-CM

## 2022-01-21 DIAGNOSIS — M25.562 CHRONIC PAIN OF BOTH KNEES: ICD-10-CM

## 2022-01-21 DIAGNOSIS — M25.561 CHRONIC PAIN OF BOTH KNEES: ICD-10-CM

## 2022-01-21 PROCEDURE — 73560 X-RAY EXAM OF KNEE 1 OR 2: CPT | Mod: TC,50,FY,PO

## 2022-01-21 PROCEDURE — 73560 X-RAY EXAM OF KNEE 1 OR 2: CPT | Mod: 26,50,, | Performed by: RADIOLOGY

## 2022-01-21 PROCEDURE — 73560 XR KNEE AP STANDING WITH BOTH LATERAL: ICD-10-PCS | Mod: 26,50,, | Performed by: RADIOLOGY

## 2022-01-22 ENCOUNTER — PATIENT MESSAGE (OUTPATIENT)
Dept: INTERNAL MEDICINE | Facility: CLINIC | Age: 77
End: 2022-01-22
Payer: MEDICARE

## 2022-02-09 ENCOUNTER — PATIENT OUTREACH (OUTPATIENT)
Dept: ADMINISTRATIVE | Facility: OTHER | Age: 77
End: 2022-02-09
Payer: MEDICARE

## 2022-02-10 ENCOUNTER — TELEPHONE (OUTPATIENT)
Dept: SPORTS MEDICINE | Facility: CLINIC | Age: 77
End: 2022-02-10

## 2022-02-10 ENCOUNTER — OFFICE VISIT (OUTPATIENT)
Dept: SPORTS MEDICINE | Facility: CLINIC | Age: 77
End: 2022-02-10
Payer: MEDICARE

## 2022-02-10 ENCOUNTER — PATIENT MESSAGE (OUTPATIENT)
Dept: INTERNAL MEDICINE | Facility: CLINIC | Age: 77
End: 2022-02-10
Payer: MEDICARE

## 2022-02-10 VITALS
BODY MASS INDEX: 33.93 KG/M2 | HEIGHT: 70 IN | RESPIRATION RATE: 18 BRPM | HEART RATE: 73 BPM | SYSTOLIC BLOOD PRESSURE: 181 MMHG | DIASTOLIC BLOOD PRESSURE: 87 MMHG | WEIGHT: 237 LBS

## 2022-02-10 DIAGNOSIS — M17.0 PRIMARY OSTEOARTHRITIS OF BOTH KNEES: Primary | ICD-10-CM

## 2022-02-10 PROCEDURE — 99999 PR PBB SHADOW E&M-EST. PATIENT-LVL III: CPT | Mod: PBBFAC,,, | Performed by: ORTHOPAEDIC SURGERY

## 2022-02-10 PROCEDURE — 99203 OFFICE O/P NEW LOW 30 MIN: CPT | Mod: S$PBB,,, | Performed by: ORTHOPAEDIC SURGERY

## 2022-02-10 PROCEDURE — 99213 OFFICE O/P EST LOW 20 MIN: CPT | Mod: PBBFAC | Performed by: ORTHOPAEDIC SURGERY

## 2022-02-10 PROCEDURE — 99203 PR OFFICE/OUTPT VISIT, NEW, LEVL III, 30-44 MIN: ICD-10-PCS | Mod: S$PBB,,, | Performed by: ORTHOPAEDIC SURGERY

## 2022-02-10 PROCEDURE — 99999 PR PBB SHADOW E&M-EST. PATIENT-LVL III: ICD-10-PCS | Mod: PBBFAC,,, | Performed by: ORTHOPAEDIC SURGERY

## 2022-02-10 RX ORDER — MELOXICAM 15 MG/1
15 TABLET ORAL DAILY
Qty: 30 TABLET | Refills: 2 | Status: SHIPPED | OUTPATIENT
Start: 2022-02-10 | End: 2022-04-26

## 2022-02-10 NOTE — PROGRESS NOTES
CC: Right knee pain    76 y.o. Male with a history of right knee pain who is referred to clinic for right knee pain by Dr. Thurston.  Pain has been present for 2 - 4 months with no known injury.   Pain with walking and standing.     He states that the pain is severe and not responding to any conservative care.    Patient has tried a CSI with minimal relief in December 2021 and a synvisc 1 injection with moderate relief.   Patient has tried NSAIDs and topicals with minimal relief. Patient recently started PT at [facility].   He reports that the pain and weakness. It also bothers him at night.    + mechanical symptoms, - instability    Is affecting ADLs.  Pain is 0/10 at it's worst.    REVIEW OF SYSTEMS:   Constitution: Negative. Negative for chills, fever and night sweats.   HENT: Negative for congestion and headaches.    Eyes: Negative for blurred vision, left vision loss and right vision loss.   Cardiovascular: Negative for chest pain and syncope.   Respiratory: Negative for cough and shortness of breath.    Endocrine: Negative for polydipsia, polyphagia and polyuria.   Hematologic/Lymphatic: Negative for bleeding problem. Does not bruise/bleed easily.   Skin: Negative for dry skin, itching and rash.   Musculoskeletal: Negative for falls. Positive for right knee pain and  muscle weakness.   Gastrointestinal: Negative for abdominal pain and bowel incontinence.   Genitourinary: Negative for bladder incontinence and nocturia.   Neurological: Negative for disturbances in coordination, loss of balance and seizures.   Psychiatric/Behavioral: Negative for depression. The patient does not have insomnia.    Allergic/Immunologic: Negative for hives and persistent infections.     PAST MEDICAL HISTORY:   Past Medical History:   Diagnosis Date    Acquired hypothyroidism     BPH (benign prostatic hyperplasia)     Cataract     Hyperlipidemia     Hypertension     Sleep apnea     SOB (shortness of breath)     feels  allergy related       PAST SURGICAL HISTORY:   Past Surgical History:   Procedure Laterality Date    CATARACT EXTRACTION, BILATERAL Bilateral     COLONOSCOPY N/A 8/11/2020    Procedure: COLONOSCOPY;  Surgeon: LISBETH Ríos MD;  Location: [address];  Service: Endoscopy;  Laterality: N/A;  covid test 8/8 [place]    EYE SURGERY      KNEE ARTHROSCOPY Bilateral     uvula removal         FAMILY HISTORY:   Family History   Problem Relation Age of Onset    Diabetes Mother     Hypertension Mother     Arthritis Mother        SOCIAL HISTORY:   Social History     Socioeconomic History    Marital status:    Tobacco Use    Smoking status: Never Smoker    Smokeless tobacco: Never Used   Substance and Sexual Activity    Alcohol use: Yes     Alcohol/week: 12.0 standard drinks     Types: 12 Standard drinks or equivalent per week     Comment: Rum and coke 2-3 drinks 3-4 times/week    Drug use: No    Sexual activity: Not Currently     Partners: Female     Social Determinants of Health     Financial Resource Strain: Low Risk     Difficulty of Paying Living Expenses: Not hard at all   Food Insecurity: No Food Insecurity    Worried About Running Out of Food in the Last Year: Never true    Ran Out of Food in the Last Year: Never true   Transportation Needs: No Transportation Needs    Lack of Transportation (Medical): No    Lack of Transportation (Non-Medical): No   Physical Activity: Inactive    Days of Exercise per Week: 0 days    Minutes of Exercise per Session: 0 min   Stress: No Stress Concern Present    Feeling of Stress : Only a little   Social Connections: Unknown    Frequency of Communication with Friends and Family: Three times a week    Frequency of Social Gatherings with Friends and Family: Once a week    Active Member of Clubs or Organizations: No    Attends Club or Organization Meetings: 1 to 4 times per year    Marital Status:    Housing Stability: Low Risk     Unable to  "Pay for Housing in the Last Year: No    Number of Places Lived in the Last Year: 1    Unstable Housing in the Last Year: No       MEDICATIONS:     Current Outpatient Medications:     albuterol (PROVENTIL/VENTOLIN HFA) 90 mcg/actuation inhaler, Inhale 2 puffs into the lungs every 6 (six) hours as needed for Wheezing. Rescue, Disp: , Rfl:     ALPRAZolam (XANAX) 0.25 MG tablet, TAKE 1 TABLET BY MOUTH NIGHTLY AS NEEDED FOR INSOMNIA, Disp: 90 tablet, Rfl: 0    aspirin-sod bicarb-citric acid (GUALBERTO-SELTZER) 324 mg TbEF, Take 325 mg by mouth every 6 (six) hours as needed. , Disp: , Rfl:     chlorhexidine (PERIDEX) 0.12 % solution, SMARTSIG:By Mouth, Disp: , Rfl:     ciprofloxacin HCl (CIPRO) 500 MG tablet, Take 1 tablet (500 mg total) by mouth 2 (two) times daily., Disp: 20 tablet, Rfl: 0    diclofenac sodium (VOLTAREN) 1 % Gel, APPLY 2 GRAMS EXTERNALLY TO THE AFFECTED AREA EVERY DAY, Disp: 100 g, Rfl: 2    diphenhydramine-acetaminophen (TYLENOL PM)  mg Tab, Take 1 tablet by mouth nightly as needed., Disp: , Rfl:     levothyroxine (SYNTHROID) 25 MCG tablet, TAKE 1 TABLET DAILY (FURTHER REFILLS REQUIRE VISIT WITH DOCTOR), Disp: 90 tablet, Rfl: 3    losartan (COZAAR) 100 MG tablet, Take 1 tablet (100 mg total) by mouth once daily., Disp: 90 tablet, Rfl: 0    multivitamin capsule, Take 1 capsule by mouth once daily., Disp: , Rfl:     nebivoloL (BYSTOLIC) 10 MG Tab, Take 1 tablet (10 mg total) by mouth once daily., Disp: 90 tablet, Rfl: 3    pravastatin (PRAVACHOL) 20 MG tablet, Take 1 tablet (20 mg total) by mouth every evening., Disp: 90 tablet, Rfl: 3    alfuzosin (UROXATRAL) 10 mg Tb24, Take 1 tablet (10 mg total) by mouth once daily., Disp: 30 tablet, Rfl: 11    ALLERGIES:   Review of patient's allergies indicates:   Allergen Reactions    Doxycycline Diarrhea     itching    Adhesive Rash       VITAL SIGNS:   BP (!) 181/87   Pulse 73   Resp 18   Ht 5' 10" (1.778 m)   Wt 107.5 kg (237 lb)   BMI " 34.01 kg/m²      PHYSICAL EXAMINATION:  General:  The patient is alert and oriented x 3.  Mood is pleasant.  Observation of ears, eyes and nose reveal no gross abnormalities.  No labored breathing observed.    RIGHT KNEE EXAMINATION     OBSERVATION / INSPECTION   Gait:   Nonantalgic   Alignment:  Neutral   Scars:   None   Muscle atrophy: Mild  Effusion:  None   Warmth:  None   Discoloration:   none     TENDERNESS / CREPITUS (T / C):          T / C      T / C   Patella   - / -   Lateral joint line   + / -   Peripatellar medial  -  Medial joint line    + / -   Peripatellar lateral -  Medial plica   - / -   Patellar tendon -   Popliteal fossa  - / -   Quad tendon   -   Gastrocnemius   -   Prepatellar Bursa - / -   Quadricep   -   Tibial tubercle  -  Thigh/hamstring  -   Pes anserine/HS -  Fibula    -   ITB   - / -  Tibia     -   Tib/fib joint  - / -  LCL    -     MFC   - / -   MCL: Proximal  -    LFC   - / -    Distal   -          ROM: (* = pain)  PASSIVE   ACTIVE    Left :   5 / 0 / 145   5 / 0 / 145     Right :    5 / 0 / 145   5 / 0 / 145    Patellofemoral examination:  See above noted areas of tenderness.   Patella position    Subluxation / dislocation: Centered           Sup. / Inf;   Normal   Crepitus (PF):    Absent   Patellar Mobility:       Medial-lateral:   Normal    Superior-inferior:  Normal    Inferior tilt   Normal    Patellar tendon:  Normal   Lateral tilt:    Normal   J-sign:     None   Patellofemoral grind:   No pain       MENISCAL SIGNS:     Pain on terminal extension:  +  Pain on terminal flexion:  +  Leannas maneuver:  +  Squat     +    LIGAMENT EXAMINATION:  ACL / Lachman:  normal (-1 to 2mm)    PCL-Post.  drawer: normal 0 to 2mm  MCL- Valgus:  normal 0 to 2mm  LCL- Varus:  normal 0 to 2mm  Pivot shift: normal (Equal)   Dial Test: difference c/w other side   At 30° flexion: normal (< 5°)    At 90° flexion: normal (< 5°)   Reverse Pivot Shift:   normal (Equal)     STRENGTH: (* = with  pain) PAINFUL SIDE   Quadricep   5/5   Hamstrin/5    EXTREMITY NEURO-VASCULAR EXAMINATION:   Sensation:  Grossly intact to light touch all dermatomal regions.   Motor Function:  Fully intact motor function at hip, knee, foot and ankle    DTRs;  quadriceps and  achilles 2+.  No clonus and downgoing Babinski.    Vascular status:  DP and PT pulses 2+, brisk capillary refill, symmetric.     OTHER FINDINGS:      IMAGING:  X-ray AP Standing Knees with Both Lateral  Narrative: EXAMINATION:  XR KNEE AP STANDING WITH BOTH LATERAL    CLINICAL HISTORY:  Pain in right knee    TECHNIQUE:  Frontal and lateral radiographs of both knees.    COMPARISON:  None    FINDINGS:  I see no acute fracture.  Marginal osteophyte with narrowing of the medial tibiofemoral and patellofemoral compartments of both knees.    No significant suprapatellar joint effusion.  Mild soft tissue edema along the anterior aspect of the right knee.  Impression: Soft tissue thickening or edema along the anterior aspect of the right knee with no acute osseous abnormality seen.    Osteoarthritis with joint space narrowing as above.    Electronically signed by: Yany Santoyo  Date:    2022  Time:    12:26         ASSESSMENT:    Right Knee Pain, Primary osteoarthirtis     PLAN:   1. Continue PT    3. Possible future referral to Ortho    4. F/u PRN    5. Mobic Prescription       All questions were answered, pt will contact us for questions or concerns in the interim.

## 2022-02-15 ENCOUNTER — PATIENT MESSAGE (OUTPATIENT)
Dept: INTERNAL MEDICINE | Facility: CLINIC | Age: 77
End: 2022-02-15
Payer: MEDICARE

## 2022-02-15 ENCOUNTER — PES CALL (OUTPATIENT)
Dept: ADMINISTRATIVE | Facility: CLINIC | Age: 77
End: 2022-02-15
Payer: MEDICARE

## 2022-02-16 ENCOUNTER — PES CALL (OUTPATIENT)
Dept: ADMINISTRATIVE | Facility: CLINIC | Age: 77
End: 2022-02-16
Payer: MEDICARE

## 2022-03-15 ENCOUNTER — OFFICE VISIT (OUTPATIENT)
Dept: FAMILY MEDICINE | Facility: CLINIC | Age: 77
End: 2022-03-15
Payer: MEDICARE

## 2022-03-15 VITALS
DIASTOLIC BLOOD PRESSURE: 80 MMHG | WEIGHT: 239.19 LBS | HEIGHT: 70 IN | HEART RATE: 72 BPM | OXYGEN SATURATION: 97 % | BODY MASS INDEX: 34.24 KG/M2 | SYSTOLIC BLOOD PRESSURE: 190 MMHG

## 2022-03-15 DIAGNOSIS — R26.9 ABNORMALITY OF GAIT AND MOBILITY: ICD-10-CM

## 2022-03-15 DIAGNOSIS — E78.5 HYPERLIPIDEMIA, UNSPECIFIED HYPERLIPIDEMIA TYPE: Chronic | ICD-10-CM

## 2022-03-15 DIAGNOSIS — G47.00 INSOMNIA, UNSPECIFIED TYPE: ICD-10-CM

## 2022-03-15 DIAGNOSIS — E03.9 ACQUIRED HYPOTHYROIDISM: Chronic | ICD-10-CM

## 2022-03-15 DIAGNOSIS — I10 ESSENTIAL HYPERTENSION: Chronic | ICD-10-CM

## 2022-03-15 DIAGNOSIS — Z00.00 ENCOUNTER FOR PREVENTIVE HEALTH EXAMINATION: Primary | ICD-10-CM

## 2022-03-15 DIAGNOSIS — N40.1 BPH WITH URINARY OBSTRUCTION: ICD-10-CM

## 2022-03-15 DIAGNOSIS — N13.8 BPH WITH URINARY OBSTRUCTION: ICD-10-CM

## 2022-03-15 DIAGNOSIS — E66.9 OBESITY (BMI 30.0-34.9): ICD-10-CM

## 2022-03-15 DIAGNOSIS — M17.0 PRIMARY OSTEOARTHRITIS OF BOTH KNEES: ICD-10-CM

## 2022-03-15 PROBLEM — F41.0 PANIC ATTACK: Status: ACTIVE | Noted: 2022-03-15

## 2022-03-15 PROBLEM — H53.2 DIPLOPIA: Status: ACTIVE | Noted: 2018-07-03

## 2022-03-15 PROCEDURE — 99999 PR PBB SHADOW E&M-EST. PATIENT-LVL V: CPT | Mod: PBBFAC,,, | Performed by: NURSE PRACTITIONER

## 2022-03-15 PROCEDURE — 99999 PR PBB SHADOW E&M-EST. PATIENT-LVL V: ICD-10-PCS | Mod: PBBFAC,,, | Performed by: NURSE PRACTITIONER

## 2022-03-15 PROCEDURE — G0439 PPPS, SUBSEQ VISIT: HCPCS | Mod: ,,, | Performed by: NURSE PRACTITIONER

## 2022-03-15 PROCEDURE — G0439 PR MEDICARE ANNUAL WELLNESS SUBSEQUENT VISIT: ICD-10-PCS | Mod: ,,, | Performed by: NURSE PRACTITIONER

## 2022-03-15 PROCEDURE — 99215 OFFICE O/P EST HI 40 MIN: CPT | Mod: PBBFAC,PO | Performed by: NURSE PRACTITIONER

## 2022-03-15 RX ORDER — FLUTICASONE PROPIONATE 50 MCG
1 SPRAY, SUSPENSION (ML) NASAL DAILY PRN
COMMUNITY
Start: 2021-11-09

## 2022-03-15 RX ORDER — PROMETHAZINE HYDROCHLORIDE AND DEXTROMETHORPHAN HYDROBROMIDE 6.25; 15 MG/5ML; MG/5ML
5 SYRUP ORAL EVERY 6 HOURS PRN
COMMUNITY
Start: 2021-11-09 | End: 2023-04-27

## 2022-03-15 NOTE — PROGRESS NOTES
"Julius Baker presented for a Medicare AWV and comprehensive Health Risk Assessment today. The following components were reviewed and updated:    · Medical history  · Family History  · Social history  · Allergies and Current Medications  · Health Risk Assessment  · Health Maintenance  · Care Team         ** See Completed Assessments for Annual Wellness Visit within the encounter summary.**         The following assessments were completed:  · Living Situation  · Depression Screening  · Timed Get Up and Go  · Cognitive Function Screening - asked to spell "world" backwards, done correctly  · Nutrition Screening  · ADL Screening  · PAQ Screening        Vitals:    03/15/22 1356   BP: (!) 190/80   BP Location: Left arm   Patient Position: Sitting   BP Method: Small (Manual)   Pulse: 72   SpO2: 97%   Weight: 108.5 kg (239 lb 3.2 oz)   Height: 5' 10" (1.778 m)     Body mass index is 34.32 kg/m².     Physical Exam  Vitals reviewed.   Constitutional:       General: He is not in acute distress.     Appearance: Normal appearance. He is well-developed and well-groomed.   HENT:      Head: Normocephalic.   Cardiovascular:      Rate and Rhythm: Normal rate.   Pulmonary:      Effort: Pulmonary effort is normal. No respiratory distress.   Skin:     Coloration: Skin is not pale.   Neurological:      Mental Status: He is alert.      Coordination: Coordination normal.      Gait: Gait normal.   Psychiatric:         Attention and Perception: Attention normal.         Mood and Affect: Mood and affect normal.         Speech: Speech normal.         Behavior: Behavior normal. Behavior is cooperative.         Thought Content: Thought content normal.             Diagnoses and health risks identified today and associated recommendations/orders:    1. Encounter for preventive health examination    2. Essential hypertension  Chronic; BP elevated today. Denies chest pain, SOB, dyspnea, palpitations, visual changes, HA, dizziness, or N/V/D. " Compliant with current medication regimen. Says BPs are typically stable but attributing to anxiety. Will arrange for patient to have BP check at PCP's office within the next 1-2 weeks. Follow up with PCP.    3. Hyperlipidemia, unspecified hyperlipidemia type  Chronic; stable on medication. Follow up with PCP.    4. Acquired hypothyroidism  Chronic; stable on medication. Follow up with PCP.    5. Primary osteoarthritis of both knees  Chronic; stable on medication. Followed by Sports Medicine.    6. BPH with urinary obstruction  Chronic; stable on medication. Follow up with PCP.  - Ambulatory referral/consult to Urology; Future    7. Insomnia, unspecified type  Chronic; stable on medication. Follow up with PCP.    8. Abnormality of gait and mobility  Stable, steady gait; reports some difficulty with climbing stairs d/t knee OA. Follow up with PCP.    9. Obesity (BMI 30.0-34.9)  Encouraged patient to continue to eat a low salt/low fat diet.      Provided Michael with a 5-10 year screening schedule and personal prevention plan. Recommendations were developed using the USPSTF age appropriate recommendations. Education, counseling, and referrals were provided as needed. After Visit Summary provided to patient which includes a list of additional screenings/tests needed if applicable.    Follow up for your next annual wellness visit.    Mi Desir NP    Advance Care Planning     I offered to discuss advanced care planning, including how to pick a person who would make decisions for you if you were unable to make them for yourself, called a health care power of , and what kind of decisions you might make such as use of life sustaining treatments such as ventilators and tube feeding when faced with a life limiting illness recorded on a living will that they will need to know. (How you want to be cared for as you near the end of your natural life)     X  Patient has advanced directives written and agrees to  provide copies to the institution.

## 2022-03-15 NOTE — Clinical Note
Good evening,  Would you please contact this patient to schedule a BP check in the next 1-2 weeks. He came in for a wellness visit yesterday and had an elevated BP. He was asymptomatic but I would like for him to be rechecked soon. I appreciate all you do.  Thanks,  ROLF

## 2022-03-15 NOTE — PATIENT INSTRUCTIONS
Counseling and Referral of Other Preventative  (Italic type indicates deductible and co-insurance are waived)    Patient Name: Julius Baker  Today's Date: 3/15/2022    Health Maintenance       Date Due Completion Date    Shingles Vaccine (2 of 2) 04/01/2022 (Originally 8/31/2021) 7/6/2021    Pneumococcal Vaccines (Age 65+) (1 of 1 - PPSV23) 04/01/2022 (Originally 11/1/2010) 6/30/2015    Colonoscopy 08/11/2023 8/11/2020    TETANUS VACCINE 06/30/2024 6/30/2014    Lipid Panel 11/10/2026 11/10/2021        Orders Placed This Encounter   Procedures    Ambulatory referral/consult to Urology     The following information is provided to all patients.  This information is to help you find resources for any of the problems found today that may be affecting your health:                Living healthy guide: www.[facility].[place].gov      Understanding Diabetes: www.diabetes.org      Eating healthy: www.cdc.gov/healthyweight      [facility] home safety checklist: www.cdc.gov/steadi/patient.html      Agency on Aging: www.goea.[place].[facility]      Alcoholics anonymous (AA): www.aa.org      Physical Activity: www.ryan.nih.gov/cl6mmya      Tobacco use: www.quitwithusla.org

## 2022-03-16 PROBLEM — E66.811 OBESITY (BMI 30.0-34.9): Status: ACTIVE | Noted: 2022-03-16

## 2022-03-16 PROBLEM — E66.9 OBESITY (BMI 30.0-34.9): Status: ACTIVE | Noted: 2022-03-16

## 2022-03-16 PROBLEM — S83.241A ACUTE MEDIAL MENISCAL TEAR, RIGHT, INITIAL ENCOUNTER: Status: RESOLVED | Noted: 2018-05-21 | Resolved: 2022-03-16

## 2022-03-16 PROBLEM — M17.0 PRIMARY OSTEOARTHRITIS OF BOTH KNEES: Status: ACTIVE | Noted: 2022-03-16

## 2022-03-16 PROBLEM — G47.00 INSOMNIA: Status: ACTIVE | Noted: 2022-03-16

## 2022-03-26 ENCOUNTER — PATIENT MESSAGE (OUTPATIENT)
Dept: INTERNAL MEDICINE | Facility: CLINIC | Age: 77
End: 2022-03-26
Payer: MEDICARE

## 2022-03-28 NOTE — TELEPHONE ENCOUNTER
If the meloxicam is not helping stop taking it.  Sometimes it can cause fluid retention.  I have not evaluated him for these issues, we would need to see him for evaluation.  He can also f/u with Dr Silvestre

## 2022-04-04 ENCOUNTER — OFFICE VISIT (OUTPATIENT)
Dept: UROLOGY | Facility: CLINIC | Age: 77
End: 2022-04-04
Payer: MEDICARE

## 2022-04-04 VITALS
SYSTOLIC BLOOD PRESSURE: 189 MMHG | HEIGHT: 70 IN | WEIGHT: 234.38 LBS | HEART RATE: 80 BPM | DIASTOLIC BLOOD PRESSURE: 80 MMHG | BODY MASS INDEX: 33.55 KG/M2

## 2022-04-04 DIAGNOSIS — R39.12 WEAK URINARY STREAM: ICD-10-CM

## 2022-04-04 DIAGNOSIS — I10 HYPERTENSION, UNSPECIFIED TYPE: ICD-10-CM

## 2022-04-04 DIAGNOSIS — N40.1 BPH WITH URINARY OBSTRUCTION: Primary | ICD-10-CM

## 2022-04-04 DIAGNOSIS — N13.8 BPH WITH URINARY OBSTRUCTION: Primary | ICD-10-CM

## 2022-04-04 LAB — POC RESIDUAL URINE VOLUME: 3 ML (ref 0–100)

## 2022-04-04 PROCEDURE — 99999 PR PBB SHADOW E&M-EST. PATIENT-LVL IV: CPT | Mod: PBBFAC,,, | Performed by: STUDENT IN AN ORGANIZED HEALTH CARE EDUCATION/TRAINING PROGRAM

## 2022-04-04 PROCEDURE — 99214 PR OFFICE/OUTPT VISIT, EST, LEVL IV, 30-39 MIN: ICD-10-PCS | Mod: S$PBB,,, | Performed by: STUDENT IN AN ORGANIZED HEALTH CARE EDUCATION/TRAINING PROGRAM

## 2022-04-04 PROCEDURE — 99214 OFFICE O/P EST MOD 30 MIN: CPT | Mod: PBBFAC,PO | Performed by: STUDENT IN AN ORGANIZED HEALTH CARE EDUCATION/TRAINING PROGRAM

## 2022-04-04 PROCEDURE — 99214 OFFICE O/P EST MOD 30 MIN: CPT | Mod: S$PBB,,, | Performed by: STUDENT IN AN ORGANIZED HEALTH CARE EDUCATION/TRAINING PROGRAM

## 2022-04-04 PROCEDURE — 99999 PR PBB SHADOW E&M-EST. PATIENT-LVL IV: ICD-10-PCS | Mod: PBBFAC,,, | Performed by: STUDENT IN AN ORGANIZED HEALTH CARE EDUCATION/TRAINING PROGRAM

## 2022-04-04 PROCEDURE — 51798 US URINE CAPACITY MEASURE: CPT | Mod: PBBFAC,PO | Performed by: STUDENT IN AN ORGANIZED HEALTH CARE EDUCATION/TRAINING PROGRAM

## 2022-04-04 RX ORDER — ALFUZOSIN HYDROCHLORIDE 10 MG/1
10 TABLET, EXTENDED RELEASE ORAL DAILY
Qty: 90 TABLET | Refills: 3 | Status: SHIPPED | OUTPATIENT
Start: 2022-04-04 | End: 2023-04-27

## 2022-04-04 RX ORDER — LOSARTAN POTASSIUM 100 MG/1
100 TABLET ORAL DAILY
Qty: 90 TABLET | Refills: 0 | Status: SHIPPED | OUTPATIENT
Start: 2022-04-04 | End: 2022-07-05

## 2022-04-04 NOTE — PROGRESS NOTES
"Subjective:       Patient ID: Julius Baker is a 76 y.o. male.    Chief Complaint:  Urinary issues  This is a 76 y.o.  male patient that is an established patient of mine.     He is established with Dr. Worthy at [facility] urology 2/2020-10/2021. Per his last note "frequency, urgency . Nocturia x2.  + incomplete emptying.  + decreased FOS.   He was started on flomax by his PCP.  This improved his LUTS, but he didn't like the retrograde ejaculation.  Was then started on uroxatral with some improvement in his LUTS.  Also caused retrograde ejaculation.  His PSA was found to be elevated.  However, he refused biopsy as he understands that PSA screening is not recommended in his age group. He denies ED.  Genitourinary: The penis is circumcised with no evidence of plaques or induration. The urethral meatus is normal. The testes, epididymides, and cord structures are normal in size and contour bilaterally. The scrotum is normal in size and contour.  The prostate is >50 g. Normal landmarks. Lateral sulci. Median furrow intact.  No nodularity or induration. Seminal vesicles are normal.  PVR done immediately after voiding by my nurse is 0 cc.     Dr. Worthy's plan was continue uroxatral, start finasteride. No PSAs given his age. F/u in 1 year.     4/4/22  On med list - alfuzosin, finasteride not on list, nebivolol. He is continuing on alfuzosin currently. He started on finasteride and woke up and felt that his "bladder was going to explode." he states that happened 1 day after finasteride initiation.     Pt clarify that if he had to do a medical procedure he would consider it if it was indicated. And also felt that surgical option was not for him.      LAST PSA  Lab Results   Component Value Date    PSA 4.7 (H) 02/03/2020    PSADIAG 6.0 (H) 07/30/2020       Lab Results   Component Value Date    CREATININE 1.0 11/10/2021       ---  Past Medical History:   Diagnosis Date    Acquired hypothyroidism     Allergy     " BPH (benign prostatic hyperplasia)     Cataract     Hyperlipidemia     Hypertension     Sleep apnea     SOB (shortness of breath)     feels allergy related       Past Surgical History:   Procedure Laterality Date    CATARACT EXTRACTION, BILATERAL Bilateral     COLONOSCOPY N/A 08/11/2020    Procedure: COLONOSCOPY;  Surgeon: LISBETH Ríos MD;  Location: [address];  Service: Endoscopy;  Laterality: N/A;  covid test 8/8 [place]    dental implant Left     EYE SURGERY      KNEE ARTHROSCOPY Bilateral     uvula removal         Family History   Problem Relation Age of Onset    Diabetes Mother     Hypertension Mother     Arthritis Mother        Social History     Tobacco Use    Smoking status: Never Smoker    Smokeless tobacco: Never Used   Substance Use Topics    Alcohol use: Yes     Alcohol/week: 12.0 standard drinks     Types: 12 Standard drinks or equivalent per week     Comment: Rum and coke 2-3 drinks 3-4 times/week    Drug use: No       Current Outpatient Medications on File Prior to Visit   Medication Sig Dispense Refill    albuterol (PROVENTIL/VENTOLIN HFA) 90 mcg/actuation inhaler Inhale 2 puffs into the lungs every 6 (six) hours as needed for Wheezing. Rescue      ALPRAZolam (XANAX) 0.25 MG tablet TAKE 1 TABLET BY MOUTH NIGHTLY AS NEEDED FOR INSOMNIA 90 tablet 0    aspirin-sod bicarb-citric acid (GUALBERTO-SELTZER) 324 mg TbEF Take 325 mg by mouth every 6 (six) hours as needed.       chlorhexidine (PERIDEX) 0.12 % solution SMARTSIG:By Mouth      diclofenac sodium (VOLTAREN) 1 % Gel APPLY 2 GRAMS TOPICALLY TO THE AFFECTED AREA EVERY  g 2    diphenhydramine-acetaminophen (TYLENOL PM)  mg Tab Take 1 tablet by mouth nightly as needed.      fluticasone propionate (FLONASE) 50 mcg/actuation nasal spray 1 spray by Each Nostril route daily as needed.      levothyroxine (SYNTHROID) 25 MCG tablet TAKE 1 TABLET DAILY (FURTHER REFILLS REQUIRE VISIT WITH DOCTOR) 90 tablet 3     losartan (COZAAR) 100 MG tablet Take 1 tablet (100 mg total) by mouth once daily. 90 tablet 0    multivitamin capsule Take 1 capsule by mouth once daily.      nebivoloL (BYSTOLIC) 10 MG Tab Take 1 tablet (10 mg total) by mouth once daily. 90 tablet 3    pravastatin (PRAVACHOL) 20 MG tablet Take 1 tablet (20 mg total) by mouth every evening. 90 tablet 3    meloxicam (MOBIC) 15 MG tablet Take 1 tablet (15 mg total) by mouth once daily. 30 tablet 2    promethazine-dextromethorphan (PROMETHAZINE-DM) 6.25-15 mg/5 mL Syrp Take 5 mLs by mouth every 6 (six) hours as needed for Cough.      [DISCONTINUED] alfuzosin (UROXATRAL) 10 mg Tb24 Take 1 tablet (10 mg total) by mouth once daily. 30 tablet 11     No current facility-administered medications on file prior to visit.       Review of patient's allergies indicates:   Allergen Reactions    Doxycycline Diarrhea     itching    Ezetimibe-simvastatin      Muscle cramps    Rosuvastatin      Muscle cramps    Sertraline Other (See Comments)     jittery    Simvastatin      Muscle cramps    Adhesive Rash       Review of Systems   Constitutional: Negative for chills.   HENT: Negative for congestion.    Eyes: Negative for visual disturbance.   Respiratory: Negative for shortness of breath.    Cardiovascular: Negative for chest pain.   Gastrointestinal: Negative for abdominal distention.   Musculoskeletal: Negative for gait problem.   Skin: Negative for color change.   Neurological: Negative for dizziness.   Psychiatric/Behavioral: Negative for agitation.       Objective:      Physical Exam  Constitutional:       Appearance: He is well-developed.   HENT:      Head: Normocephalic.   Eyes:      Pupils: Pupils are equal, round, and reactive to light.   Pulmonary:      Effort: Pulmonary effort is normal.   Abdominal:      Palpations: Abdomen is soft.   Genitourinary:     Comments: ANDREW -40-45g prostate, nonboggy, no hard nodules; benign gland  Musculoskeletal:         General:  Normal range of motion.      Cervical back: Normal range of motion.   Skin:     General: Skin is warm and dry.   Neurological:      Mental Status: He is alert.         Assessment:       1. BPH with urinary obstruction    2. Weak urinary stream        Plan:           Plan:  1. Continue uroxatrol. Pt had strange sx after 1 day of finasteride, unclear if it was the finasteride vs some other issue going on, but nevertheless as urinary sx are doing well, continue uroxatrol. [facility].   2. ANDREW yearly.         BPH with urinary obstruction  -     Ambulatory referral/consult to Urology  -     POCT Bladder Scan  -     alfuzosin (UROXATRAL) 10 mg Tb24; Take 1 tablet (10 mg total) by mouth once daily.  Dispense: 90 tablet; Refill: 3    Weak urinary stream

## 2022-04-17 ENCOUNTER — HOSPITAL ENCOUNTER (EMERGENCY)
Facility: HOSPITAL | Age: 77
Discharge: HOME | End: 2022-04-17
Payer: MEDICARE

## 2022-04-17 VITALS
DIASTOLIC BLOOD PRESSURE: 75 MMHG | WEIGHT: 220 LBS | OXYGEN SATURATION: 95 % | HEIGHT: 70 IN | SYSTOLIC BLOOD PRESSURE: 146 MMHG | TEMPERATURE: 99 F | RESPIRATION RATE: 20 BRPM | HEART RATE: 66 BPM | BODY MASS INDEX: 31.5 KG/M2

## 2022-04-17 DIAGNOSIS — M79.605 MUSCULOSKELETAL LEG PAIN, LEFT: Primary | ICD-10-CM

## 2022-04-17 PROCEDURE — 99281 EMR DPT VST MAYX REQ PHY/QHP: CPT

## 2022-04-17 RX ORDER — FUROSEMIDE 20 MG/1
20 TABLET ORAL DAILY
COMMUNITY
End: 2023-04-27

## 2022-04-17 RX ORDER — POTASSIUM CHLORIDE 750 MG/1
20 TABLET, EXTENDED RELEASE ORAL DAILY
COMMUNITY
End: 2023-04-27

## 2022-04-17 NOTE — DISCHARGE INSTRUCTIONS
You do not have signs of a DVT.     Please return to the ED if your condition changes, progresses, or if you have concerns.     Your blood pressure was a little high today.  You need to have it rechecked by your doctor within one week.     Thank you for choosing [facility]! We appreciate you coming to us for your medical care. We hope you feel better soon! Please come back to [facility] for all of your future medical needs.      Our goal in the emergency department is to always give you outstanding care and exceptional service. You may receive a survey by mail or e-mail in the next week regarding your experience in our ED. We would greatly appreciate your completing and returning the survey. Your feedback provides us with a way to recognize our staff who give very good care and it helps us learn how to improve when your experience was below our aspiration of excellence.      Sincerely,  JACOB Ruth  Lead [facility]

## 2022-04-17 NOTE — ED PROVIDER NOTES
Encounter Date: 4/17/2022       History     Chief Complaint   Patient presents with    Knee Pain     Pt. Has osteoarthritis bilateral knees. He has had injections and physical therapy. For approx. 2 weeks c/o lt. Lateral thigh pain that improves with physical therapy but does not resolve. Pt. Has  had bilateral leg swelling for 1 month, started on lasix a week ago by cardiologist. Nuclear stress test 2 days ago, results pending. Pt. Researched symptoms and thinks he may have DVT. Reports swelling in lower extremities improving since starting lasix.      76-year-old male presents to the ED for a left lateral thigh pain.  The patient reports that he has had this pain intermittently for about a month.  Pain improves after he has physical therapy for his knees for OA, but the pain comes back shortly afterwards.  He denies trauma, chest pain, shortness of breath, rash, skin color change, numbness/tingling, back pain, and weakness.  The patient states that he has BLE swelling which has greatly improved since being started on Lasix.  Pt reports that he was researching causes of leg pain and is concerned for DVT.  Pt denies anticoagulant use. No pmhx of DVT.  Pt had a nuclear stress test two days ago, results pending. No recent travel. No other complaints at this time.     The history is provided by the patient and a relative.     Review of patient's allergies indicates:   Allergen Reactions    Doxycycline Diarrhea     itching    Ezetimibe-simvastatin      Muscle cramps    Rosuvastatin      Muscle cramps    Sertraline Other (See Comments)     jittery    Simvastatin      Muscle cramps    Adhesive Rash     Past Medical History:   Diagnosis Date    Acquired hypothyroidism     Allergy     BPH (benign prostatic hyperplasia)     Cataract     Hyperlipidemia     Hypertension     Sleep apnea     SOB (shortness of breath)     feels allergy related     Past Surgical History:   Procedure Laterality Date    CATARACT  EXTRACTION, BILATERAL Bilateral     COLONOSCOPY N/A 08/11/2020    Procedure: COLONOSCOPY;  Surgeon: LISBETH Ríos MD;  Location: [facility] ([address] [place] [address]);  Service: Endoscopy;  Laterality: N/A;  covid test 8/8 [place]    dental implant Left     EYE SURGERY      KNEE ARTHROSCOPY Bilateral     uvula removal       Family History   Problem Relation Age of Onset    Diabetes Mother     Hypertension Mother     Arthritis Mother      Social History     Tobacco Use    Smoking status: Never Smoker    Smokeless tobacco: Never Used   Substance Use Topics    Alcohol use: Yes     Alcohol/week: 12.0 standard drinks     Types: 12 Standard drinks or equivalent per week     Comment: Rum and coke 2-3 drinks 3-4 times/week    Drug use: No     Review of Systems   Constitutional: Negative for activity change, appetite change and fever.   Respiratory: Negative for cough and shortness of breath.    Cardiovascular: Positive for leg swelling (chronic). Negative for chest pain and palpitations.   Musculoskeletal: Positive for arthralgias and myalgias. Negative for joint swelling.   Skin: Negative for color change, rash and wound.   Neurological: Negative for weakness and numbness.   Hematological: Does not bruise/bleed easily.   Psychiatric/Behavioral: Negative for confusion.       Physical Exam     Initial Vitals [04/17/22 1501]   BP Pulse Resp Temp SpO2   (!) 146/75 66 20 98.5 °F (36.9 °C) 95 %      MAP       --         Physical Exam    Nursing note and vitals reviewed.  Constitutional: Vital signs are normal. He appears well-developed and well-nourished. He is active and cooperative. He is easily aroused.  Non-toxic appearance. He does not have a sickly appearance. He does not appear ill. No distress.   HENT:   Head: Normocephalic and atraumatic.   Mouth/Throat: Mucous membranes are normal.   Eyes: Conjunctivae are normal.   Neck: Phonation normal.   Normal range of motion.  Cardiovascular: Normal rate, regular rhythm and normal  heart sounds.   Pulses:       Dorsalis pedis pulses are 2+ on the right side and 2+ on the left side.   Pulmonary/Chest: Effort normal and breath sounds normal.   Abdominal: Abdomen is soft. Bowel sounds are normal. He exhibits no distension. There is no abdominal tenderness. There is no rigidity, no rebound and no guarding.   Musculoskeletal:      Cervical back: Normal range of motion.      Right hip: Normal.      Left hip: Normal.      Right upper leg: Normal.      Left upper leg: Normal. No tenderness or bony tenderness.      Right knee: No swelling, erythema or bony tenderness. No tenderness.      Left knee: No swelling, erythema or bony tenderness. No tenderness.      Right lower leg: No deformity, tenderness or bony tenderness. 1+ Edema present.      Left lower leg: No tenderness or bony tenderness. 1+ Edema present.      Right ankle: Swelling (trace) present.      Left ankle: Swelling (trace) present.      Right foot: Normal.      Left foot: Normal.        Legs:       Comments: Negative Francheska's sign bilaterally.     Pt reports chronic, unchanged knee pain.      Neurological: He is alert, oriented to person, place, and time and easily aroused. He has normal strength. No sensory deficit. Coordination normal. GCS eye subscore is 4. GCS verbal subscore is 5. GCS motor subscore is 6.   Skin: Skin is warm, dry and intact. No bruising and no rash noted. No erythema. No pallor.   Psychiatric: He has a normal mood and affect. His speech is normal and behavior is normal. Judgment and thought content normal. Cognition and memory are normal.         ED Course   Procedures  Labs Reviewed - No data to display       Imaging Results    None          Medications - No data to display  Medical Decision Making:   Initial Assessment:   Pleasant 75yo male here for intermittent left lateral thigh pain for one month. No trauma. Pmhx of OA of knees, in PT.  Pt reports pain in left thigh improves after PT but returns shortly  afterwards.  Pt also has chronic LE edema, but states that the swelling has significantly improved after starting lasix.   Pt is well-appearing. Trace BLE edema. Hips normal. Lower legs normal bilaterally. Negative eva's sign bilaterally.  No sign changes. Pt reports pain to left lateral thigh.   Differential Diagnosis:   Strain, sprain, spasm, DVT  ED Management:  Pt was offered US to r/o DVT but he declined.   Pt has no sign of DVT.  No CP or SOB to suggest PE.  Pt's pain likely related to chronic knee pain.  Encouraged pt to notify PT of pain in left lateral thigh.  Encouraged OTC tylenol as directed on the labeling.     Pt to follow up with PCP within 3-5 days.  I reviewed strict return precautions. In addition, pt is to return to the ED if condition changes, progresses, or if there are any concerns.  Pt verbalized understanding, compliance, and agreement with the treatment plan.                          Clinical Impression:   Final diagnoses:  [M79.605] Musculoskeletal leg pain, left (Primary)                 Chastity Street, JINNY  04/17/22 5794

## 2022-04-21 ENCOUNTER — PATIENT MESSAGE (OUTPATIENT)
Dept: INTERNAL MEDICINE | Facility: CLINIC | Age: 77
End: 2022-04-21
Payer: MEDICARE
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
## 2022-04-26 ENCOUNTER — OFFICE VISIT (OUTPATIENT)
Dept: INTERNAL MEDICINE | Facility: CLINIC | Age: 77
End: 2022-04-26
Payer: MEDICARE

## 2022-04-26 VITALS
BODY MASS INDEX: 32.48 KG/M2 | HEIGHT: 70 IN | HEART RATE: 68 BPM | WEIGHT: 226.88 LBS | RESPIRATION RATE: 19 BRPM | DIASTOLIC BLOOD PRESSURE: 76 MMHG | TEMPERATURE: 98 F | OXYGEN SATURATION: 96 % | SYSTOLIC BLOOD PRESSURE: 148 MMHG

## 2022-04-26 DIAGNOSIS — F41.9 ANXIETY: ICD-10-CM

## 2022-04-26 DIAGNOSIS — G89.29 CHRONIC PAIN OF LEFT KNEE: Primary | ICD-10-CM

## 2022-04-26 DIAGNOSIS — M25.562 CHRONIC PAIN OF LEFT KNEE: Primary | ICD-10-CM

## 2022-04-26 DIAGNOSIS — M54.32 SCIATICA OF LEFT SIDE: ICD-10-CM

## 2022-04-26 DIAGNOSIS — R26.89 POOR BALANCE: ICD-10-CM

## 2022-04-26 PROCEDURE — 99999 PR PBB SHADOW E&M-EST. PATIENT-LVL IV: CPT | Mod: PBBFAC,,, | Performed by: NURSE PRACTITIONER

## 2022-04-26 PROCEDURE — 99999 PR PBB SHADOW E&M-EST. PATIENT-LVL IV: ICD-10-PCS | Mod: PBBFAC,,, | Performed by: NURSE PRACTITIONER

## 2022-04-26 PROCEDURE — 99214 OFFICE O/P EST MOD 30 MIN: CPT | Mod: PBBFAC,PO | Performed by: NURSE PRACTITIONER

## 2022-04-26 PROCEDURE — 99214 PR OFFICE/OUTPT VISIT, EST, LEVL IV, 30-39 MIN: ICD-10-PCS | Mod: S$PBB,,, | Performed by: NURSE PRACTITIONER

## 2022-04-26 PROCEDURE — 99214 OFFICE O/P EST MOD 30 MIN: CPT | Mod: S$PBB,,, | Performed by: NURSE PRACTITIONER

## 2022-04-26 RX ORDER — METHOCARBAMOL 750 MG/1
750 TABLET, FILM COATED ORAL 3 TIMES DAILY PRN
Qty: 15 TABLET | Refills: 0 | Status: SHIPPED | OUTPATIENT
Start: 2022-04-26 | End: 2022-05-02 | Stop reason: SDUPTHER

## 2022-04-26 RX ORDER — IBUPROFEN 800 MG/1
800 TABLET ORAL 3 TIMES DAILY PRN
Qty: 15 TABLET | Refills: 0 | Status: SHIPPED | OUTPATIENT
Start: 2022-04-26 | End: 2022-05-02 | Stop reason: SDUPTHER

## 2022-05-01 ENCOUNTER — PATIENT MESSAGE (OUTPATIENT)
Dept: INTERNAL MEDICINE | Facility: CLINIC | Age: 77
End: 2022-05-01
Payer: MEDICARE

## 2022-05-01 DIAGNOSIS — M25.562 CHRONIC PAIN OF LEFT KNEE: ICD-10-CM

## 2022-05-01 DIAGNOSIS — G89.29 CHRONIC PAIN OF LEFT KNEE: ICD-10-CM

## 2022-05-01 DIAGNOSIS — M54.32 SCIATICA OF LEFT SIDE: ICD-10-CM

## 2022-05-02 ENCOUNTER — PATIENT MESSAGE (OUTPATIENT)
Dept: INTERNAL MEDICINE | Facility: CLINIC | Age: 77
End: 2022-05-02
Payer: MEDICARE

## 2022-05-02 DIAGNOSIS — M54.32 SCIATICA OF LEFT SIDE: ICD-10-CM

## 2022-05-02 DIAGNOSIS — G89.29 CHRONIC PAIN OF LEFT KNEE: ICD-10-CM

## 2022-05-02 DIAGNOSIS — M25.562 CHRONIC PAIN OF LEFT KNEE: ICD-10-CM

## 2022-05-02 RX ORDER — IBUPROFEN 800 MG/1
800 TABLET ORAL 3 TIMES DAILY PRN
Qty: 30 TABLET | Refills: 0 | Status: SHIPPED | OUTPATIENT
Start: 2022-05-02 | End: 2022-05-23 | Stop reason: SDUPTHER

## 2022-05-02 RX ORDER — METHOCARBAMOL 750 MG/1
750 TABLET, FILM COATED ORAL 3 TIMES DAILY PRN
Qty: 15 TABLET | Refills: 0 | Status: CANCELLED | OUTPATIENT
Start: 2022-05-02 | End: 2022-05-07

## 2022-05-02 RX ORDER — IBUPROFEN 800 MG/1
800 TABLET ORAL 3 TIMES DAILY PRN
Qty: 15 TABLET | Refills: 0 | Status: CANCELLED | OUTPATIENT
Start: 2022-05-02

## 2022-05-02 RX ORDER — METHOCARBAMOL 750 MG/1
750 TABLET, FILM COATED ORAL 3 TIMES DAILY PRN
Qty: 30 TABLET | Refills: 0 | Status: SHIPPED | OUTPATIENT
Start: 2022-05-02 | End: 2022-05-07

## 2022-05-02 NOTE — TELEPHONE ENCOUNTER
Per pt request:    Dao Howard--Thank you for prescribing the steroid and 800mg Tylenol to alleviate the pains in my left leg area above the knee. When I take the medicines every 8 hours, as prescribed, the pain is almost gone. But if I forget to take it timely, the pain quickly returns.  only have three mIore pills left and am wondering if my prescription can be renewed. (I know it says no refills).  Today is Sunday, so Dr. Altman's office is closed.   How do you think I should proceed in order to address both the leg and knee issues?  Again, thank you very much for seeing me recently. I appreciate the time you took to examine me and listen to my concerns.  Julius Baker

## 2022-05-09 ENCOUNTER — PATIENT MESSAGE (OUTPATIENT)
Dept: INTERNAL MEDICINE | Facility: CLINIC | Age: 77
End: 2022-05-09
Payer: MEDICARE

## 2022-05-11 ENCOUNTER — PATIENT MESSAGE (OUTPATIENT)
Dept: INTERNAL MEDICINE | Facility: CLINIC | Age: 77
End: 2022-05-11
Payer: MEDICARE

## 2022-05-11 NOTE — TELEPHONE ENCOUNTER
Dao Howard--I saw Dr. Altman yesterday afternoon and he has scheduled my knee replacement surgery for June 13th. It will be at [facility], [place].  Also starting a pre-training program at [facility] to prepare for the surgery.    One item that was mentioned is that they want me to gradually reduce the number of steroid pills because they can impact the knee surgery. Do you see any problem with me doing that?  Again, thanks for helping me through this period.  Julius    Please advise

## 2022-05-20 ENCOUNTER — IMMUNIZATION (OUTPATIENT)
Dept: PRIMARY CARE CLINIC | Facility: CLINIC | Age: 77
End: 2022-05-20
Payer: MEDICARE

## 2022-05-20 DIAGNOSIS — Z23 NEED FOR VACCINATION: Primary | ICD-10-CM

## 2022-05-20 PROCEDURE — 91305 COVID-19, MRNA, LNP-S, PF, 30 MCG/0.3 ML DOSE VACCINE (PFIZER): CPT | Mod: PBBFAC | Performed by: INTERNAL MEDICINE

## 2022-05-23 ENCOUNTER — PATIENT MESSAGE (OUTPATIENT)
Dept: INTERNAL MEDICINE | Facility: CLINIC | Age: 77
End: 2022-05-23
Payer: MEDICARE

## 2022-05-23 ENCOUNTER — PATIENT MESSAGE (OUTPATIENT)
Dept: OPTOMETRY | Facility: CLINIC | Age: 77
End: 2022-05-23
Payer: MEDICARE

## 2022-05-23 DIAGNOSIS — M25.562 CHRONIC PAIN OF LEFT KNEE: ICD-10-CM

## 2022-05-23 DIAGNOSIS — G89.29 CHRONIC PAIN OF LEFT KNEE: ICD-10-CM

## 2022-05-23 DIAGNOSIS — M54.32 SCIATICA OF LEFT SIDE: ICD-10-CM

## 2022-05-23 RX ORDER — METHOCARBAMOL 750 MG/1
750 TABLET, FILM COATED ORAL 3 TIMES DAILY PRN
Qty: 60 TABLET | Refills: 1 | Status: SHIPPED | OUTPATIENT
Start: 2022-05-23 | End: 2023-01-23

## 2022-05-23 RX ORDER — IBUPROFEN 800 MG/1
800 TABLET ORAL 3 TIMES DAILY PRN
Qty: 30 TABLET | Refills: 0 | Status: SHIPPED | OUTPATIENT
Start: 2022-05-23 | End: 2023-04-27

## 2022-05-26 ENCOUNTER — OFFICE VISIT (OUTPATIENT)
Dept: INTERNAL MEDICINE | Facility: CLINIC | Age: 77
End: 2022-05-26
Payer: MEDICARE

## 2022-05-26 VITALS
DIASTOLIC BLOOD PRESSURE: 72 MMHG | HEART RATE: 62 BPM | OXYGEN SATURATION: 97 % | SYSTOLIC BLOOD PRESSURE: 152 MMHG | TEMPERATURE: 98 F | RESPIRATION RATE: 16 BRPM | WEIGHT: 229.06 LBS | BODY MASS INDEX: 32.79 KG/M2 | HEIGHT: 70 IN

## 2022-05-26 DIAGNOSIS — Z01.818 ENCOUNTER FOR PRE-OPERATIVE EXAMINATION: Primary | ICD-10-CM

## 2022-05-26 PROCEDURE — 99999 PR PBB SHADOW E&M-EST. PATIENT-LVL IV: CPT | Mod: PBBFAC,,, | Performed by: NURSE PRACTITIONER

## 2022-05-26 PROCEDURE — 99214 OFFICE O/P EST MOD 30 MIN: CPT | Mod: S$PBB,,, | Performed by: NURSE PRACTITIONER

## 2022-05-26 PROCEDURE — 99999 PR PBB SHADOW E&M-EST. PATIENT-LVL IV: ICD-10-PCS | Mod: PBBFAC,,, | Performed by: NURSE PRACTITIONER

## 2022-05-26 PROCEDURE — 99214 PR OFFICE/OUTPT VISIT, EST, LEVL IV, 30-39 MIN: ICD-10-PCS | Mod: S$PBB,,, | Performed by: NURSE PRACTITIONER

## 2022-05-26 PROCEDURE — 99214 OFFICE O/P EST MOD 30 MIN: CPT | Mod: PBBFAC,PO | Performed by: NURSE PRACTITIONER

## 2022-05-26 NOTE — PROGRESS NOTES
Subjective:       Patient ID: Julius Baker is a 76 y.o. male.    Chief Complaint: Pre-op Exam (Left knee Replacement )        Julius Baker is a 76 y.o. male who presents today for pre-op evaluation.     Planned to undergo left total knee replacement on 06/13/2022 by Dr. Altman.    Chronic Steroid usage: no  Is patient a current or former smoker: non-smoker    Past Medical History:   Diagnosis Date    Acquired hypothyroidism     Allergy     BPH (benign prostatic hyperplasia)     Cataract     Hyperlipidemia     Hypertension     Sleep apnea     SOB (shortness of breath)     feels allergy related     Past Surgical History:   Procedure Laterality Date    CATARACT EXTRACTION, BILATERAL Bilateral     COLONOSCOPY N/A 08/11/2020    Procedure: COLONOSCOPY;  Surgeon: LISBETH Ríos MD;  Location: [address]);  Service: Endoscopy;  Laterality: N/A;  covid test 8/8 [place]    dental implant Left     EYE SURGERY      KNEE ARTHROSCOPY Bilateral     uvula removal       Social History     Socioeconomic History    Marital status:    Tobacco Use    Smoking status: Never Smoker    Smokeless tobacco: Never Used   Substance and Sexual Activity    Alcohol use: Yes     Alcohol/week: 12.0 standard drinks     Types: 12 Standard drinks or equivalent per week     Comment: Rum and coke 2-3 drinks 3-4 times/week    Drug use: No    Sexual activity: Not Currently     Partners: Female     Social Determinants of Health     Financial Resource Strain: Low Risk     Difficulty of Paying Living Expenses: Not hard at all   Food Insecurity: No Food Insecurity    Worried About Running Out of Food in the Last Year: Never true    Ran Out of Food in the Last Year: Never true   Transportation Needs: No Transportation Needs    Lack of Transportation (Medical): No    Lack of Transportation (Non-Medical): No   Physical Activity: Insufficiently Active    Days of Exercise per Week: 1 day    Minutes of  Exercise per Session: 10 min   Stress: Stress Concern Present    Feeling of Stress : Very much   Social Connections: Socially Integrated    Frequency of Communication with Friends and Family: Three times a week    Frequency of Social Gatherings with Friends and Family: Once a week    Attends [employer] Services: More than 4 times per year    Active Member of Clubs or Organizations: Yes    Attends Club or Organization Meetings: 1 to 4 times per year    Marital Status:    Housing Stability: Low Risk     Unable to Pay for Housing in the Last Year: No    Number of Places Lived in the Last Year: 1    Unstable Housing in the Last Year: No     Family History   Problem Relation Age of Onset    Diabetes Mother     Hypertension Mother     Arthritis Mother        Review of patient's allergies indicates:   Allergen Reactions    Doxycycline Diarrhea     itching    Ezetimibe-simvastatin      Muscle cramps    Rosuvastatin      Muscle cramps    Sertraline Other (See Comments)     jittery    Simvastatin      Muscle cramps    Adhesive Rash       Medication List with Changes/Refills   Current Medications    ALBUTEROL (PROVENTIL/VENTOLIN HFA) 90 MCG/ACTUATION INHALER    Inhale 2 puffs into the lungs every 6 (six) hours as needed for Wheezing. Rescue    ALFUZOSIN (UROXATRAL) 10 MG TB24    Take 1 tablet (10 mg total) by mouth once daily.    ALPRAZOLAM (XANAX) 0.25 MG TABLET    TAKE 1 TABLET BY MOUTH NIGHTLY AS NEEDED FOR INSOMNIA    ASPIRIN-SOD BICARB-CITRIC ACID (GUALBERTO-SELTZER) 324 MG TBEF    Take 325 mg by mouth every 6 (six) hours as needed.     CHLORHEXIDINE (PERIDEX) 0.12 % SOLUTION    SMARTSIG:By Mouth    DICLOFENAC SODIUM (VOLTAREN) 1 % GEL    APPLY 2 GRAMS TOPICALLY TO THE AFFECTED AREA EVERY DAY    DIPHENHYDRAMINE-ACETAMINOPHEN (TYLENOL PM)  MG TAB    Take 1 tablet by mouth nightly as needed.    FLUTICASONE PROPIONATE (FLONASE) 50 MCG/ACTUATION NASAL SPRAY    1 spray by Each Nostril route daily as  "needed.    FUROSEMIDE (LASIX) 20 MG TABLET    Take 20 mg by mouth once daily.    IBUPROFEN (ADVIL,MOTRIN) 800 MG TABLET    Take 1 tablet (800 mg total) by mouth 3 (three) times daily as needed for Pain.    LEVOTHYROXINE (SYNTHROID) 25 MCG TABLET    TAKE 1 TABLET DAILY (FURTHER REFILLS REQUIRE VISIT WITH DOCTOR)    LOSARTAN (COZAAR) 100 MG TABLET    Take 1 tablet (100 mg total) by mouth once daily.    METHOCARBAMOL (ROBAXIN) 750 MG TAB    Take 1 tablet (750 mg total) by mouth 3 (three) times daily as needed (muscle relaxer).    MULTIVITAMIN CAPSULE    Take 1 capsule by mouth once daily.    NEBIVOLOL (BYSTOLIC) 10 MG TAB    Take 1 tablet (10 mg total) by mouth once daily.    POTASSIUM CHLORIDE (KLOR-CON) 10 MEQ TBSR    Take 20 mEq by mouth once daily.    PRAVASTATIN (PRAVACHOL) 20 MG TABLET    Take 1 tablet (20 mg total) by mouth every evening.    PROMETHAZINE-DEXTROMETHORPHAN (PROMETHAZINE-DM) 6.25-15 MG/5 ML SYRP    Take 5 mLs by mouth every 6 (six) hours as needed for Cough.     Review of Systems   Respiratory: Positive for shortness of breath. Negative for cough.    Cardiovascular: Positive for leg swelling. Negative for chest pain and palpitations.   Endo/Heme/Allergies: Does not bruise/bleed easily.       Objective:   BP (!) 152/72 (BP Location: Right arm, Patient Position: Sitting, BP Method: Medium (Manual))   Pulse 62   Temp 97.7 °F (36.5 °C) (Temporal)   Resp 16   Ht 5' 10" (1.778 m)   Wt 103.9 kg (229 lb 0.9 oz)   SpO2 97%   BMI 32.87 kg/m²     Physical Exam  Vitals reviewed.   Constitutional:       Appearance: Normal appearance.   HENT:      Head: Normocephalic and atraumatic.      Mouth/Throat:      Dentition: Normal dentition.      Pharynx: Uvula midline.   Cardiovascular:      Rate and Rhythm: Normal rate and regular rhythm.      Heart sounds: Murmur heard.   Pulmonary:      Effort: Pulmonary effort is normal. No respiratory distress.      Breath sounds: Normal breath sounds. No wheezing. "   Skin:     General: Skin is warm and dry.   Neurological:      Mental Status: He is alert and oriented to person, place, and time.       BP Readings from Last 3 Encounters:   05/26/22 (!) 152/72   04/26/22 (!) 148/76   04/17/22 (!) 146/75       Last Labs:  Glucose   Date Value Ref Range Status   11/10/2021 93 70 - 110 mg/dL Final   10/28/2019 97 70 - 110 mg/dL Final     BUN   Date Value Ref Range Status   11/10/2021 19 8 - 23 mg/dL Final   10/28/2019 19 8 - 23 mg/dL Final     Creatinine   Date Value Ref Range Status   11/10/2021 1.0 0.5 - 1.4 mg/dL Final   10/28/2019 1.1 0.5 - 1.4 mg/dL Final     Hemoglobin   Date Value Ref Range Status   11/10/2021 11.6 (L) 14.0 - 18.0 g/dL Final   02/03/2020 14.8 14.0 - 18.0 g/dL Final     Hematocrit   Date Value Ref Range Status   11/10/2021 36.8 (L) 40.0 - 54.0 % Final   02/03/2020 44.9 40.0 - 54.0 % Final       I have reviewed the following:     Details / Date    [x]   Labs     []   Micro     []   Pathology     [x]   Imaging     [x]   Cardiology Procedures     []   Other       Surgical Risk Assessment     Active cardiac issues:  Active decompensated heart failure? no   Unstable angina?  no   Significant uncontrolled arrhythmias? no   Severe valvular heart disease-Aortic or Mitral Stenosis? no   Recent MI or coronary revascularization   < 30 days? no       Clinical risk factors predicting perioperative major adverse cardiac events per RCRI  High risk surgery (suprainguinal vascular, intraperitoneal, or intrathoracic surgery)? no   History of CAD/ischemic heart disease? yes   History of cerebrovascular disease (CVA or TIA)? no   History of compensated heart failure? unknown   Type 2 diabetes requiring insulin? no   Serum Creatinine > 2? no   Total cardiac risk factors 1-2     0 predictors = 0.4%, 1 predictor = 0.9%, 2 predictors = 6.6%, ?3 predictors = >11%    According to the revised cardiac risk index, the risk of imani-procedural major cardiac complications (cardiac death,  nonfatal MI, nonfatal cardiac arrest, postoperative cardiogenic pulmonary edema, complete heart block) is: 0.9 - 6.6%    If patient has a low risk of MACE (<1%), proceed to surgery. If the patient is at elevated risk of MACE, then determine functional capacity (pt reported activity or DASI model).     If the patient has moderate, good, or excellent functional capacity (?4 METs), then proceed to surgery without further evaluation. If patient has poor or unknown functional capacity, will further testing impact decision making or perioperative care? If yes, then pharmacological stress testing is appropriate. In those patients with unknown functional capacity, exercise stress testing may be reasonable to perform.     Patient's functional mets: <=4    < 4 METs -unable to walk > 2 blocks on level ground without stopping due to symptoms  - eating, dressing, toileting, walking indoors, light housework. POOR   > 4 METs -climbing > 1 flight of stairs without stopping  -walking up hill > 1-2 blocks  -scrubbing floors  -moving furniture  - golf, bowling, dancing or tennis  -running short distance MODERATE to EXCELLENT     Assessment and Plan:         Patient Active Problem List   Diagnosis    Essential hypertension    Hyperlipidemia    Acquired hypothyroidism    BPH with urinary obstruction    Elevated prostate specific antigen (PSA)    Anxiety    Diplopia    Obesity (BMI 30.0-34.9)    Insomnia    Primary osteoarthritis of both knees       Patient is not medically optimized for moderate risk procedure with high cardiac risk and may not proceed to surgery until he has received cardiac clearance.

## 2022-05-31 ENCOUNTER — OFFICE VISIT (OUTPATIENT)
Dept: OPTOMETRY | Facility: CLINIC | Age: 77
End: 2022-05-31
Payer: MEDICARE

## 2022-05-31 DIAGNOSIS — H53.9 VISUAL DISTURBANCES: ICD-10-CM

## 2022-05-31 DIAGNOSIS — H52.4 MYOPIA WITH ASTIGMATISM AND PRESBYOPIA, BILATERAL: ICD-10-CM

## 2022-05-31 DIAGNOSIS — H52.13 MYOPIA WITH ASTIGMATISM AND PRESBYOPIA, BILATERAL: ICD-10-CM

## 2022-05-31 DIAGNOSIS — H35.372 EPIRETINAL MEMBRANE (ERM) OF LEFT EYE: ICD-10-CM

## 2022-05-31 DIAGNOSIS — Z96.1 PSEUDOPHAKIA: ICD-10-CM

## 2022-05-31 DIAGNOSIS — H43.393 VISUAL FLOATERS, BILATERAL: Primary | ICD-10-CM

## 2022-05-31 DIAGNOSIS — H52.203 MYOPIA WITH ASTIGMATISM AND PRESBYOPIA, BILATERAL: ICD-10-CM

## 2022-05-31 DIAGNOSIS — H04.123 DRY EYE SYNDROME OF BOTH EYES: ICD-10-CM

## 2022-05-31 PROCEDURE — 99213 OFFICE O/P EST LOW 20 MIN: CPT | Mod: PBBFAC | Performed by: OPTOMETRIST

## 2022-05-31 PROCEDURE — 92134 CPTRZ OPH DX IMG PST SGM RTA: CPT | Mod: PBBFAC | Performed by: OPTOMETRIST

## 2022-05-31 PROCEDURE — 99999 PR PBB SHADOW E&M-EST. PATIENT-LVL III: ICD-10-PCS | Mod: PBBFAC,,, | Performed by: OPTOMETRIST

## 2022-05-31 PROCEDURE — 92134 POSTERIOR SEGMENT OCT RETINA (OCULAR COHERENCE TOMOGRAPHY)-BOTH EYES: ICD-10-PCS | Mod: 26,S$PBB,, | Performed by: OPTOMETRIST

## 2022-05-31 PROCEDURE — 99999 PR PBB SHADOW E&M-EST. PATIENT-LVL III: CPT | Mod: PBBFAC,,, | Performed by: OPTOMETRIST

## 2022-05-31 PROCEDURE — 92014 PR EYE EXAM, EST PATIENT,COMPREHESV: ICD-10-PCS | Mod: S$PBB,,, | Performed by: OPTOMETRIST

## 2022-05-31 PROCEDURE — 92014 COMPRE OPH EXAM EST PT 1/>: CPT | Mod: S$PBB,,, | Performed by: OPTOMETRIST

## 2022-05-31 NOTE — PROGRESS NOTES
JOAN     JOSETTE: 08/20  Chief complaint (CC): Patient is here for annual eye exam today.  Patient   think distance and vision is worse.  Glasses? + 3-5 yrs. old  Contacts? -  H/o eye surgery, injections or laser: PC IOL OU  H/o eye injury: -  Known eye conditions? See above  Family h/o eye conditions? -  Eye gtts? -      (-) Flashes (+)  Floaters (-) Mucous   (-)  Tearing (-) Itching (-) Burning   (-) Headaches (-) Eye Pain/discomfort (-) Irritation   (-)  Redness (-) Double vision (-) Blurry vision    Diabetic? -  A1c? -        Last edited by Dang Ryees on 5/31/2022  9:37 AM. (History)            Assessment /Plan     For exam results, see Encounter Report.    Visual floaters, bilateral    Pseudophakia    Epiretinal membrane (ERM) of left eye    Dry eye syndrome of both eyes    Myopia with astigmatism and presbyopia, bilateral      1. No e/o h/b/t 360 degrees OU. Monitor for worsening of symptoms or S/Sx of RD.   2. Good result.   3. Stable. Mac OCT today stable. Monitor.   4. Recommend Systane Ultra or Refresh Optive BID-TID OU to aid with symptoms of dry eyes.  5. SRx released to patient. Patient educated on lens options. Normal ocular health. RTC 1 year for routine exam.

## 2022-06-01 ENCOUNTER — HOSPITAL ENCOUNTER (OUTPATIENT)
Dept: PREADMISSION TESTING | Facility: OTHER | Age: 77
Discharge: HOME | End: 2022-06-01
Attending: ORTHOPAEDIC SURGERY
Payer: MEDICARE

## 2022-06-01 VITALS
HEIGHT: 70 IN | BODY MASS INDEX: 32.21 KG/M2 | HEART RATE: 68 BPM | TEMPERATURE: 98 F | RESPIRATION RATE: 16 BRPM | DIASTOLIC BLOOD PRESSURE: 88 MMHG | WEIGHT: 225 LBS | SYSTOLIC BLOOD PRESSURE: 190 MMHG | OXYGEN SATURATION: 97 %

## 2022-06-01 DIAGNOSIS — Z01.818 PREOP TESTING: ICD-10-CM

## 2022-06-01 LAB
ABO + RH BLD: NORMAL
ALBUMIN SERPL BCP-MCNC: 4.3 G/DL (ref 3.5–5.2)
ALP SERPL-CCNC: 105 U/L (ref 55–135)
ALT SERPL W/O P-5'-P-CCNC: 256 U/L (ref 10–44)
ANION GAP SERPL CALC-SCNC: 17 MMOL/L (ref 8–16)
AST SERPL-CCNC: 366 U/L (ref 10–40)
BACTERIA #/AREA URNS HPF: NORMAL /HPF
BASOPHILS # BLD AUTO: 0.04 K/UL (ref 0–0.2)
BASOPHILS NFR BLD: 0.7 % (ref 0–1.9)
BILIRUB SERPL-MCNC: 2.6 MG/DL (ref 0.1–1)
BILIRUB UR QL STRIP: NEGATIVE
BLD GP AB SCN CELLS X3 SERPL QL: NORMAL
BUN SERPL-MCNC: 26 MG/DL (ref 8–23)
CALCIUM SERPL-MCNC: 9.7 MG/DL (ref 8.7–10.5)
CHLORIDE SERPL-SCNC: 99 MMOL/L (ref 95–110)
CLARITY UR: CLEAR
CO2 SERPL-SCNC: 24 MMOL/L (ref 23–29)
COLOR UR: YELLOW
CREAT SERPL-MCNC: 1 MG/DL (ref 0.5–1.4)
DIFFERENTIAL METHOD: ABNORMAL
EOSINOPHIL # BLD AUTO: 0.1 K/UL (ref 0–0.5)
EOSINOPHIL NFR BLD: 1.9 % (ref 0–8)
ERYTHROCYTE [DISTWIDTH] IN BLOOD BY AUTOMATED COUNT: 14.5 % (ref 11.5–14.5)
EST. GFR  (AFRICAN AMERICAN): >60 ML/MIN/1.73 M^2
EST. GFR  (NON AFRICAN AMERICAN): >60 ML/MIN/1.73 M^2
GLUCOSE SERPL-MCNC: 100 MG/DL (ref 70–110)
GLUCOSE UR QL STRIP: NEGATIVE
HCT VFR BLD AUTO: 41.5 % (ref 40–54)
HGB BLD-MCNC: 14.2 G/DL (ref 14–18)
HGB UR QL STRIP: ABNORMAL
HYALINE CASTS #/AREA URNS LPF: 0 /LPF
IMM GRANULOCYTES # BLD AUTO: 0.05 K/UL (ref 0–0.04)
IMM GRANULOCYTES NFR BLD AUTO: 0.9 % (ref 0–0.5)
KETONES UR QL STRIP: ABNORMAL
LEUKOCYTE ESTERASE UR QL STRIP: NEGATIVE
LYMPHOCYTES # BLD AUTO: 0.9 K/UL (ref 1–4.8)
LYMPHOCYTES NFR BLD: 15.1 % (ref 18–48)
MCH RBC QN AUTO: 32.1 PG (ref 27–31)
MCHC RBC AUTO-ENTMCNC: 34.2 G/DL (ref 32–36)
MCV RBC AUTO: 94 FL (ref 82–98)
MICROSCOPIC COMMENT: NORMAL
MONOCYTES # BLD AUTO: 1 K/UL (ref 0.3–1)
MONOCYTES NFR BLD: 16.5 % (ref 4–15)
NEUTROPHILS # BLD AUTO: 3.7 K/UL (ref 1.8–7.7)
NEUTROPHILS NFR BLD: 64.9 % (ref 38–73)
NITRITE UR QL STRIP: NEGATIVE
NRBC BLD-RTO: 0 /100 WBC
PH UR STRIP: 6 [PH] (ref 5–8)
PLATELET # BLD AUTO: 257 K/UL (ref 150–450)
PMV BLD AUTO: 8.7 FL (ref 9.2–12.9)
POTASSIUM SERPL-SCNC: 4.2 MMOL/L (ref 3.5–5.1)
PROT SERPL-MCNC: 7.1 G/DL (ref 6–8.4)
PROT UR QL STRIP: ABNORMAL
RBC # BLD AUTO: 4.43 M/UL (ref 4.6–6.2)
RBC #/AREA URNS HPF: 2 /HPF (ref 0–4)
SODIUM SERPL-SCNC: 140 MMOL/L (ref 136–145)
SP GR UR STRIP: >=1.03 (ref 1–1.03)
URN SPEC COLLECT METH UR: ABNORMAL
UROBILINOGEN UR STRIP-ACNC: ABNORMAL EU/DL
WBC # BLD AUTO: 5.75 K/UL (ref 3.9–12.7)
WBC #/AREA URNS HPF: 2 /HPF (ref 0–5)

## 2022-06-01 PROCEDURE — 86901 BLOOD TYPING SEROLOGIC RH(D): CPT | Performed by: ORTHOPAEDIC SURGERY

## 2022-06-01 PROCEDURE — 36415 COLL VENOUS BLD VENIPUNCTURE: CPT | Performed by: ORTHOPAEDIC SURGERY

## 2022-06-01 PROCEDURE — 93010 ELECTROCARDIOGRAM REPORT: CPT | Mod: ,,, | Performed by: INTERNAL MEDICINE

## 2022-06-01 PROCEDURE — 80053 COMPREHEN METABOLIC PANEL: CPT | Performed by: ORTHOPAEDIC SURGERY

## 2022-06-01 PROCEDURE — 93005 ELECTROCARDIOGRAM TRACING: CPT

## 2022-06-01 PROCEDURE — 81000 URINALYSIS NONAUTO W/SCOPE: CPT | Performed by: ORTHOPAEDIC SURGERY

## 2022-06-01 PROCEDURE — 85025 COMPLETE CBC W/AUTO DIFF WBC: CPT | Performed by: ORTHOPAEDIC SURGERY

## 2022-06-01 PROCEDURE — 93010 EKG 12-LEAD: ICD-10-PCS | Mod: ,,, | Performed by: INTERNAL MEDICINE

## 2022-06-01 RX ORDER — SODIUM CHLORIDE, SODIUM LACTATE, POTASSIUM CHLORIDE, CALCIUM CHLORIDE 600; 310; 30; 20 MG/100ML; MG/100ML; MG/100ML; MG/100ML
INJECTION, SOLUTION INTRAVENOUS CONTINUOUS
Status: CANCELLED | OUTPATIENT
Start: 2022-06-01

## 2022-06-01 NOTE — DISCHARGE INSTRUCTIONS
Information to Prepare you for your Surgery    PRE-ADMIT TESTING -  [phone]    [address] [facility]          Your surgery has been scheduled at [facility]. We are pleased to have the opportunity to serve you. For Further Information please call [phone].    On the day of surgery please report to the Information Desk on the 1st floor.    CONTACT YOUR PHYSICIAN'S OFFICE THE DAY PRIOR TO YOUR SURGERY TO OBTAIN YOUR ARRIVAL TIME.     The evening before surgery do not eat anything after 9 p.m. ( this includes hard candy, chewing gum and mints).  You may only have GATORADE, POWERADE AND WATER  from 9 p.m. until you leave your home.   DO NOT DRINK ANY LIQUIDS ON THE WAY TO THE HOSPITAL.      Why does your anesthesiologist allow you to drink Gatorade/Powerade before surgery?  Gatorade/Powerade helps to increase your comfort before surgery and to decrease your nausea after surgery. The carbohydrates in Gatorade/Powerade help reduce your body's stress response to surgery.  If you are a diabetic-drink only water prior to surgery.      Current Visitor policy(12/27/2021) - Patients may have 2 visitors pre and post procedure. Only 2 visitors will be allowed in the Surgical building with the patient.     SPECIAL MEDICATION INSTRUCTIONS: TAKE medications checked off by the Anesthesiologist on your Medication List.    Angiogram Patients: Take medications as instructed by your physician, including aspirin.     Surgery Patients:    If you take ASPIRIN - Your PHYSICIAN/SURGEON will need to inform you IF/OR when you need to stop taking aspirin prior to your surgery.     Do Not take any medications containing IBUPROFEN.    Do Not Wear any make-up (especially eye make-up) to surgery. Please remove any false eyelashes or eyelash extensions. If you arrive the day of surgery with makeup/eyelashes on you will be required to remove prior to surgery. (There is a risk of corneal  abrasions if eye makeup/eyelash extensions are not removed)      Leave all valuables at home.   Do Not wear any jewelry or watches, including any metal in body piercings. Jewelry must be removed prior to coming to the hospital.  There is a possibility that rings that are unable to be removed may be cut off if they are on the surgical extremity.    Please remove all hair extensions, wigs, clips and any other metal accessories/ ornaments from your hair.  These items may pose a flammable/fire risk in Surgery and must be removed.    Do not shave your surgical area at least 5 days prior to your surgery. The surgical prep will be performed at the hospital according to Infection Control regulations.    Contact Lens must be removed before surgery. Either do not wear the contact lens or bring a case and solution for storage.  Please bring a container for eyeglasses or dentures as required.  Bring any paperwork your physician has provided, such as consent forms,  history and physicals, doctor's orders, etc.   Bring comfortable clothes that are loose fitting to wear upon discharge. Take into consideration the type of surgery being performed.  Maintain your diet as advised per your physician the day prior to surgery.      Adequate rest the night before surgery is advised.   Park in the Parking lot behind the hospital or in the [facility] across the street from the parking lot. Parking is complimentary.  If you will be discharged the same day as your procedure, please arrange for a responsible adult to drive you home or to accompany you if traveling by taxi.   YOU WILL NOT BE PERMITTED TO DRIVE OR TO LEAVE THE HOSPITAL ALONE AFTER SURGERY.   If you are being discharged the same day, it is strongly recommended that you arrange for someone to remain with you for the first 24 hrs following your surgery.    The Surgeon will speak to your family/visitor after your surgery regarding the outcome of your surgery and post op  care.  The Surgeon may speak to you after your surgery, but there is a possibility you may not remember the details.  Please check with your family members regarding the conversation with the Surgeon.    We strongly recommend whoever is bringing you home be present for discharge instructions.  This will ensure a thorough understanding for your post op home care.    ALL CHILDREN MUST ALWAYS BE ACCOMPANIED BY AN ADULT.    Visitors-Refer to current Visitor policy handouts.    Thank you for your cooperation.  The Staff of [facility].            Bathing Instructions with Hibiclens    Shower the evening before and morning of your procedure with Hibiclens:  Wash your face with water and your regular face wash/soap  Apply Hibiclens directly on your skin or on a wet washcloth and wash gently. When showering: Move away from the shower stream when applying Hibiclens to avoid rinsing off too soon.  Rinse thoroughly with warm water  Do not dilute Hibiclens        Dry off as usual, do not use any deodorant, powder, body lotions, perfume, after shave or cologne.

## 2022-06-02 ENCOUNTER — TELEPHONE (OUTPATIENT)
Dept: INTERNAL MEDICINE | Facility: CLINIC | Age: 77
End: 2022-06-02
Payer: MEDICARE

## 2022-06-02 DIAGNOSIS — R79.89 ELEVATED LFTS: Primary | ICD-10-CM

## 2022-06-02 NOTE — TELEPHONE ENCOUNTER
See staff message.  Not currently cleared for surgery.  Advise pt due to elevated liver enzymes on recent lab we will need additional evaluation--he should stop the pravastatin and ibuprofen for now.  Needs abdominal ultrasound asap--ordered.  Will need to recheck labs in few days--cmp.

## 2022-06-02 NOTE — PRE ADMISSION SCREENING
Spoke with Dr Moses's nurse-States she will show them to Dr Moses. Dr Kebede reviewed labs. PCP -Dr Thurston sent basket re abnormal labs.

## 2022-06-06 ENCOUNTER — PATIENT MESSAGE (OUTPATIENT)
Dept: INTERNAL MEDICINE | Facility: CLINIC | Age: 77
End: 2022-06-06
Payer: MEDICARE

## 2022-06-06 ENCOUNTER — HOSPITAL ENCOUNTER (OUTPATIENT)
Dept: RADIOLOGY | Facility: HOSPITAL | Age: 77
Discharge: HOME | End: 2022-06-06
Attending: INTERNAL MEDICINE
Payer: MEDICARE

## 2022-06-06 DIAGNOSIS — R79.89 ELEVATED LFTS: Primary | ICD-10-CM

## 2022-06-06 DIAGNOSIS — R79.89 ELEVATED LFTS: ICD-10-CM

## 2022-06-06 PROCEDURE — 76700 US ABDOMEN COMPLETE: ICD-10-PCS | Mod: 26,,, | Performed by: RADIOLOGY

## 2022-06-06 PROCEDURE — 76700 US EXAM ABDOM COMPLETE: CPT | Mod: 26,,, | Performed by: RADIOLOGY

## 2022-06-06 PROCEDURE — 76700 US EXAM ABDOM COMPLETE: CPT | Mod: TC

## 2022-06-07 NOTE — TELEPHONE ENCOUNTER
Note that we did not clear him for surgery pending cardiology clearance  . See NP Lee evaluation and my message regarding liver enzymes.    I have reviewed the abd ultrasound which does not show anything alarming, shows fatty liver which is a common finding.    The advice by other providers regarding holding medication is all appropriate.  If he has been cleared by cardiology (kory I believe) he should hold pravastatin for 1 week post op.  If he needs something for pain we can send Rx for tramadol to the pharm.. let me know.    If he needs me to call him I will do so

## 2022-06-08 RX ORDER — TRAMADOL HYDROCHLORIDE 50 MG/1
50 TABLET ORAL EVERY 6 HOURS PRN
Qty: 28 TABLET | Refills: 0 | Status: ON HOLD | OUTPATIENT
Start: 2022-06-08 | End: 2023-05-05 | Stop reason: HOSPADM

## 2022-06-08 NOTE — TELEPHONE ENCOUNTER
I spoke with the patient this afternoon by telephone.  He is doing fine.  We discussed preoperative issues.  His surgery has been postponed.  Discuss doing a repeat lab data checked liver function studies and he is agreeable.  He tells me that he had cardiac evaluations about six weeks ago with Dr. Moses which included a nuclear stress test.  He is going to clarify that issue regarding preop cardiac clearance.  Of note he has no GI symptoms of any kind.    [place]---ordered lab to be scheduled any time in the next week ---cmp, hepc ab, hbsag.

## 2022-06-08 NOTE — TELEPHONE ENCOUNTER
Dr. Thurston pt is requesting a rx for Tramadol and pt would like a call from you to discuss further.    Thank you.

## 2022-06-13 ENCOUNTER — LAB VISIT (OUTPATIENT)
Dept: LAB | Facility: HOSPITAL | Age: 77
End: 2022-06-13
Attending: INTERNAL MEDICINE
Payer: MEDICARE

## 2022-06-13 DIAGNOSIS — R79.89 ELEVATED LFTS: ICD-10-CM

## 2022-06-13 LAB
ALBUMIN SERPL BCP-MCNC: 4 G/DL (ref 3.5–5.2)
ALP SERPL-CCNC: 73 U/L (ref 55–135)
ALT SERPL W/O P-5'-P-CCNC: 46 U/L (ref 10–44)
ANION GAP SERPL CALC-SCNC: 15 MMOL/L (ref 8–16)
AST SERPL-CCNC: 29 U/L (ref 10–40)
BILIRUB SERPL-MCNC: 1.3 MG/DL (ref 0.1–1)
BUN SERPL-MCNC: 16 MG/DL (ref 8–23)
CALCIUM SERPL-MCNC: 10.1 MG/DL (ref 8.7–10.5)
CHLORIDE SERPL-SCNC: 100 MMOL/L (ref 95–110)
CO2 SERPL-SCNC: 21 MMOL/L (ref 23–29)
CREAT SERPL-MCNC: 1 MG/DL (ref 0.5–1.4)
EST. GFR  (AFRICAN AMERICAN): >60 ML/MIN/1.73 M^2
EST. GFR  (NON AFRICAN AMERICAN): >60 ML/MIN/1.73 M^2
GLUCOSE SERPL-MCNC: 98 MG/DL (ref 70–110)
POTASSIUM SERPL-SCNC: 4.3 MMOL/L (ref 3.5–5.1)
PROT SERPL-MCNC: 6.6 G/DL (ref 6–8.4)
SODIUM SERPL-SCNC: 136 MMOL/L (ref 136–145)

## 2022-06-13 PROCEDURE — 80053 COMPREHEN METABOLIC PANEL: CPT | Performed by: INTERNAL MEDICINE

## 2022-06-13 PROCEDURE — 36415 COLL VENOUS BLD VENIPUNCTURE: CPT | Mod: PO | Performed by: INTERNAL MEDICINE

## 2022-06-13 PROCEDURE — 86803 HEPATITIS C AB TEST: CPT | Performed by: INTERNAL MEDICINE

## 2022-06-13 PROCEDURE — 87340 HEPATITIS B SURFACE AG IA: CPT | Performed by: INTERNAL MEDICINE

## 2022-06-14 LAB
HBV SURFACE AG SERPL QL IA: NEGATIVE
HCV AB SERPL QL IA: NEGATIVE

## 2022-06-14 NOTE — TELEPHONE ENCOUNTER
The repeat liver enzymes have essentially returned to normal---good.  Hepatitis screens negative.    Regarding his preop as long as he is cleared from cardiac standpoint per Dr Moses then he can proceed with surgery

## 2022-06-15 ENCOUNTER — PATIENT MESSAGE (OUTPATIENT)
Dept: INTERNAL MEDICINE | Facility: CLINIC | Age: 77
End: 2022-06-15
Payer: MEDICARE

## 2022-06-15 NOTE — TELEPHONE ENCOUNTER
These meds can all be used together.  The alfuzosin  Improves the flow , it is not a diuretic like furosemide.

## 2022-06-20 ENCOUNTER — PATIENT MESSAGE (OUTPATIENT)
Dept: ADMINISTRATIVE | Facility: HOSPITAL | Age: 77
End: 2022-06-20
Payer: MEDICARE

## 2022-06-20 ENCOUNTER — PATIENT OUTREACH (OUTPATIENT)
Dept: ADMINISTRATIVE | Facility: HOSPITAL | Age: 77
End: 2022-06-20
Payer: MEDICARE

## 2022-07-02 ENCOUNTER — PATIENT MESSAGE (OUTPATIENT)
Dept: INTERNAL MEDICINE | Facility: CLINIC | Age: 77
End: 2022-07-02
Payer: MEDICARE

## 2022-07-05 RX ORDER — ALPRAZOLAM 0.25 MG/1
TABLET ORAL
Qty: 30 TABLET | Refills: 0 | Status: SHIPPED | OUTPATIENT
Start: 2022-07-05 | End: 2023-04-27

## 2022-07-05 NOTE — TELEPHONE ENCOUNTER
Per pt request    Request Rx for PTSD  Pt state have 3 Tramadol tablets left and have sent a message to PCP Dr. Thurston as well.

## 2022-07-05 NOTE — TELEPHONE ENCOUNTER
Tramadol is not to be used for anxiety--it is a painmedication.  We will send in short term Rx for anxiety--alprazolam (xanax) for the anxiety

## 2022-07-27 ENCOUNTER — PATIENT MESSAGE (OUTPATIENT)
Dept: INTERNAL MEDICINE | Facility: CLINIC | Age: 77
End: 2022-07-27
Payer: MEDICARE

## 2022-07-31 ENCOUNTER — PATIENT MESSAGE (OUTPATIENT)
Dept: INTERNAL MEDICINE | Facility: CLINIC | Age: 77
End: 2022-07-31
Payer: MEDICARE

## 2022-08-19 ENCOUNTER — PATIENT MESSAGE (OUTPATIENT)
Dept: INTERNAL MEDICINE | Facility: CLINIC | Age: 77
End: 2022-08-19
Payer: MEDICARE

## 2022-08-19 DIAGNOSIS — I10 ESSENTIAL HYPERTENSION: ICD-10-CM

## 2022-08-24 ENCOUNTER — PATIENT MESSAGE (OUTPATIENT)
Dept: UROLOGY | Facility: CLINIC | Age: 77
End: 2022-08-24
Payer: MEDICARE

## 2022-08-30 RX ORDER — NEBIVOLOL 10 MG/1
10 TABLET ORAL DAILY
Qty: 90 TABLET | Refills: 3 | Status: SHIPPED | OUTPATIENT
Start: 2022-08-30 | End: 2023-06-05 | Stop reason: SDUPTHER

## 2022-09-07 ENCOUNTER — PATIENT MESSAGE (OUTPATIENT)
Dept: ADMINISTRATIVE | Facility: HOSPITAL | Age: 77
End: 2022-09-07
Payer: MEDICARE

## 2022-09-07 ENCOUNTER — PATIENT OUTREACH (OUTPATIENT)
Dept: ADMINISTRATIVE | Facility: HOSPITAL | Age: 77
End: 2022-09-07
Payer: MEDICARE

## 2022-09-09 ENCOUNTER — TELEPHONE (OUTPATIENT)
Dept: INTERNAL MEDICINE | Facility: CLINIC | Age: 77
End: 2022-09-09
Payer: MEDICARE

## 2022-09-09 VITALS — DIASTOLIC BLOOD PRESSURE: 73 MMHG | SYSTOLIC BLOOD PRESSURE: 114 MMHG

## 2022-09-26 DIAGNOSIS — M19.90 ARTHRITIS: ICD-10-CM

## 2022-09-26 RX ORDER — DICLOFENAC SODIUM 10 MG/G
GEL TOPICAL
Qty: 100 G | Refills: 2 | Status: SHIPPED | OUTPATIENT
Start: 2022-09-26 | End: 2023-02-20

## 2022-10-06 ENCOUNTER — IMMUNIZATION (OUTPATIENT)
Dept: INTERNAL MEDICINE | Facility: CLINIC | Age: 77
End: 2022-10-06
Payer: MEDICARE

## 2022-10-06 DIAGNOSIS — Z23 NEED FOR VACCINATION: Primary | ICD-10-CM

## 2022-10-06 PROCEDURE — 0124A COVID-19, MRNA, LNP-S, BIVALENT BOOSTER, PF, 30 MCG/0.3 ML DOSE: CPT | Mod: PBBFAC,PO

## 2022-10-06 PROCEDURE — 91312 COVID-19, MRNA, LNP-S, BIVALENT BOOSTER, PF, 30 MCG/0.3 ML DOSE: CPT | Mod: PBBFAC,PO

## 2022-10-12 ENCOUNTER — PATIENT MESSAGE (OUTPATIENT)
Dept: INTERNAL MEDICINE | Facility: CLINIC | Age: 77
End: 2022-10-12
Payer: MEDICARE

## 2022-10-13 ENCOUNTER — PATIENT MESSAGE (OUTPATIENT)
Dept: INTERNAL MEDICINE | Facility: CLINIC | Age: 77
End: 2022-10-13
Payer: MEDICARE

## 2022-10-14 ENCOUNTER — CLINICAL SUPPORT (OUTPATIENT)
Dept: FAMILY MEDICINE | Facility: CLINIC | Age: 77
End: 2022-10-14
Payer: MEDICARE

## 2022-10-14 DIAGNOSIS — Z00.00 HEALTHCARE MAINTENANCE: Primary | ICD-10-CM

## 2022-10-14 PROCEDURE — G0008 ADMIN INFLUENZA VIRUS VAC: HCPCS | Mod: PBBFAC,PO

## 2022-10-18 ENCOUNTER — PATIENT MESSAGE (OUTPATIENT)
Dept: INTERNAL MEDICINE | Facility: CLINIC | Age: 77
End: 2022-10-18
Payer: MEDICARE

## 2022-10-18 DIAGNOSIS — I10 HTN (HYPERTENSION), BENIGN: Primary | ICD-10-CM

## 2022-10-18 NOTE — TELEPHONE ENCOUNTER
Pt spoke with psychiatry and he would like pt to get a blood test in order to see his current liver enzymes and sodium levels

## 2022-10-19 ENCOUNTER — PATIENT MESSAGE (OUTPATIENT)
Dept: INTERNAL MEDICINE | Facility: CLINIC | Age: 77
End: 2022-10-19
Payer: MEDICARE

## 2022-11-04 ENCOUNTER — PATIENT MESSAGE (OUTPATIENT)
Dept: INTERNAL MEDICINE | Facility: CLINIC | Age: 77
End: 2022-11-04
Payer: MEDICARE

## 2022-11-05 ENCOUNTER — LAB VISIT (OUTPATIENT)
Dept: LAB | Facility: HOSPITAL | Age: 77
End: 2022-11-05
Attending: INTERNAL MEDICINE
Payer: MEDICARE

## 2022-11-05 DIAGNOSIS — I10 HTN (HYPERTENSION), BENIGN: ICD-10-CM

## 2022-11-05 LAB
ALBUMIN SERPL BCP-MCNC: 3.8 G/DL (ref 3.5–5.2)
ALP SERPL-CCNC: 62 U/L (ref 55–135)
ALT SERPL W/O P-5'-P-CCNC: 32 U/L (ref 10–44)
ANION GAP SERPL CALC-SCNC: 10 MMOL/L (ref 8–16)
AST SERPL-CCNC: 34 U/L (ref 10–40)
BILIRUB SERPL-MCNC: 1.5 MG/DL (ref 0.1–1)
BUN SERPL-MCNC: 11 MG/DL (ref 8–23)
CALCIUM SERPL-MCNC: 9.4 MG/DL (ref 8.7–10.5)
CHLORIDE SERPL-SCNC: 105 MMOL/L (ref 95–110)
CO2 SERPL-SCNC: 24 MMOL/L (ref 23–29)
CREAT SERPL-MCNC: 0.8 MG/DL (ref 0.5–1.4)
EST. GFR  (NO RACE VARIABLE): >60 ML/MIN/1.73 M^2
GLUCOSE SERPL-MCNC: 83 MG/DL (ref 70–110)
POTASSIUM SERPL-SCNC: 4.3 MMOL/L (ref 3.5–5.1)
PROT SERPL-MCNC: 6.5 G/DL (ref 6–8.4)
SODIUM SERPL-SCNC: 139 MMOL/L (ref 136–145)

## 2022-11-05 PROCEDURE — 80053 COMPREHEN METABOLIC PANEL: CPT | Performed by: INTERNAL MEDICINE

## 2022-11-05 PROCEDURE — 36415 COLL VENOUS BLD VENIPUNCTURE: CPT | Mod: PO | Performed by: INTERNAL MEDICINE

## 2023-02-27 ENCOUNTER — PATIENT MESSAGE (OUTPATIENT)
Dept: INTERNAL MEDICINE | Facility: CLINIC | Age: 78
End: 2023-02-27
Payer: MEDICARE

## 2023-02-27 NOTE — TELEPHONE ENCOUNTER
Fyi-I want to make you aware that Dr. Estiven Flood, my psychist discharged me from his care a month ago and thinks that I'm back on track.  He e-mailed me a letter to forward to you, but my computers have been on the velasco for a while. When I figure out how to enter the letter into the [facility] system, I will send it you.

## 2023-03-02 ENCOUNTER — PES CALL (OUTPATIENT)
Dept: ADMINISTRATIVE | Facility: CLINIC | Age: 78
End: 2023-03-02
Payer: MEDICARE

## 2023-03-28 ENCOUNTER — PATIENT MESSAGE (OUTPATIENT)
Dept: INTERNAL MEDICINE | Facility: CLINIC | Age: 78
End: 2023-03-28
Payer: MEDICARE

## 2023-03-29 RX ORDER — SERTRALINE HYDROCHLORIDE 50 MG/1
50 TABLET, FILM COATED ORAL
COMMUNITY
Start: 2023-03-05 | End: 2023-03-29 | Stop reason: SDUPTHER

## 2023-03-29 RX ORDER — ACAMPROSATE CALCIUM 333 MG/1
666 TABLET, DELAYED RELEASE ORAL 3 TIMES DAILY
COMMUNITY
Start: 2023-01-16 | End: 2023-03-29 | Stop reason: SDUPTHER

## 2023-03-29 NOTE — TELEPHONE ENCOUNTER
Pt states he was discharged by psychiatrist a month ago and was prescribed 2 linked medications .   He is asking if you can prescribe the medications for him .

## 2023-03-31 RX ORDER — SERTRALINE HYDROCHLORIDE 50 MG/1
50 TABLET, FILM COATED ORAL DAILY
Qty: 30 TABLET | Refills: 1 | Status: SHIPPED | OUTPATIENT
Start: 2023-03-31 | End: 2023-06-05 | Stop reason: SDUPTHER

## 2023-03-31 RX ORDER — ACAMPROSATE CALCIUM 333 MG/1
666 TABLET, DELAYED RELEASE ORAL 3 TIMES DAILY
Qty: 180 TABLET | Refills: 0 | Status: ON HOLD | OUTPATIENT
Start: 2023-03-31 | End: 2023-05-09 | Stop reason: HOSPADM

## 2023-03-31 NOTE — TELEPHONE ENCOUNTER
The increased Sertraline has been helpful with no bad reactions. He would appreciate anything you could provide for the alcohol cessation.I have given him instructions on how to upload a copy of Dr. Delarosa's letter

## 2023-04-03 ENCOUNTER — PATIENT MESSAGE (OUTPATIENT)
Dept: INTERNAL MEDICINE | Facility: CLINIC | Age: 78
End: 2023-04-03
Payer: MEDICARE

## 2023-04-27 ENCOUNTER — OFFICE VISIT (OUTPATIENT)
Dept: INTERNAL MEDICINE | Facility: CLINIC | Age: 78
End: 2023-04-27
Payer: MEDICARE

## 2023-04-27 VITALS
DIASTOLIC BLOOD PRESSURE: 80 MMHG | HEART RATE: 57 BPM | WEIGHT: 229.25 LBS | HEIGHT: 70 IN | BODY MASS INDEX: 32.82 KG/M2 | SYSTOLIC BLOOD PRESSURE: 164 MMHG | RESPIRATION RATE: 20 BRPM | OXYGEN SATURATION: 97 % | TEMPERATURE: 98 F

## 2023-04-27 DIAGNOSIS — I10 ESSENTIAL HYPERTENSION: Chronic | ICD-10-CM

## 2023-04-27 DIAGNOSIS — M54.16 LUMBAR RADICULOPATHY: ICD-10-CM

## 2023-04-27 DIAGNOSIS — M51.36 DDD (DEGENERATIVE DISC DISEASE), LUMBAR: ICD-10-CM

## 2023-04-27 DIAGNOSIS — M50.30 DDD (DEGENERATIVE DISC DISEASE), CERVICAL: ICD-10-CM

## 2023-04-27 DIAGNOSIS — M48.00 SPINAL STENOSIS, UNSPECIFIED SPINAL REGION: ICD-10-CM

## 2023-04-27 DIAGNOSIS — R19.01 RIGHT UPPER QUADRANT ABDOMINAL SWELLING: Primary | ICD-10-CM

## 2023-04-27 PROCEDURE — 99214 OFFICE O/P EST MOD 30 MIN: CPT | Mod: S$PBB,,, | Performed by: NURSE PRACTITIONER

## 2023-04-27 PROCEDURE — 99214 PR OFFICE/OUTPT VISIT, EST, LEVL IV, 30-39 MIN: ICD-10-PCS | Mod: S$PBB,,, | Performed by: NURSE PRACTITIONER

## 2023-04-27 PROCEDURE — 99999 PR PBB SHADOW E&M-EST. PATIENT-LVL V: CPT | Mod: PBBFAC,,, | Performed by: NURSE PRACTITIONER

## 2023-04-27 PROCEDURE — 99999 PR PBB SHADOW E&M-EST. PATIENT-LVL V: ICD-10-PCS | Mod: PBBFAC,,, | Performed by: NURSE PRACTITIONER

## 2023-04-27 PROCEDURE — 99215 OFFICE O/P EST HI 40 MIN: CPT | Mod: PBBFAC,PO | Performed by: NURSE PRACTITIONER

## 2023-04-27 RX ORDER — TIZANIDINE 2 MG/1
2 TABLET ORAL EVERY 8 HOURS PRN
Qty: 30 TABLET | Refills: 0 | Status: SHIPPED | OUTPATIENT
Start: 2023-04-27 | End: 2023-04-28

## 2023-04-27 RX ORDER — GABAPENTIN 100 MG/1
100 CAPSULE ORAL 3 TIMES DAILY
Status: ON HOLD | COMMUNITY
End: 2023-05-05 | Stop reason: HOSPADM

## 2023-04-27 NOTE — PROGRESS NOTES
Subjective:       Patient ID: Julius Baker is a 77 y.o. male.    Chief Complaint: Mass (On left side of abdomen )      Patient is a 77 y.o. male who traditionally follows with ANNA Thurston MD presenting today for:     Abdomen Swelling  Patient is currently under [place] of [place] [facility] [place]itis). He notes over the past week he has noticed a non painful area of swelling to his RUQ. He notes he is no longer drinking. Endorses some intermittent nausea in the AM relieved by Alkazelter and constipation. Notes the area gets bigger and smaller.     Answers submitted by the patient for this visit:  Back Pain Questionnaire (Submitted on 4/26/2023)  Chief Complaint: Back pain  Chronicity: new  Onset: more than 1 month ago  Frequency: constantly  Progression since onset: rapidly worsening  Pain location: thoracic spine  Pain quality: aching  Radiates to: right knee  Pain - numeric: 9/10  Pain is: worse during the day  Aggravated by: standing  Stiffness is present: all day  bowel incontinence: Yes  paresthesias: Yes  Risk factors: lack of exercise, obesity, recent trauma  Pain severity: severe  Improvement on treatment: no relief    Review of patient's allergies indicates:   Allergen Reactions    Doxycycline Diarrhea     itching    Ezetimibe-simvastatin      Muscle cramps    Rosuvastatin      Muscle cramps    Sertraline Other (See Comments)     jittery    Simvastatin      Muscle cramps    Adhesive Rash       Medication List with Changes/Refills   New Medications    TIZANIDINE (ZANAFLEX) 2 MG TABLET    Take 1 tablet (2 mg total) by mouth every 8 (eight) hours as needed (back/leg pain).   Current Medications    ACAMPROSATE (CAMPRAL) 333 MG TABLET    Take 2 tablets (666 mg total) by mouth 3 (three) times daily.    DICLOFENAC SODIUM (VOLTAREN) 1 % GEL    APPLY TOPICALLY TO THE AFFECTED AREA THREE TIMES DAILY AS NEEDED FOR JOINT PAIN    FLUTICASONE PROPIONATE (FLONASE) 50 MCG/ACTUATION NASAL SPRAY     1 spray by Each Nostril route daily as needed.    GABAPENTIN (NEURONTIN) 100 MG CAPSULE    Take 100 mg by mouth 3 (three) times daily.    LEVOTHYROXINE (SYNTHROID) 25 MCG TABLET    TAKE 1 TABLET DAILY (FURTHER REFILLS REQUIRE VISIT WITH DOCTOR)    LOSARTAN (COZAAR) 100 MG TABLET    TAKE 1 TABLET(100 MG) BY MOUTH EVERY DAY    MULTIVITAMIN CAPSULE    Take 1 capsule by mouth once daily.    NEBIVOLOL (BYSTOLIC) 10 MG TAB    Take 1 tablet (10 mg total) by mouth once daily.    PRAVASTATIN (PRAVACHOL) 20 MG TABLET    TAKE 1 TABLET EVERY EVENING    SERTRALINE (ZOLOFT) 50 MG TABLET    Take 1 tablet (50 mg total) by mouth once daily.    TRAMADOL (ULTRAM) 50 MG TABLET    Take 1 tablet (50 mg total) by mouth every 6 (six) hours as needed for Pain.   Discontinued Medications    ALBUTEROL (PROVENTIL/VENTOLIN HFA) 90 MCG/ACTUATION INHALER    Inhale 2 puffs into the lungs every 6 (six) hours as needed for Wheezing. Rescue    ALFUZOSIN (UROXATRAL) 10 MG TB24    Take 1 tablet (10 mg total) by mouth once daily.    ALPRAZOLAM (XANAX) 0.25 MG TABLET    1 po q8 hrs prn anxiety    ASPIRIN-SOD BICARB-CITRIC ACID (GUALBERTO-SELTZER) 324 MG TBEF    Take 325 mg by mouth every 6 (six) hours as needed.     CHLORHEXIDINE (PERIDEX) 0.12 % SOLUTION    SMARTSIG:By Mouth    DIPHENHYDRAMINE-ACETAMINOPHEN (TYLENOL PM)  MG TAB    Take 1 tablet by mouth nightly as needed.    FUROSEMIDE (LASIX) 20 MG TABLET    Take 20 mg by mouth once daily.    IBUPROFEN (ADVIL,MOTRIN) 800 MG TABLET    Take 1 tablet (800 mg total) by mouth 3 (three) times daily as needed for Pain.    METHOCARBAMOL (ROBAXIN) 750 MG TAB    TAKE 1 TABLET(750 MG) BY MOUTH THREE TIMES DAILY AS NEEDED FOR MUSCLE SPASMS OR TIGHTNESS    POTASSIUM CHLORIDE (KLOR-CON) 10 MEQ TBSR    Take 20 mEq by mouth once daily.    PROMETHAZINE-DEXTROMETHORPHAN (PROMETHAZINE-DM) 6.25-15 MG/5 ML SYRP    Take 5 mLs by mouth every 6 (six) hours as needed for Cough.     Medical, social and surgical history has  "been reviewed with the patient.     Review of Systems   Gastrointestinal:  Positive for abdominal distention, constipation and nausea. Negative for abdominal pain.   Genitourinary:  Positive for bladder incontinence, frequency and hematuria.   Musculoskeletal:  Positive for back pain, gait problem and leg pain.   Neurological:  Positive for weakness and numbness.       Objective:   BP (!) 164/80 (BP Location: Right arm, Patient Position: Sitting, BP Method: Medium (Manual))   Pulse (!) 57   Temp 98 °F (36.7 °C) (Temporal)   Resp 20   Ht 5' 10" (1.778 m)   Wt 104 kg (229 lb 4.5 oz)   SpO2 97%   BMI 32.90 kg/m²       Physical Exam  Vitals reviewed.   Constitutional:       Appearance: Normal appearance.   HENT:      Head: Normocephalic and atraumatic.   Pulmonary:      Effort: Pulmonary effort is normal.   Abdominal:      General: Bowel sounds are normal. There is no distension.      Palpations: Abdomen is soft.      Tenderness: There is no abdominal tenderness.      Hernia: No hernia is present.       Musculoskeletal:      Comments: Ambulating with cane   Skin:     General: Skin is warm and dry.   Neurological:      Mental Status: He is alert and oriented to person, place, and time.      Gait: Gait abnormal.       Last Labs:  Glucose   Date Value Ref Range Status   11/05/2022 83 70 - 110 mg/dL Final   06/13/2022 98 70 - 110 mg/dL Final     BUN   Date Value Ref Range Status   11/05/2022 11 8 - 23 mg/dL Final   06/13/2022 16 8 - 23 mg/dL Final     Creatinine   Date Value Ref Range Status   11/05/2022 0.8 0.5 - 1.4 mg/dL Final   06/13/2022 1.0 0.5 - 1.4 mg/dL Final     Cholesterol   Date Value Ref Range Status   11/10/2021 168 120 - 199 mg/dL Final     Comment:     The National Cholesterol Education Program (NCEP) has set the  following guidelines (reference ranges) for Cholesterol:  Optimal.....................<200 mg/dL  Borderline High.............200-239 mg/dL  High........................> or = 240 mg/dL   "   11/03/2020 225 (H) 120 - 199 mg/dL Final     Comment:     The National Cholesterol Education Program (NCEP) has set the  following guidelines (reference ranges) for Cholesterol:  Optimal.....................<200 mg/dL  Borderline High.............200-239 mg/dL  High........................> or = 240 mg/dL       Hemoglobin A1C   Date Value Ref Range Status   11/10/2021 5.5 4.0 - 5.6 % Final     Comment:     ADA Screening Guidelines:  5.7-6.4%  Consistent with prediabetes  >or=6.5%  Consistent with diabetes    High levels of fetal hemoglobin interfere with the HbA1C  assay. Heterozygous hemoglobin variants (HbS, HgC, etc)do  not significantly interfere with this assay.   However, presence of multiple variants may affect accuracy.     10/28/2019 5.4 4.0 - 5.6 % Final     Comment:     ADA Screening Guidelines:  5.7-6.4%  Consistent with prediabetes  >or=6.5%  Consistent with diabetes  High levels of fetal hemoglobin interfere with the HbA1C  assay. Heterozygous hemoglobin variants (HbS, HgC, etc)do  not significantly interfere with this assay.   However, presence of multiple variants may affect accuracy.       Hemoglobin   Date Value Ref Range Status   06/01/2022 14.2 14.0 - 18.0 g/dL Final   11/10/2021 11.6 (L) 14.0 - 18.0 g/dL Final     Hematocrit   Date Value Ref Range Status   06/01/2022 41.5 40.0 - 54.0 % Final   11/10/2021 36.8 (L) 40.0 - 54.0 % Final       I have reviewed the following:     Details / Date    [x]   Labs     []   Micro     []   Pathology     []   Imaging     []   Cardiology Procedures     []   Other      04/25/2023 10:33 AM CDT    Endplate degenerative changes and irregularity at T7-8, may represent degenerative disc disease, or discitis/osteomyelitis.    Recommend clinical correlation with lab values, and history of previous antimicrobial therapy.     04/14/2023 10:57 PM CDT      1. Limited due to motion artifact  2. MRI findings consistent with acute/active T7-8 discitis (infectious or  noninfectious) with associated bone marrow edema involving the T7-8 vertebral bodies  3. Degenerative changes  4. Scoliosis    04/14/2023 11:06 PM CDT      1. Osseous fusion of C2-3  3. Degenerative changes     Assessment and Plan:     1. Right upper quadrant abdominal swelling    Hepatomegaly vs Lipoma    - US Abdomen Limited_Liver; Future  - US Soft Tissue Abdomen; Future    2. Spinal stenosis, unspecified spinal region  3. Lumbar radiculopathy  4. DDD (degenerative disc disease), lumbar  5. DDD (degenerative disc disease), cervical    New, trial Zanaflex in placed of Robaxin. Keep f/u with Neurosurgery.     - tiZANidine (ZANAFLEX) 2 MG tablet; Take 1 tablet (2 mg total) by mouth every 8 (eight) hours as needed (back/leg pain).  Dispense: 30 tablet; Refill: 0  - WALKER FOR HOME USE    6. Essential hypertension    Chronic, recently elevated. Has not been taking Losartan while on Levaquin. Just resumed. To monitor at home and give us a call for persistent elevations.

## 2023-04-29 ENCOUNTER — PATIENT MESSAGE (OUTPATIENT)
Dept: INTERNAL MEDICINE | Facility: CLINIC | Age: 78
End: 2023-04-29
Payer: MEDICARE

## 2023-05-01 NOTE — TELEPHONE ENCOUNTER
Pt c/o hematuria 3-4 days ago (pt describe as one drop of blood) in urine. Pt state lump on right side that you saw is slowly going down.

## 2023-05-01 NOTE — TELEPHONE ENCOUNTER
Pt also c/o bilateral hip and leg pains that's increasing and has taken Diclofenac, Gabapentin and a muscle relaxer plus an antibiotic prescribed by ENT for relief.

## 2023-05-03 ENCOUNTER — OFFICE VISIT (OUTPATIENT)
Dept: NEUROLOGY | Facility: CLINIC | Age: 78
DRG: 552 | End: 2023-05-03
Payer: MEDICARE

## 2023-05-03 VITALS
HEIGHT: 70 IN | DIASTOLIC BLOOD PRESSURE: 81 MMHG | BODY MASS INDEX: 32.07 KG/M2 | HEART RATE: 87 BPM | WEIGHT: 224 LBS | SYSTOLIC BLOOD PRESSURE: 156 MMHG

## 2023-05-03 DIAGNOSIS — R93.7 ABNORMAL MRI, THORACIC SPINE: ICD-10-CM

## 2023-05-03 DIAGNOSIS — R26.81 GAIT INSTABILITY: ICD-10-CM

## 2023-05-03 DIAGNOSIS — R26.1 SPASTIC GAIT: Primary | ICD-10-CM

## 2023-05-03 DIAGNOSIS — Z71.89 COUNSELING REGARDING GOALS OF CARE: ICD-10-CM

## 2023-05-03 PROCEDURE — 99999 PR PBB SHADOW E&M-EST. PATIENT-LVL III: ICD-10-PCS | Mod: PBBFAC,,, | Performed by: PHYSICIAN ASSISTANT

## 2023-05-03 PROCEDURE — 99213 OFFICE O/P EST LOW 20 MIN: CPT | Mod: PBBFAC | Performed by: PHYSICIAN ASSISTANT

## 2023-05-03 PROCEDURE — 99999 PR PBB SHADOW E&M-EST. PATIENT-LVL III: CPT | Mod: PBBFAC,,, | Performed by: PHYSICIAN ASSISTANT

## 2023-05-03 PROCEDURE — 99205 PR OFFICE/OUTPT VISIT, NEW, LEVL V, 60-74 MIN: ICD-10-PCS | Mod: S$PBB,,, | Performed by: PHYSICIAN ASSISTANT

## 2023-05-03 PROCEDURE — 99205 OFFICE O/P NEW HI 60 MIN: CPT | Mod: S$PBB,,, | Performed by: PHYSICIAN ASSISTANT

## 2023-05-03 PROCEDURE — 99417 PR PROLONGED SVC, OUTPT, W/WO DIRECT PT CONTACT,  EA ADDTL 15 MIN: ICD-10-PCS | Mod: S$PBB,,, | Performed by: PHYSICIAN ASSISTANT

## 2023-05-03 PROCEDURE — 99417 PROLNG OP E/M EACH 15 MIN: CPT | Mod: S$PBB,,, | Performed by: PHYSICIAN ASSISTANT
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
## 2023-05-04 ENCOUNTER — TELEPHONE (OUTPATIENT)
Dept: NEUROLOGY | Facility: CLINIC | Age: 78
End: 2023-05-04
Payer: MEDICARE

## 2023-05-04 ENCOUNTER — HOSPITAL ENCOUNTER (INPATIENT)
Facility: HOSPITAL | Age: 78
LOS: 4 days | Discharge: NURSING, REHAB OR LONG-TERM CARE | DRG: 552 | End: 2023-05-09
Attending: STUDENT IN AN ORGANIZED HEALTH CARE EDUCATION/TRAINING PROGRAM | Admitting: STUDENT IN AN ORGANIZED HEALTH CARE EDUCATION/TRAINING PROGRAM
Payer: MEDICARE

## 2023-05-04 DIAGNOSIS — M46.44 DISCITIS OF THORACIC REGION: Primary | ICD-10-CM

## 2023-05-04 DIAGNOSIS — R00.1 BRADYCARDIA: ICD-10-CM

## 2023-05-04 DIAGNOSIS — R07.9 CHEST PAIN: ICD-10-CM

## 2023-05-04 PROBLEM — M54.9 BACK PAIN: Status: ACTIVE | Noted: 2023-05-04

## 2023-05-04 LAB
ALBUMIN SERPL BCP-MCNC: 4 G/DL (ref 3.5–5.2)
ALP SERPL-CCNC: 70 U/L (ref 55–135)
ALT SERPL W/O P-5'-P-CCNC: 21 U/L (ref 10–44)
ANION GAP SERPL CALC-SCNC: 7 MMOL/L (ref 8–16)
AST SERPL-CCNC: 21 U/L (ref 10–40)
BASOPHILS # BLD AUTO: 0.04 K/UL (ref 0–0.2)
BASOPHILS NFR BLD: 0.4 % (ref 0–1.9)
BILIRUB SERPL-MCNC: 1 MG/DL (ref 0.1–1)
BILIRUB UR QL STRIP: NEGATIVE
BUN SERPL-MCNC: 18 MG/DL (ref 8–23)
CALCIUM SERPL-MCNC: 9.9 MG/DL (ref 8.7–10.5)
CHLORIDE SERPL-SCNC: 102 MMOL/L (ref 95–110)
CLARITY UR REFRACT.AUTO: CLEAR
CO2 SERPL-SCNC: 30 MMOL/L (ref 23–29)
COLOR UR AUTO: YELLOW
CREAT SERPL-MCNC: 0.9 MG/DL (ref 0.5–1.4)
CRP SERPL-MCNC: 1.1 MG/L (ref 0–8.2)
DIFFERENTIAL METHOD: ABNORMAL
EOSINOPHIL # BLD AUTO: 0.2 K/UL (ref 0–0.5)
EOSINOPHIL NFR BLD: 1.8 % (ref 0–8)
ERYTHROCYTE [DISTWIDTH] IN BLOOD BY AUTOMATED COUNT: 13 % (ref 11.5–14.5)
ERYTHROCYTE [SEDIMENTATION RATE] IN BLOOD BY PHOTOMETRIC METHOD: 22 MM/HR (ref 0–23)
EST. GFR  (NO RACE VARIABLE): >60 ML/MIN/1.73 M^2
GLUCOSE SERPL-MCNC: 98 MG/DL (ref 70–110)
GLUCOSE UR QL STRIP: NEGATIVE
HCT VFR BLD AUTO: 43.2 % (ref 40–54)
HCV AB SERPL QL IA: NORMAL
HGB BLD-MCNC: 14.1 G/DL (ref 14–18)
HGB UR QL STRIP: NEGATIVE
HIV 1+2 AB+HIV1 P24 AG SERPL QL IA: NORMAL
IMM GRANULOCYTES # BLD AUTO: 0.09 K/UL (ref 0–0.04)
IMM GRANULOCYTES NFR BLD AUTO: 0.9 % (ref 0–0.5)
KETONES UR QL STRIP: NEGATIVE
LACTATE SERPL-SCNC: 1 MMOL/L (ref 0.5–2.2)
LEUKOCYTE ESTERASE UR QL STRIP: NEGATIVE
LYMPHOCYTES # BLD AUTO: 1.8 K/UL (ref 1–4.8)
LYMPHOCYTES NFR BLD: 16.9 % (ref 18–48)
MCH RBC QN AUTO: 29.6 PG (ref 27–31)
MCHC RBC AUTO-ENTMCNC: 32.6 G/DL (ref 32–36)
MCV RBC AUTO: 91 FL (ref 82–98)
MONOCYTES # BLD AUTO: 1 K/UL (ref 0.3–1)
MONOCYTES NFR BLD: 9.3 % (ref 4–15)
NEUTROPHILS # BLD AUTO: 7.5 K/UL (ref 1.8–7.7)
NEUTROPHILS NFR BLD: 70.7 % (ref 38–73)
NITRITE UR QL STRIP: NEGATIVE
NRBC BLD-RTO: 0 /100 WBC
PH UR STRIP: 6 [PH] (ref 5–8)
PLATELET # BLD AUTO: 342 K/UL (ref 150–450)
PMV BLD AUTO: 9.1 FL (ref 9.2–12.9)
POTASSIUM SERPL-SCNC: 4.2 MMOL/L (ref 3.5–5.1)
PROT SERPL-MCNC: 6.9 G/DL (ref 6–8.4)
PROT UR QL STRIP: NEGATIVE
RBC # BLD AUTO: 4.76 M/UL (ref 4.6–6.2)
SODIUM SERPL-SCNC: 139 MMOL/L (ref 136–145)
SP GR UR STRIP: 1.01 (ref 1–1.03)
URN SPEC COLLECT METH UR: NORMAL
WBC # BLD AUTO: 10.57 K/UL (ref 3.9–12.7)

## 2023-05-04 PROCEDURE — 86140 C-REACTIVE PROTEIN: CPT | Performed by: PHYSICIAN ASSISTANT

## 2023-05-04 PROCEDURE — 99285 EMERGENCY DEPT VISIT HI MDM: CPT | Mod: GC,,, | Performed by: STUDENT IN AN ORGANIZED HEALTH CARE EDUCATION/TRAINING PROGRAM

## 2023-05-04 PROCEDURE — 93010 ELECTROCARDIOGRAM REPORT: CPT | Mod: ,,, | Performed by: INTERNAL MEDICINE

## 2023-05-04 PROCEDURE — 99285 EMERGENCY DEPT VISIT HI MDM: CPT | Mod: 25

## 2023-05-04 PROCEDURE — 25500020 PHARM REV CODE 255: Performed by: STUDENT IN AN ORGANIZED HEALTH CARE EDUCATION/TRAINING PROGRAM

## 2023-05-04 PROCEDURE — 87389 HIV-1 AG W/HIV-1&-2 AB AG IA: CPT | Performed by: PHYSICIAN ASSISTANT

## 2023-05-04 PROCEDURE — 81003 URINALYSIS AUTO W/O SCOPE: CPT | Performed by: PHYSICIAN ASSISTANT

## 2023-05-04 PROCEDURE — 85652 RBC SED RATE AUTOMATED: CPT | Performed by: PHYSICIAN ASSISTANT

## 2023-05-04 PROCEDURE — 86803 HEPATITIS C AB TEST: CPT | Performed by: PHYSICIAN ASSISTANT

## 2023-05-04 PROCEDURE — 87040 BLOOD CULTURE FOR BACTERIA: CPT | Performed by: PHYSICIAN ASSISTANT

## 2023-05-04 PROCEDURE — A9585 GADOBUTROL INJECTION: HCPCS | Performed by: STUDENT IN AN ORGANIZED HEALTH CARE EDUCATION/TRAINING PROGRAM

## 2023-05-04 PROCEDURE — 96365 THER/PROPH/DIAG IV INF INIT: CPT

## 2023-05-04 PROCEDURE — G0378 HOSPITAL OBSERVATION PER HR: HCPCS

## 2023-05-04 PROCEDURE — 96366 THER/PROPH/DIAG IV INF ADDON: CPT

## 2023-05-04 PROCEDURE — 93005 ELECTROCARDIOGRAM TRACING: CPT

## 2023-05-04 PROCEDURE — 99223 1ST HOSP IP/OBS HIGH 75: CPT | Mod: GC,,, | Performed by: NEUROLOGICAL SURGERY

## 2023-05-04 PROCEDURE — 80053 COMPREHEN METABOLIC PANEL: CPT | Performed by: PHYSICIAN ASSISTANT

## 2023-05-04 PROCEDURE — 96367 TX/PROPH/DG ADDL SEQ IV INF: CPT

## 2023-05-04 PROCEDURE — 63600175 PHARM REV CODE 636 W HCPCS: Performed by: STUDENT IN AN ORGANIZED HEALTH CARE EDUCATION/TRAINING PROGRAM

## 2023-05-04 PROCEDURE — 25000003 PHARM REV CODE 250: Performed by: STUDENT IN AN ORGANIZED HEALTH CARE EDUCATION/TRAINING PROGRAM

## 2023-05-04 PROCEDURE — 96375 TX/PRO/DX INJ NEW DRUG ADDON: CPT

## 2023-05-04 PROCEDURE — 85025 COMPLETE CBC W/AUTO DIFF WBC: CPT | Performed by: PHYSICIAN ASSISTANT

## 2023-05-04 PROCEDURE — 83605 ASSAY OF LACTIC ACID: CPT | Performed by: PHYSICIAN ASSISTANT

## 2023-05-04 PROCEDURE — 99223 PR INITIAL HOSPITAL CARE,LEVL III: ICD-10-PCS | Mod: GC,,, | Performed by: NEUROLOGICAL SURGERY

## 2023-05-04 PROCEDURE — 93010 EKG 12-LEAD: ICD-10-PCS | Mod: ,,, | Performed by: INTERNAL MEDICINE

## 2023-05-04 PROCEDURE — 99285 PR EMERGENCY DEPT VISIT,LEVEL V: ICD-10-PCS | Mod: GC,,, | Performed by: STUDENT IN AN ORGANIZED HEALTH CARE EDUCATION/TRAINING PROGRAM

## 2023-05-04 RX ORDER — GLUCAGON 1 MG
1 KIT INJECTION
Status: DISCONTINUED | OUTPATIENT
Start: 2023-05-05 | End: 2023-05-09 | Stop reason: HOSPADM

## 2023-05-04 RX ORDER — POLYETHYLENE GLYCOL 3350 17 G/17G
17 POWDER, FOR SOLUTION ORAL 3 TIMES DAILY PRN
Status: DISCONTINUED | OUTPATIENT
Start: 2023-05-05 | End: 2023-05-09 | Stop reason: HOSPADM

## 2023-05-04 RX ORDER — HYDRALAZINE HYDROCHLORIDE 25 MG/1
25 TABLET, FILM COATED ORAL
Status: COMPLETED | OUTPATIENT
Start: 2023-05-04 | End: 2023-05-04

## 2023-05-04 RX ORDER — SERTRALINE HYDROCHLORIDE 50 MG/1
50 TABLET, FILM COATED ORAL DAILY
Status: DISCONTINUED | OUTPATIENT
Start: 2023-05-05 | End: 2023-05-09 | Stop reason: HOSPADM

## 2023-05-04 RX ORDER — GABAPENTIN 100 MG/1
100 CAPSULE ORAL 3 TIMES DAILY
Status: DISCONTINUED | OUTPATIENT
Start: 2023-05-05 | End: 2023-05-05

## 2023-05-04 RX ORDER — TALC
6 POWDER (GRAM) TOPICAL NIGHTLY PRN
Status: DISCONTINUED | OUTPATIENT
Start: 2023-05-04 | End: 2023-05-04

## 2023-05-04 RX ORDER — NALOXONE HCL 0.4 MG/ML
0.02 VIAL (ML) INJECTION
Status: DISCONTINUED | OUTPATIENT
Start: 2023-05-05 | End: 2023-05-09 | Stop reason: HOSPADM

## 2023-05-04 RX ORDER — KETOROLAC TROMETHAMINE 30 MG/ML
15 INJECTION, SOLUTION INTRAMUSCULAR; INTRAVENOUS EVERY 6 HOURS PRN
Status: DISCONTINUED | OUTPATIENT
Start: 2023-05-05 | End: 2023-05-05

## 2023-05-04 RX ORDER — HYDROCODONE BITARTRATE AND ACETAMINOPHEN 5; 325 MG/1; MG/1
1 TABLET ORAL
Status: COMPLETED | OUTPATIENT
Start: 2023-05-04 | End: 2023-05-04

## 2023-05-04 RX ORDER — LOSARTAN POTASSIUM 50 MG/1
100 TABLET ORAL
Status: COMPLETED | OUTPATIENT
Start: 2023-05-04 | End: 2023-05-04

## 2023-05-04 RX ORDER — ONDANSETRON 2 MG/ML
4 INJECTION INTRAMUSCULAR; INTRAVENOUS EVERY 8 HOURS PRN
Status: DISCONTINUED | OUTPATIENT
Start: 2023-05-05 | End: 2023-05-09 | Stop reason: HOSPADM

## 2023-05-04 RX ORDER — TALC
6 POWDER (GRAM) TOPICAL NIGHTLY PRN
Status: DISCONTINUED | OUTPATIENT
Start: 2023-05-05 | End: 2023-05-09 | Stop reason: HOSPADM

## 2023-05-04 RX ORDER — ACETAMINOPHEN 325 MG/1
650 TABLET ORAL EVERY 8 HOURS PRN
Status: DISCONTINUED | OUTPATIENT
Start: 2023-05-05 | End: 2023-05-05

## 2023-05-04 RX ORDER — LEVOTHYROXINE SODIUM 25 UG/1
25 TABLET ORAL
Status: DISCONTINUED | OUTPATIENT
Start: 2023-05-05 | End: 2023-05-09 | Stop reason: HOSPADM

## 2023-05-04 RX ORDER — SODIUM CHLORIDE 0.9 % (FLUSH) 0.9 %
10 SYRINGE (ML) INJECTION
Status: DISCONTINUED | OUTPATIENT
Start: 2023-05-04 | End: 2023-05-04

## 2023-05-04 RX ORDER — METOPROLOL TARTRATE 50 MG/1
50 TABLET ORAL
Status: COMPLETED | OUTPATIENT
Start: 2023-05-04 | End: 2023-05-04

## 2023-05-04 RX ORDER — SODIUM CHLORIDE 0.9 % (FLUSH) 0.9 %
10 SYRINGE (ML) INJECTION EVERY 12 HOURS PRN
Status: DISCONTINUED | OUTPATIENT
Start: 2023-05-05 | End: 2023-05-09 | Stop reason: HOSPADM

## 2023-05-04 RX ORDER — PRAVASTATIN SODIUM 20 MG/1
20 TABLET ORAL NIGHTLY
Status: DISCONTINUED | OUTPATIENT
Start: 2023-05-05 | End: 2023-05-09 | Stop reason: HOSPADM

## 2023-05-04 RX ORDER — SIMETHICONE 80 MG
1 TABLET,CHEWABLE ORAL 4 TIMES DAILY PRN
Status: DISCONTINUED | OUTPATIENT
Start: 2023-05-05 | End: 2023-05-09 | Stop reason: HOSPADM

## 2023-05-04 RX ORDER — LABETALOL HCL 20 MG/4 ML
10 SYRINGE (ML) INTRAVENOUS
Status: DISCONTINUED | OUTPATIENT
Start: 2023-05-04 | End: 2023-05-04

## 2023-05-04 RX ORDER — GADOBUTROL 604.72 MG/ML
10 INJECTION INTRAVENOUS
Status: COMPLETED | OUTPATIENT
Start: 2023-05-04 | End: 2023-05-04

## 2023-05-04 RX ORDER — LOSARTAN POTASSIUM 50 MG/1
100 TABLET ORAL DAILY
Status: DISCONTINUED | OUTPATIENT
Start: 2023-05-05 | End: 2023-05-07

## 2023-05-04 RX ORDER — TIZANIDINE 2 MG/1
2 TABLET ORAL EVERY 8 HOURS PRN
Status: DISCONTINUED | OUTPATIENT
Start: 2023-05-05 | End: 2023-05-09 | Stop reason: HOSPADM

## 2023-05-04 RX ORDER — LORAZEPAM 1 MG/1
1 TABLET ORAL
Status: COMPLETED | OUTPATIENT
Start: 2023-05-04 | End: 2023-05-04

## 2023-05-04 RX ORDER — ENOXAPARIN SODIUM 100 MG/ML
40 INJECTION SUBCUTANEOUS EVERY 24 HOURS
Status: DISCONTINUED | OUTPATIENT
Start: 2023-05-05 | End: 2023-05-09 | Stop reason: HOSPADM

## 2023-05-04 RX ORDER — ONDANSETRON 4 MG/1
4 TABLET, ORALLY DISINTEGRATING ORAL EVERY 8 HOURS PRN
Status: DISCONTINUED | OUTPATIENT
Start: 2023-05-05 | End: 2023-05-09 | Stop reason: HOSPADM

## 2023-05-04 RX ORDER — METOPROLOL SUCCINATE 100 MG/1
100 TABLET, EXTENDED RELEASE ORAL DAILY
Status: DISCONTINUED | OUTPATIENT
Start: 2023-05-05 | End: 2023-05-09 | Stop reason: HOSPADM

## 2023-05-04 RX ADMIN — LORAZEPAM 1 MG: 1 TABLET ORAL at 08:05

## 2023-05-04 RX ADMIN — LOSARTAN POTASSIUM 100 MG: 50 TABLET, FILM COATED ORAL at 10:05

## 2023-05-04 RX ADMIN — CEFTRIAXONE 2 G: 2 INJECTION, POWDER, FOR SOLUTION INTRAMUSCULAR; INTRAVENOUS at 06:05

## 2023-05-04 RX ADMIN — HYDRALAZINE HYDROCHLORIDE 25 MG: 25 TABLET, FILM COATED ORAL at 10:05

## 2023-05-04 RX ADMIN — METOPROLOL TARTRATE 50 MG: 50 TABLET, FILM COATED ORAL at 10:05

## 2023-05-04 RX ADMIN — IOHEXOL 100 ML: 350 INJECTION, SOLUTION INTRAVENOUS at 08:05

## 2023-05-04 RX ADMIN — VANCOMYCIN HYDROCHLORIDE 2000 MG: 500 INJECTION, POWDER, LYOPHILIZED, FOR SOLUTION INTRAVENOUS at 07:05

## 2023-05-04 RX ADMIN — GADOBUTROL 10 ML: 604.72 INJECTION INTRAVENOUS at 10:05

## 2023-05-04 RX ADMIN — HYDROCODONE BITARTRATE AND ACETAMINOPHEN 1 TABLET: 5; 325 TABLET ORAL at 07:05

## 2023-05-04 NOTE — TELEPHONE ENCOUNTER
Called patient after reviewing MRIs which are concerning for osteomyelitis. Patient had a course of oral levaquin from allergist. Rec'd inpatient admission for further work up and treatment of possible osteomyelitis. Patient still has not heard back from his NSGY team at [facility]. Patient understands and is agreeable to the plan.        [facility]

## 2023-05-04 NOTE — ED NOTES
Julius Baker, a 77 y.o. male presents to the ED with complaints of not being able to walk for about a month. He experienced a fall, hit his back on the tub, and had a bruise afterwards. Per neurology note, possible osteomyelitis seen on MRI at T7-T8. Pt states that he has strength to move his feet, but is unsteady.       Chief Complaint   Patient presents with    Referral     Neurology referral, mri showed possible osteomyelitis; needs admission for IV antibiotics     Review of patient's allergies indicates:   Allergen Reactions    Doxycycline Diarrhea     itching    Ezetimibe-simvastatin      Muscle cramps    Rosuvastatin      Muscle cramps    Sertraline Other (See Comments)     jittery    Simvastatin      Muscle cramps    Adhesive Rash     Past Medical History:   Diagnosis Date    Acquired hypothyroidism     Allergy     Arthritis     BPH (benign prostatic hyperplasia)     Cataract     Hyperlipidemia     Hypertension     Sleep apnea     wears CPAP-DOES NOT KNOW SETTING    SOB (shortness of breath)     feels allergy related

## 2023-05-04 NOTE — PROVIDER PROGRESS NOTES - EMERGENCY DEPT.
Encounter Date: 5/4/2023    ED Physician Progress Notes         EKG - STEMI Decision  Initial Reading: No STEMI present.

## 2023-05-04 NOTE — FIRST PROVIDER EVALUATION
Emergency Department TeleTriage Encounter Note      CHIEF COMPLAINT    Chief Complaint   Patient presents with    Referral     Neurology referral, mri showed possible osteomyelitis; needs admission for IV antibiotics       VITAL SIGNS   Initial Vitals [05/04/23 1539]   BP Pulse Resp Temp SpO2   (!) 140/66 (!) 44 20 99 °F (37.2 °C) 97 %      MAP       --            ALLERGIES    Review of patient's allergies indicates:   Allergen Reactions    Doxycycline Diarrhea     itching    Ezetimibe-simvastatin      Muscle cramps    Rosuvastatin      Muscle cramps    Sertraline Other (See Comments)     jittery    Simvastatin      Muscle cramps    Adhesive Rash       PROVIDER TRIAGE NOTE  Patient advised by neuro to come to the ED for admission for possible osteomyelitis to the spine on MRI. No fever.       ORDERS  Labs Reviewed   HIV 1 / 2 ANTIBODY   HEPATITIS C ANTIBODY       ED Orders (720h ago, onward)      Start Ordered     Status Ordering Provider    05/04/23 1542 05/04/23 1542  HIV 1/2 Ag/Ab (4th Gen)  STAT         Acknowledged STEFANI RIVERO    05/04/23 1542 05/04/23 1542  Hepatitis C Antibody  STAT         Acknowledged STEFANI RIVERO              Virtual Visit Note: The provider triage portion of this emergency department evaluation and documentation was performed via [employer], a HIPAA-compliant telemedicine application, in concert with a tele-presenter in the room. A face to face patient evaluation with one of my colleagues will occur once the patient is placed in an emergency department room.      DISCLAIMER: This note was prepared with Telepathy voice recognition transcription software. Garbled syntax, mangled pronouns, and other bizarre constructions may be attributed to that software system.

## 2023-05-04 NOTE — TELEPHONE ENCOUNTER
----- Message from Christiano Urbina sent at 5/4/2023  4:14 PM CDT -----  Regarding: adv  Contact: @[phone]  Pt calling to let  know he is at the er now like she recommended... pls call and adv@[phone]

## 2023-05-04 NOTE — TELEPHONE ENCOUNTER
----- Message from Ruth Dominguez sent at 5/4/2023 11:17 AM CDT -----  Contact: [phone]  Julius Baker calling regarding Patient Advice (message) for #Pt return a call and would like Dr Kinney to give him a call if she can

## 2023-05-04 NOTE — TELEPHONE ENCOUNTER
----- Message from Huong Arauz sent at 5/4/2023 12:58 PM CDT -----  Contact: pt  Pt retuning call to office , please call       Confirmed patient's contact info below:  Contact Name: Julius Baker  Phone Number: [phone]

## 2023-05-05 PROBLEM — R53.1 RAPIDLY PROGRESSIVE WEAKNESS: Status: ACTIVE | Noted: 2023-05-05

## 2023-05-05 PROBLEM — M54.9 BACK PAIN: Status: RESOLVED | Noted: 2023-05-04 | Resolved: 2023-05-05

## 2023-05-05 LAB
ALBUMIN SERPL BCP-MCNC: 3.7 G/DL (ref 3.5–5.2)
ALP SERPL-CCNC: 70 U/L (ref 55–135)
ALT SERPL W/O P-5'-P-CCNC: 22 U/L (ref 10–44)
ANION GAP SERPL CALC-SCNC: 11 MMOL/L (ref 8–16)
AST SERPL-CCNC: 20 U/L (ref 10–40)
BILIRUB SERPL-MCNC: 0.8 MG/DL (ref 0.1–1)
BUN SERPL-MCNC: 14 MG/DL (ref 8–23)
CALCIUM SERPL-MCNC: 9.5 MG/DL (ref 8.7–10.5)
CHLORIDE SERPL-SCNC: 102 MMOL/L (ref 95–110)
CO2 SERPL-SCNC: 27 MMOL/L (ref 23–29)
CREAT SERPL-MCNC: 0.9 MG/DL (ref 0.5–1.4)
ERYTHROCYTE [DISTWIDTH] IN BLOOD BY AUTOMATED COUNT: 13.2 % (ref 11.5–14.5)
EST. GFR  (NO RACE VARIABLE): >60 ML/MIN/1.73 M^2
GLUCOSE SERPL-MCNC: 90 MG/DL (ref 70–110)
HCT VFR BLD AUTO: 44.3 % (ref 40–54)
HGB BLD-MCNC: 14.2 G/DL (ref 14–18)
MAGNESIUM SERPL-MCNC: 1.7 MG/DL (ref 1.6–2.6)
MCH RBC QN AUTO: 29 PG (ref 27–31)
MCHC RBC AUTO-ENTMCNC: 32.1 G/DL (ref 32–36)
MCV RBC AUTO: 91 FL (ref 82–98)
PLATELET # BLD AUTO: 318 K/UL (ref 150–450)
PMV BLD AUTO: 9.4 FL (ref 9.2–12.9)
POTASSIUM SERPL-SCNC: 4 MMOL/L (ref 3.5–5.1)
PROCALCITONIN SERPL IA-MCNC: 0.03 NG/ML
PROT SERPL-MCNC: 6.5 G/DL (ref 6–8.4)
RBC # BLD AUTO: 4.89 M/UL (ref 4.6–6.2)
SODIUM SERPL-SCNC: 140 MMOL/L (ref 136–145)
T4 FREE SERPL-MCNC: 0.98 NG/DL (ref 0.71–1.51)
TSH SERPL DL<=0.005 MIU/L-ACNC: 4.5 UIU/ML (ref 0.4–4)
WBC # BLD AUTO: 10.12 K/UL (ref 3.9–12.7)

## 2023-05-05 PROCEDURE — 97165 OT EVAL LOW COMPLEX 30 MIN: CPT

## 2023-05-05 PROCEDURE — 63600175 PHARM REV CODE 636 W HCPCS: Performed by: STUDENT IN AN ORGANIZED HEALTH CARE EDUCATION/TRAINING PROGRAM

## 2023-05-05 PROCEDURE — 25000003 PHARM REV CODE 250: Performed by: STUDENT IN AN ORGANIZED HEALTH CARE EDUCATION/TRAINING PROGRAM

## 2023-05-05 PROCEDURE — 27000190 HC CPAP FULL FACE MASK W/VALVE

## 2023-05-05 PROCEDURE — 99223 1ST HOSP IP/OBS HIGH 75: CPT | Mod: ,,, | Performed by: STUDENT IN AN ORGANIZED HEALTH CARE EDUCATION/TRAINING PROGRAM

## 2023-05-05 PROCEDURE — 84439 ASSAY OF FREE THYROXINE: CPT | Performed by: STUDENT IN AN ORGANIZED HEALTH CARE EDUCATION/TRAINING PROGRAM

## 2023-05-05 PROCEDURE — 84145 PROCALCITONIN (PCT): CPT | Performed by: STUDENT IN AN ORGANIZED HEALTH CARE EDUCATION/TRAINING PROGRAM

## 2023-05-05 PROCEDURE — 11000001 HC ACUTE MED/SURG PRIVATE ROOM

## 2023-05-05 PROCEDURE — 97530 THERAPEUTIC ACTIVITIES: CPT

## 2023-05-05 PROCEDURE — 36415 COLL VENOUS BLD VENIPUNCTURE: CPT | Performed by: STUDENT IN AN ORGANIZED HEALTH CARE EDUCATION/TRAINING PROGRAM

## 2023-05-05 PROCEDURE — 63600175 PHARM REV CODE 636 W HCPCS: Performed by: HOSPITALIST

## 2023-05-05 PROCEDURE — 84443 ASSAY THYROID STIM HORMONE: CPT | Performed by: STUDENT IN AN ORGANIZED HEALTH CARE EDUCATION/TRAINING PROGRAM

## 2023-05-05 PROCEDURE — 99900031 HC PATIENT EDUCATION (STAT)

## 2023-05-05 PROCEDURE — 99900035 HC TECH TIME PER 15 MIN (STAT)

## 2023-05-05 PROCEDURE — 94761 N-INVAS EAR/PLS OXIMETRY MLT: CPT

## 2023-05-05 PROCEDURE — 99223 PR INITIAL HOSPITAL CARE,LEVL III: ICD-10-PCS | Mod: ,,, | Performed by: STUDENT IN AN ORGANIZED HEALTH CARE EDUCATION/TRAINING PROGRAM

## 2023-05-05 PROCEDURE — 80053 COMPREHEN METABOLIC PANEL: CPT | Performed by: STUDENT IN AN ORGANIZED HEALTH CARE EDUCATION/TRAINING PROGRAM

## 2023-05-05 PROCEDURE — 97535 SELF CARE MNGMENT TRAINING: CPT

## 2023-05-05 PROCEDURE — 94660 CPAP INITIATION&MGMT: CPT

## 2023-05-05 PROCEDURE — 83735 ASSAY OF MAGNESIUM: CPT | Performed by: STUDENT IN AN ORGANIZED HEALTH CARE EDUCATION/TRAINING PROGRAM

## 2023-05-05 PROCEDURE — 97162 PT EVAL MOD COMPLEX 30 MIN: CPT

## 2023-05-05 PROCEDURE — 25000003 PHARM REV CODE 250: Performed by: HOSPITALIST

## 2023-05-05 PROCEDURE — 85027 COMPLETE CBC AUTOMATED: CPT | Performed by: STUDENT IN AN ORGANIZED HEALTH CARE EDUCATION/TRAINING PROGRAM

## 2023-05-05 PROCEDURE — 97116 GAIT TRAINING THERAPY: CPT

## 2023-05-05 RX ORDER — GABAPENTIN 300 MG/1
300 CAPSULE ORAL 3 TIMES DAILY
Qty: 90 CAPSULE | Refills: 11 | Status: SHIPPED | OUTPATIENT
Start: 2023-05-05 | End: 2023-06-05 | Stop reason: SDUPTHER

## 2023-05-05 RX ORDER — DEXTROSE, SODIUM CHLORIDE, SODIUM LACTATE, POTASSIUM CHLORIDE, AND CALCIUM CHLORIDE 5; .6; .31; .03; .02 G/100ML; G/100ML; G/100ML; G/100ML; G/100ML
INJECTION, SOLUTION INTRAVENOUS CONTINUOUS
Status: DISCONTINUED | OUTPATIENT
Start: 2023-05-05 | End: 2023-05-05

## 2023-05-05 RX ORDER — ACETAMINOPHEN 500 MG
1000 TABLET ORAL EVERY 8 HOURS
Status: DISCONTINUED | OUTPATIENT
Start: 2023-05-05 | End: 2023-05-09 | Stop reason: HOSPADM

## 2023-05-05 RX ORDER — ACETAMINOPHEN 500 MG
1000 TABLET ORAL EVERY 8 HOURS
Qty: 60 TABLET | Refills: 0 | Status: SHIPPED | OUTPATIENT
Start: 2023-05-05

## 2023-05-05 RX ORDER — LIDOCAINE 50 MG/G
1 PATCH TOPICAL DAILY
Qty: 30 PATCH | Refills: 0 | Status: SHIPPED | OUTPATIENT
Start: 2023-05-05 | End: 2023-06-05 | Stop reason: SDUPTHER

## 2023-05-05 RX ORDER — GABAPENTIN 300 MG/1
300 CAPSULE ORAL 3 TIMES DAILY
Status: DISCONTINUED | OUTPATIENT
Start: 2023-05-05 | End: 2023-05-09 | Stop reason: HOSPADM

## 2023-05-05 RX ORDER — HYDRALAZINE HYDROCHLORIDE 20 MG/ML
10 INJECTION INTRAMUSCULAR; INTRAVENOUS EVERY 4 HOURS PRN
Status: DISCONTINUED | OUTPATIENT
Start: 2023-05-05 | End: 2023-05-09 | Stop reason: HOSPADM

## 2023-05-05 RX ORDER — LIDOCAINE 50 MG/G
1 PATCH TOPICAL
Status: DISCONTINUED | OUTPATIENT
Start: 2023-05-05 | End: 2023-05-09 | Stop reason: HOSPADM

## 2023-05-05 RX ADMIN — GABAPENTIN 100 MG: 100 CAPSULE ORAL at 03:05

## 2023-05-05 RX ADMIN — GABAPENTIN 300 MG: 300 CAPSULE ORAL at 09:05

## 2023-05-05 RX ADMIN — GABAPENTIN 100 MG: 100 CAPSULE ORAL at 08:05

## 2023-05-05 RX ADMIN — Medication 6 MG: at 09:05

## 2023-05-05 RX ADMIN — Medication 6 MG: at 01:05

## 2023-05-05 RX ADMIN — KETOROLAC TROMETHAMINE 15 MG: 30 INJECTION, SOLUTION INTRAMUSCULAR; INTRAVENOUS at 01:05

## 2023-05-05 RX ADMIN — LEVOTHYROXINE SODIUM 25 MCG: 25 TABLET ORAL at 05:05

## 2023-05-05 RX ADMIN — TIZANIDINE 2 MG: 2 TABLET ORAL at 09:05

## 2023-05-05 RX ADMIN — SERTRALINE HYDROCHLORIDE 50 MG: 50 TABLET ORAL at 08:05

## 2023-05-05 RX ADMIN — ENOXAPARIN SODIUM 40 MG: 40 INJECTION SUBCUTANEOUS at 05:05

## 2023-05-05 RX ADMIN — PRAVASTATIN SODIUM 20 MG: 20 TABLET ORAL at 09:05

## 2023-05-05 RX ADMIN — SODIUM CHLORIDE, SODIUM LACTATE, POTASSIUM CHLORIDE, CALCIUM CHLORIDE AND DEXTROSE MONOHYDRATE: 5; 600; 310; 30; 20 INJECTION, SOLUTION INTRAVENOUS at 08:05

## 2023-05-05 RX ADMIN — ACETAMINOPHEN 1000 MG: 500 TABLET ORAL at 05:05

## 2023-05-05 RX ADMIN — ACETAMINOPHEN 1000 MG: 500 TABLET ORAL at 09:05

## 2023-05-05 RX ADMIN — HYDRALAZINE HYDROCHLORIDE 10 MG: 20 INJECTION, SOLUTION INTRAMUSCULAR; INTRAVENOUS at 04:05

## 2023-05-05 RX ADMIN — LIDOCAINE 1 PATCH: 50 PATCH CUTANEOUS at 02:05

## 2023-05-05 RX ADMIN — LOSARTAN POTASSIUM 100 MG: 50 TABLET, FILM COATED ORAL at 08:05

## 2023-05-05 RX ADMIN — VANCOMYCIN HYDROCHLORIDE 1500 MG: 1.5 INJECTION, POWDER, LYOPHILIZED, FOR SOLUTION INTRAVENOUS at 08:05

## 2023-05-05 NOTE — PROGRESS NOTES
Therapy with vancomycin complete and/or consult discontinued by provider.  Pharmacy will sign off, please re-consult as needed.     Fabian Sky, PharmD  Ext: [phone]

## 2023-05-05 NOTE — ED NOTES
Received bedside report from JONNA RN.  Pt AAOx4, resting comfortably in bed, NAD, respirations E/UL, updated on POC, wheels locked and in low position, call bell with in reach, Comfort positioning and restroom needs were addressed. Necessary items were placed with in reach and was advised when a reassessment would take place.

## 2023-05-05 NOTE — NURSING
Nurses Note -- 4 Eyes      5/5/2023   2:34 AM      Skin assessed during: Admit      [x] No Altered Skin Integrity Present    []Prevention Measures Documented      [] Yes- Altered Skin Integrity Present or Discovered   [] LDA Added if Not in Epic (Describe Wound)   [] New Altered Skin Integrity was Present on Admit and Documented in LDA   [] Wound Image Taken    Wound Care Consulted? No    Attending Nurse:  DOTTIE Ortiz     Second RN/Staff Member:  DOTTIE Rivera

## 2023-05-05 NOTE — PT/OT/SLP EVAL
Occupational Therapy   Co-Evaluation and Co-Treatment  Co-treatment with PT for maximal pt participation, safety, and activity tolerance       Name: Julius Baker  MRN: [id number]  Admitting Diagnosis: Rapidly progressive weakness  Recent Surgery: * No surgery found *      Recommendations:     Discharge Recommendations: nursing facility, skilled  Discharge Equipment Recommendations:  none  Barriers to discharge:  Other (Comment) (increased level of A)    Assessment:     Julius Baker is a 77 y.o. male with a medical diagnosis of Rapidly progressive weakness.  He presents with impaired ADL and mobility performance deficits. Pt found upright in bed and agreeable for therapy. Session focused on bedroom and bathroom mobility today. Pt limited 2/2 burning sensation BLE's upon standing (x4 trials today).  Pt required CGA-min A for mobility using a RW however unsteady in nature often reporting fear of falling and worry of quick fatigue. PTA, pt was using a SPC until recently where he was requiring a RW. Pt would benefit from continued OT skilled services 3x/wk to improve daily living skills to optimize QOL. Pt is recommended to discharge to SNF at this time. Performance deficits affecting function: weakness, gait instability, impaired endurance, impaired balance, impaired self care skills, impaired functional mobility, decreased upper extremity function, decreased lower extremity function.      Rehab Prognosis: Good; patient would benefit from acute skilled OT services to address these deficits and reach maximum level of function.       Plan:     Patient to be seen 3 x/week to address the above listed problems via self-care/home management, therapeutic activities, therapeutic exercises  Plan of Care Expires: 06/05/23  Plan of Care Reviewed with: patient, spouse    Subjective     Chief Complaint: burning sensation in BLE's   Patient/Family Comments/goals: return home     Occupational Profile:  Living Environment:  Pt lives with wife in a H with threshold entry. Pt has a tub and separate shower.   Previous level of function: Typically using a SPC however recently needing a RW   Roles and Routines: Pt drives; retired from police force.  Equipment Used at Home: walker, rolling, cane, straight, shower chair  Assistance upon Discharge: Spouse    Pain/Comfort:  Pain Rating 1: other (see comments) (pain and burning sensation to BLE's)  Location - Side 1: Bilateral  Location - Orientation 1: generalized  Location 1: leg  Pain Addressed 1: Reposition, Distraction, Cessation of Activity    Patients cultural, spiritual, Faith conflicts given the current situation: no    Objective:     Communicated with: RN prior to session.  Patient found HOB elevated with SCD, telemetry, peripheral IV upon OT entry to room.    General Precautions: Standard, fall  Orthopedic Precautions: N/A  Braces: N/A  Respiratory Status: Room air    Occupational Performance:    Bed Mobility:    Patient completed Rolling/Turning to Right with stand by assistance  Patient completed Scooting/Bridging with stand by assistance  Patient completed Supine to Sit with stand by assistance  Patient completed Sit to Supine with stand by assistance    Functional Mobility/Transfers:  Patient completed Sit <> Stand Transfer with contact guard assistance  with  rolling walker   Patient completed Bed <> Chair Transfer using Step Transfer technique with contact guard assistance with rolling walker  Functional Mobility: Pt stood x4 from bed and x1 from chair today. Pt requiring CGA from bed on all attempts and from chair as well with mod cues needed for tf technique. Pt was min A for gait 2/2 poor RW management requiring cues and generalized lateral sway. Pt with poor endurance requiring quick sit at bedside during first stand and tolerating ~2 min at sink with request for chair in restroom.    Activities of Daily Living:  Grooming: contact guard assistance washing face and  brushing teeth at sink  Upper Body Dressing: minimum assistance donning gown at EOB     Cognitive/Visual Perceptual:  Cognitive/Psychosocial Skills:     -       Oriented to: Person, Place, Time, and Situation   -       Follows Commands/attention:Follows multistep  commands  -       Communication: clear/fluent  -       Memory: No Deficits noted  -       Safety awareness/insight to disability: intact   -       Mood/Affect/Coping skills/emotional control: Pleasant    Physical Exam:  Balance:    -       demo good sitting and standing balance with RW   Upper Extremity Range of Motion:     -       Right Upper Extremity: WFL  -       Left Upper Extremity: WFL  Upper Extremity Strength:    -       Right Upper Extremity: WFL  -       Left Upper Extremity: WFL   Strength:    -       Right Upper Extremity: WFL  -       Left Upper Extremity: WFL    AMPAC 6 Click ADL:  AMPAC Total Score: 16    Treatment & Education:  Pt educated on role of occupational therapy, POC, and safety during ADLs and functional mobility. Pt and OT discussed importance of safe, continued mobility to optimize daily living skills. Pt verbalized understanding.   White board updated during session. Pt given instruction to call for medical staff/nurse for assistance.       Patient left  with all lines intact, call button in reach, bed alarm on, chair alarm on, RN  notified, and spouse present    GOALS:   Multidisciplinary Problems       Occupational Therapy Goals          Problem: Occupational Therapy    Goal Priority Disciplines Outcome Interventions   Occupational Therapy Goal     OT, PT/OT Ongoing, Progressing    Description: Goals to be met by: 6/5/23 (1 month)     Patient will increase functional independence with ADLs by performing:    UE Dressing with Supervision.  LE Dressing with Supervision.  Grooming while standing at sink with Supervision.  Toileting from toilet with Supervision for hygiene and clothing management.   Rolling to Bilateral with  Lyman.   Supine to sit with Lyman.  Step transfer with Supervision  Toilet transfer to toilet with Supervision.                         History:     Past Medical History:   Diagnosis Date    Acquired hypothyroidism     Allergy     Arthritis     BPH (benign prostatic hyperplasia)     Cataract     Hyperlipidemia     Hypertension     Sleep apnea     wears CPAP-DOES NOT KNOW SETTING    SOB (shortness of breath)     feels allergy related         Past Surgical History:   Procedure Laterality Date    CATARACT EXTRACTION, BILATERAL Bilateral     COLONOSCOPY N/A 08/11/2020    Procedure: COLONOSCOPY;  Surgeon: LISBETH Ríos MD;  Location: [address];  Service: Endoscopy;  Laterality: N/A;  covid test 8/8 [place]    dental implant Left     EYE SURGERY      KNEE ARTHROSCOPY Bilateral     uvula removal         Time Tracking:     OT Date of Treatment: 05/05/23  OT Start Time: 1111  OT Stop Time: 1144  OT Total Time (min): 33 min    Billable Minutes:Evaluation 10 min  Self Care/Home Management 10 min  Therapeutic Activity 13 min    5/5/2023

## 2023-05-05 NOTE — HPI
"Julius Baker is a 77-year-old man with a past medical history of HTN, HLD, history of alcohol use disorder, and BPH who presents to the emergency department with progressive weakness and gait instability who presents to the emergency department. The patient is accompanied by his daughter who assists in providing the history. Per report, the patient has been suffering from a progressive weakness with gait instability and several falls at home. Per family report, he has a shuffling gait. The patient says that approximately six months ago he was walking with a cane and given the progression of his weakness he now ambulates with a walker. The patient had been following with a neurosurgeon, Dr. Otoole ([facility]) for back pain. The MRI ordered by Dr. Otoole possibly showed osteomyelitis but he is not currently being treated at this time. He did, however, see his allergist who prescribed a shot of corticosteroids and a ten day course of antibiotics. The patient said it has been difficult for him to get back into clinic to see Dr. Otoole given his clinic is so busy. The patient was seen in the Neurology clinic given concern that gait instability may be secondary to Parkinson's disease. However, the provider was concerned about the MRI results and encouraged the patient to present to the emergency department for further evaluation. The patient complains of some mild back pain which he treats with pain relief patches and warm compresses. He denies any recent fever, chills or rigors. Of note, the patient and his daughter do bring up that the patient had a tooth extraction for an infected tooth approximately six months ago. He was reportedly placed on oral antibiotics for an "extended" period of time by his dentist. He had a tooth implant placed which subsequently fell out for unknown reasons which could not be explained by his dentist. The patient thinks he was having fevers and chills around this time. The " patient denies any saddle anesthesia, bladder or bowel incontinence.    In the emergency department, the patient was noted to be bradycardic with a heart rate at 44. Other vital signs stable upon arrival. Labs were largely unremarkable. ESR and CRP within normal limits. The patient had CT of his lumbar and thoracic spine revealing inferior T7 and superior T8 endplate irregularity, consistent with area of concern on outside hospital MRI for possible discitis/osteomyelitis and inferior L2, superior L3, and inferior L4 endplate irregularity, likely representing degenerative change although osteomyelitis/discitis cannot be completely excluded. MRI lumbar spine revealing multilevel signal abnormalities at L1-2, L2-L3, L3-L4, and L4-L5, favored to represent degenerative changes with osteomyelitis/discitis not completely excluded thought to be less likely. The patient was given IV vancomycin in addition to his home blood pressure medications. He was admitted to Hospital Medicine for further management.

## 2023-05-05 NOTE — PLAN OF CARE
PT eval complete, plan of care established    2023    Problem: Physical Therapy  Goal: Physical Therapy Goal  Description: Goals to be met by: 2023     Patient will increase functional independence with mobility by performin. Supine to sit with independence  2. Sit to supine with independence  3. Sit to stand transfer with modified independence  4. Gait  x 200 feet with modified independence using LRAD as needed  5. Ascend/Descend 6 inch curb step with modified independence using LRAD as needed  6. Lower extremity exercise program x10 reps per handout, with independence   Outcome: Ongoing, Progressing

## 2023-05-05 NOTE — H&P
[facility] - Neurosurgery ([facility] Medicine  History & Physical    Patient Name: Julius Baker  MRN: [id number]  Patient Class: IP- Inpatient  Admission Date: 5/4/2023  Attending Physician: Zi Rowell MD   Primary Care Provider: ANNA Thurston MD    Patient information was obtained from patient, past medical records and ER records.     Subjective:     Principal Problem:Rapidly progressive weakness    Chief Complaint:   Chief Complaint   Patient presents with    Referral     Neurology referral, mri showed possible osteomyelitis; needs admission for IV antibiotics        HPI: Julius Baker is a 77-year-old man with a past medical history of HTN, HLD, history of alcohol use disorder, and BPH who presents to the emergency department with progressive weakness and gait instability who presents to the emergency department. The patient is accompanied by his daughter who assists in providing the history. Per report, the patient has been suffering from a progressive weakness with gait instability and several falls at home. Per family report, he has a shuffling gait. The patient says that approximately six months ago he was walking with a cane and given the progression of his weakness he now ambulates with a walker. The patient had been following with a neurosurgeon, Dr. Otoole ([facility]) for back pain. The MRI ordered by Dr. Otoole possibly showed osteomyelitis but he is not currently being treated at this time. He did, however, see his allergist who prescribed a shot of corticosteroids and a ten day course of antibiotics. The patient said it has been difficult for him to get back into clinic to see Dr. Otoole given his clinic is so busy. The patient was seen in the Neurology clinic given concern that gait instability may be secondary to Parkinson's disease. However, the provider was concerned about the MRI results and encouraged the patient to present to the emergency department for  "further evaluation. The patient complains of some mild back pain which he treats with pain relief patches and warm compresses. He denies any recent fever, chills or rigors. Of note, the patient and his daughter do bring up that the patient had a tooth extraction for an infected tooth approximately six months ago. He was reportedly placed on oral antibiotics for an "extended" period of time by his dentist. He had a tooth implant placed which subsequently fell out for unknown reasons which could not be explained by his dentist. The patient thinks he was having fevers and chills around this time. The patient denies any saddle anesthesia, bladder or bowel incontinence.    In the emergency department, the patient was noted to be bradycardic with a heart rate at 44. Other vital signs stable upon arrival. Labs were largely unremarkable. ESR and CRP within normal limits. The patient had CT of his lumbar and thoracic spine revealing inferior T7 and superior T8 endplate irregularity, consistent with area of concern on outside hospital MRI for possible discitis/osteomyelitis and inferior L2, superior L3, and inferior L4 endplate irregularity, likely representing degenerative change although osteomyelitis/discitis cannot be completely excluded. MRI lumbar spine revealing multilevel signal abnormalities at L1-2, L2-L3, L3-L4, and L4-L5, favored to represent degenerative changes with osteomyelitis/discitis not completely excluded thought to be less likely. The patient was given IV vancomycin in addition to his home blood pressure medications. He was admitted to Hospital Medicine for further management.         Past Medical History:   Diagnosis Date    Acquired hypothyroidism     Allergy     Arthritis     BPH (benign prostatic hyperplasia)     Cataract     Hyperlipidemia     Hypertension     Sleep apnea     wears CPAP-DOES NOT KNOW SETTING    SOB (shortness of breath)     feels allergy related       Past Surgical History: "   Procedure Laterality Date    CATARACT EXTRACTION, BILATERAL Bilateral     COLONOSCOPY N/A 08/11/2020    Procedure: COLONOSCOPY;  Surgeon: LISBETH Ríos MD;  Location: [facility] ([address] [place] [address]);  Service: Endoscopy;  Laterality: N/A;  covid test 8/8 [place]    dental implant Left     EYE SURGERY      KNEE ARTHROSCOPY Bilateral     uvula removal         Review of patient's allergies indicates:   Allergen Reactions    Doxycycline Diarrhea     itching    Ezetimibe-simvastatin      Muscle cramps    Rosuvastatin      Muscle cramps    Sertraline Other (See Comments)     jittery    Simvastatin      Muscle cramps    Adhesive Rash       No current facility-administered medications on file prior to encounter.     Current Outpatient Medications on File Prior to Encounter   Medication Sig    acamprosate (CAMPRAL) 333 mg tablet Take 2 tablets (666 mg total) by mouth 3 (three) times daily.    diclofenac sodium (VOLTAREN) 1 % Gel APPLY TOPICALLY TO THE AFFECTED AREA THREE TIMES DAILY AS NEEDED FOR JOINT PAIN    fluticasone propionate (FLONASE) 50 mcg/actuation nasal spray 1 spray by Each Nostril route daily as needed.    gabapentin (NEURONTIN) 100 MG capsule Take 100 mg by mouth 3 (three) times daily.    levothyroxine (SYNTHROID) 25 MCG tablet TAKE 1 TABLET DAILY (FURTHER REFILLS REQUIRE VISIT WITH DOCTOR)    losartan (COZAAR) 100 MG tablet TAKE 1 TABLET(100 MG) BY MOUTH EVERY DAY    multivitamin capsule Take 1 capsule by mouth once daily.    nebivoloL (BYSTOLIC) 10 MG Tab Take 1 tablet (10 mg total) by mouth once daily.    pravastatin (PRAVACHOL) 20 MG tablet TAKE 1 TABLET EVERY EVENING    sertraline (ZOLOFT) 50 MG tablet Take 1 tablet (50 mg total) by mouth once daily.    tiZANidine (ZANAFLEX) 2 MG tablet TAKE 1 TABLET(2 MG) BY MOUTH EVERY 8 HOURS AS NEEDED FOR BACK AND LEG PAIN    traMADoL (ULTRAM) 50 mg tablet Take 1 tablet (50 mg total) by mouth every 6 (six) hours as needed for Pain. (Patient not  taking: Reported on 4/27/2023)     Family History       Problem Relation (Age of Onset)    Arthritis Mother    Diabetes Mother    Hypertension Mother          Tobacco Use    Smoking status: Never    Smokeless tobacco: Never   Substance and Sexual Activity    Alcohol use: Yes     Alcohol/week: 15.0 standard drinks     Types: 12 Standard drinks or equivalent, 3 Shots of liquor per week     Comment: Rum and coke 2-3 drinks NIGHTLY    Drug use: No    Sexual activity: Not Currently     Partners: Female     Review of Systems   Constitutional:  Negative for chills, fatigue and fever.   HENT:  Positive for dental problem. Negative for congestion, rhinorrhea, sinus pressure, sinus pain, sneezing and sore throat.    Eyes:  Negative for photophobia and visual disturbance.   Respiratory:  Negative for cough, choking, chest tightness and shortness of breath.    Cardiovascular:  Negative for chest pain, palpitations and leg swelling.   Gastrointestinal:  Negative for abdominal pain, constipation, diarrhea, nausea and vomiting.   Endocrine: Negative for polyphagia and polyuria.   Genitourinary:  Negative for difficulty urinating, dysuria, hematuria and urgency.   Musculoskeletal:  Positive for back pain and gait problem. Negative for neck pain and neck stiffness.   Skin:  Negative for rash and wound.   Neurological:  Positive for weakness and numbness. Negative for dizziness, tremors and syncope.   Psychiatric/Behavioral:  Negative for agitation, behavioral problems and confusion.    Objective:     Vital Signs (Most Recent):  Temp: 98.9 °F (37.2 °C) (05/04/23 1915)  Pulse: 67 (05/04/23 2230)  Resp: 20 (05/04/23 2230)  BP: (!) 169/78 (05/04/23 2230)  SpO2: 96 % (05/04/23 2230)   Vital Signs (24h Range):  Temp:  [98.9 °F (37.2 °C)-99 °F (37.2 °C)] 98.9 °F (37.2 °C)  Pulse:  [44-67] 67  Resp:  [14-22] 20  SpO2:  [95 %-99 %] 96 %  BP: (140-221)/() 169/78     Weight: 101.6 kg (224 lb)  Body mass index is 32.14 kg/m².      Physical Exam  Vitals reviewed. Exam conducted with a chaperone present.   Constitutional:       General: He is not in acute distress.     Appearance: Normal appearance. He is normal weight. He is not ill-appearing, toxic-appearing or diaphoretic.      Comments: Pleasant man in no acute distress. Cooperative with examination and conversant with interview.   HENT:      Head: Normocephalic and atraumatic.      Right Ear: External ear normal.      Left Ear: External ear normal.      Nose: Nose normal.      Mouth/Throat:      Mouth: Mucous membranes are moist.   Eyes:      General:         Right eye: No discharge.         Left eye: No discharge.      Extraocular Movements: Extraocular movements intact.   Cardiovascular:      Rate and Rhythm: Regular rhythm. Bradycardia present.      Pulses: Normal pulses.      Heart sounds: No murmur heard.    No friction rub. No gallop.   Pulmonary:      Effort: Pulmonary effort is normal. No respiratory distress.      Breath sounds: Normal breath sounds. No stridor. No wheezing, rhonchi or rales.   Abdominal:      Palpations: Abdomen is soft. There is no mass.      Tenderness: There is no abdominal tenderness. There is no guarding or rebound.      Hernia: No hernia is present.   Musculoskeletal:      Cervical back: No signs of trauma or tenderness.      Thoracic back: No signs of trauma or tenderness.      Lumbar back: No signs of trauma or tenderness.      Right lower leg: No edema.      Left lower leg: No edema.   Skin:     General: Skin is warm and dry.   Neurological:      General: No focal deficit present.      Mental Status: He is alert and oriented to person, place, and time. Mental status is at baseline.      Sensory: No sensory deficit.      Motor: Weakness present.      Gait: Gait abnormal.   Psychiatric:         Mood and Affect: Mood normal.         Behavior: Behavior normal.         Thought Content: Thought content normal.         Judgment: Judgment normal.               Significant Labs: All pertinent labs within the past 24 hours have been reviewed.  CBC:   Recent Labs   Lab 05/04/23  1748   WBC 10.57   HGB 14.1   HCT 43.2        CMP:   Recent Labs   Lab 05/04/23  1748      K 4.2      CO2 30*   GLU 98   BUN 18   CREATININE 0.9   CALCIUM 9.9   PROT 6.9   ALBUMIN 4.0   BILITOT 1.0   ALKPHOS 70   AST 21   ALT 21   ANIONGAP 7*       Significant Imaging: I have reviewed all pertinent imaging results/findings within the past 24 hours.    Assessment/Plan:     * Progressive weakness  The patient has notably progressive weakness with gait instability that has caused recurrent falls while at home. Imaging performed by outside neurosurgeon revealed concern for possible osteomyelitis or discitis. Inflammatory markers negative. Repeat CT imaging in the ED reveals inferior T7 and superior T8 endplate irregularity, consistent with area of concern for possible discitis/osteomyelitis. MRI lumbar spine reveals multilevel signal abnormalities at L1-2, L2-L3, L3-L4, and L4-L5, favored to represent degenerative changes with osteomyelitis/discitis thought to be less likely.   - Neurosurgery consulted, appreciate additional recommendations   - Empiric treatment for osteomyelitis with IV vancomycin and IV ceftriaxone  - f/u blood cultures  - PT/OT consulted, appreciate recommendations    Anxiety  Resume home sertraline.      Acquired hypothyroidism  Resume home levothyroxine.      Hyperlipidemia  Resume home pravastatin.      Essential hypertension  Blood pressure mildly elevated upon admission.  - losartan 100 mg daily  - home nebivolol not on formulary; begin metoprolol succinate 100 mg daily    VTE Risk Mitigation (From admission, onward)         Ordered     enoxaparin injection 40 mg  Daily         05/04/23 2347     IP VTE HIGH RISK PATIENT  Once         05/04/23 2347     Place sequential compression device  Until discontinued         05/04/23 2347              Code Status:  FULL    Sid Miles MD  Department of Hospital Medicine  [facility] - Neurosurgery ([facility])

## 2023-05-05 NOTE — ASSESSMENT & PLAN NOTE
Blood pressure mildly elevated upon admission.  - losartan 100 mg daily  - home nebivolol not on formulary; begin metoprolol succinate 100 mg daily    5/5 SBP in 200s. IV fluids discontinued with improvement

## 2023-05-05 NOTE — CONSULTS
[facility] - Emergency Dept  Neurosurgery  Consult Note    Subjective:     History of Present Illness: 77 M PMHx HTN, HLD, BPH, currently undergoing workup for PD with neurology clinic, presents to hospital with multiple falls and difficulty walking with back pain and leg pain, consulted to NSGY for thoracic spine enhancement on MRI. Pt reports progressively worsening back pain and leg pain over the past sever months. The leg pain is sharp, shooting pain down the backs of his legs and wrapping to the front of the ankle. He has also had worsening coordination with his legs while walking over the past few months which has led to multiple falls. He denies focal weakness, upper extremity symptoms, bowel/bladder changes, saddle anesthesia, or numbness. He is being seen in neurology clinic for w/u of parkinson's disease, and he has been seen by Dr. Otoole at [facility] for possible thoracic osteomyelitis      Post-Op Info:  * No surgery found *         Past Medical History:   Diagnosis Date    Acquired hypothyroidism     Allergy     Arthritis     BPH (benign prostatic hyperplasia)     Cataract     Hyperlipidemia     Hypertension     Sleep apnea     wears CPAP-DOES NOT KNOW SETTING    SOB (shortness of breath)     feels allergy related       Past Surgical History:   Procedure Laterality Date    CATARACT EXTRACTION, BILATERAL Bilateral     COLONOSCOPY N/A 08/11/2020    Procedure: COLONOSCOPY;  Surgeon: LISBETH Ríos MD;  Location: [facility] ([address] [place] [address]);  Service: Endoscopy;  Laterality: N/A;  covid test 8/8 [place]    dental implant Left     EYE SURGERY      KNEE ARTHROSCOPY Bilateral     uvula removal         Social History     Socioeconomic History    Marital status:    Tobacco Use    Smoking status: Never    Smokeless tobacco: Never   Substance and Sexual Activity    Alcohol use: Yes     Alcohol/week: 15.0 standard drinks     Types: 12 Standard drinks or equivalent, 3 Shots of liquor  per week     Comment: Rum and coke 2-3 drinks NIGHTLY    Drug use: No    Sexual activity: Not Currently     Partners: Female     Social Determinants of Health     Financial Resource Strain: Low Risk     Difficulty of Paying Living Expenses: Not hard at all   Food Insecurity: No Food Insecurity    Worried About Running Out of Food in the Last Year: Never true    Ran Out of Food in the Last Year: Never true   Transportation Needs: No Transportation Needs    Lack of Transportation (Medical): No    Lack of Transportation (Non-Medical): No   Physical Activity: Insufficiently Active    Days of Exercise per Week: 1 day    Minutes of Exercise per Session: 20 min   Stress: No Stress Concern Present    Feeling of Stress : Not at all   Social Connections: Unknown    Frequency of Communication with Friends and Family: Three times a week    Frequency of Social Gatherings with Friends and Family: Once a week    Active Member of Clubs or Organizations: Yes    Attends Club or Organization Meetings: 1 to 4 times per year    Marital Status:    Housing Stability: Low Risk     Unable to Pay for Housing in the Last Year: No    Number of Places Lived in the Last Year: 1    Unstable Housing in the Last Year: No       Family History   Problem Relation Age of Onset    Diabetes Mother     Hypertension Mother     Arthritis Mother        Review of patient's allergies indicates:   Allergen Reactions    Doxycycline Diarrhea     itching    Ezetimibe-simvastatin      Muscle cramps    Rosuvastatin      Muscle cramps    Sertraline Other (See Comments)     jittery    Simvastatin      Muscle cramps    Adhesive Rash         Medications:  Continuous Infusions:  Scheduled Meds:   hydrALAZINE  25 mg Oral ED 1 Time    losartan  100 mg Oral ED 1 Time    metoprolol tartrate  50 mg Oral ED 1 Time     PRN Meds:     Review of Systems    Review of Systems   Constitutional:  Negative for fatigue and fever.   Respiratory:   Negative for shortness of breath.    Cardiovascular:  Negative for chest pain.   Gastrointestinal:  Negative for nausea and vomiting.   Genitourinary:  Negative for difficulty urinating.   Musculoskeletal: back pain, leg pain  Neurological:  coordination issues with legs    Objective:     Weight: 101.6 kg (224 lb)  Body mass index is 32.14 kg/m².  Vital Signs (Most Recent):  Temp: 98.9 °F (37.2 °C) (05/04/23 1915)  Pulse: (!) 59 (05/04/23 1915)  Resp: 16 (05/04/23 1924)  BP: (!) 197/83 (05/04/23 1915)  SpO2: 98 % (05/04/23 1915)   Vital Signs (24h Range):  Temp:  [98.9 °F (37.2 °C)-99 °F (37.2 °C)] 98.9 °F (37.2 °C)  Pulse:  [44-59] 59  Resp:  [14-22] 16  SpO2:  [95 %-99 %] 98 %  BP: (140-221)/() 197/83     Date 05/04/23 0700 - 05/05/23 0659   Shift 5034-7652 0341-8507 1542-0313 24 Hour Total   INTAKE   IV Piggyback  50  50   Shift Total(mL/kg)  50(0.5)  50(0.5)   OUTPUT   Shift Total(mL/kg)       Weight (kg)  101.6 101.6 101.6                               Physical Exam         Neurosurgery Physical Exam    Physical Exam:    Constitutional: No distress.     HEENT: atraumatic/normocephalic    Cardiovascular: Regular rhythm.     Pulm: aerating well, saturating well    Abdominal: Soft.     Psych/Behavior: He is alert.     RUE: 5/5 delt, 5/5 bi, 5/5 tri, 5/5 hg, 5/5 io  LUE: 5/5 delt, 5/5 bi, 5/5 tri, 5/5 hg, 5/5 io  RLE: 5/5 hf, 5/5 quad, 5/5 hamstring, 5/5 df, 5/5 pf  LLE: 5/5 hf, 5/5 quad, 5/5 hamstring, 5/5 df, 5/5 pf    SILT    No hoffmans  No clonus  No babinski    BUE cogwheel rigidity    Significant Labs:  Recent Labs   Lab 05/04/23  1748   GLU 98      K 4.2      CO2 30*   BUN 18   CREATININE 0.9   CALCIUM 9.9     Recent Labs   Lab 05/04/23  1748   WBC 10.57   HGB 14.1   HCT 43.2        No results for input(s): LABPT, INR, APTT in the last 48 hours.  Microbiology Results (last 7 days)       Procedure Component Value Units Date/Time    Blood culture #1 **CANNOT BE ORDERED STAT**  [[id number]] Collected: 05/04/23 1748    Order Status: Sent Specimen: Blood from Peripheral, Antecubital, Right Updated: 05/04/23 1756    Blood culture #2 **CANNOT BE ORDERED STAT** [[id number]] Collected: 05/04/23 1751    Order Status: Sent Specimen: Blood from Peripheral, Wrist, Left Updated: 05/04/23 1756          All pertinent labs from the last 24 hours have been reviewed.    Significant Diagnostics:  I have reviewed all pertinent imaging results/findings within the past 24 hours.    Assessment/Plan:     Back pain  77 M PMHx HTN, HLD, BPH, currently undergoing workup for PD with neurology clinic, presents to hospital with multiple falls and difficulty walking with back pain and leg pain, consulted to NSGY for thoracic spine enhancement on MRI.      MRI T spine with T7-8 STIR changes and enhancement in disc space and associated endplates. May be related to osteomyelitis discitis, or could be reactive inflammatory changes from unstable level.   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 Multilevel degenerative changes with vacuum discs and sclerotic endplates in lumbar spine    -- Given no infectious signs/symptoms, negative inflammatory markers, there is low likelihood that patient has osteomyelitis/discitis. It is more likely that patient has inflammatory changes from micromotion at the involved segement because of non-fused segment at anterior T7-T8 amongst ankylosed spine  -- Pt's falls seem more likely related to possible PD rather than thoracic myelopathy. Pt has no clinical signs and symptoms of myelopathy or cord compression.   -- Low back pain and BLE radiculopathy may be due to lumbar spine pathology  With possible nerve root compression.  -- MRI L spine w/wo to complete radiographic evaluation of infection in spine, and to evaluate for any lumbar spine compressive lesions.   -- Pending L spine MRI findings, may  continue to proceed with IR biopsy of spine to confirm or rule out osteomyelitis/discitis  -- Will continue to follow along        Philip Cooper MD  Neurosurgery  [facility] - Emergency Dept

## 2023-05-05 NOTE — ASSESSMENT & PLAN NOTE
The patient has notably progressive weakness with gait instability that has caused recurrent falls while at home. Imaging performed by outside neurosurgeon revealed concern for possible osteomyelitis or discitis. Inflammatory markers negative. Repeat CT imaging in the ED reveals inferior T7 and superior T8 endplate irregularity, consistent with area of concern for possible discitis/osteomyelitis. MRI lumbar spine reveals multilevel signal abnormalities at L1-2, L2-L3, L3-L4, and L4-L5, favored to represent degenerative changes with osteomyelitis/discitis thought to be less likely.   - Neurosurgery consulted, appreciate additional recommendations   - Empiric treatment for osteomyelitis with IV vancomycin and IV ceftriaxone  - f/u blood cultures  - PT/OT consulted, appreciate recommendations  5/5 progressive weakness with gait instability and recurrent falls  falls at home. Imaging performed by outside neurosurgeon revealed concern for possible osteomyelitis or discitis. Inflammatory markers negative. Repeat CT imaging in the ED reveals inferior T7 and superior T8 endplate irregularity, consistent with area of concern for possible discitis/osteomyelitis. MRI lumbar spine reveals multilevel signal abnormalities at L1-2, L2-L3, L3-L4, and L4-L5, favored to represent degenerative changes with osteomyelitis/discitis tnot completely excludded. Mild enhancement of the dura extending from the L1-L5 level suggestive of possible infectious or inflammatory process and potentially relating to chronic inflammation. Neurosurgery consulted, started on empiric  IV vancomycin and IV ceftriaxone. ESR,  procalctionin WNL . no signs and symptoms of myelopathy or cord compression.  Back pain likely with BLE radiculopathy  and  possible nerve root compression. IR consulted for biopsy of spine to confirm or rule out osteomyelitis/discitis. IR eval -no imaging evidence of discitis and with normal inflammatory markers IR feels it is very  unlikely discitis .  disc biopsy is not warrented per neurosurgery . ceftriaxone and vancomycin discontinued. Neurosurgery plans for outpatient follow up in clinic with scoliosis xrays and lumbar spine flexion extension xrays. patient may require arge scale spinal fusion as outpatient. PT/OT consulted - recs SNF. Patient reluctant to go [facility] SNF [facility] with family

## 2023-05-05 NOTE — ED PROVIDER NOTES
Encounter Date: 5/4/2023       History     Chief Complaint   Patient presents with    Referral     Neurology referral, mri showed possible osteomyelitis; needs admission for IV antibiotics     Patient is a 77-year-old male with a past medical history of hypertension and hyperlipidemia presenting to the ED for spine evaluation.  He had previously been following with Neurosurgery outside of [facility].  Unclear what the primary reasons was, however back in mid April, he had MRIs of his cervical and thoracic spine done by NSGY.  The patient and his wife had access to these reports, which documented findings concerning for diskitis of T7 and T8.  They have had difficulty hearing back from Neurosurgery since these MRIs have been performed.  They were somehow able to be evaluated by neurology at [facility].  It was at this Neurology visit that the neuro NP recommended an ED visit for treatment/further evaluation of diskitis. He denies any fevers.    Of note, patient has been having difficulty walking with ataxia that has been progressive for the last year. He has been having frequent falls as well, the most recently being 3 weeks ago when he fell in the tub and hit his back, which he does endorse pain at.  He denies any overt lower extremity weakness or any diminished sensation.     Review of patient's allergies indicates:   Allergen Reactions    Doxycycline Diarrhea     itching    Ezetimibe-simvastatin      Muscle cramps    Rosuvastatin      Muscle cramps    Sertraline Other (See Comments)     jittery    Simvastatin      Muscle cramps    Adhesive Rash     Past Medical History:   Diagnosis Date    Acquired hypothyroidism     Allergy     Arthritis     BPH (benign prostatic hyperplasia)     Cataract     Hyperlipidemia     Hypertension     Sleep apnea     wears CPAP-DOES NOT KNOW SETTING    SOB (shortness of breath)     feels allergy related     Past Surgical History:   Procedure Laterality Date    CATARACT EXTRACTION, BILATERAL  Bilateral     COLONOSCOPY N/A 08/11/2020    Procedure: COLONOSCOPY;  Surgeon: LISBETH Ríos MD;  Location: [facility] ([address] [place] [address]);  Service: Endoscopy;  Laterality: N/A;  covid test 8/8 [place]    dental implant Left     EYE SURGERY      KNEE ARTHROSCOPY Bilateral     uvula removal       Family History   Problem Relation Age of Onset    Diabetes Mother     Hypertension Mother     Arthritis Mother      Social History     Tobacco Use    Smoking status: Never    Smokeless tobacco: Never   Substance Use Topics    Alcohol use: Yes     Alcohol/week: 15.0 standard drinks     Types: 12 Standard drinks or equivalent, 3 Shots of liquor per week     Comment: Rum and coke 2-3 drinks NIGHTLY    Drug use: No     Review of Systems    Physical Exam     Initial Vitals [05/04/23 1539]   BP Pulse Resp Temp SpO2   (!) 140/66 (!) 44 20 99 °F (37.2 °C) 97 %      MAP       --         Physical Exam    Nursing note and vitals reviewed.  Constitutional: He appears well-developed. He is not diaphoretic. He is Obese . No distress.   Well-appearing.  Speaking full sentences.  No acute distress.   HENT:   Head: Normocephalic and atraumatic.   Right Ear: External ear normal.   Left Ear: External ear normal.   Neck: Neck supple.   Cardiovascular:  Normal rate, regular rhythm, normal heart sounds and intact distal pulses.           Pulmonary/Chest: Breath sounds normal. No respiratory distress. He has no wheezes. He has no rhonchi. He has no rales.   Abdominal: Abdomen is soft. He exhibits no distension. There is no abdominal tenderness. There is no rebound and no guarding.   Musculoskeletal:      Cervical back: Neck supple.      Comments: There is midline thoracic spinal tenderness to palpation around T7 with lidocaine patch in place.     Neurological: He is alert and oriented to person, place, and time. GCS score is 15. GCS eye subscore is 4. GCS verbal subscore is 5. GCS motor subscore is 6.   Cranial nerves 2-12 intact.  Intact motor/strength  and sensation to upper and lower extremities bilaterally.   Skin: Skin is warm. Capillary refill takes less than 2 seconds. No rash noted.   Psychiatric: He has a normal mood and affect.       ED Course   Procedures  Labs Reviewed   CBC W/ AUTO DIFFERENTIAL - Abnormal; Notable for the following components:       Result Value    MPV 9.1 (*)     Immature Granulocytes 0.9 (*)     Immature Grans (Abs) 0.09 (*)     Lymph % 16.9 (*)     All other components within normal limits   COMPREHENSIVE METABOLIC PANEL - Abnormal; Notable for the following components:    CO2 30 (*)     Anion Gap 7 (*)     All other components within normal limits    Narrative:     ADD ON ESR [id number]  PER FRANCK CHI MD.  05/04/2023  18:16     add on crp per dr franck chi/order#[id number] @18:13 05/04/2023   CULTURE, BLOOD   CULTURE, BLOOD   HIV 1 / 2 ANTIBODY    Narrative:     Release to patient->Immediate   HEPATITIS C ANTIBODY    Narrative:     Release to patient->Immediate   LACTIC ACID, PLASMA    Narrative:     ADD ON ESR [id number]  PER FRANCK CHI MD.  05/04/2023  18:16     add on crp per dr franck chi/order#[id number] @18:13 05/04/2023   URINALYSIS, REFLEX TO URINE CULTURE    Narrative:     Specimen Source->Urine   SEDIMENTATION RATE   C-REACTIVE PROTEIN   C-REACTIVE PROTEIN    Narrative:     ADD ON ESR [id number]  PER FRANCK CHI MD.  05/04/2023  18:16     add on crp per dr franck chi/order#[id number] @18:13 05/04/2023   SEDIMENTATION RATE    Narrative:     ADD ON ESR [id number]  PER FRANCK CHI MD.  05/04/2023  18:16     add on crp per dr franck chi/order#[id number] @18:13 05/04/2023   PROCALCITONIN     EKG Readings: (Independently Interpreted)   Initial Reading: No STEMI. Previous EKG: Compared with most recent EKG Previous EKG Date: June 1, 2022. Rhythm: Sinus Arrhythmia. Heart Rate: 64. Ectopy: No Ectopy. Conduction: Normal.     Imaging Results               MRI Lumbar Spine W WO Cont (Final result)   Result time 05/05/23 00:10:32      Final result by Sid Gomez MD (05/05/23 00:10:32)                   Impression:      1. Multilevel signal abnormalities at L1-2, L2-L3, L3-L4, and L4-L5, favored to represent degenerative changes  with osteomyelitis/discitis not completely excluded thought to be less likely.  Correlate clinically with follow-up as clinically indicated.  2. No psoas muscle or adjacent soft tissue signal change or enhancement and no epidural abscess.  3. Mild enhancement of the dura extending from the L1-L5 level suggestive of possible infectious or inflammatory process and potentially relating to chronic inflammation.  Follow-up as clinically indicated.  4. Degenerative changes of the facet joints most pronounced at L5-S1 with septic arthritis thought to be less likely but not completely excluded.  Correlate clinically with follow-up as clinically indicated.  5. Additional findings, as above.  This report was flagged in Epic as abnormal.    Electronically signed by resident: Indiana De Leon  Date:    05/04/2023  Time:    22:54    Electronically signed by: Sid Gomez MD  Date:    05/05/2023  Time:    00:10               Narrative:    EXAMINATION:  MRI LUMBAR SPINE W WO CONTRAST    CLINICAL HISTORY:  Low back pain, progressive neurologic deficit;    TECHNIQUE:  Multiplanar, multisequence MR images were acquired from the thoracolumbar junction to the sacrum following the administration of 10 cc Gadavist intravenous contrast.    COMPARISON:  CT thoracic and lumbar spine 05/04/2023.    FINDINGS:  Alignment: Normal.    Vertebrae: Heterogeneous appearance of the marrow throughout the lumbar spine.  There is additionally T1 hypointensity and STIR hyperintensity of the posterior endplate of L1, anterior inferior endplate of L2, inferior endplate of L3, superior endplate of L4 and focal anterior endplate of L4.  T1 hypointense/stir hyperintense focus of the mid vertebral body L5 lesion and L5 spinous  process.  There is enhancement of the inferior endplate of L1, superior endplate of L2, inferior endplate of L2, superior endplate of L3, inferior endplate of L3, and superior endplate of L4 and of the focal anterior endplate of L4 and mid L5 vertebral body lesion.  Degenerative changes of the facet joints most pronounced at L5-S1 with enhancement and STIR hyperintensity at that level.    Discs: Multilevel disc height loss and desiccation.  There is STIR hyperintensity of the L1-L2, L2-L3, and L3-L4 discs.    Cord: The visualized distal cord demonstrates normal signal.  Conus terminates at L1.  There is no clumping of the cauda equina nerve roots.    Degenerative findings:    T12-L1: No spinal canal stenosis or neural foraminal narrowing.    L1-L2:  Circumferential disc bulge and bilateral facet arthropathy.  No spinal canal stenosis or neural foraminal narrowing.    L2-L3: Circumferential disc bulge and bilateral facet arthropathy.  Findings result in mild spinal canal stenosis and mild bilateral neural foraminal narrowing.    L3-L4: Circumferential disc bulge and bilateral facet arthropathy.  Findings result in moderate spinal canal stenosis and moderate bilateral neural foraminal narrowing.    L4-L5: Circumferential disc bulge and bilateral facet arthropathy resulting in moderate spinal canal stenosis and moderate bilateral neural foraminal narrowing.    L5-S1: Circumferential disc bulge and bilateral facet arthropathy.  Findings result in mild spinal canal stenosis and mild bilateral neural foraminal narrowing.    Paraspinal muscles & soft tissues: Epidural lipomatosis most pronounced at L3.  There is thin epidural enhancement most pronounced along the anterior spinal canal extending from L1 to L5.  No organized fluid collection within the epidural space or within the paraspinal soft tissues.  No psoas muscle or adjacent soft tissue signal change or enhancement.  No epidural abscess.                                         CT Thoracic Spine With Contrast (Final result)  Result time 05/04/23 23:36:32   Procedure changed from CT Thoracic Spine W Wo Contrast     Final result by Sid Gomez MD (05/04/23 23:36:32)                   Impression:      1. Inferior T7 and superior T8 endplate irregularity, consistent with area of concern on outside hospital MRI for possible discitis/osteomyelitis.  2. Inferior L2, superior L3, and inferior L4 endplate irregularity, likely representing degenerative change although osteomyelitis/discitis cannot be completely excluded.  3. No organized fluid collection.  4. Degenerative changes of the thoracic spine without high-grade spinal canal stenosis or neural foraminal narrowing.  5. Degenerative changes of the lumbar spine most pronounced at L3-L4 and L4-L5 with moderate spinal canal stenosis and mild-moderate neural foraminal narrowing.  6. Additional findings, as above.  This report was flagged in Epic as abnormal.    Electronically signed by resident: Indiana De Leon  Date:    05/04/2023  Time:    22:02    Electronically signed by: Sid Gomez MD  Date:    05/04/2023  Time:    23:36               Narrative:    EXAMINATION:  CT THORACIC SPINE WITH CONTRAST; CT LUMBAR SPINE WITH CONTRAST    CLINICAL HISTORY:  Osteomyelitis, thoracic;diagnosed discitis of T7-T8;; Osteomyelitis, lumbar;    TECHNIQUE:  Low-dose axial images of the thoracic and lumbar spine were performed following the administration of 100 cc intravenous contrast.  Multiplanar reconstructions were performed.    COMPARISON:  MRI thoracic spine 04/25/2023 and thoracic spine radiograph 09/15/2016.    FINDINGS:  Thoracic Spine:    Alignment: Grade 1 anterolisthesis of C7 on T1.    Vertebrae: Multiple flowing anterior osteophytes consistent with DISH.  Inferior T7 and superior T8 endplate irregularity, consistent with area of concern on outside hospital MRI for possible discitis/osteomyelitis.  No acute fracture.    Discs:  Multilevel disc height loss most pronounced at T7-T8.    Degenerative findings: No high-grade spinal canal stenosis or neural foraminal narrowing.    Lumbar spine:    Alignment: Minimal grade 1 retrolisthesis of L4 on L5.    Vertebrae: Inferior L2, superior L3, and inferior L4 endplate irregularity, likely representing degenerative changes although osteomyelitis/discitis cannot be completely excluded.    Discs: Multilevel disc height loss most pronounced at L2-L3.  Vacuum disc phenomenon at L1-L2, L2-L3, and L3-L4, and L4-L5.    Degenerative findings: Circumferential disc bulge from L1-L2 through L5-S1 and multilevel facet arthropathy.  Findings result in mild L2-L3 moderate L3-L4, moderate L4-L5 and mild L5-S1 spinal canal stenosis.  Additionally there is mild left L1-L2 moderate bilateral L2-L3, moderate bilateral L3-L4 mild bilateral L4-L5 and mild bilateral L5-S1 neural foraminal narrowing.  Epidural fatty proliferation throughout the lumbar spine.  Degenerative changes lower lumbar facet joints.    Upper SI joints/sacrum: Unremarkable.    Soft tissue: No organized fluid collection within the paraspinal muscles.  Few scattered calcific foci within the subcutaneous fat.  Scattered vascular calcifications.  Nonspecific bilateral perinephric fat stranding small hiatal hernia.  Multi-vessel calcific atherosclerosis of the coronary arteries.  Calcification of the aortic valve annulus, mitral valve annulus, and aortic valve leaflets.  Ground-glass attenuation throughout the bilateral lung fields, suggestive of possible infectious or inflammatory process.  Bilateral bandlike opacifications in the lung bases favored to represent scarring or subsegmental atelectasis.                                        CT Lumbar Spine With Contrast (Final result)  Result time 05/04/23 23:36:32   Procedure changed from CT Lumbar Spine W Wo Contrast     Final result by Sid Gomez MD (05/04/23 23:36:32)                    Impression:      1. Inferior T7 and superior T8 endplate irregularity, consistent with area of concern on outside hospital MRI for possible discitis/osteomyelitis.  2. Inferior L2, superior L3, and inferior L4 endplate irregularity, likely representing degenerative change although osteomyelitis/discitis cannot be completely excluded.  3. No organized fluid collection.  4. Degenerative changes of the thoracic spine without high-grade spinal canal stenosis or neural foraminal narrowing.  5. Degenerative changes of the lumbar spine most pronounced at L3-L4 and L4-L5 with moderate spinal canal stenosis and mild-moderate neural foraminal narrowing.  6. Additional findings, as above.  This report was flagged in Epic as abnormal.    Electronically signed by resident: Indiana De Leon  Date:    05/04/2023  Time:    22:02    Electronically signed by: Sid Gomez MD  Date:    05/04/2023  Time:    23:36               Narrative:    EXAMINATION:  CT THORACIC SPINE WITH CONTRAST; CT LUMBAR SPINE WITH CONTRAST    CLINICAL HISTORY:  Osteomyelitis, thoracic;diagnosed discitis of T7-T8;; Osteomyelitis, lumbar;    TECHNIQUE:  Low-dose axial images of the thoracic and lumbar spine were performed following the administration of 100 cc intravenous contrast.  Multiplanar reconstructions were performed.    COMPARISON:  MRI thoracic spine 04/25/2023 and thoracic spine radiograph 09/15/2016.    FINDINGS:  Thoracic Spine:    Alignment: Grade 1 anterolisthesis of C7 on T1.    Vertebrae: Multiple flowing anterior osteophytes consistent with DISH.  Inferior T7 and superior T8 endplate irregularity, consistent with area of concern on outside hospital MRI for possible discitis/osteomyelitis.  No acute fracture.    Discs: Multilevel disc height loss most pronounced at T7-T8.    Degenerative findings: No high-grade spinal canal stenosis or neural foraminal narrowing.    Lumbar spine:    Alignment: Minimal grade 1 retrolisthesis of L4 on  L5.    Vertebrae: Inferior L2, superior L3, and inferior L4 endplate irregularity, likely representing degenerative changes although osteomyelitis/discitis cannot be completely excluded.    Discs: Multilevel disc height loss most pronounced at L2-L3.  Vacuum disc phenomenon at L1-L2, L2-L3, and L3-L4, and L4-L5.    Degenerative findings: Circumferential disc bulge from L1-L2 through L5-S1 and multilevel facet arthropathy.  Findings result in mild L2-L3 moderate L3-L4, moderate L4-L5 and mild L5-S1 spinal canal stenosis.  Additionally there is mild left L1-L2 moderate bilateral L2-L3, moderate bilateral L3-L4 mild bilateral L4-L5 and mild bilateral L5-S1 neural foraminal narrowing.  Epidural fatty proliferation throughout the lumbar spine.  Degenerative changes lower lumbar facet joints.    Upper SI joints/sacrum: Unremarkable.    Soft tissue: No organized fluid collection within the paraspinal muscles.  Few scattered calcific foci within the subcutaneous fat.  Scattered vascular calcifications.  Nonspecific bilateral perinephric fat stranding small hiatal hernia.  Multi-vessel calcific atherosclerosis of the coronary arteries.  Calcification of the aortic valve annulus, mitral valve annulus, and aortic valve leaflets.  Ground-glass attenuation throughout the bilateral lung fields, suggestive of possible infectious or inflammatory process.  Bilateral bandlike opacifications in the lung bases favored to represent scarring or subsegmental atelectasis.                                       Medications   gabapentin capsule 100 mg (has no administration in time range)   levothyroxine tablet 25 mcg (has no administration in time range)   losartan tablet 100 mg (has no administration in time range)   metoprolol succinate (TOPROL-XL) 24 hr tablet 100 mg (has no administration in time range)   pravastatin tablet 20 mg (has no administration in time range)   tiZANidine tablet 2 mg (has no administration in time range)    sertraline tablet 50 mg (has no administration in time range)   sodium chloride 0.9% flush 10 mL (has no administration in time range)   melatonin tablet 6 mg (has no administration in time range)   ondansetron disintegrating tablet 4 mg (has no administration in time range)   ondansetron injection 4 mg (has no administration in time range)   polyethylene glycol packet 17 g (has no administration in time range)   acetaminophen tablet 650 mg (has no administration in time range)   simethicone chewable tablet 80 mg (has no administration in time range)   naloxone 0.4 mg/mL injection 0.02 mg (has no administration in time range)   glucagon (human recombinant) injection 1 mg (has no administration in time range)   dextrose 10% bolus 125 mL 125 mL (has no administration in time range)   dextrose 10% bolus 250 mL 250 mL (has no administration in time range)   enoxaparin injection 40 mg (has no administration in time range)   ketorolac injection 15 mg (has no administration in time range)   vancomycin - pharmacy to dose (has no administration in time range)   cefTRIAXone (ROCEPHIN) 2 g in dextrose 5 % in water (D5W) 5 % 50 mL IVPB (MB+) (has no administration in time range)   vancomycin 1,500 mg in dextrose 5 % (D5W) 250 mL IVPB (Vial-Mate) (1,500 mg Intravenous Trough Due As Scheduled Before Dose 5/6/23 0630)   LIDOcaine 5 % patch 1 patch (has no administration in time range)   vancomycin 2 g in dextrose 5 % 500 mL IVPB (0 mg Intravenous Stopped 5/4/23 2333)   cefTRIAXone (ROCEPHIN) 2 g in dextrose 5 % in water (D5W) 5 % 50 mL IVPB (MB+) (0 g Intravenous Stopped 5/4/23 1852)   HYDROcodone-acetaminophen 5-325 mg per tablet 1 tablet (1 tablet Oral Given 5/4/23 1924)   LORazepam tablet 1 mg (1 mg Oral Given 5/4/23 2001)   losartan tablet 100 mg (100 mg Oral Given 5/4/23 2215)   metoprolol tartrate (LOPRESSOR) tablet 50 mg (50 mg Oral Given 5/4/23 2215)   hydrALAZINE tablet 25 mg (25 mg Oral Given 5/4/23 2215)   iohexoL  (OMNIPAQUE 350) injection 100 mL (100 mLs Intravenous Given 5/4/23 2027)   gadobutroL (GADAVIST) injection 10 mL (10 mLs Intravenous Given 5/4/23 2236)     Medical Decision Making:   History:   I obtained history from: someone other than patient.  Old Medical Records: I decided to obtain old medical records.  Initial Assessment:   Emergent evaluation for possible diskitis.  He is afebrile and hemodynamically stable.  Differential Diagnosis:   Diskitis/osteomyelitis, thoracic fracture, unlikely epidural abscess, doubt compressive cord syndrome  Clinical Tests:   Lab Tests: Ordered and Reviewed  Radiological Study: Ordered and Reviewed  Medical Tests: Ordered and Reviewed  ED Management:  Briefly discussed case with Neurosurgery, who recommended MRI lumbar spine (as we do already have MRI cervical-and thoracic imaging-though without reports) as well as CT T and L-spine.  Empirically given vancomycin and Rocephin.  Repeat imaging here does show changes concerning for diskitis/osteomyelitis. I do not suspect this is the reason patient has been having difficulty walking, given the chronicity of the ataxia-- though may have diskitis following the recent trauma to his back from fall last week.  Discussed case with Hospital Medicine, who will admit patient to their service.                        Clinical Impression:   Final diagnoses:  [M46.44] Discitis of thoracic region (Primary)        ED Disposition Condition    Observation Stable                Jose Cochran MD  Resident  05/05/23 0030

## 2023-05-05 NOTE — HOSPITAL COURSE
5/5 progressive weakness with gait instability and recurrent falls  falls at home. Imaging performed by outside neurosurgeon revealed concern for possible osteomyelitis or discitis. Inflammatory markers negative. Repeat CT imaging in the ED reveals inferior T7 and superior T8 endplate irregularity, consistent with area of concern for possible discitis/osteomyelitis. MRI lumbar spine reveals multilevel signal abnormalities at L1-2, L2-L3, L3-L4, and L4-L5, favored to represent degenerative changes with osteomyelitis/discitis tnot completely excludded. Mild enhancement of the dura extending from the L1-L5 level suggestive of possible infectious or inflammatory process and potentially relating to chronic inflammation. Neurosurgery consulted, started on empiric  IV vancomycin and IV ceftriaxone. ESR,  procalctionin WNL . no signs and symptoms of myelopathy or cord compression.  Back pain likely with BLE radiculopathy  and  possible nerve root compression. IR consulted for biopsy of spine to confirm or rule out osteomyelitis/discitis. IR eval -no imaging evidence of discitis and with normal inflammatory markers IR feels it is very unlikely discitis .  disc biopsy is not warrented per neurosurgery . ceftriaxone and vancomycin discontinued. Neurosurgery plans for outpatient follow up in clinic with scoliosis xrays and lumbar spine flexion extension xrays. patient may require arge scale spinal fusion as outpatient. PT/OT consulted - recs SNF. Patient reluctant to go to SNF but agreed for [facility] after discussion with family   5/6 improved backpain this AM. on tylenol 1000mg q 8h, gabapentin 300mg TID, lidocaine patch and warm compressses. pending [facility] . daughter wants  referral sent to [place], [place]  5/7 SBP in 180s. received losartan this AM. started on Hydralazine 25mg q 8h .c/o right knee pain. no effusion, erythema . X ray knee.   5/8 X ray right knee -No acute fracture or dislocation.   Tricompartmental joint space narrowing with mild marginal osteophytes.  No significant suprapatellar joint effusion.  Trace prepatellar edema, nonspecific.  SBP improved to 160s. c/o 7/10 pain back and Right knee. tramadol PRN.   5/9 dischage to SNF today with Neurosurgery follow up

## 2023-05-05 NOTE — PLAN OF CARE
Problem: Adult Inpatient Plan of Care  Goal: Optimal Comfort and Wellbeing  Outcome: Ongoing, Progressing  Goal: Readiness for Transition of Care  Outcome: Ongoing, Progressing     Problem: Adult Inpatient Plan of Care  Goal: Optimal Comfort and Wellbeing  Outcome: Ongoing, Progressing     Problem: Adult Inpatient Plan of Care  Goal: Readiness for Transition of Care  Outcome: Ongoing, Progressing     Problem: Infection  Goal: Absence of Infection Signs and Symptoms  Outcome: Ongoing, Progressing     Problem: Infection  Goal: Absence of Infection Signs and Symptoms  Outcome: Ongoing, Progressing     Problem: Skin Injury Risk Increased  Goal: Skin Health and Integrity  Outcome: Ongoing, Progressing     Problem: Skin Injury Risk Increased  Goal: Skin Health and Integrity  Outcome: Ongoing, Progressing       POC reviewed with patient and his daughter at the bedside. Verbalization of understanding voiced. Questions and concerns addressed. Precautions maintained. No events noted at present.  Cardiac monitoring ongoing, Vital signs all shift. See flow sheet. PRN Hydralazine given x 1 for elevated B/P, noted to be somewhat effective. PRN Toradol given x 1 for complaints of back pain and Melatonin given per patient request for sleep.  Bed low and locked with call light within reach and bed alarm activated. Patients daughter remains at the beside. POC ongoing.

## 2023-05-05 NOTE — PT/OT/SLP EVAL
"Physical Therapy Co-Evaluation and Co-Treatment    Patient Name:  Julius Baker   MRN:  [id number]    Co-evaluation and co-treatment performed for this visit due to suspected patient need for two skilled therapists to ensure patient and staff safety and to accommodate for patient activity tolerance/pain management   Recommendations:     Discharge Recommendations: nursing facility, skilled   Discharge Equipment Recommendations: none   Barriers to discharge: Increased level of assist    Assessment:     Julius Baker is a 77 y.o. male admitted with a medical diagnosis of Rapidly progressive weakness. He presents with the following impairments/functional limitations: weakness, gait instability, impaired endurance, impaired balance, impaired self care skills, impaired functional mobility, decreased upper extremity function, decreased lower extremity function. Patient tolerated session fairly well however limited by pain and burning sensation to posterior BLEs requiring return to seated. Noted possible decreased safety awareness and insight to deficits increasing risk of falls in addition to pain. Shortly after leaving room initially, PT and OT responded to staff assist alert activated by pt's spouse, patient attempting to stand from chair and confused by chair alarm that was explained to pt and spouse on PT exit from room.    Rehab Prognosis: Good; patient would benefit from acute skilled PT services 4 x/week to address these deficits and reach maximum level of function.  Recent Surgery: * No surgery found *      Plan:     During this hospitalization, patient to be seen 4 x/week to address the identified rehab impairments via gait training, therapeutic activities, therapeutic exercises, neuromuscular re-education and progress toward the following goals:    Plan of Care Expires:  06/05/23    Subjective     Chief Complaint: Back pain with mobility  Patient/Family Comments/Goals: "This thing keeps going " "off"  Pain/Comfort:  Pain Rating 1:  (not rated, reported pain from buttocks down the back of his legs)  Location - Side 1: Bilateral  Location - Orientation 1: posterior  Location 1: leg  Pain Addressed 1: Reposition, Distraction  Pain Rating Post-Intervention 1:  (not rated)    Patients cultural, spiritual, Sabianism conflicts given the current situation: no    Living Environment:  Living Environment: Patient lives with their spouse in a single story house with number of outside stair(s): threshold, tub-shower combo, and walk-in shower with built in shower chair. Patient prefers to take hot baths to relieve back pain.  Prior Level of Function: Prior to admission, patient was modified independent with ADLs using rolling walker for mobility and driving and retired. Reports 3 falls in the last 3 months.  Equipment Used at Home: walker, rolling, cane, straight, shower chair.  DME owned (not currently used): none  Assistance Upon Discharge: significant other    Objective:     Communicated with nursing prior to session. Patient found HOB elevated with telemetry, peripheral IV upon PT entry to room.    General Precautions: Standard, fall  Orthopedic Precautions:N/A    Braces: N/A    Exams:  Cognitive Exam:  Patient is oriented to Person, Place, Time, Situation, follows commands 100% of the time  RLE ROM: WFL  RLE Strength: hip flexion 4/5, knee extension 4+/5, knee flexion 4+/5, and ankle dorsiflexion 4+/5  LLE ROM: WFL  LLE Strength: hip flexion 4+/5, knee extension 4+/5, knee flexion 4/5, and ankle dorsiflexion 4+/5  Sensation: Intact light touch to BLEs  Coordination: WFL speed and accuracy heel to shin     Functional Mobility:  Bed Mobility:    Supine to Sit: stand by assistance  Sit to Supine: stand by assistance  Transfers:    Sit to Stand: contact guard assistance with rolling walker with cues for hand placement  Bed to Chair: contact guard assistance with rolling walker using Step Transfer  Gait: Patient " ambulated 2x10 ft, 4 steps back to bed with hand-held assist and rolling walker and minimum assistance. Patient demonstrates unsteady gait, ambulates outside DODIE of RW, flexed posture, and decreased gracie. Noted increased UE weightbearing on RW. Cuing for safety. All lines remained intact throughout ambulation trial.  Balance:   Static Sitting: Good, able to maintain for 5 minute(s) with supervision  Dynamic Sitting: Fair: Patient accepts minimal challenge, stand by - contact guard assistance  Static Standing: Good, able to maintain for 2 minute(s) with contact guard - minimum assistance. Pt requesting seated rest break after attempting self care in standing due to back pain  Dynamic Standing: Fair: Patient accepts minimal challenge, minimum assistance    Therapeutic Activities and Exercises:  Patient educated on role of acute care PT and PT POC, safety while in hospital including calling nurse for mobility, and call light usage  Patient is clear to stand pivot transfer with RN/PCT, assist x1  Educated about gradual progression of activity and safety with functional mobility once discharged from hospital    AM-PAC 6 CLICK MOBILITY  Total Score:16     Patient left HOB elevated with all lines intact, call button in reach, RN notified, and spouse present.    GOALS:   Multidisciplinary Problems       Physical Therapy Goals          Problem: Physical Therapy    Goal Priority Disciplines Outcome Goal Variances Interventions   Physical Therapy Goal     PT, PT/OT Ongoing, Progressing     Description: Goals to be met by: 2023     Patient will increase functional independence with mobility by performin. Supine to sit with independence  2. Sit to supine with independence  3. Sit to stand transfer with modified independence  4. Gait  x 200 feet with modified independence using LRAD as needed  5. Ascend/Descend 6 inch curb step with modified independence using LRAD as needed  6. Lower extremity exercise program x10  reps per handout, with independence                        History:     Past Medical History:   Diagnosis Date    Acquired hypothyroidism     Allergy     Arthritis     BPH (benign prostatic hyperplasia)     Cataract     Hyperlipidemia     Hypertension     Sleep apnea     wears CPAP-DOES NOT KNOW SETTING    SOB (shortness of breath)     feels allergy related       Past Surgical History:   Procedure Laterality Date    CATARACT EXTRACTION, BILATERAL Bilateral     COLONOSCOPY N/A 08/11/2020    Procedure: COLONOSCOPY;  Surgeon: LISBETH Ríos MD;  Location: [address]);  Service: Endoscopy;  Laterality: N/A;  covid test 8/8 [place]    dental implant Left     EYE SURGERY      KNEE ARTHROSCOPY Bilateral     uvula removal         Time Tracking:     PT Received On: 05/05/23  PT Start Time: 1112     PT Stop Time: 1144  PT Total Time (min): 32 min     Billable Minutes: Evaluation 8 min Gait Training 15 min and Therapeutic Activity 9 min      05/05/2023

## 2023-05-05 NOTE — ASSESSMENT & PLAN NOTE
77 M PMHx HTN, HLD, BPH, currently undergoing workup for PD with neurology clinic, presents to hospital with multiple falls and difficulty walking with back pain and leg pain, consulted to NSGY for thoracic spine enhancement on MRI.      MRI T spine with T7-8 STIR changes and enhancement in disc space and associated endplates. May be related to osteomyelitis discitis, or could be reactive inflammatory changes from unstable level.   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 Multilevel degenerative changes with vacuum discs and sclerotic endplates in lumbar spine    -- Given no infectious signs/symptoms, negative inflammatory markers, there is low likelihood that patient has osteomyelitis/discitis. It is more likely that patient has inflammatory changes from micromotion at the involved segement because of non-fused segment at anterior T7-T8 amongst ankylosed spine  -- Pt's falls seem more likely related to possible PD rather than thoracic myelopathy. Pt has no clinical signs and symptoms of myelopathy or cord compression.   -- Low back pain and BLE radiculopathy may be due to lumbar spine pathology  With possible nerve root compression.  -- MRI L spine w/wo to complete radiographic evaluation of infection in spine, and to evaluate for any lumbar spine compressive lesions.   -- Pending L spine MRI findings, may continue to proceed with IR biopsy of spine to confirm or rule out osteomyelitis/discitis  -- Will continue to follow along

## 2023-05-05 NOTE — NURSING
Report on this patient received this morning from DOTTIE Ortiz.  Patient lying on bed, talking with daughter.  Patient A&O x4, denies pain at this time.  BP found to be 203/90, HR 49.  Hospitalist notified of findings; patient given antihypertensive meds.  Metoprolol held r/t low HR.  Nurse will recheck BP and HR in one hour. Patient and family educated about reason for his NPO status, and possibility of his IR procedure today.  Understanding of teaching aknowledged by patient and family.

## 2023-05-05 NOTE — CONSULTS
Mr. Baker is a 77 M PMHx HTN, HLD, BPH, PD with admission concerns of multiple falls / difficulty walking / back pain. Imaging evaluation revealing   multilevel signal abnormalities at L1-2, L2-L3, L3-L4, and L4-L5 and IR consulted for biopsy to rule out/eval for osteodiscitis.     Review of imaging - findings appears within the degenerative spectrum. Low suspicion for infective osteomyelitis / discitis from imaging marker standpoint, at the background of negative clinical or serum infectious markers.     Recs:  No absolute indications for IR guided spine biopsy for further diagnostic eval. Would defer IR biopsy considering low yield, n not outweighing the risks.         Bharath Norton MD     Neuro Endovascular Surgery Fellow   [facility]      Agree with above. Imaging personally reviewed. There are multilevel degenerative changes of the Lumbar spine. The findings at T7-8 are also likely degenerative. No imaging evidence of discitis.  With normal inflammatory markers, discitis/osteomyelitis is highly unlikely.     Stephen Lawler MD  Department of Radiology

## 2023-05-05 NOTE — NURSING
Len[facility] notified of new order for telesitter placed for patient, was informed that the patient will not be able to be monitored tonight, will have to be placed on the wait list for monitoring to begin 05/05/23. POC ongoing.

## 2023-05-05 NOTE — SUBJECTIVE & OBJECTIVE
Past Medical History:   Diagnosis Date    Acquired hypothyroidism     Allergy     Arthritis     BPH (benign prostatic hyperplasia)     Cataract     Hyperlipidemia     Hypertension     Sleep apnea     wears CPAP-DOES NOT KNOW SETTING    SOB (shortness of breath)     feels allergy related       Past Surgical History:   Procedure Laterality Date    CATARACT EXTRACTION, BILATERAL Bilateral     COLONOSCOPY N/A 08/11/2020    Procedure: COLONOSCOPY;  Surgeon: LISBETH Ríos MD;  Location: [address];  Service: Endoscopy;  Laterality: N/A;  covid test 8/8 [place]    dental implant Left     EYE SURGERY      KNEE ARTHROSCOPY Bilateral     uvula removal         Social History     Socioeconomic History    Marital status:    Tobacco Use    Smoking status: Never    Smokeless tobacco: Never   Substance and Sexual Activity    Alcohol use: Yes     Alcohol/week: 15.0 standard drinks     Types: 12 Standard drinks or equivalent, 3 Shots of liquor per week     Comment: Rum and coke 2-3 drinks NIGHTLY    Drug use: No    Sexual activity: Not Currently     Partners: Female     Social Determinants of Health     Financial Resource Strain: Low Risk     Difficulty of Paying Living Expenses: Not hard at all   Food Insecurity: No Food Insecurity    Worried About Running Out of Food in the Last Year: Never true    Ran Out of Food in the Last Year: Never true   Transportation Needs: No Transportation Needs    Lack of Transportation (Medical): No    Lack of Transportation (Non-Medical): No   Physical Activity: Insufficiently Active    Days of Exercise per Week: 1 day    Minutes of Exercise per Session: 20 min   Stress: No Stress Concern Present    Feeling of Stress : Not at all   Social Connections: Unknown    Frequency of Communication with Friends and Family: Three times a week    Frequency of Social Gatherings with Friends and Family: Once a week    Active Member of Clubs or Organizations: Yes    Attends Club or Organization  Meetings: 1 to 4 times per year    Marital Status:    Housing Stability: Low Risk     Unable to Pay for Housing in the Last Year: No    Number of Places Lived in the Last Year: 1    Unstable Housing in the Last Year: No       Family History   Problem Relation Age of Onset    Diabetes Mother     Hypertension Mother     Arthritis Mother        Review of patient's allergies indicates:   Allergen Reactions    Doxycycline Diarrhea     itching    Ezetimibe-simvastatin      Muscle cramps    Rosuvastatin      Muscle cramps    Sertraline Other (See Comments)     jittery    Simvastatin      Muscle cramps    Adhesive Rash         Medications:  Continuous Infusions:  Scheduled Meds:   hydrALAZINE  25 mg Oral ED 1 Time    losartan  100 mg Oral ED 1 Time    metoprolol tartrate  50 mg Oral ED 1 Time     PRN Meds:     Review of Systems    Review of Systems   Constitutional:  Negative for fatigue and fever.   Respiratory:  Negative for shortness of breath.    Cardiovascular:  Negative for chest pain.   Gastrointestinal:  Negative for nausea and vomiting.   Genitourinary:  Negative for difficulty urinating.   Musculoskeletal: back pain, leg pain  Neurological:  coordination issues with legs    Objective:     Weight: 101.6 kg (224 lb)  Body mass index is 32.14 kg/m².  Vital Signs (Most Recent):  Temp: 98.9 °F (37.2 °C) (05/04/23 1915)  Pulse: (!) 59 (05/04/23 1915)  Resp: 16 (05/04/23 1924)  BP: (!) 197/83 (05/04/23 1915)  SpO2: 98 % (05/04/23 1915)   Vital Signs (24h Range):  Temp:  [98.9 °F (37.2 °C)-99 °F (37.2 °C)] 98.9 °F (37.2 °C)  Pulse:  [44-59] 59  Resp:  [14-22] 16  SpO2:  [95 %-99 %] 98 %  BP: (140-221)/() 197/83     Date 05/04/23 0700 - 05/05/23 0659   Shift 9404-1734 9985-7011 7175-3563 24 Hour Total   INTAKE   IV Piggyback  50  50   Shift Total(mL/kg)  50(0.5)  50(0.5)   OUTPUT   Shift Total(mL/kg)       Weight (kg)  101.6 101.6 101.6                               Physical Exam         Neurosurgery Physical  Exam    Physical Exam:    Constitutional: No distress.     HEENT: atraumatic/normocephalic    Cardiovascular: Regular rhythm.     Pulm: aerating well, saturating well    Abdominal: Soft.     Psych/Behavior: He is alert.     RUE: 5/5 delt, 5/5 bi, 5/5 tri, 5/5 hg, 5/5 io  LUE: 5/5 delt, 5/5 bi, 5/5 tri, 5/5 hg, 5/5 io  RLE: 5/5 hf, 5/5 quad, 5/5 hamstring, 5/5 df, 5/5 pf  LLE: 5/5 hf, 5/5 quad, 5/5 hamstring, 5/5 df, 5/5 pf    SILT    No hoffmans  No clonus  No babinski    BUE cogwheel rigidity    Significant Labs:  Recent Labs   Lab 05/04/23 1748   GLU 98      K 4.2      CO2 30*   BUN 18   CREATININE 0.9   CALCIUM 9.9     Recent Labs   Lab 05/04/23 1748   WBC 10.57   HGB 14.1   HCT 43.2        No results for input(s): LABPT, INR, APTT in the last 48 hours.  Microbiology Results (last 7 days)       Procedure Component Value Units Date/Time    Blood culture #1 **CANNOT BE ORDERED STAT** [[id number]] Collected: 05/04/23 1748    Order Status: Sent Specimen: Blood from Peripheral, Antecubital, Right Updated: 05/04/23 1756    Blood culture #2 **CANNOT BE ORDERED STAT** [[id number]] Collected: 05/04/23 1751    Order Status: Sent Specimen: Blood from Peripheral, Wrist, Left Updated: 05/04/23 1756          All pertinent labs from the last 24 hours have been reviewed.    Significant Diagnostics:  I have reviewed all pertinent imaging results/findings within the past 24 hours.

## 2023-05-05 NOTE — CARE UPDATE
RAPID RESPONSE NURSE ROUND       Rounding completed with charge RNCaridad for bradycardia and hypertension reports NAD. No additional concerns verbalized at this time. Instructed to call [phone] for further concerns or assistance.

## 2023-05-05 NOTE — PLAN OF CARE
[facility] (Hospital)  Initial Discharge Assessment       Primary Care Provider: ANNA Thurston MD    Admission Diagnosis: Bradycardia [R00.1]  Chest pain [R07.9]  Discitis of thoracic region [M46.44]    Admission Date: 5/4/2023  Expected Discharge Date: 5/8/2023    Discharge Barriers Identified: None    Payor: MEDICARE / Plan: MEDICARE PART A & B / Product Type: Government /     Extended Emergency Contact Information  Primary Emergency Contact: Senia Baker   [place]  Mobile Phone: [phone]  Relation: Spouse    Discharge Plan A: Skilled Nursing Facility  Discharge Plan B: Home with family, Home Health      [id number] - [place] - [address] AT [facility] [address]  [address] [place] [address]  Phone: [phone] Fax: [phone]    [facility] - [address] [address]  Phone: [phone] Fax: [phone]    CM met with patient and spouse to obtain discharge planning assessment.  Patient provided with discharge planning booklet.    Initial Assessment (most recent)       Adult Discharge Assessment - 05/05/23 1420          Discharge Assessment    Assessment Type Discharge Planning Assessment     Confirmed/corrected address, phone number and insurance Yes     Confirmed Demographics Correct on Facesheet     Source of Information patient     Communicated CAYLA with patient/caregiver Date not available/Unable to determine     Reason For Admission progressive weakness     People in Home spouse     Do you expect to return to your current living situation? Yes     Do you have help at home or someone to help you manage your care at home? Yes     Who are your caregiver(s) and their phone number(s)? Senia Samuel - spouse [phone]     Prior to hospitilization cognitive status: Alert/Oriented     Current cognitive status: Alert/Oriented     Walking or Climbing  Stairs ambulation difficulty, requires equipment     Mobility Management using a borrowed cane and walekr     Equipment Currently Used at Home walker, rolling;cane, straight     Readmission within 30 days? No     Patient currently being followed by outpatient case management? No     Do you currently have service(s) that help you manage your care at home? No     Do you take prescription medications? Yes     Do you have prescription coverage? Yes     Coverage MEDICARE - MEDICARE PART A & B     Do you have any problems affording any of your prescribed medications? No     Is the patient taking medications as prescribed? yes     Who is going to help you get home at discharge? family     How do you get to doctors appointments? car, drives self;family or friend will provide     Are you on dialysis? No     Do you take coumadin? No     Discharge Plan A Skilled Nursing Facility     Discharge Plan B Home with family;Home Health     DME Needed Upon Discharge  walker, rolling     Discharge Plan discussed with: Spouse/sig other;Patient     Name(s) and Number(s) Senia Baker - spouse [phone]     Discharge Barriers Identified None        Physical Activity    On average, how many days per week do you engage in moderate to strenuous exercise (like a brisk walk)? 0 days     On average, how many minutes do you engage in exercise at this level? 0 min        Financial Resource Strain    How hard is it for you to pay for the very basics like food, housing, medical care, and heating? Not hard at all        Housing Stability    In the last 12 months, was there a time when you were not able to pay the mortgage or rent on time? No     In the last 12 months, was there a time when you did not have a steady place to sleep or slept in a shelter (including now)? No        Transportation Needs    In the past 12 months, has lack of transportation kept you from medical appointments or from getting medications? No     In the past 12 months, has  lack of transportation kept you from meetings, work, or from getting things needed for daily living? No        Food Insecurity    Within the past 12 months, you worried that your food would run out before you got the money to buy more. Never true     Within the past 12 months, the food you bought just didn't last and you didn't have money to get more. Never true        Stress    Do you feel stress - tense, restless, nervous, or anxious, or unable to sleep at night because your mind is troubled all the time - these days? Only a little        Social Connections    In a typical week, how many times do you talk on the phone with family, friends, or neighbors? More than three times a week     How often do you get together with friends or relatives? More than three times a week     How often do you attend Druze or Shinto services? 1 to 4 times per year     Do you belong to any clubs or organizations such as Druze groups, unions, fraternal or athletic groups, or school groups? No     How often do you attend meetings of the clubs or organizations you belong to? Never     Are you , , , , never , or living with a partner?         Alcohol Use    Q1: How often do you have a drink containing alcohol? Never     Q2: How many drinks containing alcohol do you have on a typical day when you are drinking? Patient does not drink     Q3: How often do you have six or more drinks on one occasion? Never        OTHER    Name(s) of People in Home Senia - spouse

## 2023-05-05 NOTE — SUBJECTIVE & OBJECTIVE
Past Medical History:   Diagnosis Date    Acquired hypothyroidism     Allergy     Arthritis     BPH (benign prostatic hyperplasia)     Cataract     Hyperlipidemia     Hypertension     Sleep apnea     wears CPAP-DOES NOT KNOW SETTING    SOB (shortness of breath)     feels allergy related       Past Surgical History:   Procedure Laterality Date    CATARACT EXTRACTION, BILATERAL Bilateral     COLONOSCOPY N/A 08/11/2020    Procedure: COLONOSCOPY;  Surgeon: LISBETH Ríos MD;  Location: [address]);  Service: Endoscopy;  Laterality: N/A;  covid test 8/8 [place]    dental implant Left     EYE SURGERY      KNEE ARTHROSCOPY Bilateral     uvula removal         Review of patient's allergies indicates:   Allergen Reactions    Doxycycline Diarrhea     itching    Ezetimibe-simvastatin      Muscle cramps    Rosuvastatin      Muscle cramps    Sertraline Other (See Comments)     jittery    Simvastatin      Muscle cramps    Adhesive Rash       No current facility-administered medications on file prior to encounter.     Current Outpatient Medications on File Prior to Encounter   Medication Sig    acamprosate (CAMPRAL) 333 mg tablet Take 2 tablets (666 mg total) by mouth 3 (three) times daily.    diclofenac sodium (VOLTAREN) 1 % Gel APPLY TOPICALLY TO THE AFFECTED AREA THREE TIMES DAILY AS NEEDED FOR JOINT PAIN    fluticasone propionate (FLONASE) 50 mcg/actuation nasal spray 1 spray by Each Nostril route daily as needed.    gabapentin (NEURONTIN) 100 MG capsule Take 100 mg by mouth 3 (three) times daily.    levothyroxine (SYNTHROID) 25 MCG tablet TAKE 1 TABLET DAILY (FURTHER REFILLS REQUIRE VISIT WITH DOCTOR)    losartan (COZAAR) 100 MG tablet TAKE 1 TABLET(100 MG) BY MOUTH EVERY DAY    multivitamin capsule Take 1 capsule by mouth once daily.    nebivoloL (BYSTOLIC) 10 MG Tab Take 1 tablet (10 mg total) by mouth once daily.    pravastatin (PRAVACHOL) 20 MG tablet TAKE 1 TABLET EVERY EVENING    sertraline (ZOLOFT) 50 MG  tablet Take 1 tablet (50 mg total) by mouth once daily.    tiZANidine (ZANAFLEX) 2 MG tablet TAKE 1 TABLET(2 MG) BY MOUTH EVERY 8 HOURS AS NEEDED FOR BACK AND LEG PAIN    traMADoL (ULTRAM) 50 mg tablet Take 1 tablet (50 mg total) by mouth every 6 (six) hours as needed for Pain. (Patient not taking: Reported on 4/27/2023)     Family History       Problem Relation (Age of Onset)    Arthritis Mother    Diabetes Mother    Hypertension Mother          Tobacco Use    Smoking status: Never    Smokeless tobacco: Never   Substance and Sexual Activity    Alcohol use: Yes     Alcohol/week: 15.0 standard drinks     Types: 12 Standard drinks or equivalent, 3 Shots of liquor per week     Comment: Rum and coke 2-3 drinks NIGHTLY    Drug use: No    Sexual activity: Not Currently     Partners: Female     Review of Systems   Constitutional:  Negative for chills, fatigue and fever.   HENT:  Positive for dental problem. Negative for congestion, rhinorrhea, sinus pressure, sinus pain, sneezing and sore throat.    Eyes:  Negative for photophobia and visual disturbance.   Respiratory:  Negative for cough, choking, chest tightness and shortness of breath.    Cardiovascular:  Negative for chest pain, palpitations and leg swelling.   Gastrointestinal:  Negative for abdominal pain, constipation, diarrhea, nausea and vomiting.   Endocrine: Negative for polyphagia and polyuria.   Genitourinary:  Negative for difficulty urinating, dysuria, hematuria and urgency.   Musculoskeletal:  Positive for back pain and gait problem. Negative for neck pain and neck stiffness.   Skin:  Negative for rash and wound.   Neurological:  Positive for weakness and numbness. Negative for dizziness, tremors and syncope.   Psychiatric/Behavioral:  Negative for agitation, behavioral problems and confusion.    Objective:     Vital Signs (Most Recent):  Temp: 98.9 °F (37.2 °C) (05/04/23 1915)  Pulse: 67 (05/04/23 2230)  Resp: 20 (05/04/23 2230)  BP: (!) 169/78 (05/04/23  2230)  SpO2: 96 % (05/04/23 2230)   Vital Signs (24h Range):  Temp:  [98.9 °F (37.2 °C)-99 °F (37.2 °C)] 98.9 °F (37.2 °C)  Pulse:  [44-67] 67  Resp:  [14-22] 20  SpO2:  [95 %-99 %] 96 %  BP: (140-221)/() 169/78     Weight: 101.6 kg (224 lb)  Body mass index is 32.14 kg/m².     Physical Exam  Vitals reviewed. Exam conducted with a chaperone present.   Constitutional:       General: He is not in acute distress.     Appearance: Normal appearance. He is normal weight. He is not ill-appearing, toxic-appearing or diaphoretic.      Comments: Pleasant man in no acute distress. Cooperative with examination and conversant with interview.   HENT:      Head: Normocephalic and atraumatic.      Right Ear: External ear normal.      Left Ear: External ear normal.      Nose: Nose normal.      Mouth/Throat:      Mouth: Mucous membranes are moist.   Eyes:      General:         Right eye: No discharge.         Left eye: No discharge.      Extraocular Movements: Extraocular movements intact.   Cardiovascular:      Rate and Rhythm: Regular rhythm. Bradycardia present.      Pulses: Normal pulses.      Heart sounds: No murmur heard.    No friction rub. No gallop.   Pulmonary:      Effort: Pulmonary effort is normal. No respiratory distress.      Breath sounds: Normal breath sounds. No stridor. No wheezing, rhonchi or rales.   Abdominal:      Palpations: Abdomen is soft. There is no mass.      Tenderness: There is no abdominal tenderness. There is no guarding or rebound.      Hernia: No hernia is present.   Musculoskeletal:      Cervical back: No signs of trauma or tenderness.      Thoracic back: No signs of trauma or tenderness.      Lumbar back: No signs of trauma or tenderness.      Right lower leg: No edema.      Left lower leg: No edema.   Skin:     General: Skin is warm and dry.   Neurological:      General: No focal deficit present.      Mental Status: He is alert and oriented to person, place, and time. Mental status is at  baseline.      Sensory: No sensory deficit.      Motor: Weakness present.      Gait: Gait abnormal.   Psychiatric:         Mood and Affect: Mood normal.         Behavior: Behavior normal.         Thought Content: Thought content normal.         Judgment: Judgment normal.              Significant Labs: All pertinent labs within the past 24 hours have been reviewed.  CBC:   Recent Labs   Lab 05/04/23  1748   WBC 10.57   HGB 14.1   HCT 43.2        CMP:   Recent Labs   Lab 05/04/23  1748      K 4.2      CO2 30*   GLU 98   BUN 18   CREATININE 0.9   CALCIUM 9.9   PROT 6.9   ALBUMIN 4.0   BILITOT 1.0   ALKPHOS 70   AST 21   ALT 21   ANIONGAP 7*       Significant Imaging: I have reviewed all pertinent imaging results/findings within the past 24 hours.

## 2023-05-05 NOTE — HPI
77 M PMHx HTN, HLD, BPH, currently undergoing workup for PD with neurology clinic, presents to hospital with multiple falls and difficulty walking with back pain and leg pain, consulted to NSGY for thoracic spine enhancement on MRI. Pt reports progressively worsening back pain and leg pain over the past sever months. The leg pain is sharp, shooting pain down the backs of his legs and wrapping to the front of the ankle. He has also had worsening coordination with his legs while walking over the past few months which has led to multiple falls. He denies focal weakness, upper extremity symptoms, bowel/bladder changes, saddle anesthesia, or numbness. He is being seen in neurology clinic for w/u of parkinson's disease, and he has been seen by Dr. Otoole at [facility] for possible thoracic osteomyelitis

## 2023-05-05 NOTE — PLAN OF CARE
[facility]     Department of Hospital Medicine     [address] [place] [address]     [phone] after hours  [phone] fax       NURSING HOME ORDERS    Patient Name: Julius Baker  YOB: [birth year]    Admit to Nursing Home:    Skilled Bed                                                Diagnoses:  Active Hospital Problems    Diagnosis  POA    *Progressive weakness [R53.1]  Unknown    Anxiety [F41.9]  Yes    Acquired hypothyroidism [E03.9]  Yes     Chronic    Essential hypertension [I10]  Yes     Chronic    Hyperlipidemia [E78.5]  Yes     Chronic      Resolved Hospital Problems    Diagnosis Date Resolved POA    Back pain [M54.9] 05/05/2023 Unknown       Patient is homebound due to:  Rapidly progressive weakness    Allergies:  Review of patient's allergies indicates:   Allergen Reactions    Doxycycline Diarrhea     itching    Ezetimibe-simvastatin      Muscle cramps    Rosuvastatin      Muscle cramps    Sertraline Other (See Comments)     jittery    Simvastatin      Muscle cramps    Adhesive Rash       Vitals:        Every shift (Skilled Nursing patients)    Diet: cardiac diet                    Acitivities:     - Up in a chair each morning as tolerated  - Ambulate with assistance to bathroom  - Scheduled walks once each shift (every 8 hours)  - May ambulate independently  - May use walker, cane, or self-propelled wheelchair       - Weight bearing: as tolerated      LABS:  Per facility protocol       CMP, CBC each month for 3 months   PT-INR each week for 1 month then monthly   Pre-albumin each month for 3 months   Digoxin level in 1 month and every 6 months   TSH every year   Dilantin level in 1 month and every 3 months   Tegretol level in 1 month and every 3 months    Phenobarbital level in 1 month and every 3 months  Nursing Precautions:     - Aspiration precautions:             - Total assistance with meals            -   Upright 90 degrees befor during and after meals             -  Suction at bedside          - Fall precautions per nursing home protocol   - Decubitus precautions:        -  for positioning   - Pressure reducing foam mattress   - Turn patient every two hours. Use wedge pillows to anchor patient    CONSULTS:       Physical Therapy to evaluate and treat     Occupational Therapy to evaluate and treat          MISCELLANEOUS CARE:       Routine Skin for Bedridden Patients:  Apply moisture barrier cream to all    skin folds and wet areas in perineal area daily and after baths and                           all bowel movements.                       DIABETES CARE:    NA    Medications: Discontinue all previous medication orders, if any. See new list below.        Medication List        Start taking these medications      acetaminophen 500 MG tablet  Commonly known as: TYLENOL  Take 2 tablets (1,000 mg total) by mouth every 8 (eight) hours.     LIDOcaine 5 %  Commonly known as: LIDODERM  Place 1 patch onto the skin once daily. Remove & Discard patch within 12 hours or as directed by MD            Change how you take these medications      gabapentin 300 MG capsule  Commonly known as: NEURONTIN  Take 1 capsule (300 mg total) by mouth 3 (three) times daily.  What changed:   medication strength  how much to take            Continue taking these medications      acamprosate 333 mg tablet  Commonly known as: CAMPRAL  Take 2 tablets (666 mg total) by mouth 3 (three) times daily.     diclofenac sodium 1 % Gel  Commonly known as: VOLTAREN  APPLY TOPICALLY TO THE AFFECTED AREA THREE TIMES DAILY AS NEEDED FOR JOINT PAIN     fluticasone propionate 50 mcg/actuation nasal spray  Commonly known as: FLONASE  1 spray by Each Nostril route daily as needed.     levothyroxine 25 MCG tablet  Commonly known as: SYNTHROID  TAKE 1 TABLET DAILY (FURTHER REFILLS REQUIRE VISIT WITH DOCTOR)     losartan 100 MG tablet  Commonly known as:  COZAAR  TAKE 1 TABLET(100 MG) BY MOUTH EVERY DAY     multivitamin capsule  Take 1 capsule by mouth once daily.     nebivoloL 10 MG Tab  Commonly known as: BYSTOLIC  Take 1 tablet (10 mg total) by mouth once daily.     pravastatin 20 MG tablet  Commonly known as: PRAVACHOL  TAKE 1 TABLET EVERY EVENING     sertraline 50 MG tablet  Commonly known as: ZOLOFT  Take 1 tablet (50 mg total) by mouth once daily.     tiZANidine 2 MG tablet  Commonly known as: ZANAFLEX  TAKE 1 TABLET(2 MG) BY MOUTH EVERY 8 HOURS AS NEEDED FOR BACK AND LEG PAIN            Stop taking these medications      traMADoL 50 mg tablet  Commonly known as: ULTRAM                     _________________________________  Zi Rowell MD  05/05/2023

## 2023-05-05 NOTE — ASSESSMENT & PLAN NOTE
The patient has notably progressive weakness with gait instability that has caused recurrent falls while at home. Imaging performed by outside neurosurgeon revealed concern for possible osteomyelitis or discitis. Inflammatory markers negative. Repeat CT imaging in the ED reveals inferior T7 and superior T8 endplate irregularity, consistent with area of concern for possible discitis/osteomyelitis. MRI lumbar spine reveals multilevel signal abnormalities at L1-2, L2-L3, L3-L4, and L4-L5, favored to represent degenerative changes with osteomyelitis/discitis thought to be less likely.   - Neurosurgery consulted, appreciate additional recommendations   - Empiric treatment for osteomyelitis with IV vancomycin and IV ceftriaxone  - f/u blood cultures  - PT/OT consulted, appreciate recommendations

## 2023-05-05 NOTE — CARE UPDATE
Complete imaging reviewed.   Pt has multilevel degenerative disease of lumbar spine with endplate changes and facet arthropathy with disc degeneration and foraminal stenosis worse at L4-5 foramen. Back pain is mostly likely from lumbar degenerative changes and bilateral radiculopathy likley from L4-5 nerve root compression.     Given prevalence of degenerative changes and negative infectious markers, it is likely that thoracic lesion is degenerative in nature and not infectious.     We do not think disc biopsy is warrented in this case as discitis is now very low on the differential.     We will plan on outpatient follow up in clinic with scoliosis xrays and lumbar spine flexion extension xrays as patient may require large scale spinal fusion surgery on non urgent basis.     NSGY will sign off at this time    Philip Cooper  NSGY PGY2

## 2023-05-05 NOTE — PLAN OF CARE
Problem: Occupational Therapy  Goal: Occupational Therapy Goal  Description: Goals to be met by: 6/5/23 (1 month)     Patient will increase functional independence with ADLs by performing:    UE Dressing with Supervision.  LE Dressing with Supervision.  Grooming while standing at sink with Supervision.  Toileting from toilet with Supervision for hygiene and clothing management.   Rolling to Bilateral with [place].   Supine to sit with [place].  Step transfer with Supervision  Toilet transfer to toilet with Supervision.    Evaluated pt and established OT POC. Continue OT as tolerated.  Janny Fairchild OT  5/5/2023    Outcome: Ongoing, Progressing

## 2023-05-05 NOTE — PROGRESS NOTES
Pharmacokinetic Assessment Follow Up: IV Vancomycin    Vancomycin serum concentration assessment(s):    -Vancomycin was initiated for suspected bone/joint.  -Goal 15-20 mcg/mL.  -Renal function stable.     Vancomycin Regimen Plan:    -S/p vancomycin 2000 mg and 1500 mg doses (~ 12 hr interval).  -Will start 1250 mg IV Q12H (age, est. AUC:JOSEFINA 5551 mcg*hr/mL).  -Level on 5/7 @ 0800.    Drug levels (last 3 results):  No results for input(s): VANCOMYCINRA, VANCORANDOM, VANCOMYCINPE, VANCOPEAK, VANCOMYCINTR, VANCOTROUGH in the last 72 hours.    Pharmacy will continue to follow and monitor vancomycin.    Please contact pharmacy at extension [phone] for questions regarding this assessment.    Thank you for the consult,   Sid Ye       Patient brief summary:  Julius Baker is a 77 y.o. male initiated on antimicrobial therapy with IV Vancomycin for treatment of bone/joint infection    Drug Allergies:   Review of patient's allergies indicates:   Allergen Reactions    Doxycycline Diarrhea     itching    Ezetimibe-simvastatin      Muscle cramps    Rosuvastatin      Muscle cramps    Sertraline Other (See Comments)     jittery    Simvastatin      Muscle cramps    Adhesive Rash       Actual Body Weight:   101.6 kg    Renal Function:   Estimated Creatinine Clearance: 82.1 mL/min (based on SCr of 0.9 mg/dL).,     Dialysis Method (if applicable):  N/A    CBC (last 72 hours):  Recent Labs   Lab Result Units 05/04/23 1748 05/05/23  0210   WBC K/uL 10.57 10.12   Hemoglobin g/dL 14.1 14.2   Hematocrit % 43.2 44.3   Platelets K/uL 342 318   Gran % % 70.7  --    Lymph % % 16.9*  --    Mono % % 9.3  --    Eosinophil % % 1.8  --    Basophil % % 0.4  --    Differential Method  Automated  --        Metabolic Panel (last 72 hours):  Recent Labs   Lab Result Units 05/04/23 1748 05/04/23  1756 05/05/23  0210   Sodium mmol/L 139  --  140   Potassium mmol/L 4.2  --  4.0   Chloride mmol/L 102  --  102   CO2 mmol/L 30*  --  27   Glucose  mg/dL 98  --  90   Glucose, UA   --  Negative  --    BUN mg/dL 18  --  14   Creatinine mg/dL 0.9  --  0.9   Albumin g/dL 4.0  --  3.7   Total Bilirubin mg/dL 1.0  --  0.8   Alkaline Phosphatase U/L 70  --  70   AST U/L 21  --  20   ALT U/L 21  --  22   Magnesium mg/dL  --   --  1.7       Vancomycin Administrations:  vancomycin given in the last 96 hours                     vancomycin 1,500 mg in dextrose 5 % (D5W) 250 mL IVPB (Vial-Mate) (mg) 1,500 mg New Bag 05/05/23 0823    vancomycin 2 g in dextrose 5 % 500 mL IVPB (mg) 2,000 mg New Bag 05/04/23 1924                    Microbiologic Results:  Microbiology Results (last 7 days)       Procedure Component Value Units Date/Time    Blood culture #2 **CANNOT BE ORDERED STAT** [[id number]] Collected: 05/04/23 1751    Order Status: Completed Specimen: Blood from Peripheral, Wrist, Left Updated: 05/05/23 0315     Blood Culture, Routine No Growth to date    Blood culture #1 **CANNOT BE ORDERED STAT** [[id number]] Collected: 05/04/23 1748    Order Status: Completed Specimen: Blood from Peripheral, Antecubital, Right Updated: 05/05/23 0315     Blood Culture, Routine No Growth to date

## 2023-05-05 NOTE — PROGRESS NOTES
Pharmacokinetic Initial Assessment: IV Vancomycin    Assessment/Plan:    Initiate intravenous vancomycin with loading dose of 2000 mg once, done in ED, followed by a maintenance dose of vancomycin 1500 mg IV every 12 hours.  Desired empiric serum trough concentration is 10 to 20 mcg/mL.  Draw vancomycin trough level 60 min prior to fourth dose on 05/06/2023 at 0630.  Pharmacy will continue to follow and monitor vancomycin.      Please contact pharmacy at extension [phone] with any questions regarding this assessment.     Thank you for the consult,   Kelsie Ardon       Patient brief summary:  Julius Baker is a 77 y.o. male initiated on antimicrobial therapy with IV Vancomycin for treatment of suspected skin & soft tissue infection.    Drug Allergies:   Review of patient's allergies indicates:   Allergen Reactions    Doxycycline Diarrhea     itching    Ezetimibe-simvastatin      Muscle cramps    Rosuvastatin      Muscle cramps    Sertraline Other (See Comments)     jittery    Simvastatin      Muscle cramps    Adhesive Rash       Actual Body Weight:   101.6 kg    Renal Function:   Estimated Creatinine Clearance: 82.1 mL/min (based on SCr of 0.9 mg/dL).    CBC (last 72 hours):  Recent Labs   Lab Result Units 05/04/23  1748   WBC K/uL 10.57   Hemoglobin g/dL 14.1   Hematocrit % 43.2   Platelets K/uL 342   Gran % % 70.7   Lymph % % 16.9*   Mono % % 9.3   Eosinophil % % 1.8   Basophil % % 0.4   Differential Method  Automated       Metabolic Panel (last 72 hours):  Recent Labs   Lab Result Units 05/04/23  1748 05/04/23  1756   Sodium mmol/L 139  --    Potassium mmol/L 4.2  --    Chloride mmol/L 102  --    CO2 mmol/L 30*  --    Glucose mg/dL 98  --    Glucose, UA   --  Negative   BUN mg/dL 18  --    Creatinine mg/dL 0.9  --    Albumin g/dL 4.0  --    Total Bilirubin mg/dL 1.0  --    Alkaline Phosphatase U/L 70  --    AST U/L 21  --    ALT U/L 21  --        Drug levels (last 3 results):  No results for input(s):  VANCOMYCINRA, VANCORANDOM, VANCOMYCINPE, VANCOPEAK, VANCOMYCINTR, VANCOTROUGH in the last 72 hours.    Microbiologic Results:  Microbiology Results (last 7 days)       Procedure Component Value Units Date/Time    Blood culture #1 **CANNOT BE ORDERED STAT** [[id number]] Collected: 05/04/23 1748    Order Status: Sent Specimen: Blood from Peripheral, Antecubital, Right Updated: 05/04/23 1756    Blood culture #2 **CANNOT BE ORDERED STAT** [[id number]] Collected: 05/04/23 1751    Order Status: Sent Specimen: Blood from Peripheral, Wrist, Left Updated: 05/04/23 1756

## 2023-05-05 NOTE — PROGRESS NOTES
[facility] - Neurosurgery ([facility]  Progress Note    Patient Name: Julius Baker  MRN: [id number]  Patient Class: IP- Inpatient   Admission Date: 5/4/2023  Length of Stay: 0 days  Attending Physician: Zi Rowell MD  Primary Care Provider: ANNA Thurston MD        Subjective:     Principal Problem:Rapidly progressive weakness        HPI:  Julius Baker is a 77-year-old man with a past medical history of HTN, HLD, history of alcohol use disorder, and BPH who presents to the emergency department with progressive weakness and gait instability who presents to the emergency department. The patient is accompanied by his daughter who assists in providing the history. Per report, the patient has been suffering from a progressive weakness with gait instability and several falls at home. Per family report, he has a shuffling gait. The patient says that approximately six months ago he was walking with a cane and given the progression of his weakness he now ambulates with a walker. The patient had been following with a neurosurgeon, Dr. Otoole ([facility]) for back pain. The MRI ordered by Dr. Otoole possibly showed osteomyelitis but he is not currently being treated at this time. He did, however, see his allergist who prescribed a shot of corticosteroids and a ten day course of antibiotics. The patient said it has been difficult for him to get back into clinic to see Dr. Otoole given his clinic is so busy. The patient was seen in the Neurology clinic given concern that gait instability may be secondary to Parkinson's disease. However, the provider was concerned about the MRI results and encouraged the patient to present to the emergency department for further evaluation. The patient complains of some mild back pain which he treats with pain relief patches and warm compresses. He denies any recent fever, chills or rigors. Of note, the patient and his daughter do bring up that the patient  "had a tooth extraction for an infected tooth approximately six months ago. He was reportedly placed on oral antibiotics for an "extended" period of time by his dentist. He had a tooth implant placed which subsequently fell out for unknown reasons which could not be explained by his dentist. The patient thinks he was having fevers and chills around this time. The patient denies any saddle anesthesia, bladder or bowel incontinence.    In the emergency department, the patient was noted to be bradycardic with a heart rate at 44. Other vital signs stable upon arrival. Labs were largely unremarkable. ESR and CRP within normal limits. The patient had CT of his lumbar and thoracic spine revealing inferior T7 and superior T8 endplate irregularity, consistent with area of concern on outside hospital MRI for possible discitis/osteomyelitis and inferior L2, superior L3, and inferior L4 endplate irregularity, likely representing degenerative change although osteomyelitis/discitis cannot be completely excluded. MRI lumbar spine revealing multilevel signal abnormalities at L1-2, L2-L3, L3-L4, and L4-L5, favored to represent degenerative changes with osteomyelitis/discitis not completely excluded thought to be less likely. The patient was given IV vancomycin in addition to his home blood pressure medications. He was admitted to Hospital Medicine for further management.         Overview/Hospital Course:  5/5 progressive weakness with gait instability and recurrent falls  falls at home. Imaging performed by outside neurosurgeon revealed concern for possible osteomyelitis or discitis. Inflammatory markers negative. Repeat CT imaging in the ED reveals inferior T7 and superior T8 endplate irregularity, consistent with area of concern for possible discitis/osteomyelitis. MRI lumbar spine reveals multilevel signal abnormalities at L1-2, L2-L3, L3-L4, and L4-L5, favored to represent degenerative changes with osteomyelitis/discitis tnot " completely excludded. Mild enhancement of the dura extending from the L1-L5 level suggestive of possible infectious or inflammatory process and potentially relating to chronic inflammation. Neurosurgery consulted, started on empiric  IV vancomycin and IV ceftriaxone. ESR,  procalctionin WNL . no signs and symptoms of myelopathy or cord compression.  Back pain likely with BLE radiculopathy  and  possible nerve root compression. IR consulted for biopsy of spine to confirm or rule out osteomyelitis/discitis. IR eval -no imaging evidence of discitis and with normal inflammatory markers IR feels it is very unlikely discitis .  disc biopsy is not warrented per neurosurgery . ceftriaxone and vancomycin discontinued. Neurosurgery plans for outpatient follow up in clinic with scoliosis xrays and lumbar spine flexion extension xrays. patient may require arge scale spinal fusion as outpatient. PT/OT consulted - recs SNF. Patient reluctant to go to SNF but agreed for [facility] after discussion with family                Review of Systems:   Pain scale:    Constitutional:  fever,  chills, headache, vision loss, hearing loss, weight loss, Generalized weakness, falls, loss of smell, loss of taste, poor appetite,  sore throat, falls   Respiratory: cough, shortness of breath.   Cardiovascular: chest pain, dizziness, palpitations, orthopnea, swelling of feet, syncope  Gastrointestinal: nausea, vomiting, abdominal pain, diarrhea, black stool,  blood in stool, change in bowel habits  Genitourinary: hematuria, dysuria, urgency, frequency  Integument/Breast: rash,  pruritis  Hematologic/Lymphatic: easy bruising, lymphadenopathy  Musculoskeletal: arthralgias , myalgias, back pain, neck pain, knee pain, gait problem  Neurological: confusion, seizures, tremors, slurred speech, ,weakness, numbness  Behavioral/Psych:  depression, anxiety, auditory or visual hallucinations     OBJECTIVE:     Physical Exam:  Body mass index is 32.14  kg/m².    Constitutional: Appears well-developed and well-nourished. obesity   Head: Normocephalic and atraumatic.   Neck: Normal range of motion. Neck supple.   Cardiovascular: Normal heart rate.  Regular heart rhythm.  Pulmonary/Chest: Effort normal.   Abdominal: No distension.  No tenderness  Musculoskeletal: Normal range of motion. No edema.   Neurological: Alert and oriented to person, place, and time. able to move bilateral upper and lower extremities without limitation  Skin: Skin is warm and dry.   Psychiatric: Normal mood and affect. Behavior is normal.                  Vital Signs  Temp: 97.2 °F (36.2 °C) (05/05/23 1044)  Pulse: (Abnormal) 56 (05/05/23 1534)  Resp: 18 (05/05/23 1044)  BP: (Abnormal) 140/87 (05/05/23 1044)  SpO2: (Abnormal) 94 % (05/05/23 1044)     24 Hour VS Range    Temp:  [97.2 °F (36.2 °C)-98.9 °F (37.2 °C)]   Pulse:  [49-67]   Resp:  [14-22]   BP: (140-221)/()   SpO2:  [94 %-99 %]     Intake/Output Summary (Last 24 hours) at 5/5/2023 1652  Last data filed at 5/5/2023 0745  Gross per 24 hour   Intake 50 ml   Output 800 ml   Net -750 ml         I/O This Shift:  I/O this shift:  In: -   Out: 300 [Urine:300]    Wt Readings from Last 3 Encounters:   05/04/23 101.6 kg (224 lb)   05/03/23 101.6 kg (224 lb)   04/27/23 104 kg (229 lb 4.5 oz)       I have personally reviewed the vitals and recorded Intake/Output     Laboratory/Diagnostic Data:    CBC/Anemia Labs: Coags:    Recent Labs   Lab 05/04/23 1748 05/05/23  0210   WBC 10.57 10.12   HGB 14.1 14.2   HCT 43.2 44.3    318   MCV 91 91   RDW 13.0 13.2    No results for input(s): PT, INR, APTT in the last 168 hours.     Chemistries: ABG:   Recent Labs   Lab 05/04/23 1748 05/05/23  0210    140   K 4.2 4.0    102   CO2 30* 27   BUN 18 14   CREATININE 0.9 0.9   CALCIUM 9.9 9.5   PROT 6.9 6.5   BILITOT 1.0 0.8   ALKPHOS 70 70   ALT 21 22   AST 21 20   MG  --  1.7    No results for input(s): PH, PCO2, PO2, HCO3,  POCSATURATED, BE in the last 168 hours.     POCT Glucose: HbA1c:    No results for input(s): POCTGLUCOSE in the last 168 hours. Hemoglobin A1C   Date Value Ref Range Status   11/10/2021 5.5 4.0 - 5.6 % Final     Comment:     ADA Screening Guidelines:  5.7-6.4%  Consistent with prediabetes  >or=6.5%  Consistent with diabetes    High levels of fetal hemoglobin interfere with the HbA1C  assay. Heterozygous hemoglobin variants (HbS, HgC, etc)do  not significantly interfere with this assay.   However, presence of multiple variants may affect accuracy.     10/28/2019 5.4 4.0 - 5.6 % Final     Comment:     ADA Screening Guidelines:  5.7-6.4%  Consistent with prediabetes  >or=6.5%  Consistent with diabetes  High levels of fetal hemoglobin interfere with the HbA1C  assay. Heterozygous hemoglobin variants (HbS, HgC, etc)do  not significantly interfere with this assay.   However, presence of multiple variants may affect accuracy.     10/15/2018 5.3 4.0 - 5.6 % Final     Comment:     ADA Screening Guidelines:  5.7-6.4%  Consistent with prediabetes  >or=6.5%  Consistent with diabetes  High levels of fetal hemoglobin interfere with the HbA1C  assay. Heterozygous hemoglobin variants (HbS, HgC, etc)do  not significantly interfere with this assay.   However, presence of multiple variants may affect accuracy.          Cardiac Enzymes: Ejection Fractions:    No results for input(s): CPK, CPKMB, MB, TROPONINI in the last 72 hours. No results found for: EF       Recent Labs   Lab 05/04/23  1756   COLORU Yellow   APPEARANCEUA Clear   PHUR 6.0   SPECGRAV 1.010   PROTEINUA Negative   GLUCUA Negative   KETONESU Negative   BILIRUBINUA Negative   OCCULTUA Negative   NITRITE Negative   LEUKOCYTESUR Negative       Procalcitonin (ng/mL)   Date Value   05/05/2023 0.03     Lactate (Lactic Acid) (mmol/L)   Date Value   05/04/2023 1.0     BNP (pg/mL)   Date Value   04/15/2017 27     CRP (mg/L)   Date Value   05/04/2023 1.1   02/03/2020 3.0     Sed  Rate (mm/Hr)   Date Value   05/04/2023 22   02/03/2020 23     No results found for: DDIMER  No results found for: FERRITIN  No results found for: LDH  Troponin I (ng/mL)   Date Value   04/15/2017 <0.006     No results found for this or any previous visit.  SARS-CoV2 (COVID-19) Qualitative PCR (no units)   Date Value   08/08/2020 Not Detected       Microbiology labs for the last week  Microbiology Results (last 7 days)       Procedure Component Value Units Date/Time    Blood culture #2 **CANNOT BE ORDERED STAT** [[id number]] Collected: 05/04/23 1751    Order Status: Completed Specimen: Blood from Peripheral, Wrist, Left Updated: 05/05/23 0315     Blood Culture, Routine No Growth to date    Blood culture #1 **CANNOT BE ORDERED STAT** [[id number]] Collected: 05/04/23 1748    Order Status: Completed Specimen: Blood from Peripheral, Antecubital, Right Updated: 05/05/23 0315     Blood Culture, Routine No Growth to date            Reviewed and noted in plan where applicable- Please see chart for full lab data.    Lines/Drains:       Peripheral IV - Single Lumen 05/04/23 1757 20 G Right Antecubital (Active)   Site Assessment Clean;Dry;Intact;No redness;No swelling 05/05/23 0052   Extremity Assessment Distal to IV No abnormal discoloration 05/05/23 0052   Line Status Flushed;Saline locked 05/05/23 0052   Dressing Status Clean;Dry;Intact 05/05/23 0052   Dressing Intervention Integrity maintained 05/05/23 0052   Dressing Change Due 05/07/23 05/05/23 0052   Site Change Due 05/07/23 05/05/23 0052   Reason Not Rotated Not due 05/05/23 0052   Number of days: 0            Peripheral IV - Single Lumen 05/04/23 1757 Anterior;Distal;Right Forearm (Active)   Site Assessment Clean;Dry;Intact;No redness;No swelling 05/05/23 0052   Extremity Assessment Distal to IV No abnormal discoloration 05/05/23 0052   Line Status Flushed;Saline locked 05/05/23 0052   Dressing Status Clean;Dry;Intact 05/05/23 0052   Dressing Intervention Integrity  maintained 05/05/23 0052   Dressing Change Due 05/07/23 05/05/23 0052   Site Change Due 05/07/23 05/05/23 0052   Reason Not Rotated Not due 05/05/23 0052   Number of days: 0       Imaging  ECG Results              EKG 12-lead (Edited Result - FINAL)  Result time 05/05/23 14:26:07      Edited Result - FINAL by Interface, Lab In [facility] (05/05/23 14:26:07)               Narrative:    Test Reason : R00.1,    Vent. Rate : 064 BPM     Atrial Rate : 064 BPM     P-R Int : 152 ms          QRS Dur : 090 ms      QT Int : 398 ms       P-R-T Axes : 032 033 036 degrees     QTc Int : 410 ms    Sinus rhythm with marked sinus arrythmia and PACs  Otherwise normal ECG  When compared with ECG of 01-JUN-2022 11:54,  QT has shortened  Reconfirmed by Ruiz YAN MD ([id number]) on 5/5/2023 2:25:54 PM    Referred By: [email or web address]   SELF           Confirmed By:Ruiz YAN MD                                  No results found for this or any previous visit.      MRI Lumbar Spine W WO Cont  Narrative: EXAMINATION:  MRI LUMBAR SPINE W WO CONTRAST    CLINICAL HISTORY:  Low back pain, progressive neurologic deficit;    TECHNIQUE:  Multiplanar, multisequence MR images were acquired from the thoracolumbar junction to the sacrum following the administration of 10 cc Gadavist intravenous contrast.    COMPARISON:  CT thoracic and lumbar spine 05/04/2023.    FINDINGS:  Alignment: Normal.    Vertebrae: Heterogeneous appearance of the marrow throughout the lumbar spine.  There is additionally T1 hypointensity and STIR hyperintensity of the posterior endplate of L1, anterior inferior endplate of L2, inferior endplate of L3, superior endplate of L4 and focal anterior endplate of L4.  T1 hypointense/stir hyperintense focus of the mid vertebral body L5 lesion and L5 spinous process.  There is enhancement of the inferior endplate of L1, superior endplate of L2, inferior endplate of L2, superior endplate of L3, inferior endplate of L3, and superior endplate of L4 and of  the focal anterior endplate of L4 and mid L5 vertebral body lesion.  Degenerative changes of the facet joints most pronounced at L5-S1 with enhancement and STIR hyperintensity at that level.    Discs: Multilevel disc height loss and desiccation.  There is STIR hyperintensity of the L1-L2, L2-L3, and L3-L4 discs.    Cord: The visualized distal cord demonstrates normal signal.  Conus terminates at L1.  There is no clumping of the cauda equina nerve roots.    Degenerative findings:    T12-L1: No spinal canal stenosis or neural foraminal narrowing.    L1-L2:  Circumferential disc bulge and bilateral facet arthropathy.  No spinal canal stenosis or neural foraminal narrowing.    L2-L3: Circumferential disc bulge and bilateral facet arthropathy.  Findings result in mild spinal canal stenosis and mild bilateral neural foraminal narrowing.    L3-L4: Circumferential disc bulge and bilateral facet arthropathy.  Findings result in moderate spinal canal stenosis and moderate bilateral neural foraminal narrowing.    L4-L5: Circumferential disc bulge and bilateral facet arthropathy resulting in moderate spinal canal stenosis and moderate bilateral neural foraminal narrowing.    L5-S1: Circumferential disc bulge and bilateral facet arthropathy.  Findings result in mild spinal canal stenosis and mild bilateral neural foraminal narrowing.    Paraspinal muscles & soft tissues: Epidural lipomatosis most pronounced at L3.  There is thin epidural enhancement most pronounced along the anterior spinal canal extending from L1 to L5.  No organized fluid collection within the epidural space or within the paraspinal soft tissues.  No psoas muscle or adjacent soft tissue signal change or enhancement.  No epidural abscess.  Impression: 1. Multilevel signal abnormalities at L1-2, L2-L3, L3-L4, and L4-L5, favored to represent degenerative changes  with osteomyelitis/discitis not completely excluded thought to be less likely.  Correlate clinically  with follow-up as clinically indicated.  2. No psoas muscle or adjacent soft tissue signal change or enhancement and no epidural abscess.  3. Mild enhancement of the dura extending from the L1-L5 level suggestive of possible infectious or inflammatory process and potentially relating to chronic inflammation.  Follow-up as clinically indicated.  4. Degenerative changes of the facet joints most pronounced at L5-S1 with septic arthritis thought to be less likely but not completely excluded.  Correlate clinically with follow-up as clinically indicated.  5. Additional findings, as above.  This report was flagged in Epic as abnormal.    Electronically signed by resident: Indiana De Leon  Date:    05/04/2023  Time:    22:54    Electronically signed by: Sid Gomez MD  Date:    05/05/2023  Time:    00:10      Labs, Imaging, EKG and Diagnostic results from 5/5/2023 were reviewed.    Medications:  Medication list was reviewed and changes noted under Assessment/Plan.  No current facility-administered medications on file prior to encounter.     Current Outpatient Medications on File Prior to Encounter   Medication Sig Dispense Refill    acamprosate (CAMPRAL) 333 mg tablet Take 2 tablets (666 mg total) by mouth 3 (three) times daily. 180 tablet 0    diclofenac sodium (VOLTAREN) 1 % Gel APPLY TOPICALLY TO THE AFFECTED AREA THREE TIMES DAILY AS NEEDED FOR JOINT PAIN 100 g 2    fluticasone propionate (FLONASE) 50 mcg/actuation nasal spray 1 spray by Each Nostril route daily as needed.      gabapentin (NEURONTIN) 100 MG capsule Take 100 mg by mouth 3 (three) times daily.      levothyroxine (SYNTHROID) 25 MCG tablet TAKE 1 TABLET DAILY (FURTHER REFILLS REQUIRE VISIT WITH DOCTOR) 90 tablet 3    losartan (COZAAR) 100 MG tablet TAKE 1 TABLET(100 MG) BY MOUTH EVERY DAY 90 tablet 0    multivitamin capsule Take 1 capsule by mouth once daily.      nebivoloL (BYSTOLIC) 10 MG Tab Take 1 tablet (10 mg total) by mouth once daily. 90 tablet 3     pravastatin (PRAVACHOL) 20 MG tablet TAKE 1 TABLET EVERY EVENING 90 tablet 3    sertraline (ZOLOFT) 50 MG tablet Take 1 tablet (50 mg total) by mouth once daily. 30 tablet 1    tiZANidine (ZANAFLEX) 2 MG tablet TAKE 1 TABLET(2 MG) BY MOUTH EVERY 8 HOURS AS NEEDED FOR BACK AND LEG PAIN 30 tablet 0    traMADoL (ULTRAM) 50 mg tablet Take 1 tablet (50 mg total) by mouth every 6 (six) hours as needed for Pain. (Patient not taking: Reported on 4/27/2023) 28 tablet 0     Scheduled Medications:  enoxaparin, 40 mg, Subcutaneous, Daily  gabapentin, 100 mg, Oral, TID  levothyroxine, 25 mcg, Oral, Before breakfast  LIDOcaine, 1 patch, Transdermal, Q24H  losartan, 100 mg, Oral, Daily  metoprolol succinate, 100 mg, Oral, Daily  pravastatin, 20 mg, Oral, QHS  sertraline, 50 mg, Oral, Daily      PRN: acetaminophen, dextrose 10%, dextrose 10%, glucagon (human recombinant), hydrALAZINE, ketorolac, melatonin, naloxone, ondansetron, ondansetron, polyethylene glycol, simethicone, sodium chloride 0.9%, tiZANidine  Infusions:       Estimated Creatinine Clearance: 82.1 mL/min (based on SCr of 0.9 mg/dL).    Assessment/Plan:      * Progressive weakness  The patient has notably progressive weakness with gait instability that has caused recurrent falls while at home. Imaging performed by outside neurosurgeon revealed concern for possible osteomyelitis or discitis. Inflammatory markers negative. Repeat CT imaging in the ED reveals inferior T7 and superior T8 endplate irregularity, consistent with area of concern for possible discitis/osteomyelitis. MRI lumbar spine reveals multilevel signal abnormalities at L1-2, L2-L3, L3-L4, and L4-L5, favored to represent degenerative changes with osteomyelitis/discitis thought to be less likely.   - Neurosurgery consulted, appreciate additional recommendations   - Empiric treatment for osteomyelitis with IV vancomycin and IV ceftriaxone  - f/u blood cultures  - PT/OT consulted, appreciate  recommendations  5/5 progressive weakness with gait instability and recurrent falls  falls at home. Imaging performed by outside neurosurgeon revealed concern for possible osteomyelitis or discitis. Inflammatory markers negative. Repeat CT imaging in the ED reveals inferior T7 and superior T8 endplate irregularity, consistent with area of concern for possible discitis/osteomyelitis. MRI lumbar spine reveals multilevel signal abnormalities at L1-2, L2-L3, L3-L4, and L4-L5, favored to represent degenerative changes with osteomyelitis/discitis tnot completely excludded. Mild enhancement of the dura extending from the L1-L5 level suggestive of possible infectious or inflammatory process and potentially relating to chronic inflammation. Neurosurgery consulted, started on empiric  IV vancomycin and IV ceftriaxone. ESR,  procalctionin WNL . no signs and symptoms of myelopathy or cord compression.  Back pain likely with BLE radiculopathy  and  possible nerve root compression. IR consulted for biopsy of spine to confirm or rule out osteomyelitis/discitis. IR eval -no imaging evidence of discitis and with normal inflammatory markers IR feels it is very unlikely discitis .  disc biopsy is not warrented per neurosurgery . ceftriaxone and vancomycin discontinued. Neurosurgery plans for outpatient follow up in clinic with scoliosis xrays and lumbar spine flexion extension xrays. patient may require arge scale spinal fusion as outpatient. PT/OT consulted - recs SNF. Patient reluctant to go to SNF but agreed for [facility] after discussion with family     Anxiety  Resume home sertraline.      Acquired hypothyroidism  Resume home levothyroxine.      Hyperlipidemia  Resume home pravastatin.      Essential hypertension  Blood pressure mildly elevated upon admission.  - losartan 100 mg daily  - home nebivolol not on formulary; begin metoprolol succinate 100 mg daily    5/5 SBP in 200s. IV fluids discontinued with improvement       VTE  Risk Mitigation (From admission, onward)           Ordered     enoxaparin injection 40 mg  Daily         05/04/23 2347     IP VTE HIGH RISK PATIENT  Once         05/04/23 2347     Place sequential compression device  Until discontinued         05/04/23 2347                    Discharge Planning   CAYLA: 5/8/2023     Code Status: Full Code   Is the patient medically ready for discharge?: No    Reason for patient still in hospital (select all that apply): Treatment  Discharge Plan A: Skilled Nursing Facility                  Zi Rowell MD  Department of Hospital Medicine   [facility] - Neurosurgery ([facility])

## 2023-05-05 NOTE — ASSESSMENT & PLAN NOTE
Blood pressure mildly elevated upon admission.  - losartan 100 mg daily  - home nebivolol not on formulary; begin metoprolol succinate 100 mg daily

## 2023-05-06 PROBLEM — G89.29 CHRONIC MIDLINE LOW BACK PAIN WITHOUT SCIATICA: Status: ACTIVE | Noted: 2018-11-14

## 2023-05-06 LAB
ALBUMIN SERPL BCP-MCNC: 3.4 G/DL (ref 3.5–5.2)
ALP SERPL-CCNC: 60 U/L (ref 55–135)
ALT SERPL W/O P-5'-P-CCNC: 20 U/L (ref 10–44)
ANION GAP SERPL CALC-SCNC: 7 MMOL/L (ref 8–16)
AST SERPL-CCNC: 18 U/L (ref 10–40)
BILIRUB SERPL-MCNC: 0.8 MG/DL (ref 0.1–1)
BUN SERPL-MCNC: 19 MG/DL (ref 8–23)
CALCIUM SERPL-MCNC: 9.3 MG/DL (ref 8.7–10.5)
CHLORIDE SERPL-SCNC: 103 MMOL/L (ref 95–110)
CO2 SERPL-SCNC: 26 MMOL/L (ref 23–29)
CREAT SERPL-MCNC: 0.8 MG/DL (ref 0.5–1.4)
ERYTHROCYTE [DISTWIDTH] IN BLOOD BY AUTOMATED COUNT: 13.1 % (ref 11.5–14.5)
EST. GFR  (NO RACE VARIABLE): >60 ML/MIN/1.73 M^2
GLUCOSE SERPL-MCNC: 90 MG/DL (ref 70–110)
HCT VFR BLD AUTO: 41.1 % (ref 40–54)
HGB BLD-MCNC: 13.1 G/DL (ref 14–18)
MAGNESIUM SERPL-MCNC: 1.7 MG/DL (ref 1.6–2.6)
MCH RBC QN AUTO: 29.4 PG (ref 27–31)
MCHC RBC AUTO-ENTMCNC: 31.9 G/DL (ref 32–36)
MCV RBC AUTO: 92 FL (ref 82–98)
PLATELET # BLD AUTO: 313 K/UL (ref 150–450)
PMV BLD AUTO: 9.5 FL (ref 9.2–12.9)
POTASSIUM SERPL-SCNC: 4 MMOL/L (ref 3.5–5.1)
PROT SERPL-MCNC: 5.8 G/DL (ref 6–8.4)
RBC # BLD AUTO: 4.46 M/UL (ref 4.6–6.2)
SODIUM SERPL-SCNC: 136 MMOL/L (ref 136–145)
WBC # BLD AUTO: 8.98 K/UL (ref 3.9–12.7)

## 2023-05-06 PROCEDURE — 63600175 PHARM REV CODE 636 W HCPCS: Performed by: STUDENT IN AN ORGANIZED HEALTH CARE EDUCATION/TRAINING PROGRAM

## 2023-05-06 PROCEDURE — 80053 COMPREHEN METABOLIC PANEL: CPT | Performed by: STUDENT IN AN ORGANIZED HEALTH CARE EDUCATION/TRAINING PROGRAM

## 2023-05-06 PROCEDURE — 85027 COMPLETE CBC AUTOMATED: CPT | Performed by: STUDENT IN AN ORGANIZED HEALTH CARE EDUCATION/TRAINING PROGRAM

## 2023-05-06 PROCEDURE — 25000003 PHARM REV CODE 250: Performed by: STUDENT IN AN ORGANIZED HEALTH CARE EDUCATION/TRAINING PROGRAM

## 2023-05-06 PROCEDURE — 36415 COLL VENOUS BLD VENIPUNCTURE: CPT | Performed by: STUDENT IN AN ORGANIZED HEALTH CARE EDUCATION/TRAINING PROGRAM

## 2023-05-06 PROCEDURE — 11000001 HC ACUTE MED/SURG PRIVATE ROOM

## 2023-05-06 PROCEDURE — 94761 N-INVAS EAR/PLS OXIMETRY MLT: CPT

## 2023-05-06 PROCEDURE — 99233 SBSQ HOSP IP/OBS HIGH 50: CPT | Mod: ,,, | Performed by: HOSPITALIST

## 2023-05-06 PROCEDURE — 94660 CPAP INITIATION&MGMT: CPT

## 2023-05-06 PROCEDURE — 99900035 HC TECH TIME PER 15 MIN (STAT)

## 2023-05-06 PROCEDURE — 83735 ASSAY OF MAGNESIUM: CPT | Performed by: STUDENT IN AN ORGANIZED HEALTH CARE EDUCATION/TRAINING PROGRAM

## 2023-05-06 PROCEDURE — 99233 PR SUBSEQUENT HOSPITAL CARE,LEVL III: ICD-10-PCS | Mod: ,,, | Performed by: HOSPITALIST

## 2023-05-06 PROCEDURE — 25000003 PHARM REV CODE 250: Performed by: HOSPITALIST

## 2023-05-06 RX ADMIN — LOSARTAN POTASSIUM 100 MG: 50 TABLET, FILM COATED ORAL at 08:05

## 2023-05-06 RX ADMIN — PRAVASTATIN SODIUM 20 MG: 20 TABLET ORAL at 09:05

## 2023-05-06 RX ADMIN — GABAPENTIN 300 MG: 300 CAPSULE ORAL at 03:05

## 2023-05-06 RX ADMIN — TIZANIDINE 2 MG: 2 TABLET ORAL at 09:05

## 2023-05-06 RX ADMIN — SERTRALINE HYDROCHLORIDE 50 MG: 50 TABLET ORAL at 08:05

## 2023-05-06 RX ADMIN — Medication 6 MG: at 09:05

## 2023-05-06 RX ADMIN — GABAPENTIN 300 MG: 300 CAPSULE ORAL at 08:05

## 2023-05-06 RX ADMIN — ENOXAPARIN SODIUM 40 MG: 40 INJECTION SUBCUTANEOUS at 06:05

## 2023-05-06 RX ADMIN — ACETAMINOPHEN 1000 MG: 500 TABLET ORAL at 03:05

## 2023-05-06 RX ADMIN — ACETAMINOPHEN 1000 MG: 500 TABLET ORAL at 09:05

## 2023-05-06 RX ADMIN — LIDOCAINE 1 PATCH: 50 PATCH CUTANEOUS at 01:05

## 2023-05-06 RX ADMIN — GABAPENTIN 300 MG: 300 CAPSULE ORAL at 09:05

## 2023-05-06 RX ADMIN — LEVOTHYROXINE SODIUM 25 MCG: 25 TABLET ORAL at 05:05

## 2023-05-06 RX ADMIN — ACETAMINOPHEN 1000 MG: 500 TABLET ORAL at 05:05

## 2023-05-06 RX ADMIN — POLYETHYLENE GLYCOL 3350 17 G: 17 POWDER, FOR SOLUTION ORAL at 08:05

## 2023-05-06 NOTE — ASSESSMENT & PLAN NOTE
secondary to above.   5/6 improved backpain this AM. on tylenol 1000mg q 8h, gabapentin 300mg TID, lidocaine patch and warm compressses. pending [facility]   5/8  c/o 7/10 pain back and Right knee. tramadol PRN. pending SNF

## 2023-05-06 NOTE — PROGRESS NOTES
[facility] - Neurosurgery ([facility] Medicine  Progress Note    Patient Name: Julius Baker  MRN: [id number]  Patient Class: IP- Inpatient   Admission Date: 5/4/2023  Length of Stay: 1 days  Attending Physician: Zi Rowell MD  Primary Care Provider: ANNA Thurston MD        Subjective:     Principal Problem:Rapidly progressive weakness        HPI:  Julius Baker is a 77-year-old man with a past medical history of HTN, HLD, history of alcohol use disorder, and BPH who presents to the emergency department with progressive weakness and gait instability who presents to the emergency department. The patient is accompanied by his daughter who assists in providing the history. Per report, the patient has been suffering from a progressive weakness with gait instability and several falls at home. Per family report, he has a shuffling gait. The patient says that approximately six months ago he was walking with a cane and given the progression of his weakness he now ambulates with a walker. The patient had been following with a neurosurgeon, Dr. Otoole ([facility]) for back pain. The MRI ordered by Dr. Otoole possibly showed osteomyelitis but he is not currently being treated at this time. He did, however, see his allergist who prescribed a shot of corticosteroids and a ten day course of antibiotics. The patient said it has been difficult for him to get back into clinic to see Dr. Otoole given his clinic is so busy. The patient was seen in the Neurology clinic given concern that gait instability may be secondary to Parkinson's disease. However, the provider was concerned about the MRI results and encouraged the patient to present to the emergency department for further evaluation. The patient complains of some mild back pain which he treats with pain relief patches and warm compresses. He denies any recent fever, chills or rigors. Of note, the patient and his daughter do bring up that the patient  "had a tooth extraction for an infected tooth approximately six months ago. He was reportedly placed on oral antibiotics for an "extended" period of time by his dentist. He had a tooth implant placed which subsequently fell out for unknown reasons which could not be explained by his dentist. The patient thinks he was having fevers and chills around this time. The patient denies any saddle anesthesia, bladder or bowel incontinence.    In the emergency department, the patient was noted to be bradycardic with a heart rate at 44. Other vital signs stable upon arrival. Labs were largely unremarkable. ESR and CRP within normal limits. The patient had CT of his lumbar and thoracic spine revealing inferior T7 and superior T8 endplate irregularity, consistent with area of concern on outside hospital MRI for possible discitis/osteomyelitis and inferior L2, superior L3, and inferior L4 endplate irregularity, likely representing degenerative change although osteomyelitis/discitis cannot be completely excluded. MRI lumbar spine revealing multilevel signal abnormalities at L1-2, L2-L3, L3-L4, and L4-L5, favored to represent degenerative changes with osteomyelitis/discitis not completely excluded thought to be less likely. The patient was given IV vancomycin in addition to his home blood pressure medications. He was admitted to Hospital Medicine for further management.         Overview/Hospital Course:  5/5 progressive weakness with gait instability and recurrent falls  falls at home. Imaging performed by outside neurosurgeon revealed concern for possible osteomyelitis or discitis. Inflammatory markers negative. Repeat CT imaging in the ED reveals inferior T7 and superior T8 endplate irregularity, consistent with area of concern for possible discitis/osteomyelitis. MRI lumbar spine reveals multilevel signal abnormalities at L1-2, L2-L3, L3-L4, and L4-L5, favored to represent degenerative changes with osteomyelitis/discitis tnot " completely excludded. Mild enhancement of the dura extending from the L1-L5 level suggestive of possible infectious or inflammatory process and potentially relating to chronic inflammation. Neurosurgery consulted, started on empiric  IV vancomycin and IV ceftriaxone. ESR,  procalctionin WNL . no signs and symptoms of myelopathy or cord compression.  Back pain likely with BLE radiculopathy  and  possible nerve root compression. IR consulted for biopsy of spine to confirm or rule out osteomyelitis/discitis. IR eval -no imaging evidence of discitis and with normal inflammatory markers IR feels it is very unlikely discitis .  disc biopsy is not warrented per neurosurgery . ceftriaxone and vancomycin discontinued. Neurosurgery plans for outpatient follow up in clinic with scoliosis xrays and lumbar spine flexion extension xrays. patient may require arge scale spinal fusion as outpatient. PT/OT consulted - recs SNF. Patient reluctant to go to SNF but agreed for [facility] after discussion with family   5/6 improved backpain this AM. on tylenol 1000mg q 8h, gabapentin 300mg TID, lidocaine patch and warm compressses. pending [facility] . daughter wants  referral sent to [facility]               Review of Systems:   Pain scale:    Constitutional:  fever,  chills, headache, vision loss, hearing loss, weight loss, Generalized weakness, falls, loss of smell, loss of taste, poor appetite,  sore throat, falls   Respiratory: cough, shortness of breath.   Cardiovascular: chest pain, dizziness, palpitations, orthopnea, swelling of feet, syncope  Gastrointestinal: nausea, vomiting, abdominal pain, diarrhea, black stool,  blood in stool, change in bowel habits  Genitourinary: hematuria, dysuria, urgency, frequency  Integument/Breast: rash,  pruritis  Hematologic/Lymphatic: easy bruising, lymphadenopathy  Musculoskeletal: arthralgias , myalgias, back pain, neck pain, knee pain, gait problem  Neurological: confusion,  seizures, tremors, slurred speech, ,weakness, numbness  Behavioral/Psych:  depression, anxiety, auditory or visual hallucinations     OBJECTIVE:     Physical Exam:  Body mass index is 32.14 kg/m².    Constitutional: Appears well-developed and well-nourished. obesity   Head: Normocephalic and atraumatic.   Neck: Normal range of motion. Neck supple.   Cardiovascular: Normal heart rate.  Regular heart rhythm.  Pulmonary/Chest: Effort normal.   Abdominal: No distension.  No tenderness  Musculoskeletal: Normal range of motion. No edema.   Neurological: Alert and oriented to person, place, and time. able to move bilateral upper and lower extremities without limitation  Skin: Skin is warm and dry.   Psychiatric: Normal mood and affect. Behavior is normal.                  Vital Signs  Temp: 97.5 °F (36.4 °C) (05/06/23 1104)  Pulse: (Abnormal) 52 (05/06/23 1159)  Resp: 19 (05/06/23 1104)  BP: (Abnormal) 157/70 (05/06/23 1104)  SpO2: (Abnormal) 94 % (05/06/23 1104)     24 Hour VS Range    Temp:  [96.4 °F (35.8 °C)-98.7 °F (37.1 °C)]   Pulse:  [52-69]   Resp:  [16-20]   BP: (118-201)/(63-98)   SpO2:  [92 %-97 %]     Intake/Output Summary (Last 24 hours) at 5/6/2023 1504  Last data filed at 5/5/2023 1650  Gross per 24 hour   Intake no documentation   Output 300 ml   Net -300 ml         I/O This Shift:  No intake/output data recorded.    Wt Readings from Last 3 Encounters:   05/04/23 101.6 kg (224 lb)   05/03/23 101.6 kg (224 lb)   04/27/23 104 kg (229 lb 4.5 oz)       I have personally reviewed the vitals and recorded Intake/Output     Laboratory/Diagnostic Data:    CBC/Anemia Labs: Coags:    Recent Labs   Lab 05/04/23  1748 05/05/23  0210 05/06/23  0304   WBC 10.57 10.12 8.98   HGB 14.1 14.2 13.1*   HCT 43.2 44.3 41.1    318 313   MCV 91 91 92   RDW 13.0 13.2 13.1    No results for input(s): PT, INR, APTT in the last 168 hours.     Chemistries: ABG:   Recent Labs   Lab 05/04/23  1748 05/05/23  0210 05/06/23  0304   NA  139 140 136   K 4.2 4.0 4.0    102 103   CO2 30* 27 26   BUN 18 14 19   CREATININE 0.9 0.9 0.8   CALCIUM 9.9 9.5 9.3   PROT 6.9 6.5 5.8*   BILITOT 1.0 0.8 0.8   ALKPHOS 70 70 60   ALT 21 22 20   AST 21 20 18   MG  --  1.7 1.7    No results for input(s): PH, PCO2, PO2, HCO3, POCSATURATED, BE in the last 168 hours.     POCT Glucose: HbA1c:    No results for input(s): POCTGLUCOSE in the last 168 hours. Hemoglobin A1C   Date Value Ref Range Status   11/10/2021 5.5 4.0 - 5.6 % Final     Comment:     ADA Screening Guidelines:  5.7-6.4%  Consistent with prediabetes  >or=6.5%  Consistent with diabetes    High levels of fetal hemoglobin interfere with the HbA1C  assay. Heterozygous hemoglobin variants (HbS, HgC, etc)do  not significantly interfere with this assay.   However, presence of multiple variants may affect accuracy.     10/28/2019 5.4 4.0 - 5.6 % Final     Comment:     ADA Screening Guidelines:  5.7-6.4%  Consistent with prediabetes  >or=6.5%  Consistent with diabetes  High levels of fetal hemoglobin interfere with the HbA1C  assay. Heterozygous hemoglobin variants (HbS, HgC, etc)do  not significantly interfere with this assay.   However, presence of multiple variants may affect accuracy.     10/15/2018 5.3 4.0 - 5.6 % Final     Comment:     ADA Screening Guidelines:  5.7-6.4%  Consistent with prediabetes  >or=6.5%  Consistent with diabetes  High levels of fetal hemoglobin interfere with the HbA1C  assay. Heterozygous hemoglobin variants (HbS, HgC, etc)do  not significantly interfere with this assay.   However, presence of multiple variants may affect accuracy.          Cardiac Enzymes: Ejection Fractions:    No results for input(s): CPK, CPKMB, MB, TROPONINI in the last 72 hours. No results found for: EF       Recent Labs   Lab 05/04/23  1756   COLORU Yellow   APPEARANCEUA Clear   PHUR 6.0   SPECGRAV 1.010   PROTEINUA Negative   GLUCUA Negative   KETONESU Negative   BILIRUBINUA Negative   OCCULTUA Negative    NITRITE Negative   LEUKOCYTESUR Negative       Procalcitonin (ng/mL)   Date Value   05/05/2023 0.03     Lactate (Lactic Acid) (mmol/L)   Date Value   05/04/2023 1.0     BNP (pg/mL)   Date Value   04/15/2017 27     CRP (mg/L)   Date Value   05/04/2023 1.1   02/03/2020 3.0     Sed Rate (mm/Hr)   Date Value   05/04/2023 22   02/03/2020 23     No results found for: DDIMER  No results found for: FERRITIN  No results found for: LDH  Troponin I (ng/mL)   Date Value   04/15/2017 <0.006     No results found for this or any previous visit.  SARS-CoV2 (COVID-19) Qualitative PCR (no units)   Date Value   08/08/2020 Not Detected       Microbiology labs for the last week  Microbiology Results (last 7 days)       Procedure Component Value Units Date/Time    Blood culture #1 **CANNOT BE ORDERED STAT** [[id number]] Collected: 05/04/23 1748    Order Status: Completed Specimen: Blood from Peripheral, Antecubital, Right Updated: 05/05/23 2212     Blood Culture, Routine No Growth to date      No Growth to date    Blood culture #2 **CANNOT BE ORDERED STAT** [[id number]] Collected: 05/04/23 1751    Order Status: Completed Specimen: Blood from Peripheral, Wrist, Left Updated: 05/05/23 2212     Blood Culture, Routine No Growth to date      No Growth to date            Reviewed and noted in plan where applicable- Please see chart for full lab data.    Lines/Drains:       Peripheral IV - Single Lumen 05/04/23 1757 20 G Right Antecubital (Active)   Site Assessment Clean;Dry;Intact;No redness;No swelling 05/05/23 0052   Extremity Assessment Distal to IV No abnormal discoloration 05/05/23 0052   Line Status Flushed;Saline locked 05/05/23 0052   Dressing Status Clean;Dry;Intact 05/05/23 0052   Dressing Intervention Integrity maintained 05/05/23 0052   Dressing Change Due 05/07/23 05/05/23 0052   Site Change Due 05/07/23 05/05/23 0052   Reason Not Rotated Not due 05/05/23 0052   Number of days: 0            Peripheral IV - Single Lumen 05/04/23  1757 Anterior;Distal;Right Forearm (Active)   Site Assessment Clean;Dry;Intact;No redness;No swelling 05/05/23 0052   Extremity Assessment Distal to IV No abnormal discoloration 05/05/23 0052   Line Status Flushed;Saline locked 05/05/23 0052   Dressing Status Clean;Dry;Intact 05/05/23 0052   Dressing Intervention Integrity maintained 05/05/23 0052   Dressing Change Due 05/07/23 05/05/23 0052   Site Change Due 05/07/23 05/05/23 0052   Reason Not Rotated Not due 05/05/23 0052   Number of days: 0       Imaging  ECG Results              EKG 12-lead (Edited Result - FINAL)  Result time 05/05/23 14:26:07      Edited Result - FINAL by Interface, Lab In [facility] (05/05/23 14:26:07)               Narrative:    Test Reason : R00.1,    Vent. Rate : 064 BPM     Atrial Rate : 064 BPM     P-R Int : 152 ms          QRS Dur : 090 ms      QT Int : 398 ms       P-R-T Axes : 032 033 036 degrees     QTc Int : 410 ms    Sinus rhythm with marked sinus arrythmia and PACs  Otherwise normal ECG  When compared with ECG of 01-JUN-2022 11:54,  QT has shortened  Reconfirmed by Ruiz YAN MD ([id number]) on 5/5/2023 2:25:54 PM    Referred By: [email or web address]   SELF           Confirmed By:Ruiz YAN MD                                  No results found for this or any previous visit.      MRI Lumbar Spine W WO Cont  Narrative: EXAMINATION:  MRI LUMBAR SPINE W WO CONTRAST    CLINICAL HISTORY:  Low back pain, progressive neurologic deficit;    TECHNIQUE:  Multiplanar, multisequence MR images were acquired from the thoracolumbar junction to the sacrum following the administration of 10 cc Gadavist intravenous contrast.    COMPARISON:  CT thoracic and lumbar spine 05/04/2023.    FINDINGS:  Alignment: Normal.    Vertebrae: Heterogeneous appearance of the marrow throughout the lumbar spine.  There is additionally T1 hypointensity and STIR hyperintensity of the posterior endplate of L1, anterior inferior endplate of L2, inferior endplate of L3, superior endplate of  L4 and focal anterior endplate of L4.  T1 hypointense/stir hyperintense focus of the mid vertebral body L5 lesion and L5 spinous process.  There is enhancement of the inferior endplate of L1, superior endplate of L2, inferior endplate of L2, superior endplate of L3, inferior endplate of L3, and superior endplate of L4 and of the focal anterior endplate of L4 and mid L5 vertebral body lesion.  Degenerative changes of the facet joints most pronounced at L5-S1 with enhancement and STIR hyperintensity at that level.    Discs: Multilevel disc height loss and desiccation.  There is STIR hyperintensity of the L1-L2, L2-L3, and L3-L4 discs.    Cord: The visualized distal cord demonstrates normal signal.  Conus terminates at L1.  There is no clumping of the cauda equina nerve roots.    Degenerative findings:    T12-L1: No spinal canal stenosis or neural foraminal narrowing.    L1-L2:  Circumferential disc bulge and bilateral facet arthropathy.  No spinal canal stenosis or neural foraminal narrowing.    L2-L3: Circumferential disc bulge and bilateral facet arthropathy.  Findings result in mild spinal canal stenosis and mild bilateral neural foraminal narrowing.    L3-L4: Circumferential disc bulge and bilateral facet arthropathy.  Findings result in moderate spinal canal stenosis and moderate bilateral neural foraminal narrowing.    L4-L5: Circumferential disc bulge and bilateral facet arthropathy resulting in moderate spinal canal stenosis and moderate bilateral neural foraminal narrowing.    L5-S1: Circumferential disc bulge and bilateral facet arthropathy.  Findings result in mild spinal canal stenosis and mild bilateral neural foraminal narrowing.    Paraspinal muscles & soft tissues: Epidural lipomatosis most pronounced at L3.  There is thin epidural enhancement most pronounced along the anterior spinal canal extending from L1 to L5.  No organized fluid collection within the epidural space or within the paraspinal soft  tissues.  No psoas muscle or adjacent soft tissue signal change or enhancement.  No epidural abscess.  Impression: 1. Multilevel signal abnormalities at L1-2, L2-L3, L3-L4, and L4-L5, favored to represent degenerative changes  with osteomyelitis/discitis not completely excluded thought to be less likely.  Correlate clinically with follow-up as clinically indicated.  2. No psoas muscle or adjacent soft tissue signal change or enhancement and no epidural abscess.  3. Mild enhancement of the dura extending from the L1-L5 level suggestive of possible infectious or inflammatory process and potentially relating to chronic inflammation.  Follow-up as clinically indicated.  4. Degenerative changes of the facet joints most pronounced at L5-S1 with septic arthritis thought to be less likely but not completely excluded.  Correlate clinically with follow-up as clinically indicated.  5. Additional findings, as above.  This report was flagged in Epic as abnormal.    Electronically signed by resident: Indiana De Leon  Date:    05/04/2023  Time:    22:54    Electronically signed by: Sid Gomez MD  Date:    05/05/2023  Time:    00:10      Labs, Imaging, EKG and Diagnostic results from 5/6/2023 were reviewed.    Medications:  Medication list was reviewed and changes noted under Assessment/Plan.  No current facility-administered medications on file prior to encounter.     Current Outpatient Medications on File Prior to Encounter   Medication Sig Dispense Refill    acamprosate (CAMPRAL) 333 mg tablet Take 2 tablets (666 mg total) by mouth 3 (three) times daily. 180 tablet 0    diclofenac sodium (VOLTAREN) 1 % Gel APPLY TOPICALLY TO THE AFFECTED AREA THREE TIMES DAILY AS NEEDED FOR JOINT PAIN 100 g 2    fluticasone propionate (FLONASE) 50 mcg/actuation nasal spray 1 spray by Each Nostril route daily as needed.      levothyroxine (SYNTHROID) 25 MCG tablet TAKE 1 TABLET DAILY (FURTHER REFILLS REQUIRE VISIT WITH DOCTOR) 90 tablet 3     losartan (COZAAR) 100 MG tablet TAKE 1 TABLET(100 MG) BY MOUTH EVERY DAY 90 tablet 0    multivitamin capsule Take 1 capsule by mouth once daily.      nebivoloL (BYSTOLIC) 10 MG Tab Take 1 tablet (10 mg total) by mouth once daily. 90 tablet 3    pravastatin (PRAVACHOL) 20 MG tablet TAKE 1 TABLET EVERY EVENING 90 tablet 3    sertraline (ZOLOFT) 50 MG tablet Take 1 tablet (50 mg total) by mouth once daily. 30 tablet 1    tiZANidine (ZANAFLEX) 2 MG tablet TAKE 1 TABLET(2 MG) BY MOUTH EVERY 8 HOURS AS NEEDED FOR BACK AND LEG PAIN 30 tablet 0     Scheduled Medications:  acetaminophen, 1,000 mg, Oral, Q8H  enoxaparin, 40 mg, Subcutaneous, Daily  gabapentin, 300 mg, Oral, TID  levothyroxine, 25 mcg, Oral, Before breakfast  LIDOcaine, 1 patch, Transdermal, Q24H  losartan, 100 mg, Oral, Daily  metoprolol succinate, 100 mg, Oral, Daily  pravastatin, 20 mg, Oral, QHS  sertraline, 50 mg, Oral, Daily      PRN: dextrose 10%, dextrose 10%, glucagon (human recombinant), hydrALAZINE, melatonin, naloxone, ondansetron, ondansetron, polyethylene glycol, simethicone, sodium chloride 0.9%, tiZANidine  Infusions:       Estimated Creatinine Clearance: 92.3 mL/min (based on SCr of 0.8 mg/dL).    Assessment/Plan:      * Progressive weakness  The patient has notably progressive weakness with gait instability that has caused recurrent falls while at home. Imaging performed by outside neurosurgeon revealed concern for possible osteomyelitis or discitis. Inflammatory markers negative. Repeat CT imaging in the ED reveals inferior T7 and superior T8 endplate irregularity, consistent with area of concern for possible discitis/osteomyelitis. MRI lumbar spine reveals multilevel signal abnormalities at L1-2, L2-L3, L3-L4, and L4-L5, favored to represent degenerative changes with osteomyelitis/discitis thought to be less likely.   - Neurosurgery consulted, appreciate additional recommendations   - Empiric treatment for osteomyelitis with IV vancomycin  and IV ceftriaxone  - f/u blood cultures  - PT/OT consulted, appreciate recommendations  5/5 progressive weakness with gait instability and recurrent falls  falls at home. Imaging performed by outside neurosurgeon revealed concern for possible osteomyelitis or discitis. Inflammatory markers negative. Repeat CT imaging in the ED reveals inferior T7 and superior T8 endplate irregularity, consistent with area of concern for possible discitis/osteomyelitis. MRI lumbar spine reveals multilevel signal abnormalities at L1-2, L2-L3, L3-L4, and L4-L5, favored to represent degenerative changes with osteomyelitis/discitis tnot completely excludded. Mild enhancement of the dura extending from the L1-L5 level suggestive of possible infectious or inflammatory process and potentially relating to chronic inflammation. Neurosurgery consulted, started on empiric  IV vancomycin and IV ceftriaxone. ESR,  procalctionin WNL . no signs and symptoms of myelopathy or cord compression.  Back pain likely with BLE radiculopathy  and  possible nerve root compression. IR consulted for biopsy of spine to confirm or rule out osteomyelitis/discitis. IR eval -no imaging evidence of discitis and with normal inflammatory markers IR feels it is very unlikely discitis .  disc biopsy is not warrented per neurosurgery . ceftriaxone and vancomycin discontinued. Neurosurgery plans for outpatient follow up in clinic with scoliosis xrays and lumbar spine flexion extension xrays. patient may require arge scale spinal fusion as outpatient. PT/OT consulted - recs SNF. Patient reluctant to go to SNF but agreed for [facility] after discussion with family     Anxiety  Resume home sertraline.      Acquired hypothyroidism  Resume home levothyroxine.      Hyperlipidemia  Resume home pravastatin.      Essential hypertension  Blood pressure mildly elevated upon admission.  - losartan 100 mg daily  - home nebivolol not on formulary; begin metoprolol succinate 100 mg  daily    5/5 SBP in 200s. IV fluids discontinued with improvement     Chronic midline low back pain without sciatica  secondary to above.   5/6 improved backpain this AM. on tylenol 1000mg q 8h, gabapentin 300mg TID, lidocaine patch and warm compressses. pending [facility]         VTE Risk Mitigation (From admission, onward)           Ordered     enoxaparin injection 40 mg  Daily         05/04/23 2347     IP VTE HIGH RISK PATIENT  Once         05/04/23 2347     Place sequential compression device  Until discontinued         05/04/23 2347                    Discharge Planning   CAYLA: 5/8/2023     Code Status: Full Code   Is the patient medically ready for discharge?: No    Reason for patient still in hospital (select all that apply): Treatment  Discharge Plan A: Skilled Nursing Facility                  Zi Rowell MD  Department of Hospital Medicine   [facility] - Neurosurgery ([facility])

## 2023-05-06 NOTE — PLAN OF CARE
On call CM in communication with patient daughter to discuss discharge plan.  Patient has a rec of Skilled.  CM had sent referral to [facility].  In conversation with patient daughter she stated she would like referral sent to [facility].  CM sent referral to [facility] via [facility].

## 2023-05-06 NOTE — PLAN OF CARE
Problem: Adult Inpatient Plan of Care  Goal: Optimal Comfort and Wellbeing  Outcome: Ongoing, Progressing  Goal: Readiness for Transition of Care  Outcome: Ongoing, Progressing     Problem: Adult Inpatient Plan of Care  Goal: Optimal Comfort and Wellbeing  Outcome: Ongoing, Progressing     Problem: Adult Inpatient Plan of Care  Goal: Readiness for Transition of Care  Outcome: Ongoing, Progressing     Problem: Skin Injury Risk Increased  Goal: Skin Health and Integrity  Outcome: Ongoing, Progressing     Problem: Skin Injury Risk Increased  Goal: Skin Health and Integrity  Outcome: Ongoing, Progressing     POC reviewed with patient and his daughter at the bedside. Verbalization of understanding voiced. Questions and concerns addressed. Precautions maintained. No events noted at present.  Cardiac monitoring ongoing, Vital signs all shift. See flow sheet. Zanaflex given per patient request with HS meds noted to be effective. Melatonin given per patient request for sleep.  Patient voiced a decrease in pain this am and was able to ambulate to the bathroom with one person assist. Bed low and locked with call light within reach and bed alarm activated. Patients daughter remains at the beside. POC ongoing.

## 2023-05-06 NOTE — PLAN OF CARE
[facility]     Department of Hospital Medicine     [address] [place] [address]     [phone] after hours  [phone] fax       NURSING HOME ORDERS    Patient Name: Julius Baker  YOB: [birth year]    Admit to Nursing Home:       Skilled Bed                                                Diagnoses:  Active Hospital Problems    Diagnosis  POA    *Progressive weakness [R53.1]  Unknown    Anxiety [F41.9]  Yes    Acquired hypothyroidism [E03.9]  Yes     Chronic    Essential hypertension [I10]  Yes     Chronic    Hyperlipidemia [E78.5]  Yes     Chronic    Chronic midline low back pain without sciatica [M54.50, G89.29]  Yes      Resolved Hospital Problems    Diagnosis Date Resolved POA    Back pain [M54.9] 05/05/2023 Unknown       Patient is homebound due to:  Rapidly progressive weakness    Allergies:  Review of patient's allergies indicates:   Allergen Reactions    Doxycycline Diarrhea     itching    Ezetimibe-simvastatin      Muscle cramps    Rosuvastatin      Muscle cramps    Sertraline Other (See Comments)     jittery    Simvastatin      Muscle cramps    Adhesive Rash       Vitals:        Every shift (Skilled Nursing patients)    Diet: cardiac diet                   Acitivities:     - Up in a chair each morning as tolerated  - Ambulate with assistance to bathroom  - Scheduled walks once each shift (every 8 hours)  - May ambulate independently  - May use walker, cane, or self-propelled wheelchair       - Weight bearing: as tolerated      LABS:  Per facility protocol    Nursing Precautions:     - Aspiration precautions:             - Total assistance with meals            -  Upright 90 degrees befor during and after meals             -  Suction at bedside          - Fall precautions per nursing home protocol   - Seizure precaution per [facility] protocol   - Decubitus precautions:        -  for positioning   - Pressure  reducing foam mattress   - Turn patient every two hours. Use wedge pillows to anchor patient    CONSULTS:      Physical Therapy to evaluate and treat     Occupational Therapy to evaluate and treat          MISCELLANEOUS CARE:                    Routine Skin for Bedridden Patients:  Apply moisture barrier cream to all    skin folds and wet areas in perineal area daily and after baths and                           all bowel movements.          DIABETES CARE:  NA      Medications: Discontinue all previous medication orders, if any. See new list below.       Medication List        Start taking these medications      acetaminophen 500 MG tablet  Commonly known as: TYLENOL  Take 2 tablets (1,000 mg total) by mouth every 8 (eight) hours.     hydrALAZINE 50 MG tablet  Commonly known as: APRESOLINE  Take 1 tablet (50 mg total) by mouth every 8 (eight) hours.     LIDOcaine 5 %  Commonly known as: LIDODERM  Place 1 patch onto the skin once daily. Remove & Discard patch within 12 hours or as directed by MD            Change how you take these medications      gabapentin 300 MG capsule  Commonly known as: NEURONTIN  Take 1 capsule (300 mg total) by mouth 3 (three) times daily.  What changed:   medication strength  how much to take     traMADoL 50 mg tablet  Commonly known as: ULTRAM  Take 0.5 tablets (25 mg total) by mouth every 12 (twelve) hours as needed.  What changed:   how much to take  when to take this  reasons to take this            Continue taking these medications      diclofenac sodium 1 % Gel  Commonly known as: VOLTAREN  APPLY TOPICALLY TO THE AFFECTED AREA THREE TIMES DAILY AS NEEDED FOR JOINT PAIN     fluticasone propionate 50 mcg/actuation nasal spray  Commonly known as: FLONASE  1 spray by Each Nostril route daily as needed.     levothyroxine 25 MCG tablet  Commonly known as: SYNTHROID  TAKE 1 TABLET DAILY (FURTHER REFILLS REQUIRE VISIT WITH DOCTOR)     losartan 100 MG tablet  Commonly known as: COZAAR  TAKE 1  TABLET(100 MG) BY MOUTH EVERY DAY     multivitamin capsule  Take 1 capsule by mouth once daily.     nebivoloL 10 MG Tab  Commonly known as: BYSTOLIC  Take 1 tablet (10 mg total) by mouth once daily.     pravastatin 20 MG tablet  Commonly known as: PRAVACHOL  TAKE 1 TABLET EVERY EVENING     sertraline 50 MG tablet  Commonly known as: ZOLOFT  Take 1 tablet (50 mg total) by mouth once daily.     tiZANidine 2 MG tablet  Commonly known as: ZANAFLEX  TAKE 1 TABLET(2 MG) BY MOUTH EVERY 8 HOURS AS NEEDED FOR BACK AND LEG PAIN            Stop taking these medications      acamprosate 333 mg tablet  Commonly known as: CAMPRAL                 _________________________________  Zi Rowell MD  05/09/2023

## 2023-05-06 NOTE — NURSING
Tele sitter discontinued per patient and family request. Patient and family at the beside does not want the camera in room the alarm is to loud and the patients daugher is assisting the patient and will summon staff when in need of assistance.

## 2023-05-07 LAB
ALBUMIN SERPL BCP-MCNC: 3.6 G/DL (ref 3.5–5.2)
ALP SERPL-CCNC: 69 U/L (ref 55–135)
ALT SERPL W/O P-5'-P-CCNC: 22 U/L (ref 10–44)
ANION GAP SERPL CALC-SCNC: 8 MMOL/L (ref 8–16)
AST SERPL-CCNC: 20 U/L (ref 10–40)
BILIRUB SERPL-MCNC: 0.8 MG/DL (ref 0.1–1)
BUN SERPL-MCNC: 13 MG/DL (ref 8–23)
CALCIUM SERPL-MCNC: 9.5 MG/DL (ref 8.7–10.5)
CHLORIDE SERPL-SCNC: 104 MMOL/L (ref 95–110)
CO2 SERPL-SCNC: 25 MMOL/L (ref 23–29)
CREAT SERPL-MCNC: 0.7 MG/DL (ref 0.5–1.4)
ERYTHROCYTE [DISTWIDTH] IN BLOOD BY AUTOMATED COUNT: 13.1 % (ref 11.5–14.5)
EST. GFR  (NO RACE VARIABLE): >60 ML/MIN/1.73 M^2
GLUCOSE SERPL-MCNC: 92 MG/DL (ref 70–110)
HCT VFR BLD AUTO: 45.1 % (ref 40–54)
HGB BLD-MCNC: 14.5 G/DL (ref 14–18)
MAGNESIUM SERPL-MCNC: 1.7 MG/DL (ref 1.6–2.6)
MCH RBC QN AUTO: 29.7 PG (ref 27–31)
MCHC RBC AUTO-ENTMCNC: 32.2 G/DL (ref 32–36)
MCV RBC AUTO: 92 FL (ref 82–98)
PLATELET # BLD AUTO: 307 K/UL (ref 150–450)
PMV BLD AUTO: 9.4 FL (ref 9.2–12.9)
POTASSIUM SERPL-SCNC: 4 MMOL/L (ref 3.5–5.1)
PROT SERPL-MCNC: 6.3 G/DL (ref 6–8.4)
RBC # BLD AUTO: 4.89 M/UL (ref 4.6–6.2)
SODIUM SERPL-SCNC: 137 MMOL/L (ref 136–145)
WBC # BLD AUTO: 8.87 K/UL (ref 3.9–12.7)

## 2023-05-07 PROCEDURE — 85027 COMPLETE CBC AUTOMATED: CPT | Performed by: STUDENT IN AN ORGANIZED HEALTH CARE EDUCATION/TRAINING PROGRAM

## 2023-05-07 PROCEDURE — 11000001 HC ACUTE MED/SURG PRIVATE ROOM

## 2023-05-07 PROCEDURE — 25000003 PHARM REV CODE 250: Performed by: HOSPITALIST

## 2023-05-07 PROCEDURE — 63600175 PHARM REV CODE 636 W HCPCS: Performed by: STUDENT IN AN ORGANIZED HEALTH CARE EDUCATION/TRAINING PROGRAM

## 2023-05-07 PROCEDURE — 99900035 HC TECH TIME PER 15 MIN (STAT)

## 2023-05-07 PROCEDURE — 25000003 PHARM REV CODE 250: Performed by: INTERNAL MEDICINE

## 2023-05-07 PROCEDURE — 94660 CPAP INITIATION&MGMT: CPT

## 2023-05-07 PROCEDURE — 25000003 PHARM REV CODE 250: Performed by: STUDENT IN AN ORGANIZED HEALTH CARE EDUCATION/TRAINING PROGRAM

## 2023-05-07 PROCEDURE — 99233 PR SUBSEQUENT HOSPITAL CARE,LEVL III: ICD-10-PCS | Mod: ,,, | Performed by: HOSPITALIST

## 2023-05-07 PROCEDURE — 94761 N-INVAS EAR/PLS OXIMETRY MLT: CPT

## 2023-05-07 PROCEDURE — 36415 COLL VENOUS BLD VENIPUNCTURE: CPT | Performed by: STUDENT IN AN ORGANIZED HEALTH CARE EDUCATION/TRAINING PROGRAM

## 2023-05-07 PROCEDURE — 83735 ASSAY OF MAGNESIUM: CPT | Performed by: STUDENT IN AN ORGANIZED HEALTH CARE EDUCATION/TRAINING PROGRAM

## 2023-05-07 PROCEDURE — 80053 COMPREHEN METABOLIC PANEL: CPT | Performed by: STUDENT IN AN ORGANIZED HEALTH CARE EDUCATION/TRAINING PROGRAM

## 2023-05-07 PROCEDURE — 99233 SBSQ HOSP IP/OBS HIGH 50: CPT | Mod: ,,, | Performed by: HOSPITALIST

## 2023-05-07 RX ORDER — LOSARTAN POTASSIUM 50 MG/1
100 TABLET ORAL DAILY
Status: DISCONTINUED | OUTPATIENT
Start: 2023-05-07 | End: 2023-05-09 | Stop reason: HOSPADM

## 2023-05-07 RX ORDER — HYDRALAZINE HYDROCHLORIDE 25 MG/1
25 TABLET, FILM COATED ORAL EVERY 8 HOURS
Status: DISCONTINUED | OUTPATIENT
Start: 2023-05-07 | End: 2023-05-07

## 2023-05-07 RX ORDER — LIDOCAINE 50 MG/G
1 PATCH TOPICAL
Status: DISCONTINUED | OUTPATIENT
Start: 2023-05-07 | End: 2023-05-09 | Stop reason: HOSPADM

## 2023-05-07 RX ORDER — HYDRALAZINE HYDROCHLORIDE 25 MG/1
50 TABLET, FILM COATED ORAL EVERY 8 HOURS
Status: DISCONTINUED | OUTPATIENT
Start: 2023-05-07 | End: 2023-05-09 | Stop reason: HOSPADM

## 2023-05-07 RX ADMIN — PRAVASTATIN SODIUM 20 MG: 20 TABLET ORAL at 09:05

## 2023-05-07 RX ADMIN — ENOXAPARIN SODIUM 40 MG: 40 INJECTION SUBCUTANEOUS at 05:05

## 2023-05-07 RX ADMIN — TIZANIDINE 2 MG: 2 TABLET ORAL at 09:05

## 2023-05-07 RX ADMIN — LIDOCAINE 1 PATCH: 50 PATCH CUTANEOUS at 01:05

## 2023-05-07 RX ADMIN — GABAPENTIN 300 MG: 300 CAPSULE ORAL at 02:05

## 2023-05-07 RX ADMIN — METOPROLOL SUCCINATE 100 MG: 100 TABLET, EXTENDED RELEASE ORAL at 09:05

## 2023-05-07 RX ADMIN — ACETAMINOPHEN 1000 MG: 500 TABLET ORAL at 02:05

## 2023-05-07 RX ADMIN — LIDOCAINE 1 PATCH: 50 PATCH CUTANEOUS at 02:05

## 2023-05-07 RX ADMIN — Medication 6 MG: at 09:05

## 2023-05-07 RX ADMIN — HYDRALAZINE HYDROCHLORIDE 50 MG: 25 TABLET, FILM COATED ORAL at 09:05

## 2023-05-07 RX ADMIN — LOSARTAN POTASSIUM 100 MG: 50 TABLET, FILM COATED ORAL at 04:05

## 2023-05-07 RX ADMIN — SERTRALINE HYDROCHLORIDE 50 MG: 50 TABLET ORAL at 09:05

## 2023-05-07 RX ADMIN — LEVOTHYROXINE SODIUM 25 MCG: 25 TABLET ORAL at 05:05

## 2023-05-07 RX ADMIN — GABAPENTIN 300 MG: 300 CAPSULE ORAL at 09:05

## 2023-05-07 RX ADMIN — HYDRALAZINE HYDROCHLORIDE 25 MG: 25 TABLET, FILM COATED ORAL at 02:05

## 2023-05-07 RX ADMIN — ACETAMINOPHEN 1000 MG: 500 TABLET ORAL at 05:05

## 2023-05-07 RX ADMIN — HYDRALAZINE HYDROCHLORIDE 25 MG: 25 TABLET, FILM COATED ORAL at 09:05

## 2023-05-07 RX ADMIN — ACETAMINOPHEN 1000 MG: 500 TABLET ORAL at 09:05

## 2023-05-07 NOTE — ASSESSMENT & PLAN NOTE
Blood pressure mildly elevated upon admission.  - losartan 100 mg daily  - home nebivolol not on formulary; begin metoprolol succinate 100 mg daily    5/5 SBP in 200s. IV fluids discontinued with improvement   5/7 SBP in 180s. received losartan this AM. started on Hydralazine 50 mg q 8h

## 2023-05-07 NOTE — PLAN OF CARE
Problem: Adult Inpatient Plan of Care  Goal: Optimal Comfort and Wellbeing  Outcome: Ongoing, Progressing  Goal: Readiness for Transition of Care  Outcome: Ongoing, Progressing     Problem: Adult Inpatient Plan of Care  Goal: Optimal Comfort and Wellbeing  Outcome: Ongoing, Progressing     Problem: Adult Inpatient Plan of Care  Goal: Readiness for Transition of Care  Outcome: Ongoing, Progressing         POC reviewed with patient and his daughter at the bedside. Verbalization of understanding voiced. Questions and concerns addressed. Precautions maintained. No events noted at present.  Cardiac monitoring ongoing, Vital signs all shift. See flow sheet. Zanaflex given per patient request with HS meds noted to be effective. Melatonin given per patient request for sleep. Patients 0400 blood pressure noted to be above parameters, PO Hydralazine 100 mg given per order of Dr. Lopez Shah due to IV access currenlty lost. Bed low and locked with call light within reach and bed alarm activated. Patients daughter remains at the beside. POC ongoing.

## 2023-05-07 NOTE — PROGRESS NOTES
[facility] - Neurosurgery ([facility] Medicine  Progress Note    Patient Name: Julius Baker  MRN: [id number]  Patient Class: IP- Inpatient   Admission Date: 5/4/2023  Length of Stay: 2 days  Attending Physician: Zi Rowell MD  Primary Care Provider: ANNA Thurston MD        Subjective:     Principal Problem:Rapidly progressive weakness        HPI:  Julius Baker is a 77-year-old man with a past medical history of HTN, HLD, history of alcohol use disorder, and BPH who presents to the emergency department with progressive weakness and gait instability who presents to the emergency department. The patient is accompanied by his daughter who assists in providing the history. Per report, the patient has been suffering from a progressive weakness with gait instability and several falls at home. Per family report, he has a shuffling gait. The patient says that approximately six months ago he was walking with a cane and given the progression of his weakness he now ambulates with a walker. The patient had been following with a neurosurgeon, Dr. Otoole ([facility]) for back pain. The MRI ordered by Dr. Otoole possibly showed osteomyelitis but he is not currently being treated at this time. He did, however, see his allergist who prescribed a shot of corticosteroids and a ten day course of antibiotics. The patient said it has been difficult for him to get back into clinic to see Dr. Otoole given his clinic is so busy. The patient was seen in the Neurology clinic given concern that gait instability may be secondary to Parkinson's disease. However, the provider was concerned about the MRI results and encouraged the patient to present to the emergency department for further evaluation. The patient complains of some mild back pain which he treats with pain relief patches and warm compresses. He denies any recent fever, chills or rigors. Of note, the patient and his daughter do bring up that the patient  "had a tooth extraction for an infected tooth approximately six months ago. He was reportedly placed on oral antibiotics for an "extended" period of time by his dentist. He had a tooth implant placed which subsequently fell out for unknown reasons which could not be explained by his dentist. The patient thinks he was having fevers and chills around this time. The patient denies any saddle anesthesia, bladder or bowel incontinence.    In the emergency department, the patient was noted to be bradycardic with a heart rate at 44. Other vital signs stable upon arrival. Labs were largely unremarkable. ESR and CRP within normal limits. The patient had CT of his lumbar and thoracic spine revealing inferior T7 and superior T8 endplate irregularity, consistent with area of concern on outside hospital MRI for possible discitis/osteomyelitis and inferior L2, superior L3, and inferior L4 endplate irregularity, likely representing degenerative change although osteomyelitis/discitis cannot be completely excluded. MRI lumbar spine revealing multilevel signal abnormalities at L1-2, L2-L3, L3-L4, and L4-L5, favored to represent degenerative changes with osteomyelitis/discitis not completely excluded thought to be less likely. The patient was given IV vancomycin in addition to his home blood pressure medications. He was admitted to Hospital Medicine for further management.         Overview/Hospital Course:  5/5 progressive weakness with gait instability and recurrent falls  falls at home. Imaging performed by outside neurosurgeon revealed concern for possible osteomyelitis or discitis. Inflammatory markers negative. Repeat CT imaging in the ED reveals inferior T7 and superior T8 endplate irregularity, consistent with area of concern for possible discitis/osteomyelitis. MRI lumbar spine reveals multilevel signal abnormalities at L1-2, L2-L3, L3-L4, and L4-L5, favored to represent degenerative changes with osteomyelitis/discitis tnot " completely excludded. Mild enhancement of the dura extending from the L1-L5 level suggestive of possible infectious or inflammatory process and potentially relating to chronic inflammation. Neurosurgery consulted, started on empiric  IV vancomycin and IV ceftriaxone. ESR,  procalctionin WNL . no signs and symptoms of myelopathy or cord compression.  Back pain likely with BLE radiculopathy  and  possible nerve root compression. IR consulted for biopsy of spine to confirm or rule out osteomyelitis/discitis. IR eval -no imaging evidence of discitis and with normal inflammatory markers IR feels it is very unlikely discitis .  disc biopsy is not warrented per neurosurgery . ceftriaxone and vancomycin discontinued. Neurosurgery plans for outpatient follow up in clinic with scoliosis xrays and lumbar spine flexion extension xrays. patient may require arge scale spinal fusion as outpatient. PT/OT consulted - recs SNF. Patient reluctant to go to SNF but agreed for [facility] after discussion with family   5/6 improved backpain this AM. on tylenol 1000mg q 8h, gabapentin 300mg TID, lidocaine patch and warm compressses. pending [facility] . daughter wants  referral sent to [place], [place]  5/7 SBP in 180s. received losartan this AM. started on Hydralazine 25mg q 8h .c/o right knee pain. no effusion, erythema . X ray knee.                Review of Systems:   Pain scale:    Constitutional:  fever,  chills, headache, vision loss, hearing loss, weight loss, Generalized weakness, falls, loss of smell, loss of taste, poor appetite,  sore throat, falls   Respiratory: cough, shortness of breath.   Cardiovascular: chest pain, dizziness, palpitations, orthopnea, swelling of feet, syncope  Gastrointestinal: nausea, vomiting, abdominal pain, diarrhea, black stool,  blood in stool, change in bowel habits  Genitourinary: hematuria, dysuria, urgency, frequency  Integument/Breast: rash,  pruritis  Hematologic/Lymphatic: easy  bruising, lymphadenopathy  Musculoskeletal: arthralgias- right knee pain intermittent , myalgias, back pain, neck pain, knee pain, gait problem  Neurological: confusion, seizures, tremors, slurred speech, ,weakness, numbness  Behavioral/Psych:  depression, anxiety, auditory or visual hallucinations     OBJECTIVE:     Physical Exam:  Body mass index is 32.14 kg/m².    Constitutional: Appears well-developed and well-nourished. obesity   Head: Normocephalic and atraumatic.   Neck: Normal range of motion. Neck supple.   Cardiovascular: Normal heart rate.  Regular heart rhythm.  Pulmonary/Chest: Effort normal.   Abdominal: No distension.  No tenderness  Musculoskeletal: Normal range of motion. No edema.   Neurological: Alert and oriented to person, place, and time. able to move bilateral upper and lower extremities without limitation  Skin: Skin is warm and dry.   Psychiatric: Normal mood and affect. Behavior is normal.                  Vital Signs  Temp: 97.7 °F (36.5 °C) (05/07/23 0806)  Pulse: 66 (05/07/23 1141)  Resp: 20 (05/07/23 0806)  BP: (Abnormal) 194/91 (05/07/23 1415)  SpO2: 95 % (05/07/23 0806)     24 Hour VS Range    Temp:  [97.6 °F (36.4 °C)-98.7 °F (37.1 °C)]   Pulse:  [57-66]   Resp:  [16-20]   BP: (135-196)/(66-91)   SpO2:  [95 %-98 %]     Intake/Output Summary (Last 24 hours) at 5/7/2023 1421  Last data filed at 5/6/2023 1600  Gross per 24 hour   Intake no documentation   Output 350 ml   Net -350 ml         I/O This Shift:  No intake/output data recorded.    Wt Readings from Last 3 Encounters:   05/04/23 101.6 kg (224 lb)   05/03/23 101.6 kg (224 lb)   04/27/23 104 kg (229 lb 4.5 oz)       I have personally reviewed the vitals and recorded Intake/Output     Laboratory/Diagnostic Data:    CBC/Anemia Labs: Coags:    Recent Labs   Lab 05/05/23  0210 05/06/23  0304 05/07/23  0502   WBC 10.12 8.98 8.87   HGB 14.2 13.1* 14.5   HCT 44.3 41.1 45.1    313 307   MCV 91 92 92   RDW 13.2 13.1 13.1    No  results for input(s): PT, INR, APTT in the last 168 hours.     Chemistries: ABG:   Recent Labs   Lab 05/05/23  0210 05/06/23  0304 05/07/23  0502    136 137   K 4.0 4.0 4.0    103 104   CO2 27 26 25   BUN 14 19 13   CREATININE 0.9 0.8 0.7   CALCIUM 9.5 9.3 9.5   PROT 6.5 5.8* 6.3   BILITOT 0.8 0.8 0.8   ALKPHOS 70 60 69   ALT 22 20 22   AST 20 18 20   MG 1.7 1.7 1.7    No results for input(s): PH, PCO2, PO2, HCO3, POCSATURATED, BE in the last 168 hours.     POCT Glucose: HbA1c:    No results for input(s): POCTGLUCOSE in the last 168 hours. Hemoglobin A1C   Date Value Ref Range Status   11/10/2021 5.5 4.0 - 5.6 % Final     Comment:     ADA Screening Guidelines:  5.7-6.4%  Consistent with prediabetes  >or=6.5%  Consistent with diabetes    High levels of fetal hemoglobin interfere with the HbA1C  assay. Heterozygous hemoglobin variants (HbS, HgC, etc)do  not significantly interfere with this assay.   However, presence of multiple variants may affect accuracy.     10/28/2019 5.4 4.0 - 5.6 % Final     Comment:     ADA Screening Guidelines:  5.7-6.4%  Consistent with prediabetes  >or=6.5%  Consistent with diabetes  High levels of fetal hemoglobin interfere with the HbA1C  assay. Heterozygous hemoglobin variants (HbS, HgC, etc)do  not significantly interfere with this assay.   However, presence of multiple variants may affect accuracy.     10/15/2018 5.3 4.0 - 5.6 % Final     Comment:     ADA Screening Guidelines:  5.7-6.4%  Consistent with prediabetes  >or=6.5%  Consistent with diabetes  High levels of fetal hemoglobin interfere with the HbA1C  assay. Heterozygous hemoglobin variants (HbS, HgC, etc)do  not significantly interfere with this assay.   However, presence of multiple variants may affect accuracy.          Cardiac Enzymes: Ejection Fractions:    No results for input(s): CPK, CPKMB, MB, TROPONINI in the last 72 hours. No results found for: EF       No results for input(s): COLORU, APPEARANCEUA, PHUR,  SPECGRAV, PROTEINUA, GLUCUA, KETONESU, BILIRUBINUA, OCCULTUA, NITRITE, UROBILINOGEN, LEUKOCYTESUR, RBCUA, WBCUA, BACTERIA, SQUAMEPITHEL, HYALINECASTS in the last 48 hours.    Invalid input(s): WRIGHTSUR      Procalcitonin (ng/mL)   Date Value   05/05/2023 0.03     Lactate (Lactic Acid) (mmol/L)   Date Value   05/04/2023 1.0     BNP (pg/mL)   Date Value   04/15/2017 27     CRP (mg/L)   Date Value   05/04/2023 1.1   02/03/2020 3.0     Sed Rate (mm/Hr)   Date Value   05/04/2023 22   02/03/2020 23     No results found for: DDIMER  No results found for: FERRITIN  No results found for: LDH  Troponin I (ng/mL)   Date Value   04/15/2017 <0.006     No results found for this or any previous visit.  SARS-CoV2 (COVID-19) Qualitative PCR (no units)   Date Value   08/08/2020 Not Detected       Microbiology labs for the last week  Microbiology Results (last 7 days)       Procedure Component Value Units Date/Time    Blood culture #1 **CANNOT BE ORDERED STAT** [[id number]] Collected: 05/04/23 1748    Order Status: Completed Specimen: Blood from Peripheral, Antecubital, Right Updated: 05/06/23 2212     Blood Culture, Routine No Growth to date      No Growth to date      No Growth to date    Blood culture #2 **CANNOT BE ORDERED STAT** [[id number]] Collected: 05/04/23 1751    Order Status: Completed Specimen: Blood from Peripheral, Wrist, Left Updated: 05/06/23 2212     Blood Culture, Routine No Growth to date      No Growth to date      No Growth to date            Reviewed and noted in plan where applicable- Please see chart for full lab data.    Lines/Drains:       Peripheral IV - Single Lumen 05/04/23 1757 20 G Right Antecubital (Active)   Site Assessment Clean;Dry;Intact;No redness;No swelling 05/05/23 0052   Extremity Assessment Distal to IV No abnormal discoloration 05/05/23 0052   Line Status Flushed;Saline locked 05/05/23 0052   Dressing Status Clean;Dry;Intact 05/05/23 0052   Dressing Intervention Integrity maintained 05/05/23  0052   Dressing Change Due 05/07/23 05/05/23 0052   Site Change Due 05/07/23 05/05/23 0052   Reason Not Rotated Not due 05/05/23 0052   Number of days: 0            Peripheral IV - Single Lumen 05/04/23 1757 Anterior;Distal;Right Forearm (Active)   Site Assessment Clean;Dry;Intact;No redness;No swelling 05/05/23 0052   Extremity Assessment Distal to IV No abnormal discoloration 05/05/23 0052   Line Status Flushed;Saline locked 05/05/23 0052   Dressing Status Clean;Dry;Intact 05/05/23 0052   Dressing Intervention Integrity maintained 05/05/23 0052   Dressing Change Due 05/07/23 05/05/23 0052   Site Change Due 05/07/23 05/05/23 0052   Reason Not Rotated Not due 05/05/23 0052   Number of days: 0       Imaging  ECG Results              EKG 12-lead (Edited Result - FINAL)  Result time 05/05/23 14:26:07      Edited Result - FINAL by Interface, Lab In [facility] (05/05/23 14:26:07)               Narrative:    Test Reason : R00.1,    Vent. Rate : 064 BPM     Atrial Rate : 064 BPM     P-R Int : 152 ms          QRS Dur : 090 ms      QT Int : 398 ms       P-R-T Axes : 032 033 036 degrees     QTc Int : 410 ms    Sinus rhythm with marked sinus arrythmia and PACs  Otherwise normal ECG  When compared with ECG of 01-JUN-2022 11:54,  QT has shortened  Reconfirmed by Ruiz YAN MD ([id number]) on 5/5/2023 2:25:54 PM    Referred By: [email or web address]   SELF           Confirmed By:Ruiz YAN MD                                  No results found for this or any previous visit.      MRI Lumbar Spine W WO Cont  Narrative: EXAMINATION:  MRI LUMBAR SPINE W WO CONTRAST    CLINICAL HISTORY:  Low back pain, progressive neurologic deficit;    TECHNIQUE:  Multiplanar, multisequence MR images were acquired from the thoracolumbar junction to the sacrum following the administration of 10 cc Gadavist intravenous contrast.    COMPARISON:  CT thoracic and lumbar spine 05/04/2023.    FINDINGS:  Alignment: Normal.    Vertebrae: Heterogeneous appearance of the marrow  throughout the lumbar spine.  There is additionally T1 hypointensity and STIR hyperintensity of the posterior endplate of L1, anterior inferior endplate of L2, inferior endplate of L3, superior endplate of L4 and focal anterior endplate of L4.  T1 hypointense/stir hyperintense focus of the mid vertebral body L5 lesion and L5 spinous process.  There is enhancement of the inferior endplate of L1, superior endplate of L2, inferior endplate of L2, superior endplate of L3, inferior endplate of L3, and superior endplate of L4 and of the focal anterior endplate of L4 and mid L5 vertebral body lesion.  Degenerative changes of the facet joints most pronounced at L5-S1 with enhancement and STIR hyperintensity at that level.    Discs: Multilevel disc height loss and desiccation.  There is STIR hyperintensity of the L1-L2, L2-L3, and L3-L4 discs.    Cord: The visualized distal cord demonstrates normal signal.  Conus terminates at L1.  There is no clumping of the cauda equina nerve roots.    Degenerative findings:    T12-L1: No spinal canal stenosis or neural foraminal narrowing.    L1-L2:  Circumferential disc bulge and bilateral facet arthropathy.  No spinal canal stenosis or neural foraminal narrowing.    L2-L3: Circumferential disc bulge and bilateral facet arthropathy.  Findings result in mild spinal canal stenosis and mild bilateral neural foraminal narrowing.    L3-L4: Circumferential disc bulge and bilateral facet arthropathy.  Findings result in moderate spinal canal stenosis and moderate bilateral neural foraminal narrowing.    L4-L5: Circumferential disc bulge and bilateral facet arthropathy resulting in moderate spinal canal stenosis and moderate bilateral neural foraminal narrowing.    L5-S1: Circumferential disc bulge and bilateral facet arthropathy.  Findings result in mild spinal canal stenosis and mild bilateral neural foraminal narrowing.    Paraspinal muscles & soft tissues: Epidural lipomatosis most  pronounced at L3.  There is thin epidural enhancement most pronounced along the anterior spinal canal extending from L1 to L5.  No organized fluid collection within the epidural space or within the paraspinal soft tissues.  No psoas muscle or adjacent soft tissue signal change or enhancement.  No epidural abscess.  Impression: 1. Multilevel signal abnormalities at L1-2, L2-L3, L3-L4, and L4-L5, favored to represent degenerative changes  with osteomyelitis/discitis not completely excluded thought to be less likely.  Correlate clinically with follow-up as clinically indicated.  2. No psoas muscle or adjacent soft tissue signal change or enhancement and no epidural abscess.  3. Mild enhancement of the dura extending from the L1-L5 level suggestive of possible infectious or inflammatory process and potentially relating to chronic inflammation.  Follow-up as clinically indicated.  4. Degenerative changes of the facet joints most pronounced at L5-S1 with septic arthritis thought to be less likely but not completely excluded.  Correlate clinically with follow-up as clinically indicated.  5. Additional findings, as above.  This report was flagged in Epic as abnormal.    Electronically signed by resident: Indiana De Leon  Date:    05/04/2023  Time:    22:54    Electronically signed by: Sid Gomez MD  Date:    05/05/2023  Time:    00:10      Labs, Imaging, EKG and Diagnostic results from 5/7/2023 were reviewed.    Medications:  Medication list was reviewed and changes noted under Assessment/Plan.  No current facility-administered medications on file prior to encounter.     Current Outpatient Medications on File Prior to Encounter   Medication Sig Dispense Refill    acamprosate (CAMPRAL) 333 mg tablet Take 2 tablets (666 mg total) by mouth 3 (three) times daily. 180 tablet 0    diclofenac sodium (VOLTAREN) 1 % Gel APPLY TOPICALLY TO THE AFFECTED AREA THREE TIMES DAILY AS NEEDED FOR JOINT PAIN 100 g 2    fluticasone propionate  (FLONASE) 50 mcg/actuation nasal spray 1 spray by Each Nostril route daily as needed.      levothyroxine (SYNTHROID) 25 MCG tablet TAKE 1 TABLET DAILY (FURTHER REFILLS REQUIRE VISIT WITH DOCTOR) 90 tablet 3    losartan (COZAAR) 100 MG tablet TAKE 1 TABLET(100 MG) BY MOUTH EVERY DAY 90 tablet 0    multivitamin capsule Take 1 capsule by mouth once daily.      nebivoloL (BYSTOLIC) 10 MG Tab Take 1 tablet (10 mg total) by mouth once daily. 90 tablet 3    pravastatin (PRAVACHOL) 20 MG tablet TAKE 1 TABLET EVERY EVENING 90 tablet 3    sertraline (ZOLOFT) 50 MG tablet Take 1 tablet (50 mg total) by mouth once daily. 30 tablet 1    tiZANidine (ZANAFLEX) 2 MG tablet TAKE 1 TABLET(2 MG) BY MOUTH EVERY 8 HOURS AS NEEDED FOR BACK AND LEG PAIN 30 tablet 0     Scheduled Medications:  acetaminophen, 1,000 mg, Oral, Q8H  enoxaparin, 40 mg, Subcutaneous, Daily  gabapentin, 300 mg, Oral, TID  hydrALAZINE, 25 mg, Oral, Q8H  levothyroxine, 25 mcg, Oral, Before breakfast  LIDOcaine, 1 patch, Transdermal, Q24H  LIDOcaine, 1 patch, Transdermal, Q24H  losartan, 100 mg, Oral, Daily  metoprolol succinate, 100 mg, Oral, Daily  pravastatin, 20 mg, Oral, QHS  sertraline, 50 mg, Oral, Daily      PRN: dextrose 10%, dextrose 10%, glucagon (human recombinant), hydrALAZINE, melatonin, naloxone, ondansetron, ondansetron, polyethylene glycol, simethicone, sodium chloride 0.9%, tiZANidine  Infusions:       Estimated Creatinine Clearance: 105.5 mL/min (based on SCr of 0.7 mg/dL).    Assessment/Plan:      * Progressive weakness  The patient has notably progressive weakness with gait instability that has caused recurrent falls while at home. Imaging performed by outside neurosurgeon revealed concern for possible osteomyelitis or discitis. Inflammatory markers negative. Repeat CT imaging in the ED reveals inferior T7 and superior T8 endplate irregularity, consistent with area of concern for possible discitis/osteomyelitis. MRI lumbar spine reveals  multilevel signal abnormalities at L1-2, L2-L3, L3-L4, and L4-L5, favored to represent degenerative changes with osteomyelitis/discitis thought to be less likely.   - Neurosurgery consulted, appreciate additional recommendations   - Empiric treatment for osteomyelitis with IV vancomycin and IV ceftriaxone  - f/u blood cultures  - PT/OT consulted, appreciate recommendations  5/5 progressive weakness with gait instability and recurrent falls  falls at home. Imaging performed by outside neurosurgeon revealed concern for possible osteomyelitis or discitis. Inflammatory markers negative. Repeat CT imaging in the ED reveals inferior T7 and superior T8 endplate irregularity, consistent with area of concern for possible discitis/osteomyelitis. MRI lumbar spine reveals multilevel signal abnormalities at L1-2, L2-L3, L3-L4, and L4-L5, favored to represent degenerative changes with osteomyelitis/discitis tnot completely excludded. Mild enhancement of the dura extending from the L1-L5 level suggestive of possible infectious or inflammatory process and potentially relating to chronic inflammation. Neurosurgery consulted, started on empiric  IV vancomycin and IV ceftriaxone. ESR,  procalctionin WNL . no signs and symptoms of myelopathy or cord compression.  Back pain likely with BLE radiculopathy  and  possible nerve root compression. IR consulted for biopsy of spine to confirm or rule out osteomyelitis/discitis. IR eval -no imaging evidence of discitis and with normal inflammatory markers IR feels it is very unlikely discitis .  disc biopsy is not warrented per neurosurgery . ceftriaxone and vancomycin discontinued. Neurosurgery plans for outpatient follow up in clinic with scoliosis xrays and lumbar spine flexion extension xrays. patient may require arge scale spinal fusion as outpatient. PT/OT consulted - recs SNF. Patient reluctant to go to SNF but [facility] after discussion with family     Anxiety  Resume home  sertraline.      Acquired hypothyroidism  Resume home levothyroxine.      Hyperlipidemia  Resume home pravastatin.      Essential hypertension  Blood pressure mildly elevated upon admission.  - losartan 100 mg daily  - home nebivolol not on formulary; begin metoprolol succinate 100 mg daily    5/5 SBP in 200s. IV fluids discontinued with improvement   5/7 SBP in 180s. received losartan this AM. started on Hydralazine 25mg q 8h     Chronic midline low back pain without sciatica  secondary to above.   5/6 improved backpain this AM. on tylenol 1000mg q 8h, gabapentin 300mg TID, lidocaine patch and warm compressses. pending [facility]         VTE Risk Mitigation (From admission, onward)           Ordered     enoxaparin injection 40 mg  Daily         05/04/23 2347     IP VTE HIGH RISK PATIENT  Once         05/04/23 2347     Place sequential compression device  Until discontinued         05/04/23 2347                    Discharge Planning   CAYLA: 5/8/2023     Code Status: Full Code   Is the patient medically ready for discharge?: No    Reason for patient still in hospital (select all that apply): Treatment  Discharge Plan A: Skilled Nursing Facility                  Zi Rowell MD  Department of Hospital Medicine   [facility] - Neurosurgery ([facility])

## 2023-05-07 NOTE — NURSING
Patients IV had to be removed because the IV was leaking, attempted to restart IV x 4 attempts, attempts unsuccessful, Dr. Sid Miles on call for hospital medicine notifed via secure chat, midline consult placed. POC ongoing.

## 2023-05-08 ENCOUNTER — PATIENT MESSAGE (OUTPATIENT)
Dept: NEUROLOGY | Facility: CLINIC | Age: 78
End: 2023-05-08
Payer: MEDICARE

## 2023-05-08 LAB — SARS-COV-2 RNA RESP QL NAA+PROBE: NOT DETECTED

## 2023-05-08 PROCEDURE — U0003 INFECTIOUS AGENT DETECTION BY NUCLEIC ACID (DNA OR RNA); SEVERE ACUTE RESPIRATORY SYNDROME CORONAVIRUS 2 (SARS-COV-2) (CORONAVIRUS DISEASE [COVID-19]), AMPLIFIED PROBE TECHNIQUE, MAKING USE OF HIGH THROUGHPUT TECHNOLOGIES AS DESCRIBED BY CMS-2020-01-R: HCPCS | Performed by: HOSPITALIST

## 2023-05-08 PROCEDURE — 99232 SBSQ HOSP IP/OBS MODERATE 35: CPT | Mod: ,,, | Performed by: HOSPITALIST

## 2023-05-08 PROCEDURE — 30200315 PPD INTRADERMAL TEST REV CODE 302: Performed by: HOSPITALIST

## 2023-05-08 PROCEDURE — 11000001 HC ACUTE MED/SURG PRIVATE ROOM

## 2023-05-08 PROCEDURE — 63600175 PHARM REV CODE 636 W HCPCS: Performed by: STUDENT IN AN ORGANIZED HEALTH CARE EDUCATION/TRAINING PROGRAM

## 2023-05-08 PROCEDURE — 25000003 PHARM REV CODE 250: Performed by: HOSPITALIST

## 2023-05-08 PROCEDURE — 97530 THERAPEUTIC ACTIVITIES: CPT

## 2023-05-08 PROCEDURE — 97535 SELF CARE MNGMENT TRAINING: CPT

## 2023-05-08 PROCEDURE — 25000003 PHARM REV CODE 250: Performed by: INTERNAL MEDICINE

## 2023-05-08 PROCEDURE — 25000003 PHARM REV CODE 250: Performed by: STUDENT IN AN ORGANIZED HEALTH CARE EDUCATION/TRAINING PROGRAM

## 2023-05-08 PROCEDURE — 94761 N-INVAS EAR/PLS OXIMETRY MLT: CPT

## 2023-05-08 PROCEDURE — 86580 TB INTRADERMAL TEST: CPT | Performed by: HOSPITALIST

## 2023-05-08 PROCEDURE — 99232 PR SUBSEQUENT HOSPITAL CARE,LEVL II: ICD-10-PCS | Mod: ,,, | Performed by: HOSPITALIST

## 2023-05-08 PROCEDURE — U0005 INFEC AGEN DETEC AMPLI PROBE: HCPCS | Performed by: HOSPITALIST

## 2023-05-08 RX ORDER — HYDRALAZINE HYDROCHLORIDE 50 MG/1
50 TABLET, FILM COATED ORAL EVERY 8 HOURS
Qty: 90 TABLET | Refills: 11 | Status: SHIPPED | OUTPATIENT
Start: 2023-05-08 | End: 2023-06-05 | Stop reason: SDUPTHER

## 2023-05-08 RX ORDER — TRAMADOL HYDROCHLORIDE 50 MG/1
25 TABLET ORAL EVERY 12 HOURS PRN
Qty: 7 TABLET | Refills: 0 | Status: SHIPPED | OUTPATIENT
Start: 2023-05-08 | End: 2023-05-15

## 2023-05-08 RX ADMIN — ACETAMINOPHEN 1000 MG: 500 TABLET ORAL at 06:05

## 2023-05-08 RX ADMIN — GABAPENTIN 300 MG: 300 CAPSULE ORAL at 04:05

## 2023-05-08 RX ADMIN — ACETAMINOPHEN 1000 MG: 500 TABLET ORAL at 04:05

## 2023-05-08 RX ADMIN — HYDRALAZINE HYDROCHLORIDE 50 MG: 25 TABLET, FILM COATED ORAL at 08:05

## 2023-05-08 RX ADMIN — LEVOTHYROXINE SODIUM 25 MCG: 25 TABLET ORAL at 06:05

## 2023-05-08 RX ADMIN — TUBERCULIN PURIFIED PROTEIN DERIVATIVE 5 UNITS: 5 INJECTION, SOLUTION INTRADERMAL at 04:05

## 2023-05-08 RX ADMIN — GABAPENTIN 300 MG: 300 CAPSULE ORAL at 09:05

## 2023-05-08 RX ADMIN — LIDOCAINE 1 PATCH: 50 PATCH CUTANEOUS at 04:05

## 2023-05-08 RX ADMIN — TIZANIDINE 2 MG: 2 TABLET ORAL at 08:05

## 2023-05-08 RX ADMIN — METOPROLOL SUCCINATE 100 MG: 100 TABLET, EXTENDED RELEASE ORAL at 09:05

## 2023-05-08 RX ADMIN — LOSARTAN POTASSIUM 100 MG: 50 TABLET, FILM COATED ORAL at 09:05

## 2023-05-08 RX ADMIN — PRAVASTATIN SODIUM 20 MG: 20 TABLET ORAL at 08:05

## 2023-05-08 RX ADMIN — TRAMADOL HYDROCHLORIDE 25 MG: 50 TABLET, COATED ORAL at 10:05

## 2023-05-08 RX ADMIN — HYDRALAZINE HYDROCHLORIDE 50 MG: 25 TABLET, FILM COATED ORAL at 04:05

## 2023-05-08 RX ADMIN — HYDRALAZINE HYDROCHLORIDE 50 MG: 25 TABLET, FILM COATED ORAL at 06:05

## 2023-05-08 RX ADMIN — ACETAMINOPHEN 1000 MG: 500 TABLET ORAL at 08:05

## 2023-05-08 RX ADMIN — ENOXAPARIN SODIUM 40 MG: 40 INJECTION SUBCUTANEOUS at 04:05

## 2023-05-08 RX ADMIN — GABAPENTIN 300 MG: 300 CAPSULE ORAL at 08:05

## 2023-05-08 RX ADMIN — SERTRALINE HYDROCHLORIDE 50 MG: 50 TABLET ORAL at 09:05

## 2023-05-08 RX ADMIN — Medication 6 MG: at 08:05

## 2023-05-08 NOTE — PT/OT/SLP PROGRESS
Occupational Therapy   Treatment    Name: Julius Baker  MRN: [id number]  Admitting Diagnosis:  Rapidly progressive weakness       Recommendations:     Discharge Recommendations: nursing facility, skilled  Discharge Equipment Recommendations:  none  Barriers to discharge:  None    Assessment:     Julius Baker is a 77 y.o. male with a medical diagnosis of Rapidly progressive weakness.   Pt found upright in bed and motivated to participate. Session focused on bed mobility and fx'l mobility in bathroom with subsequent hallway ambulation today using RW. Pt required SBA for bed mobility and CGA with limited endurance following standing ADLs at sink. Pt continues to display fear of falling and is not at his baseline. Pt is very motivated to participate in therapy. Pt would benefit from continued OT skilled services 3x/wk to improve daily living skills to optimize QOL.  Pt is recommended to discharge to SNF at this time.Performance deficits affecting function are weakness, gait instability, decreased upper extremity function, impaired endurance, impaired balance, impaired self care skills, pain, impaired functional mobility, decreased lower extremity function, decreased safety awareness.     Rehab Prognosis:  Good; patient would benefit from acute skilled OT services to address these deficits and reach maximum level of function.       Plan:     Patient to be seen 3 x/week to address the above listed problems via self-care/home management, therapeutic activities, therapeutic exercises  Plan of Care Expires: 06/05/23  Plan of Care Reviewed with: patient, daughter    Subjective     Chief Complaint: fear of falling   Patient/Family Comments/goals: increase strength at rehab  Pain/Comfort:  Pain Rating 1: other (see comments) (hip and R knee pain described as minimal)  Location - Side 1: Right  Location - Orientation 1: generalized  Location 1: leg  Pain Addressed 1: Reposition, Distraction, Cessation of  Activity    Objective:     Communicated with: RN prior to session.  Patient found HOB elevated with telemetry, peripheral IV upon OT entry to room.    General Precautions: Standard, fall    Orthopedic Precautions:N/A  Braces: N/A  Respiratory Status: Room air     Occupational Performance:     Bed Mobility:    Patient completed Rolling/Turning to Right with stand by assistance  Patient completed Scooting/Bridging with stand by assistance  Patient completed Supine to Sit with stand by assistance     Functional Mobility/Transfers:  Patient completed Sit <> Stand Transfer with contact guard assistance  with  rolling walker   Patient completed Bed <> Chair Transfer using Step Transfer technique with contact guard assistance with rolling walker  Functional Mobility: Pt stood and mobilized in room and into restroom for ~2 minutes of standing oral care at sink. Pt then mobilized in hallway with RW ~2 minutes total with noted endurance requesting return to room.     Activities of Daily Living:  Grooming: stand by assistance washing face and brushing teeth at sink      [facility] 6 Click ADL: 16    Treatment & Education:  Pt educated on role of occupational therapy, POC, and safety during ADLs and functional mobility. Pt and OT discussed importance of safe, continued mobility to optimize daily living skills. Pt verbalized understanding.     White board updated during session. Pt given instruction to call for medical staff/nurse for assistance.       Patient left up in chair with all lines intact, call button in reach, RN notified, and daughter present    GOALS:   Multidisciplinary Problems       Occupational Therapy Goals          Problem: Occupational Therapy    Goal Priority Disciplines Outcome Interventions   Occupational Therapy Goal     OT, PT/OT Ongoing, Progressing    Description: Goals to be met by: 6/5/23 (1 month)     Patient will increase functional independence with ADLs by performing:    UE Dressing with  Supervision.  LE Dressing with Supervision.  Grooming while standing at sink with Supervision.  Toileting from toilet with Supervision for hygiene and clothing management.   Rolling to Bilateral with [place].   Supine to sit with [place].  Step transfer with Supervision  Toilet transfer to toilet with Supervision.                         Time Tracking:     OT Date of Treatment: 05/08/23  OT Start Time: 0930  OT Stop Time: 0953  OT Total Time (min): 23 min    Billable Minutes:Self Care/Home Management 13 min  Therapeutic Activity 10 min    OT/RYANN: OT          5/8/2023

## 2023-05-08 NOTE — PROGRESS NOTES
[facility] - Neurosurgery ([facility] Medicine  Progress Note    Patient Name: Julius Baker  MRN: [id number]  Patient Class: IP- Inpatient   Admission Date: 5/4/2023  Length of Stay: 3 days  Attending Physician: Zi Rowell MD  Primary Care Provider: ANNA Thurston MD        Subjective:     Principal Problem:Rapidly progressive weakness        HPI:  Julius Baker is a 77-year-old man with a past medical history of HTN, HLD, history of alcohol use disorder, and BPH who presents to the emergency department with progressive weakness and gait instability who presents to the emergency department. The patient is accompanied by his daughter who assists in providing the history. Per report, the patient has been suffering from a progressive weakness with gait instability and several falls at home. Per family report, he has a shuffling gait. The patient says that approximately six months ago he was walking with a cane and given the progression of his weakness he now ambulates with a walker. The patient had been following with a neurosurgeon, Dr. Otoole ([facility]) for back pain. The MRI ordered by Dr. Otoole possibly showed osteomyelitis but he is not currently being treated at this time. He did, however, see his allergist who prescribed a shot of corticosteroids and a ten day course of antibiotics. The patient said it has been difficult for him to get back into clinic to see Dr. Otoole given his clinic is so busy. The patient was seen in the Neurology clinic given concern that gait instability may be secondary to Parkinson's disease. However, the provider was concerned about the MRI results and encouraged the patient to present to the emergency department for further evaluation. The patient complains of some mild back pain which he treats with pain relief patches and warm compresses. He denies any recent fever, chills or rigors. Of note, the patient and his daughter do bring up that the patient  "had a tooth extraction for an infected tooth approximately six months ago. He was reportedly placed on oral antibiotics for an "extended" period of time by his dentist. He had a tooth implant placed which subsequently fell out for unknown reasons which could not be explained by his dentist. The patient thinks he was having fevers and chills around this time. The patient denies any saddle anesthesia, bladder or bowel incontinence.    In the emergency department, the patient was noted to be bradycardic with a heart rate at 44. Other vital signs stable upon arrival. Labs were largely unremarkable. ESR and CRP within normal limits. The patient had CT of his lumbar and thoracic spine revealing inferior T7 and superior T8 endplate irregularity, consistent with area of concern on outside hospital MRI for possible discitis/osteomyelitis and inferior L2, superior L3, and inferior L4 endplate irregularity, likely representing degenerative change although osteomyelitis/discitis cannot be completely excluded. MRI lumbar spine revealing multilevel signal abnormalities at L1-2, L2-L3, L3-L4, and L4-L5, favored to represent degenerative changes with osteomyelitis/discitis not completely excluded thought to be less likely. The patient was given IV vancomycin in addition to his home blood pressure medications. He was admitted to Hospital Medicine for further management.         Overview/Hospital Course:  5/5 progressive weakness with gait instability and recurrent falls  falls at home. Imaging performed by outside neurosurgeon revealed concern for possible osteomyelitis or discitis. Inflammatory markers negative. Repeat CT imaging in the ED reveals inferior T7 and superior T8 endplate irregularity, consistent with area of concern for possible discitis/osteomyelitis. MRI lumbar spine reveals multilevel signal abnormalities at L1-2, L2-L3, L3-L4, and L4-L5, favored to represent degenerative changes with osteomyelitis/discitis tnot " completely excludded. Mild enhancement of the dura extending from the L1-L5 level suggestive of possible infectious or inflammatory process and potentially relating to chronic inflammation. Neurosurgery consulted, started on empiric  IV vancomycin and IV ceftriaxone. ESR,  procalctionin WNL . no signs and symptoms of myelopathy or cord compression.  Back pain likely with BLE radiculopathy  and  possible nerve root compression. IR consulted for biopsy of spine to confirm or rule out osteomyelitis/discitis. IR eval -no imaging evidence of discitis and with normal inflammatory markers IR feels it is very unlikely discitis .  disc biopsy is not warrented per neurosurgery . ceftriaxone and vancomycin discontinued. Neurosurgery plans for outpatient follow up in clinic with scoliosis xrays and lumbar spine flexion extension xrays. patient may require arge scale spinal fusion as outpatient. PT/OT consulted - recs SNF. Patient reluctant to go to [facility] but agreed for [facility] after discussion with family   5/6 improved backpain this AM. on tylenol 1000mg q 8h, gabapentin 300mg TID, lidocaine patch and warm compressses. pending [facility] . daughter wants  referral sent to [place] [place]  5/7 SBP in 180s. received losartan this AM. started on Hydralazine 25mg q 8h .c/o right knee pain. no effusion, erythema . X ray knee.   5/8 X ray right knee -No acute fracture or dislocation.  Tricompartmental joint space narrowing with mild marginal osteophytes.  No significant suprapatellar joint effusion.  Trace prepatellar edema, nonspecific.  SBP improved to 160s. c/o 7/10 pain back and Right knee. tramadol PRN. pending SNF                Review of Systems:   Pain scale:    Constitutional:  fever,  chills, headache, vision loss, hearing loss, weight loss, Generalized weakness, falls, loss of smell, loss of taste, poor appetite,  sore throat, falls   Respiratory: cough, shortness of breath.   Cardiovascular: chest pain,  dizziness, palpitations, orthopnea, swelling of feet, syncope  Gastrointestinal: nausea, vomiting, abdominal pain, diarrhea, black stool,  blood in stool, change in bowel habits  Genitourinary: hematuria, dysuria, urgency, frequency  Integument/Breast: rash,  pruritis  Hematologic/Lymphatic: easy bruising, lymphadenopathy  Musculoskeletal: arthralgias- right knee pain intermittent , myalgias, back pain, neck pain, knee pain, gait problem  Neurological: confusion, seizures, tremors, slurred speech, ,weakness, numbness  Behavioral/Psych:  depression, anxiety, auditory or visual hallucinations     OBJECTIVE:     Physical Exam:  Body mass index is 32.14 kg/m².    Constitutional: Appears well-developed and well-nourished. obesity   Head: Normocephalic and atraumatic.   Neck: Normal range of motion. Neck supple.   Cardiovascular: Normal heart rate.  Regular heart rhythm.  Pulmonary/Chest: Effort normal.   Abdominal: No distension.  No tenderness  Musculoskeletal: Normal range of motion. No edema.   Neurological: Alert and oriented to person, place, and time. able to move bilateral upper and lower extremities without limitation  Skin: Skin is warm and dry.   Psychiatric: Normal mood and affect. Behavior is normal.                  Vital Signs  Temp: 96.9 °F (36.1 °C) (05/08/23 1611)  Pulse: (Abnormal) 55 (05/08/23 1611)  Resp: 18 (05/08/23 1611)  BP: (Abnormal) 143/67 (05/08/23 1611)  SpO2: 95 % (05/08/23 1611)     24 Hour VS Range    Temp:  [96.4 °F (35.8 °C)-98.6 °F (37 °C)]   Pulse:  [49-63]   Resp:  [16-18]   BP: (125-168)/(59-88)   SpO2:  [93 %-95 %]   No intake or output data in the 24 hours ending 05/08/23 1648        I/O This Shift:  No intake/output data recorded.    Wt Readings from Last 3 Encounters:   05/04/23 101.6 kg (224 lb)   05/03/23 101.6 kg (224 lb)   04/27/23 104 kg (229 lb 4.5 oz)       I have personally reviewed the vitals and recorded Intake/Output     Laboratory/Diagnostic Data:    CBC/Anemia Labs:  Coags:    Recent Labs   Lab 05/05/23  0210 05/06/23  0304 05/07/23  0502   WBC 10.12 8.98 8.87   HGB 14.2 13.1* 14.5   HCT 44.3 41.1 45.1    313 307   MCV 91 92 92   RDW 13.2 13.1 13.1    No results for input(s): PT, INR, APTT in the last 168 hours.     Chemistries: ABG:   Recent Labs   Lab 05/05/23  0210 05/06/23  0304 05/07/23  0502    136 137   K 4.0 4.0 4.0    103 104   CO2 27 26 25   BUN 14 19 13   CREATININE 0.9 0.8 0.7   CALCIUM 9.5 9.3 9.5   PROT 6.5 5.8* 6.3   BILITOT 0.8 0.8 0.8   ALKPHOS 70 60 69   ALT 22 20 22   AST 20 18 20   MG 1.7 1.7 1.7    No results for input(s): PH, PCO2, PO2, HCO3, POCSATURATED, BE in the last 168 hours.     POCT Glucose: HbA1c:    No results for input(s): POCTGLUCOSE in the last 168 hours. Hemoglobin A1C   Date Value Ref Range Status   11/10/2021 5.5 4.0 - 5.6 % Final     Comment:     ADA Screening Guidelines:  5.7-6.4%  Consistent with prediabetes  >or=6.5%  Consistent with diabetes    High levels of fetal hemoglobin interfere with the HbA1C  assay. Heterozygous hemoglobin variants (HbS, HgC, etc)do  not significantly interfere with this assay.   However, presence of multiple variants may affect accuracy.     10/28/2019 5.4 4.0 - 5.6 % Final     Comment:     ADA Screening Guidelines:  5.7-6.4%  Consistent with prediabetes  >or=6.5%  Consistent with diabetes  High levels of fetal hemoglobin interfere with the HbA1C  assay. Heterozygous hemoglobin variants (HbS, HgC, etc)do  not significantly interfere with this assay.   However, presence of multiple variants may affect accuracy.     10/15/2018 5.3 4.0 - 5.6 % Final     Comment:     ADA Screening Guidelines:  5.7-6.4%  Consistent with prediabetes  >or=6.5%  Consistent with diabetes  High levels of fetal hemoglobin interfere with the HbA1C  assay. Heterozygous hemoglobin variants (HbS, HgC, etc)do  not significantly interfere with this assay.   However, presence of multiple variants may affect accuracy.           Cardiac Enzymes: Ejection Fractions:    No results for input(s): CPK, CPKMB, MB, TROPONINI in the last 72 hours. No results found for: EF       No results for input(s): COLORU, APPEARANCEUA, PHUR, SPECGRAV, PROTEINUA, GLUCUA, KETONESU, BILIRUBINUA, OCCULTUA, NITRITE, UROBILINOGEN, LEUKOCYTESUR, RBCUA, WBCUA, BACTERIA, SQUAMEPITHEL, HYALINECASTS in the last 48 hours.    Invalid input(s): WRIGHTSUR      Procalcitonin (ng/mL)   Date Value   05/05/2023 0.03     Lactate (Lactic Acid) (mmol/L)   Date Value   05/04/2023 1.0     BNP (pg/mL)   Date Value   04/15/2017 27     CRP (mg/L)   Date Value   05/04/2023 1.1   02/03/2020 3.0     Sed Rate (mm/Hr)   Date Value   05/04/2023 22   02/03/2020 23     No results found for: DDIMER  No results found for: FERRITIN  No results found for: LDH  Troponin I (ng/mL)   Date Value   04/15/2017 <0.006     No results found for this or any previous visit.  SARS-CoV2 (COVID-19) Qualitative PCR (no units)   Date Value   08/08/2020 Not Detected       Microbiology labs for the last week  Microbiology Results (last 7 days)       Procedure Component Value Units Date/Time    Blood culture #2 **CANNOT BE ORDERED STAT** [[id number]] Collected: 05/04/23 1751    Order Status: Completed Specimen: Blood from Peripheral, Wrist, Left Updated: 05/07/23 2212     Blood Culture, Routine No Growth to date      No Growth to date      No Growth to date      No Growth to date    Blood culture #1 **CANNOT BE ORDERED STAT** [[id number]] Collected: 05/04/23 1748    Order Status: Completed Specimen: Blood from Peripheral, Antecubital, Right Updated: 05/07/23 2212     Blood Culture, Routine No Growth to date      No Growth to date      No Growth to date      No Growth to date            Reviewed and noted in plan where applicable- Please see chart for full lab data.    Lines/Drains:       Peripheral IV - Single Lumen 05/04/23 1757 20 G Right Antecubital (Active)   Site Assessment Clean;Dry;Intact;No redness;No  swelling 05/05/23 0052   Extremity Assessment Distal to IV No abnormal discoloration 05/05/23 0052   Line Status Flushed;Saline locked 05/05/23 0052   Dressing Status Clean;Dry;Intact 05/05/23 0052   Dressing Intervention Integrity maintained 05/05/23 0052   Dressing Change Due 05/07/23 05/05/23 0052   Site Change Due 05/07/23 05/05/23 0052   Reason Not Rotated Not due 05/05/23 0052   Number of days: 0            Peripheral IV - Single Lumen 05/04/23 1757 Anterior;Distal;Right Forearm (Active)   Site Assessment Clean;Dry;Intact;No redness;No swelling 05/05/23 0052   Extremity Assessment Distal to IV No abnormal discoloration 05/05/23 0052   Line Status Flushed;Saline locked 05/05/23 0052   Dressing Status Clean;Dry;Intact 05/05/23 0052   Dressing Intervention Integrity maintained 05/05/23 0052   Dressing Change Due 05/07/23 05/05/23 0052   Site Change Due 05/07/23 05/05/23 0052   Reason Not Rotated Not due 05/05/23 0052   Number of days: 0       Imaging  ECG Results              EKG 12-lead (Edited Result - FINAL)  Result time 05/05/23 14:26:07      Edited Result - FINAL by Interface, Lab In [facility] (05/05/23 14:26:07)               Narrative:    Test Reason : R00.1,    Vent. Rate : 064 BPM     Atrial Rate : 064 BPM     P-R Int : 152 ms          QRS Dur : 090 ms      QT Int : 398 ms       P-R-T Axes : 032 033 036 degrees     QTc Int : 410 ms    Sinus rhythm with marked sinus arrythmia and PACs  Otherwise normal ECG  When compared with ECG of 01-JUN-2022 11:54,  QT has shortened  Reconfirmed by Ruiz YAN MD ([id number]) on 5/5/2023 2:25:54 PM    Referred By: [email or web address]   SELF           Confirmed By:Ruiz YAN MD                                  No results found for this or any previous visit.      X-Ray Chest AP Portable  Narrative: EXAMINATION:  XR CHEST AP PORTABLE    CLINICAL HISTORY:  nursing home;    TECHNIQUE:  Single frontal view of the chest was performed.    COMPARISON:  No 11/10/2021 ne    FINDINGS:  Heart size  normal.  The lungs are clear.  No pleural effusion  Impression: See above    Electronically signed by: Abdiel Chavarria MD  Date:    05/08/2023  Time:    15:35      Labs, Imaging, EKG and Diagnostic results from 5/8/2023 were reviewed.    Medications:  Medication list was reviewed and changes noted under Assessment/Plan.  No current facility-administered medications on file prior to encounter.     Current Outpatient Medications on File Prior to Encounter   Medication Sig Dispense Refill    acamprosate (CAMPRAL) 333 mg tablet Take 2 tablets (666 mg total) by mouth 3 (three) times daily. 180 tablet 0    diclofenac sodium (VOLTAREN) 1 % Gel APPLY TOPICALLY TO THE AFFECTED AREA THREE TIMES DAILY AS NEEDED FOR JOINT PAIN 100 g 2    fluticasone propionate (FLONASE) 50 mcg/actuation nasal spray 1 spray by Each Nostril route daily as needed.      levothyroxine (SYNTHROID) 25 MCG tablet TAKE 1 TABLET DAILY (FURTHER REFILLS REQUIRE VISIT WITH DOCTOR) 90 tablet 3    losartan (COZAAR) 100 MG tablet TAKE 1 TABLET(100 MG) BY MOUTH EVERY DAY 90 tablet 0    multivitamin capsule Take 1 capsule by mouth once daily.      nebivoloL (BYSTOLIC) 10 MG Tab Take 1 tablet (10 mg total) by mouth once daily. 90 tablet 3    pravastatin (PRAVACHOL) 20 MG tablet TAKE 1 TABLET EVERY EVENING 90 tablet 3    sertraline (ZOLOFT) 50 MG tablet Take 1 tablet (50 mg total) by mouth once daily. 30 tablet 1    tiZANidine (ZANAFLEX) 2 MG tablet TAKE 1 TABLET(2 MG) BY MOUTH EVERY 8 HOURS AS NEEDED FOR BACK AND LEG PAIN 30 tablet 0     Scheduled Medications:  acetaminophen, 1,000 mg, Oral, Q8H  enoxaparin, 40 mg, Subcutaneous, Daily  gabapentin, 300 mg, Oral, TID  hydrALAZINE, 50 mg, Oral, Q8H  levothyroxine, 25 mcg, Oral, Before breakfast  LIDOcaine, 1 patch, Transdermal, Q24H  LIDOcaine, 1 patch, Transdermal, Q24H  losartan, 100 mg, Oral, Daily  metoprolol succinate, 100 mg, Oral, Daily  pravastatin, 20 mg, Oral, QHS  sertraline, 50 mg, Oral, Daily      PRN:  dextrose 10%, dextrose 10%, glucagon (human recombinant), hydrALAZINE, melatonin, naloxone, ondansetron, ondansetron, polyethylene glycol, simethicone, sodium chloride 0.9%, tiZANidine, traMADoL  Infusions:       Estimated Creatinine Clearance: 105.5 mL/min (based on SCr of 0.7 mg/dL).    Assessment/Plan:      * Progressive weakness  The patient has notably progressive weakness with gait instability that has caused recurrent falls while at home. Imaging performed by outside neurosurgeon revealed concern for possible osteomyelitis or discitis. Inflammatory markers negative. Repeat CT imaging in the ED reveals inferior T7 and superior T8 endplate irregularity, consistent with area of concern for possible discitis/osteomyelitis. MRI lumbar spine reveals multilevel signal abnormalities at L1-2, L2-L3, L3-L4, and L4-L5, favored to represent degenerative changes with osteomyelitis/discitis thought to be less likely.   - Neurosurgery consulted, appreciate additional recommendations   - Empiric treatment for osteomyelitis with IV vancomycin and IV ceftriaxone  - f/u blood cultures  - PT/OT consulted, appreciate recommendations  5/5 progressive weakness with gait instability and recurrent falls  falls at home. Imaging performed by outside neurosurgeon revealed concern for possible osteomyelitis or discitis. Inflammatory markers negative. Repeat CT imaging in the ED reveals inferior T7 and superior T8 endplate irregularity, consistent with area of concern for possible discitis/osteomyelitis. MRI lumbar spine reveals multilevel signal abnormalities at L1-2, L2-L3, L3-L4, and L4-L5, favored to represent degenerative changes with osteomyelitis/discitis tnot completely excludded. Mild enhancement of the dura extending from the L1-L5 level suggestive of possible infectious or inflammatory process and potentially relating to chronic inflammation. Neurosurgery consulted, started on empiric  IV vancomycin and IV ceftriaxone. ESR,   procalctionin WNL . no signs and symptoms of myelopathy or cord compression.  Back pain likely with BLE radiculopathy  and  possible nerve root compression. IR consulted for biopsy of spine to confirm or rule out osteomyelitis/discitis. IR eval -no imaging evidence of discitis and with normal inflammatory markers IR feels it is very unlikely discitis .  disc biopsy is not warrented per neurosurgery . ceftriaxone and vancomycin discontinued. Neurosurgery plans for outpatient follow up in clinic with scoliosis xrays and lumbar spine flexion extension xrays. patient may require arge scale spinal fusion as outpatient. PT/OT consulted - recs SNF. Patient reluctant to go to SNF but agreed for [facility] after discussion with family   5/8 X ray right knee -No acute fracture or dislocation.  Tricompartmental joint space narrowing with mild marginal osteophytes.  No significant suprapatellar joint effusion.  Trace prepatellar edema, nonspecific.  SBP improved to 160s. c/o 7/10 pain back and Right knee. tramadol PRN. pending SNF    Anxiety  Resume home sertraline.      Acquired hypothyroidism  Resume home levothyroxine.      Hyperlipidemia  Resume home pravastatin.      Essential hypertension  Blood pressure mildly elevated upon admission.  - losartan 100 mg daily  - home nebivolol not on formulary; begin metoprolol succinate 100 mg daily    5/5 SBP in 200s. IV fluids discontinued with improvement   5/7 SBP in 180s. received losartan this AM. started on Hydralazine 25mg q 8h     Chronic midline low back pain without sciatica  secondary to above.   5/6 improved backpain this AM. on tylenol 1000mg q 8h, gabapentin 300mg TID, lidocaine patch and warm compressses. pending [facility]   5/8  c/o 7/10 pain back and Right knee. tramadol PRN. pending [facility]        VTE Risk Mitigation (From admission, onward)           Ordered     enoxaparin injection 40 mg  Daily         05/04/23 9357     IP VTE HIGH RISK PATIENT  Once         05/04/23  2347     Place sequential compression device  Until discontinued         05/04/23 2347                    Discharge Planning   CAYLA: 5/9/2023     Code Status: Full Code   Is the patient medically ready for discharge?: No    Reason for patient still in hospital (select all that apply): Treatment  Discharge Plan A: Skilled Nursing Facility   Discharge Delays: None known at this time              Zi Rowell MD  Department of Hospital Medicine   [facility] - Neurosurgery ([facility])

## 2023-05-08 NOTE — PLAN OF CARE
Patient accepted by [facility].  Passr/142 sent via carport.  Plan for discharge tomorrow.  Pending covid, chest and ppd.   05/08/23 1411   Post-Acute Status   Post-Acute Authorization Placement   Post-Acute Placement Status Pending Bed Availability

## 2023-05-08 NOTE — PLAN OF CARE
[facility] completed LOCET via phone. [facility] faxed PASSR to obtain the 142 for NH admission.    BYRON Stinson  [phone]

## 2023-05-08 NOTE — ASSESSMENT & PLAN NOTE
The patient has notably progressive weakness with gait instability that has caused recurrent falls while at home. Imaging performed by outside neurosurgeon revealed concern for possible osteomyelitis or discitis. Inflammatory markers negative. Repeat CT imaging in the ED reveals inferior T7 and superior T8 endplate irregularity, consistent with area of concern for possible discitis/osteomyelitis. MRI lumbar spine reveals multilevel signal abnormalities at L1-2, L2-L3, L3-L4, and L4-L5, favored to represent degenerative changes with osteomyelitis/discitis thought to be less likely.   - Neurosurgery consulted, appreciate additional recommendations   - Empiric treatment for osteomyelitis with IV vancomycin and IV ceftriaxone  - f/u blood cultures  - PT/OT consulted, appreciate recommendations  5/5 progressive weakness with gait instability and recurrent falls  falls at home. Imaging performed by outside neurosurgeon revealed concern for possible osteomyelitis or discitis. Inflammatory markers negative. Repeat CT imaging in the ED reveals inferior T7 and superior T8 endplate irregularity, consistent with area of concern for possible discitis/osteomyelitis. MRI lumbar spine reveals multilevel signal abnormalities at L1-2, L2-L3, L3-L4, and L4-L5, favored to represent degenerative changes with osteomyelitis/discitis tnot completely excludded. Mild enhancement of the dura extending from the L1-L5 level suggestive of possible infectious or inflammatory process and potentially relating to chronic inflammation. Neurosurgery consulted, started on empiric  IV vancomycin and IV ceftriaxone. ESR,  procalctionin WNL . no signs and symptoms of myelopathy or cord compression.  Back pain likely with BLE radiculopathy  and  possible nerve root compression. IR consulted for biopsy of spine to confirm or rule out osteomyelitis/discitis. IR eval -no imaging evidence of discitis and with normal inflammatory markers IR feels it is very  unlikely discitis .  disc biopsy is not warrented per neurosurgery . ceftriaxone and vancomycin discontinued. Neurosurgery plans for outpatient follow up in clinic with scoliosis xrays and lumbar spine flexion extension xrays. patient may require arge scale spinal fusion as outpatient. PT/OT consulted - recs SNF. Patient reluctant to go to SNF but agreed for [facility] after discussion with family   5/8 X ray right knee -No acute fracture or dislocation.  Tricompartmental joint space narrowing with mild marginal osteophytes.  No significant suprapatellar joint effusion.  Trace prepatellar edema, nonspecific.  SBP improved to 160s. c/o 7/10 pain back and Right knee. tramadol PRN.   5/9 dischage to SNF today with Neurosurgery follow up

## 2023-05-09 VITALS
HEART RATE: 58 BPM | WEIGHT: 224 LBS | DIASTOLIC BLOOD PRESSURE: 83 MMHG | OXYGEN SATURATION: 94 % | RESPIRATION RATE: 18 BRPM | HEIGHT: 70 IN | TEMPERATURE: 97 F | BODY MASS INDEX: 32.07 KG/M2 | SYSTOLIC BLOOD PRESSURE: 150 MMHG

## 2023-05-09 LAB
ALBUMIN SERPL BCP-MCNC: 3.8 G/DL (ref 3.5–5.2)
ALP SERPL-CCNC: 63 U/L (ref 55–135)
ALT SERPL W/O P-5'-P-CCNC: 40 U/L (ref 10–44)
ANION GAP SERPL CALC-SCNC: 9 MMOL/L (ref 8–16)
AST SERPL-CCNC: 36 U/L (ref 10–40)
BACTERIA BLD CULT: NORMAL
BACTERIA BLD CULT: NORMAL
BASOPHILS # BLD AUTO: 0.05 K/UL (ref 0–0.2)
BASOPHILS NFR BLD: 0.5 % (ref 0–1.9)
BILIRUB DIRECT SERPL-MCNC: 0.4 MG/DL (ref 0.1–0.3)
BILIRUB SERPL-MCNC: 1.2 MG/DL (ref 0.1–1)
BUN SERPL-MCNC: 22 MG/DL (ref 8–23)
CALCIUM SERPL-MCNC: 9.6 MG/DL (ref 8.7–10.5)
CHLORIDE SERPL-SCNC: 100 MMOL/L (ref 95–110)
CO2 SERPL-SCNC: 27 MMOL/L (ref 23–29)
CREAT SERPL-MCNC: 1 MG/DL (ref 0.5–1.4)
DIFFERENTIAL METHOD: ABNORMAL
EOSINOPHIL # BLD AUTO: 0.3 K/UL (ref 0–0.5)
EOSINOPHIL NFR BLD: 3.3 % (ref 0–8)
ERYTHROCYTE [DISTWIDTH] IN BLOOD BY AUTOMATED COUNT: 13.5 % (ref 11.5–14.5)
EST. GFR  (NO RACE VARIABLE): >60 ML/MIN/1.73 M^2
GLUCOSE SERPL-MCNC: 81 MG/DL (ref 70–110)
HCT VFR BLD AUTO: 42.8 % (ref 40–54)
HGB BLD-MCNC: 14.9 G/DL (ref 14–18)
IMM GRANULOCYTES # BLD AUTO: 0.08 K/UL (ref 0–0.04)
IMM GRANULOCYTES NFR BLD AUTO: 0.8 % (ref 0–0.5)
LYMPHOCYTES # BLD AUTO: 2.5 K/UL (ref 1–4.8)
LYMPHOCYTES NFR BLD: 26 % (ref 18–48)
MAGNESIUM SERPL-MCNC: 1.8 MG/DL (ref 1.6–2.6)
MCH RBC QN AUTO: 31.4 PG (ref 27–31)
MCHC RBC AUTO-ENTMCNC: 34.8 G/DL (ref 32–36)
MCV RBC AUTO: 90 FL (ref 82–98)
MONOCYTES # BLD AUTO: 1.1 K/UL (ref 0.3–1)
MONOCYTES NFR BLD: 11.1 % (ref 4–15)
NEUTROPHILS # BLD AUTO: 5.5 K/UL (ref 1.8–7.7)
NEUTROPHILS NFR BLD: 58.3 % (ref 38–73)
NRBC BLD-RTO: 0 /100 WBC
PLATELET # BLD AUTO: 292 K/UL (ref 150–450)
PMV BLD AUTO: 9.3 FL (ref 9.2–12.9)
POTASSIUM SERPL-SCNC: 4.1 MMOL/L (ref 3.5–5.1)
PROT SERPL-MCNC: 6.6 G/DL (ref 6–8.4)
RBC # BLD AUTO: 4.75 M/UL (ref 4.6–6.2)
SODIUM SERPL-SCNC: 136 MMOL/L (ref 136–145)
WBC # BLD AUTO: 9.45 K/UL (ref 3.9–12.7)

## 2023-05-09 PROCEDURE — 99239 PR HOSPITAL DISCHARGE DAY,>30 MIN: ICD-10-PCS | Mod: ,,, | Performed by: HOSPITALIST

## 2023-05-09 PROCEDURE — 99239 HOSP IP/OBS DSCHRG MGMT >30: CPT | Mod: ,,, | Performed by: HOSPITALIST

## 2023-05-09 PROCEDURE — 83735 ASSAY OF MAGNESIUM: CPT | Performed by: HOSPITALIST

## 2023-05-09 PROCEDURE — 36415 COLL VENOUS BLD VENIPUNCTURE: CPT | Performed by: HOSPITALIST

## 2023-05-09 PROCEDURE — 80053 COMPREHEN METABOLIC PANEL: CPT | Performed by: HOSPITALIST

## 2023-05-09 PROCEDURE — 99900035 HC TECH TIME PER 15 MIN (STAT)

## 2023-05-09 PROCEDURE — 25000003 PHARM REV CODE 250: Performed by: INTERNAL MEDICINE

## 2023-05-09 PROCEDURE — 25000003 PHARM REV CODE 250: Performed by: STUDENT IN AN ORGANIZED HEALTH CARE EDUCATION/TRAINING PROGRAM

## 2023-05-09 PROCEDURE — 25000003 PHARM REV CODE 250: Performed by: HOSPITALIST

## 2023-05-09 PROCEDURE — 94761 N-INVAS EAR/PLS OXIMETRY MLT: CPT

## 2023-05-09 PROCEDURE — 82248 BILIRUBIN DIRECT: CPT | Performed by: HOSPITALIST

## 2023-05-09 PROCEDURE — 85025 COMPLETE CBC W/AUTO DIFF WBC: CPT | Performed by: HOSPITALIST

## 2023-05-09 RX ADMIN — GABAPENTIN 300 MG: 300 CAPSULE ORAL at 08:05

## 2023-05-09 RX ADMIN — TIZANIDINE 2 MG: 2 TABLET ORAL at 09:05

## 2023-05-09 RX ADMIN — LEVOTHYROXINE SODIUM 25 MCG: 25 TABLET ORAL at 06:05

## 2023-05-09 RX ADMIN — SERTRALINE HYDROCHLORIDE 50 MG: 50 TABLET ORAL at 08:05

## 2023-05-09 RX ADMIN — METOPROLOL SUCCINATE 100 MG: 100 TABLET, EXTENDED RELEASE ORAL at 08:05

## 2023-05-09 RX ADMIN — TRAMADOL HYDROCHLORIDE 25 MG: 50 TABLET, COATED ORAL at 10:05

## 2023-05-09 RX ADMIN — LOSARTAN POTASSIUM 100 MG: 50 TABLET, FILM COATED ORAL at 08:05

## 2023-05-09 RX ADMIN — ACETAMINOPHEN 1000 MG: 500 TABLET ORAL at 06:05

## 2023-05-09 RX ADMIN — HYDRALAZINE HYDROCHLORIDE 50 MG: 25 TABLET, FILM COATED ORAL at 06:05

## 2023-05-09 NOTE — NURSING
10:56 AM: Patient is being discharged and transferred to [facility]. Full report given to DOTTIE Arellano. DOTTIE Arellano verbalized an understanding of the report. Informed DOTTIE Arellano to call this writer if she has any additional questions.

## 2023-05-09 NOTE — PROGRESS NOTES
[facility] - Neurosurgery ([facility] Medicine  Progress Note    Patient Name: Julius Baker  MRN: [id number]  Patient Class: IP- Inpatient   Admission Date: 5/4/2023  Length of Stay: 4 days  Attending Physician: Zi Rowell MD  Primary Care Provider: ANNA Thurston MD        Subjective:     Principal Problem:Rapidly progressive weakness        HPI:  Julius Baker is a 77-year-old man with a past medical history of HTN, HLD, history of alcohol use disorder, and BPH who presents to the emergency department with progressive weakness and gait instability who presents to the emergency department. The patient is accompanied by his daughter who assists in providing the history. Per report, the patient has been suffering from a progressive weakness with gait instability and several falls at home. Per family report, he has a shuffling gait. The patient says that approximately six months ago he was walking with a cane and given the progression of his weakness he now ambulates with a walker. The patient had been following with a neurosurgeon, Dr. Otoole ([facility]) for back pain. The MRI ordered by Dr. Otoole possibly showed osteomyelitis but he is not currently being treated at this time. He did, however, see his allergist who prescribed a shot of corticosteroids and a ten day course of antibiotics. The patient said it has been difficult for him to get back into clinic to see Dr. Otoole given his clinic is so busy. The patient was seen in the Neurology clinic given concern that gait instability may be secondary to Parkinson's disease. However, the provider was concerned about the MRI results and encouraged the patient to present to the emergency department for further evaluation. The patient complains of some mild back pain which he treats with pain relief patches and warm compresses. He denies any recent fever, chills or rigors. Of note, the patient and his daughter do bring up that the patient  "had a tooth extraction for an infected tooth approximately six months ago. He was reportedly placed on oral antibiotics for an "extended" period of time by his dentist. He had a tooth implant placed which subsequently fell out for unknown reasons which could not be explained by his dentist. The patient thinks he was having fevers and chills around this time. The patient denies any saddle anesthesia, bladder or bowel incontinence.    In the emergency department, the patient was noted to be bradycardic with a heart rate at 44. Other vital signs stable upon arrival. Labs were largely unremarkable. ESR and CRP within normal limits. The patient had CT of his lumbar and thoracic spine revealing inferior T7 and superior T8 endplate irregularity, consistent with area of concern on outside hospital MRI for possible discitis/osteomyelitis and inferior L2, superior L3, and inferior L4 endplate irregularity, likely representing degenerative change although osteomyelitis/discitis cannot be completely excluded. MRI lumbar spine revealing multilevel signal abnormalities at L1-2, L2-L3, L3-L4, and L4-L5, favored to represent degenerative changes with osteomyelitis/discitis not completely excluded thought to be less likely. The patient was given IV vancomycin in addition to his home blood pressure medications. He was admitted to Hospital Medicine for further management.         Overview/Hospital Course:  5/5 progressive weakness with gait instability and recurrent falls  falls at home. Imaging performed by outside neurosurgeon revealed concern for possible osteomyelitis or discitis. Inflammatory markers negative. Repeat CT imaging in the ED reveals inferior T7 and superior T8 endplate irregularity, consistent with area of concern for possible discitis/osteomyelitis. MRI lumbar spine reveals multilevel signal abnormalities at L1-2, L2-L3, L3-L4, and L4-L5, favored to represent degenerative changes with osteomyelitis/discitis tnot " completely excludded. Mild enhancement of the dura extending from the L1-L5 level suggestive of possible infectious or inflammatory process and potentially relating to chronic inflammation. Neurosurgery consulted, started on empiric  IV vancomycin and IV ceftriaxone. ESR,  procalctionin WNL . no signs and symptoms of myelopathy or cord compression.  Back pain likely with BLE radiculopathy  and  possible nerve root compression. IR consulted for biopsy of spine to confirm or rule out osteomyelitis/discitis. IR eval -no imaging evidence of discitis and with normal inflammatory markers IR feels it is very unlikely discitis .  disc biopsy is not warrented per neurosurgery . ceftriaxone and vancomycin discontinued. Neurosurgery plans for outpatient follow up in clinic with scoliosis xrays and lumbar spine flexion extension xrays. patient may require arge scale spinal fusion as outpatient. PT/OT consulted - recs SNF. Patient reluctant to go to SNF but agreed for [facility] after discussion with family   5/6 improved backpain this AM. on tylenol 1000mg q 8h, gabapentin 300mg TID, lidocaine patch and warm compressses. pending [facility] . daughter wants  referral sent to [place], [place]  5/7 SBP in 180s. received losartan this AM. started on Hydralazine 25mg q 8h .c/o right knee pain. no effusion, erythema . X ray knee.   5/8 X ray right knee -No acute fracture or dislocation.  Tricompartmental joint space narrowing with mild marginal osteophytes.  No significant suprapatellar joint effusion.  Trace prepatellar edema, nonspecific.  SBP improved to 160s. c/o 7/10 pain back and Right knee. tramadol PRN.   5/9 dischage to SNF today with Neurosurgery follow up                Review of Systems:   Pain scale:    Constitutional:  fever,  chills, headache, vision loss, hearing loss, weight loss, Generalized weakness, falls, loss of smell, loss of taste, poor appetite,  sore throat, falls   Respiratory: cough,  shortness of breath.   Cardiovascular: chest pain, dizziness, palpitations, orthopnea, swelling of feet, syncope  Gastrointestinal: nausea, vomiting, abdominal pain, diarrhea, black stool,  blood in stool, change in bowel habits  Genitourinary: hematuria, dysuria, urgency, frequency  Integument/Breast: rash,  pruritis  Hematologic/Lymphatic: easy bruising, lymphadenopathy  Musculoskeletal: arthralgias- right knee pain intermittent , myalgias, back pain, neck pain, knee pain, gait problem  Neurological: confusion, seizures, tremors, slurred speech, ,weakness, numbness  Behavioral/Psych:  depression, anxiety, auditory or visual hallucinations     OBJECTIVE:     Physical Exam:  Body mass index is 32.14 kg/m².    Constitutional: Appears well-developed and well-nourished. obesity   Head: Normocephalic and atraumatic.   Neck: Normal range of motion. Neck supple.   Cardiovascular: Normal heart rate.  Regular heart rhythm.  Pulmonary/Chest: Effort normal.   Abdominal: No distension.  No tenderness  Musculoskeletal: Normal range of motion. No edema.   Neurological: Alert and oriented to person, place, and time. able to move bilateral upper and lower extremities without limitation  Skin: Skin is warm and dry.   Psychiatric: Normal mood and affect. Behavior is normal.                  Vital Signs  Temp: 97.6 °F (36.4 °C) (05/09/23 0435)  Pulse: (Abnormal) 54 (05/09/23 0505)  Resp: 16 (05/09/23 0505)  BP: (Abnormal) 188/79 (05/09/23 0435)  SpO2: 97 % (05/09/23 0505)     24 Hour VS Range    Temp:  [96.4 °F (35.8 °C)-98.7 °F (37.1 °C)]   Pulse:  [49-64]   Resp:  [16-18]   BP: (114-188)/(57-88)   SpO2:  [92 %-97 %]     Intake/Output Summary (Last 24 hours) at 5/9/2023 0704  Last data filed at 5/9/2023 0558  Gross per 24 hour   Intake no documentation   Output 200 ml   Net -200 ml           I/O This Shift:  No intake/output data recorded.    Wt Readings from Last 3 Encounters:   05/04/23 101.6 kg (224 lb)   05/03/23 101.6 kg (224  lb)   04/27/23 104 kg (229 lb 4.5 oz)       I have personally reviewed the vitals and recorded Intake/Output     Laboratory/Diagnostic Data:    CBC/Anemia Labs: Coags:    Recent Labs   Lab 05/06/23 0304 05/07/23  0502 05/09/23  0301   WBC 8.98 8.87 9.45   HGB 13.1* 14.5 14.9   HCT 41.1 45.1 42.8    307 292   MCV 92 92 90   RDW 13.1 13.1 13.5    No results for input(s): PT, INR, APTT in the last 168 hours.     Chemistries: ABG:   Recent Labs   Lab 05/06/23 0304 05/07/23  0502 05/09/23  0301    137 136   K 4.0 4.0 4.1    104 100   CO2 26 25 27   BUN 19 13 22   CREATININE 0.8 0.7 1.0   CALCIUM 9.3 9.5 9.6   PROT 5.8* 6.3 6.6   BILITOT 0.8 0.8 1.2*   ALKPHOS 60 69 63   ALT 20 22 40   AST 18 20 36   MG 1.7 1.7 1.8    No results for input(s): PH, PCO2, PO2, HCO3, POCSATURATED, BE in the last 168 hours.     POCT Glucose: HbA1c:    No results for input(s): POCTGLUCOSE in the last 168 hours. Hemoglobin A1C   Date Value Ref Range Status   11/10/2021 5.5 4.0 - 5.6 % Final     Comment:     ADA Screening Guidelines:  5.7-6.4%  Consistent with prediabetes  >or=6.5%  Consistent with diabetes    High levels of fetal hemoglobin interfere with the HbA1C  assay. Heterozygous hemoglobin variants (HbS, HgC, etc)do  not significantly interfere with this assay.   However, presence of multiple variants may affect accuracy.     10/28/2019 5.4 4.0 - 5.6 % Final     Comment:     ADA Screening Guidelines:  5.7-6.4%  Consistent with prediabetes  >or=6.5%  Consistent with diabetes  High levels of fetal hemoglobin interfere with the HbA1C  assay. Heterozygous hemoglobin variants (HbS, HgC, etc)do  not significantly interfere with this assay.   However, presence of multiple variants may affect accuracy.     10/15/2018 5.3 4.0 - 5.6 % Final     Comment:     ADA Screening Guidelines:  5.7-6.4%  Consistent with prediabetes  >or=6.5%  Consistent with diabetes  High levels of fetal hemoglobin interfere with the HbA1C  assay.  Heterozygous hemoglobin variants (HbS, HgC, etc)do  not significantly interfere with this assay.   However, presence of multiple variants may affect accuracy.          Cardiac Enzymes: Ejection Fractions:    No results for input(s): CPK, CPKMB, MB, TROPONINI in the last 72 hours. No results found for: EF       No results for input(s): COLORU, APPEARANCEUA, PHUR, SPECGRAV, PROTEINUA, GLUCUA, KETONESU, BILIRUBINUA, OCCULTUA, NITRITE, UROBILINOGEN, LEUKOCYTESUR, RBCUA, WBCUA, BACTERIA, SQUAMEPITHEL, HYALINECASTS in the last 48 hours.    Invalid input(s): WRIGHTSUR      Procalcitonin (ng/mL)   Date Value   05/05/2023 0.03     Lactate (Lactic Acid) (mmol/L)   Date Value   05/04/2023 1.0     BNP (pg/mL)   Date Value   04/15/2017 27     CRP (mg/L)   Date Value   05/04/2023 1.1   02/03/2020 3.0     Sed Rate (mm/Hr)   Date Value   05/04/2023 22   02/03/2020 23     No results found for: DDIMER  No results found for: FERRITIN  No results found for: LDH  Troponin I (ng/mL)   Date Value   04/15/2017 <0.006     No results found for this or any previous visit.  SARS-CoV2 (COVID-19) Qualitative PCR (no units)   Date Value   05/08/2023 Not Detected   08/08/2020 Not Detected       Microbiology labs for the last week  Microbiology Results (last 7 days)       Procedure Component Value Units Date/Time    Blood culture #1 **CANNOT BE ORDERED STAT** [[id number]] Collected: 05/04/23 1748    Order Status: Completed Specimen: Blood from Peripheral, Antecubital, Right Updated: 05/08/23 2212     Blood Culture, Routine No Growth to date      No Growth to date      No Growth to date      No Growth to date      No Growth to date    Blood culture #2 **CANNOT BE ORDERED STAT** [[id number]] Collected: 05/04/23 1751    Order Status: Completed Specimen: Blood from Peripheral, Wrist, Left Updated: 05/08/23 2212     Blood Culture, Routine No Growth to date      No Growth to date      No Growth to date      No Growth to date      No Growth to date             Reviewed and noted in plan where applicable- Please see chart for full lab data.    Lines/Drains:       Peripheral IV - Single Lumen 05/04/23 1757 20 G Right Antecubital (Active)   Site Assessment Clean;Dry;Intact;No redness;No swelling 05/05/23 0052   Extremity Assessment Distal to IV No abnormal discoloration 05/05/23 0052   Line Status Flushed;Saline locked 05/05/23 0052   Dressing Status Clean;Dry;Intact 05/05/23 0052   Dressing Intervention Integrity maintained 05/05/23 0052   Dressing Change Due 05/07/23 05/05/23 0052   Site Change Due 05/07/23 05/05/23 0052   Reason Not Rotated Not due 05/05/23 0052   Number of days: 0            Peripheral IV - Single Lumen 05/04/23 1757 Anterior;Distal;Right Forearm (Active)   Site Assessment Clean;Dry;Intact;No redness;No swelling 05/05/23 0052   Extremity Assessment Distal to IV No abnormal discoloration 05/05/23 0052   Line Status Flushed;Saline locked 05/05/23 0052   Dressing Status Clean;Dry;Intact 05/05/23 0052   Dressing Intervention Integrity maintained 05/05/23 0052   Dressing Change Due 05/07/23 05/05/23 0052   Site Change Due 05/07/23 05/05/23 0052   Reason Not Rotated Not due 05/05/23 0052   Number of days: 0       Imaging  ECG Results              EKG 12-lead (Edited Result - FINAL)  Result time 05/05/23 14:26:07      Edited Result - FINAL by Interface, Lab In [facility] (05/05/23 14:26:07)               Narrative:    Test Reason : R00.1,    Vent. Rate : 064 BPM     Atrial Rate : 064 BPM     P-R Int : 152 ms          QRS Dur : 090 ms      QT Int : 398 ms       P-R-T Axes : 032 033 036 degrees     QTc Int : 410 ms    Sinus rhythm with marked sinus arrythmia and PACs  Otherwise normal ECG  When compared with ECG of 01-JUN-2022 11:54,  QT has shortened  Reconfirmed by Ruiz YAN MD ([id number]) on 5/5/2023 2:25:54 PM    Referred By: [email or web address]   SELF           Confirmed By:Ruiz YAN MD                                  No results found for this or any previous  visit.      X-Ray Chest AP Portable  Narrative: EXAMINATION:  XR CHEST AP PORTABLE    CLINICAL HISTORY:  nursing home;    TECHNIQUE:  Single frontal view of the chest was performed.    COMPARISON:  No 11/10/2021 ne    FINDINGS:  Heart size normal.  The lungs are clear.  No pleural effusion  Impression: See above    Electronically signed by: Abdiel Chavarria MD  Date:    05/08/2023  Time:    15:35      Labs, Imaging, EKG and Diagnostic results from 5/9/2023 were reviewed.    Medications:  Medication list was reviewed and changes noted under Assessment/Plan.  No current facility-administered medications on file prior to encounter.     Current Outpatient Medications on File Prior to Encounter   Medication Sig Dispense Refill    acamprosate (CAMPRAL) 333 mg tablet Take 2 tablets (666 mg total) by mouth 3 (three) times daily. 180 tablet 0    diclofenac sodium (VOLTAREN) 1 % Gel APPLY TOPICALLY TO THE AFFECTED AREA THREE TIMES DAILY AS NEEDED FOR JOINT PAIN 100 g 2    fluticasone propionate (FLONASE) 50 mcg/actuation nasal spray 1 spray by Each Nostril route daily as needed.      levothyroxine (SYNTHROID) 25 MCG tablet TAKE 1 TABLET DAILY (FURTHER REFILLS REQUIRE VISIT WITH DOCTOR) 90 tablet 3    losartan (COZAAR) 100 MG tablet TAKE 1 TABLET(100 MG) BY MOUTH EVERY DAY 90 tablet 0    multivitamin capsule Take 1 capsule by mouth once daily.      nebivoloL (BYSTOLIC) 10 MG Tab Take 1 tablet (10 mg total) by mouth once daily. 90 tablet 3    pravastatin (PRAVACHOL) 20 MG tablet TAKE 1 TABLET EVERY EVENING 90 tablet 3    sertraline (ZOLOFT) 50 MG tablet Take 1 tablet (50 mg total) by mouth once daily. 30 tablet 1    tiZANidine (ZANAFLEX) 2 MG tablet TAKE 1 TABLET(2 MG) BY MOUTH EVERY 8 HOURS AS NEEDED FOR BACK AND LEG PAIN 30 tablet 0     Scheduled Medications:  acetaminophen, 1,000 mg, Oral, Q8H  enoxaparin, 40 mg, Subcutaneous, Daily  gabapentin, 300 mg, Oral, TID  hydrALAZINE, 50 mg, Oral, Q8H  levothyroxine, 25 mcg, Oral,  Before breakfast  LIDOcaine, 1 patch, Transdermal, Q24H  LIDOcaine, 1 patch, Transdermal, Q24H  losartan, 100 mg, Oral, Daily  metoprolol succinate, 100 mg, Oral, Daily  pravastatin, 20 mg, Oral, QHS  sertraline, 50 mg, Oral, Daily      PRN: dextrose 10%, dextrose 10%, glucagon (human recombinant), hydrALAZINE, melatonin, naloxone, ondansetron, ondansetron, polyethylene glycol, simethicone, sodium chloride 0.9%, tiZANidine, traMADoL  Infusions:       Estimated Creatinine Clearance: 73.9 mL/min (based on SCr of 1 mg/dL).    Assessment/Plan:      * Progressive weakness  The patient has notably progressive weakness with gait instability that has caused recurrent falls while at home. Imaging performed by outside neurosurgeon revealed concern for possible osteomyelitis or discitis. Inflammatory markers negative. Repeat CT imaging in the ED reveals inferior T7 and superior T8 endplate irregularity, consistent with area of concern for possible discitis/osteomyelitis. MRI lumbar spine reveals multilevel signal abnormalities at L1-2, L2-L3, L3-L4, and L4-L5, favored to represent degenerative changes with osteomyelitis/discitis thought to be less likely.   - Neurosurgery consulted, appreciate additional recommendations   - Empiric treatment for osteomyelitis with IV vancomycin and IV ceftriaxone  - f/u blood cultures  - PT/OT consulted, appreciate recommendations  5/5 progressive weakness with gait instability and recurrent falls  falls at home. Imaging performed by outside neurosurgeon revealed concern for possible osteomyelitis or discitis. Inflammatory markers negative. Repeat CT imaging in the ED reveals inferior T7 and superior T8 endplate irregularity, consistent with area of concern for possible discitis/osteomyelitis. MRI lumbar spine reveals multilevel signal abnormalities at L1-2, L2-L3, L3-L4, and L4-L5, favored to represent degenerative changes with osteomyelitis/discitis tnot completely excludded. Mild  enhancement of the dura extending from the L1-L5 level suggestive of possible infectious or inflammatory process and potentially relating to chronic inflammation. Neurosurgery consulted, started on empiric  IV vancomycin and IV ceftriaxone. ESR,  procalctionin WNL . no signs and symptoms of myelopathy or cord compression.  Back pain likely with BLE radiculopathy  and  possible nerve root compression. IR consulted for biopsy of spine to confirm or rule out osteomyelitis/discitis. IR eval -no imaging evidence of discitis and with normal inflammatory markers IR feels it is very unlikely discitis .  disc biopsy is not warrented per neurosurgery . ceftriaxone and vancomycin discontinued. Neurosurgery plans for outpatient follow up in clinic with scoliosis xrays and lumbar spine flexion extension xrays. patient may require arge scale spinal fusion as outpatient. PT/OT consulted - recs SNF. Patient reluctant to go to SNF but agreed for [facility] after discussion with family   5/8 X ray right knee -No acute fracture or dislocation.  Tricompartmental joint space narrowing with mild marginal osteophytes.  No significant suprapatellar joint effusion.  Trace prepatellar edema, nonspecific.  SBP improved to 160s. c/o 7/10 pain back and Right knee. tramadol PRN.   5/9 dischage to SNF today with Neurosurgery follow up     Anxiety  Resume home sertraline.      Acquired hypothyroidism  Resume home levothyroxine.      Hyperlipidemia  Resume home pravastatin.      Essential hypertension  Blood pressure mildly elevated upon admission.  - losartan 100 mg daily  - home nebivolol not on formulary; begin metoprolol succinate 100 mg daily    5/5 SBP in 200s. IV fluids discontinued with improvement   5/7 SBP in 180s. received losartan this AM. started on Hydralazine 50 mg q 8h     Chronic midline low back pain without sciatica  secondary to above.   5/6 improved backpain this AM. on tylenol 1000mg q 8h, gabapentin 300mg TID, lidocaine  patch and warm compressses. pending [facility]   5/8  c/o 7/10 pain back and Right knee. tramadol PRN. pending SNF        VTE Risk Mitigation (From admission, onward)           Ordered     enoxaparin injection 40 mg  Daily         05/04/23 2347     IP VTE HIGH RISK PATIENT  Once         05/04/23 2347     Place sequential compression device  Until discontinued         05/04/23 2347                    Discharge Planning   CAYLA: 5/9/2023     Code Status: Full Code   Is the patient medically ready for discharge?: No    Reason for patient still in hospital (select all that apply): Treatment  Discharge Plan A: Skilled Nursing Facility   Discharge Delays: None known at this time              Zi Rowell MD  Department of Hospital Medicine   [facility] - Neurosurgery ([facility])

## 2023-05-09 NOTE — DISCHARGE SUMMARY
Cameron Falcon - Neurosurgery ([facility])  [facility]  Discharge Summary      Patient Name: Julius Baker  MRN: [id number]  SHEILA: [id number]  Patient Class: IP- Inpatient  Admission Date: 5/4/2023  Hospital Length of Stay: 4 days  Discharge Date and Time:  05/09/2023 7:05 AM  Attending Physician: Zi Rowell MD   Discharging Provider: Zi Rowell MD  Primary Care Provider: ANNA Thurston MD  [facility] Medicine Team: [facility] Zi Rowell MD  Primary Care Team: [facility]    HPI:   Julius Baker is a 77-year-old man with a past medical history of HTN, HLD, history of alcohol use disorder, and BPH who presents to the emergency department with progressive weakness and gait instability who presents to the emergency department. The patient is accompanied by his daughter who assists in providing the history. Per report, the patient has been suffering from a progressive weakness with gait instability and several falls at home. Per family report, he has a shuffling gait. The patient says that approximately six months ago he was walking with a cane and given the progression of his weakness he now ambulates with a walker. The patient had been following with a neurosurgeon, Dr. Otoole ([facility]) for back pain. The MRI ordered by Dr. Otoole possibly showed osteomyelitis but he is not currently being treated at this time. He did, however, see his allergist who prescribed a shot of corticosteroids and a ten day course of antibiotics. The patient said it has been difficult for him to get back into clinic to see Dr. Otoole given his clinic is so busy. The patient was seen in the Neurology clinic given concern that gait instability may be secondary to Parkinson's disease. However, the provider was concerned about the MRI results and encouraged the patient to present to the emergency department for further evaluation. The patient complains of some mild back pain which he treats with pain  "relief patches and warm compresses. He denies any recent fever, chills or rigors. Of note, the patient and his daughter do bring up that the patient had a tooth extraction for an infected tooth approximately six months ago. He was reportedly placed on oral antibiotics for an "extended" period of time by his dentist. He had a tooth implant placed which subsequently fell out for unknown reasons which could not be explained by his dentist. The patient thinks he was having fevers and chills around this time. The patient denies any saddle anesthesia, bladder or bowel incontinence.    In the emergency department, the patient was noted to be bradycardic with a heart rate at 44. Other vital signs stable upon arrival. Labs were largely unremarkable. ESR and CRP within normal limits. The patient had CT of his lumbar and thoracic spine revealing inferior T7 and superior T8 endplate irregularity, consistent with area of concern on outside hospital MRI for possible discitis/osteomyelitis and inferior L2, superior L3, and inferior L4 endplate irregularity, likely representing degenerative change although osteomyelitis/discitis cannot be completely excluded. MRI lumbar spine revealing multilevel signal abnormalities at L1-2, L2-L3, L3-L4, and L4-L5, favored to represent degenerative changes with osteomyelitis/discitis not completely excluded thought to be less likely. The patient was given IV vancomycin in addition to his home blood pressure medications. He was admitted to Hospital Medicine for further management.         * No surgery found *      Hospital Course:   5/5 progressive weakness with gait instability and recurrent falls  falls at home. Imaging performed by outside neurosurgeon revealed concern for possible osteomyelitis or discitis. Inflammatory markers negative. Repeat CT imaging in the ED reveals inferior T7 and superior T8 endplate irregularity, consistent with area of concern for possible discitis/osteomyelitis. " MRI lumbar spine reveals multilevel signal abnormalities at L1-2, L2-L3, L3-L4, and L4-L5, favored to represent degenerative changes with osteomyelitis/discitis tnot completely excludded. Mild enhancement of the dura extending from the L1-L5 level suggestive of possible infectious or inflammatory process and potentially relating to chronic inflammation. Neurosurgery consulted, started on empiric  IV vancomycin and IV ceftriaxone. ESR,  procalctionin WNL . no signs and symptoms of myelopathy or cord compression.  Back pain likely with BLE radiculopathy  and  possible nerve root compression. IR consulted for biopsy of spine to confirm or rule out osteomyelitis/discitis. IR eval -no imaging evidence of discitis and with normal inflammatory markers IR feels it is very unlikely discitis .  disc biopsy is not warrented per neurosurgery . ceftriaxone and vancomycin discontinued. Neurosurgery plans for outpatient follow up in clinic with scoliosis xrays and lumbar spine flexion extension xrays. patient may require arge scale spinal fusion as outpatient. PT/OT consulted - recs SNF. Patient reluctant to go to SNF but agreed for [facility] after discussion with family   5/6 improved backpain this AM. on tylenol 1000mg q 8h, gabapentin 300mg TID, lidocaine patch and warm compressses. pending [facility] . daughter wants  referral sent to [place], [place]  5/7 SBP in 180s. received losartan this AM. started on Hydralazine 25mg q 8h .c/o right knee pain. no effusion, erythema . X ray knee.   5/8 X ray right knee -No acute fracture or dislocation.  Tricompartmental joint space narrowing with mild marginal osteophytes.  No significant suprapatellar joint effusion.  Trace prepatellar edema, nonspecific.  SBP improved to 160s. c/o 7/10 pain back and Right knee. tramadol PRN.   5/9 dischage to SNF today with Neurosurgery follow up        Goals of Care Treatment Preferences:  Code Status: Full Code      Consults:    Consults (From admission, onward)        Status Ordering Provider     Inpatient consult to Midline team  Once        Provider:  (Not yet assigned)    Completed JUAN ANDERSON     Inpatient consult to Interventional Radiology  Once        Provider:  (Not yet assigned)    Completed JUAN ANDERSON     IP consult to case management  Once        Provider:  (Not yet assigned)    Acknowledged JUAN ANDERSON     Inpatient consult to Neurosurgery  Once        Provider:  (Not yet assigned)    Completed YINKA CHI        Assessment/Plan:      * Progressive weakness  The patient has notably progressive weakness with gait instability that has caused recurrent falls while at home. Imaging performed by outside neurosurgeon revealed concern for possible osteomyelitis or discitis. Inflammatory markers negative. Repeat CT imaging in the ED reveals inferior T7 and superior T8 endplate irregularity, consistent with area of concern for possible discitis/osteomyelitis. MRI lumbar spine reveals multilevel signal abnormalities at L1-2, L2-L3, L3-L4, and L4-L5, favored to represent degenerative changes with osteomyelitis/discitis thought to be less likely.   - Neurosurgery consulted, appreciate additional recommendations   - Empiric treatment for osteomyelitis with IV vancomycin and IV ceftriaxone  - f/u blood cultures  - PT/OT consulted, appreciate recommendations  5/5 progressive weakness with gait instability and recurrent falls  falls at home. Imaging performed by outside neurosurgeon revealed concern for possible osteomyelitis or discitis. Inflammatory markers negative. Repeat CT imaging in the ED reveals inferior T7 and superior T8 endplate irregularity, consistent with area of concern for possible discitis/osteomyelitis. MRI lumbar spine reveals multilevel signal abnormalities at L1-2, L2-L3, L3-L4, and L4-L5, favored to represent degenerative changes with osteomyelitis/discitis tnot completely excludded. Mild  enhancement of the dura extending from the L1-L5 level suggestive of possible infectious or inflammatory process and potentially relating to chronic inflammation. Neurosurgery consulted, started on empiric  IV vancomycin and IV ceftriaxone. ESR,  procalctionin WNL . no signs and symptoms of myelopathy or cord compression.  Back pain likely with BLE radiculopathy  and  possible nerve root compression. IR consulted for biopsy of spine to confirm or rule out osteomyelitis/discitis. IR eval -no imaging evidence of discitis and with normal inflammatory markers IR feels it is very unlikely discitis .  disc biopsy is not warrented per neurosurgery . ceftriaxone and vancomycin discontinued. Neurosurgery plans for outpatient follow up in clinic with scoliosis xrays and lumbar spine flexion extension xrays. patient may require arge scale spinal fusion as outpatient. PT/OT consulted - recs SNF. Patient reluctant to go to SNF but agreed for [facility] after discussion with family   5/8 X ray right knee -No acute fracture or dislocation.  Tricompartmental joint space narrowing with mild marginal osteophytes.  No significant suprapatellar joint effusion.  Trace prepatellar edema, nonspecific.  SBP improved to 160s. c/o 7/10 pain back and Right knee. tramadol PRN.   5/9 dischage to SNF today with Neurosurgery follow up      Anxiety  Resume home sertraline.        Acquired hypothyroidism  Resume home levothyroxine.        Hyperlipidemia  Resume home pravastatin.        Essential hypertension  Blood pressure mildly elevated upon admission.  - losartan 100 mg daily  - home nebivolol not on formulary; begin metoprolol succinate 100 mg daily     5/5 SBP in 200s. IV fluids discontinued with improvement   5/7 SBP in 180s. received losartan this AM. started on Hydralazine 50 mg q 8h      Chronic midline low back pain without sciatica  secondary to above.   5/6 improved backpain this AM. on tylenol 1000mg q 8h, gabapentin 300mg TID,  lidocaine patch and warm compressses. pending [facility]   5/8  c/o 7/10 pain back and Right knee. tramadol PRN. pending SNF          Final Active Diagnoses:    Diagnosis Date Noted POA    PRINCIPAL PROBLEM:  Progressive weakness [R53.1] 05/05/2023 Unknown    Anxiety [F41.9] 03/15/2022 Yes    Acquired hypothyroidism [E03.9]  Yes     Chronic    Essential hypertension [I10]  Yes     Chronic    Hyperlipidemia [E78.5]  Yes     Chronic    Chronic midline low back pain without sciatica [M54.50, G89.29] 11/14/2018 Yes      Problems Resolved During this Admission:    Diagnosis Date Noted Date Resolved POA    Back pain [M54.9] 05/04/2023 05/05/2023 Unknown       Medications:  Reconciled Home Medications:      Medication List      Start taking these medications    acetaminophen 500 MG tablet  Commonly known as: TYLENOL  Take 2 tablets (1,000 mg total) by mouth every 8 (eight) hours.     hydrALAZINE 50 MG tablet  Commonly known as: APRESOLINE  Take 1 tablet (50 mg total) by mouth every 8 (eight) hours.     LIDOcaine 5 %  Commonly known as: LIDODERM  Place 1 patch onto the skin once daily. Remove & Discard patch within 12 hours or as directed by MD        Change how you take these medications    gabapentin 300 MG capsule  Commonly known as: NEURONTIN  Take 1 capsule (300 mg total) by mouth 3 (three) times daily.  What changed:   · medication strength  · how much to take     traMADoL 50 mg tablet  Commonly known as: ULTRAM  Take 0.5 tablets (25 mg total) by mouth every 12 (twelve) hours as needed.  What changed:   · how much to take  · when to take this  · reasons to take this        Continue taking these medications    diclofenac sodium 1 % Gel  Commonly known as: VOLTAREN  APPLY TOPICALLY TO THE AFFECTED AREA THREE TIMES DAILY AS NEEDED FOR JOINT PAIN     fluticasone propionate 50 mcg/actuation nasal spray  Commonly known as: FLONASE  1 spray by Each Nostril route daily as needed.     levothyroxine 25 MCG  tablet  Commonly known as: SYNTHROID  TAKE 1 TABLET DAILY (FURTHER REFILLS REQUIRE VISIT WITH DOCTOR)     losartan 100 MG tablet  Commonly known as: COZAAR  TAKE 1 TABLET(100 MG) BY MOUTH EVERY DAY     multivitamin capsule  Take 1 capsule by mouth once daily.     nebivoloL 10 MG Tab  Commonly known as: BYSTOLIC  Take 1 tablet (10 mg total) by mouth once daily.     pravastatin 20 MG tablet  Commonly known as: PRAVACHOL  TAKE 1 TABLET EVERY EVENING     sertraline 50 MG tablet  Commonly known as: ZOLOFT  Take 1 tablet (50 mg total) by mouth once daily.     tiZANidine 2 MG tablet  Commonly known as: ZANAFLEX  TAKE 1 TABLET(2 MG) BY MOUTH EVERY 8 HOURS AS NEEDED FOR BACK AND LEG PAIN        Stop taking these medications    acamprosate 333 mg tablet  Commonly known as: CAMPRAL              Discharged Condition: fair    Disposition: Nursing Facility           Follow Up:     Patient Instructions:   No discharge procedures on file.    Laboratory/Diagnostic Data:    CBC/Anemia Labs: Coags:    Recent Labs   Lab 05/06/23  0304 05/07/23  0502 05/09/23  0301   WBC 8.98 8.87 9.45   HGB 13.1* 14.5 14.9   HCT 41.1 45.1 42.8    307 292   MCV 92 92 90   RDW 13.1 13.1 13.5    No results for input(s): PT, INR, APTT in the last 168 hours.     Chemistries: ABG:   Recent Labs   Lab 05/06/23  0304 05/07/23  0502 05/09/23  0301    137 136   K 4.0 4.0 4.1    104 100   CO2 26 25 27   BUN 19 13 22   CREATININE 0.8 0.7 1.0   CALCIUM 9.3 9.5 9.6   PROT 5.8* 6.3 6.6   BILITOT 0.8 0.8 1.2*   ALKPHOS 60 69 63   ALT 20 22 40   AST 18 20 36   MG 1.7 1.7 1.8    No results for input(s): PH, PCO2, PO2, HCO3, POCSATURATED, BE in the last 168 hours.     POCT Glucose: HbA1c:    No results for input(s): POCTGLUCOSE in the last 168 hours. Hemoglobin A1C   Date Value Ref Range Status   11/10/2021 5.5 4.0 - 5.6 % Final     Comment:     ADA Screening Guidelines:  5.7-6.4%  Consistent with prediabetes  >or=6.5%  Consistent with diabetes    High  levels of fetal hemoglobin interfere with the HbA1C  assay. Heterozygous hemoglobin variants (HbS, HgC, etc)do  not significantly interfere with this assay.   However, presence of multiple variants may affect accuracy.     10/28/2019 5.4 4.0 - 5.6 % Final     Comment:     ADA Screening Guidelines:  5.7-6.4%  Consistent with prediabetes  >or=6.5%  Consistent with diabetes  High levels of fetal hemoglobin interfere with the HbA1C  assay. Heterozygous hemoglobin variants (HbS, HgC, etc)do  not significantly interfere with this assay.   However, presence of multiple variants may affect accuracy.     10/15/2018 5.3 4.0 - 5.6 % Final     Comment:     ADA Screening Guidelines:  5.7-6.4%  Consistent with prediabetes  >or=6.5%  Consistent with diabetes  High levels of fetal hemoglobin interfere with the HbA1C  assay. Heterozygous hemoglobin variants (HbS, HgC, etc)do  not significantly interfere with this assay.   However, presence of multiple variants may affect accuracy.          Cardiac Enzymes: Ejection Fractions:    No results for input(s): CPK, CPKMB, MB, TROPONINI in the last 72 hours. No results found for: EF       No results for input(s): COLORU, APPEARANCEUA, PHUR, SPECGRAV, PROTEINUA, GLUCUA, KETONESU, BILIRUBINUA, OCCULTUA, NITRITE, UROBILINOGEN, LEUKOCYTESUR, RBCUA, WBCUA, BACTERIA, SQUAMEPITHEL, HYALINECASTS in the last 48 hours.    Invalid input(s): WRIGHTSUR    Procalcitonin (ng/mL)   Date Value   05/05/2023 0.03     Lactate (Lactic Acid) (mmol/L)   Date Value   05/04/2023 1.0     BNP (pg/mL)   Date Value   04/15/2017 27     CRP (mg/L)   Date Value   05/04/2023 1.1   02/03/2020 3.0     Sed Rate (mm/Hr)   Date Value   05/04/2023 22   02/03/2020 23     No results found for: DDIMER  No results found for: FERRITIN  No results found for: LDH  Troponin I (ng/mL)   Date Value   04/15/2017 <0.006     No results found for this or any previous visit.  SARS-CoV2 (COVID-19) Qualitative PCR (no units)   Date Value    05/08/2023 Not Detected   08/08/2020 Not Detected       Microbiology labs for the last week  Microbiology Results (last 7 days)     Procedure Component Value Units Date/Time    Blood culture #1 **CANNOT BE ORDERED STAT** [[id number]] Collected: 05/04/23 1748    Order Status: Completed Specimen: Blood from Peripheral, Antecubital, Right Updated: 05/08/23 2212     Blood Culture, Routine No Growth to date      No Growth to date      No Growth to date      No Growth to date      No Growth to date    Blood culture #2 **CANNOT BE ORDERED STAT** [[id number]] Collected: 05/04/23 1751    Order Status: Completed Specimen: Blood from Peripheral, Wrist, Left Updated: 05/08/23 2212     Blood Culture, Routine No Growth to date      No Growth to date      No Growth to date      No Growth to date      No Growth to date            Reviewed and noted in plan where applicable- Please see chart for full lab data.    Lines/Drains:       Peripheral IV - Single Lumen 05/07/23 1152 20 G;1 3/4 in Right Forearm (Active)   Site Assessment Clean;Dry;Intact;No redness;No swelling 05/09/23 0400   Extremity Assessment Distal to IV No abnormal discoloration;No redness;No swelling;No warmth 05/08/23 1815   Line Status Saline locked 05/09/23 0400   Dressing Status Clean;Dry;Intact 05/09/23 0400   Dressing Intervention Integrity maintained 05/09/23 0400   Site Change Due 05/12/23 05/08/23 0802   Number of days: 1       Imaging  ECG Results          EKG 12-lead (Edited Result - FINAL)  Result time 05/05/23 14:26:07    Edited Result - FINAL by Interface, Lab In [facility] (05/05/23 14:26:07)             Narrative:    Test Reason : R00.1,    Vent. Rate : 064 BPM     Atrial Rate : 064 BPM     P-R Int : 152 ms          QRS Dur : 090 ms      QT Int : 398 ms       P-R-T Axes : 032 033 036 degrees     QTc Int : 410 ms    Sinus rhythm with marked sinus arrythmia and PACs  Otherwise normal ECG  When compared with ECG of 01-JUN-2022 11:54,  QT has  shortened  Reconfirmed by Ruiz YAN MD ([id number]) on 5/5/2023 2:25:54 PM    Referred By: [email or web address]   SELF           Confirmed By:Ruiz YAN MD                            No results found for this or any previous visit.      X-Ray Chest AP Portable  Narrative: EXAMINATION:  XR CHEST AP PORTABLE    CLINICAL HISTORY:  nursing home;    TECHNIQUE:  Single frontal view of the chest was performed.    COMPARISON:  No 11/10/2021 ne    FINDINGS:  Heart size normal.  The lungs are clear.  No pleural effusion  Impression: See above    Electronically signed by: Abdiel Chavarria MD  Date:    05/08/2023  Time:    15:35      Imaging:  Imaging Results           MRI Lumbar Spine W WO Cont (Final result)  Result time 05/05/23 00:10:32    Final result by Sid Gomez MD (05/05/23 00:10:32)             Impression:      1. Multilevel signal abnormalities at L1-2, L2-L3, L3-L4, and L4-L5, favored to represent degenerative changes  with osteomyelitis/discitis not completely excluded thought to be less likely.  Correlate clinically with follow-up as clinically indicated.  2. No psoas muscle or adjacent soft tissue signal change or enhancement and no epidural abscess.  3. Mild enhancement of the dura extending from the L1-L5 level suggestive of possible infectious or inflammatory process and potentially relating to chronic inflammation.  Follow-up as clinically indicated.  4. Degenerative changes of the facet joints most pronounced at L5-S1 with septic arthritis thought to be less likely but not completely excluded.  Correlate clinically with follow-up as clinically indicated.  5. Additional findings, as above.  This report was flagged in Epic as abnormal.    Electronically signed by resident: Indiana De Leon  Date:    05/04/2023  Time:    22:54    Electronically signed by: Sid Gomez MD  Date:    05/05/2023  Time:    00:10           Narrative:    EXAMINATION:  MRI LUMBAR SPINE W WO CONTRAST    CLINICAL HISTORY:  Low back pain, progressive  neurologic deficit;    TECHNIQUE:  Multiplanar, multisequence MR images were acquired from the thoracolumbar junction to the sacrum following the administration of 10 cc Gadavist intravenous contrast.    COMPARISON:  CT thoracic and lumbar spine 05/04/2023.    FINDINGS:  Alignment: Normal.    Vertebrae: Heterogeneous appearance of the marrow throughout the lumbar spine.  There is additionally T1 hypointensity and STIR hyperintensity of the posterior endplate of L1, anterior inferior endplate of L2, inferior endplate of L3, superior endplate of L4 and focal anterior endplate of L4.  T1 hypointense/stir hyperintense focus of the mid vertebral body L5 lesion and L5 spinous process.  There is enhancement of the inferior endplate of L1, superior endplate of L2, inferior endplate of L2, superior endplate of L3, inferior endplate of L3, and superior endplate of L4 and of the focal anterior endplate of L4 and mid L5 vertebral body lesion.  Degenerative changes of the facet joints most pronounced at L5-S1 with enhancement and STIR hyperintensity at that level.    Discs: Multilevel disc height loss and desiccation.  There is STIR hyperintensity of the L1-L2, L2-L3, and L3-L4 discs.    Cord: The visualized distal cord demonstrates normal signal.  Conus terminates at L1.  There is no clumping of the cauda equina nerve roots.    Degenerative findings:    T12-L1: No spinal canal stenosis or neural foraminal narrowing.    L1-L2:  Circumferential disc bulge and bilateral facet arthropathy.  No spinal canal stenosis or neural foraminal narrowing.    L2-L3: Circumferential disc bulge and bilateral facet arthropathy.  Findings result in mild spinal canal stenosis and mild bilateral neural foraminal narrowing.    L3-L4: Circumferential disc bulge and bilateral facet arthropathy.  Findings result in moderate spinal canal stenosis and moderate bilateral neural foraminal narrowing.    L4-L5: Circumferential disc bulge and bilateral facet  arthropathy resulting in moderate spinal canal stenosis and moderate bilateral neural foraminal narrowing.    L5-S1: Circumferential disc bulge and bilateral facet arthropathy.  Findings result in mild spinal canal stenosis and mild bilateral neural foraminal narrowing.    Paraspinal muscles & soft tissues: Epidural lipomatosis most pronounced at L3.  There is thin epidural enhancement most pronounced along the anterior spinal canal extending from L1 to L5.  No organized fluid collection within the epidural space or within the paraspinal soft tissues.  No psoas muscle or adjacent soft tissue signal change or enhancement.  No epidural abscess.                              CT Thoracic Spine With Contrast (Final result)  Result time 05/04/23 23:36:32   Procedure changed from CT Thoracic Spine W Wo Contrast     Final result by Sid Gomez MD (05/04/23 23:36:32)             Impression:      1. Inferior T7 and superior T8 endplate irregularity, consistent with area of concern on outside hospital MRI for possible discitis/osteomyelitis.  2. Inferior L2, superior L3, and inferior L4 endplate irregularity, likely representing degenerative change although osteomyelitis/discitis cannot be completely excluded.  3. No organized fluid collection.  4. Degenerative changes of the thoracic spine without high-grade spinal canal stenosis or neural foraminal narrowing.  5. Degenerative changes of the lumbar spine most pronounced at L3-L4 and L4-L5 with moderate spinal canal stenosis and mild-moderate neural foraminal narrowing.  6. Additional findings, as above.  This report was flagged in Epic as abnormal.    Electronically signed by resident: Indiana De Leon  Date:    05/04/2023  Time:    22:02    Electronically signed by: Sid Gomez MD  Date:    05/04/2023  Time:    23:36           Narrative:    EXAMINATION:  CT THORACIC SPINE WITH CONTRAST; CT LUMBAR SPINE WITH CONTRAST    CLINICAL HISTORY:  Osteomyelitis,  thoracic;diagnosed discitis of T7-T8;; Osteomyelitis, lumbar;    TECHNIQUE:  Low-dose axial images of the thoracic and lumbar spine were performed following the administration of 100 cc intravenous contrast.  Multiplanar reconstructions were performed.    COMPARISON:  MRI thoracic spine 04/25/2023 and thoracic spine radiograph 09/15/2016.    FINDINGS:  Thoracic Spine:    Alignment: Grade 1 anterolisthesis of C7 on T1.    Vertebrae: Multiple flowing anterior osteophytes consistent with DISH.  Inferior T7 and superior T8 endplate irregularity, consistent with area of concern on outside hospital MRI for possible discitis/osteomyelitis.  No acute fracture.    Discs: Multilevel disc height loss most pronounced at T7-T8.    Degenerative findings: No high-grade spinal canal stenosis or neural foraminal narrowing.    Lumbar spine:    Alignment: Minimal grade 1 retrolisthesis of L4 on L5.    Vertebrae: Inferior L2, superior L3, and inferior L4 endplate irregularity, likely representing degenerative changes although osteomyelitis/discitis cannot be completely excluded.    Discs: Multilevel disc height loss most pronounced at L2-L3.  Vacuum disc phenomenon at L1-L2, L2-L3, and L3-L4, and L4-L5.    Degenerative findings: Circumferential disc bulge from L1-L2 through L5-S1 and multilevel facet arthropathy.  Findings result in mild L2-L3 moderate L3-L4, moderate L4-L5 and mild L5-S1 spinal canal stenosis.  Additionally there is mild left L1-L2 moderate bilateral L2-L3, moderate bilateral L3-L4 mild bilateral L4-L5 and mild bilateral L5-S1 neural foraminal narrowing.  Epidural fatty proliferation throughout the lumbar spine.  Degenerative changes lower lumbar facet joints.    Upper SI joints/sacrum: Unremarkable.    Soft tissue: No organized fluid collection within the paraspinal muscles.  Few scattered calcific foci within the subcutaneous fat.  Scattered vascular calcifications.  Nonspecific bilateral perinephric fat stranding  small hiatal hernia.  Multi-vessel calcific atherosclerosis of the coronary arteries.  Calcification of the aortic valve annulus, mitral valve annulus, and aortic valve leaflets.  Ground-glass attenuation throughout the bilateral lung fields, suggestive of possible infectious or inflammatory process.  Bilateral bandlike opacifications in the lung bases favored to represent scarring or subsegmental atelectasis.                              CT Lumbar Spine With Contrast (Final result)  Result time 05/04/23 23:36:32   Procedure changed from CT Lumbar Spine W Wo Contrast     Final result by Sid Gomez MD (05/04/23 23:36:32)             Impression:      1. Inferior T7 and superior T8 endplate irregularity, consistent with area of concern on outside hospital MRI for possible discitis/osteomyelitis.  2. Inferior L2, superior L3, and inferior L4 endplate irregularity, likely representing degenerative change although osteomyelitis/discitis cannot be completely excluded.  3. No organized fluid collection.  4. Degenerative changes of the thoracic spine without high-grade spinal canal stenosis or neural foraminal narrowing.  5. Degenerative changes of the lumbar spine most pronounced at L3-L4 and L4-L5 with moderate spinal canal stenosis and mild-moderate neural foraminal narrowing.  6. Additional findings, as above.  This report was flagged in Epic as abnormal.    Electronically signed by resident: Indiana De Leon  Date:    05/04/2023  Time:    22:02    Electronically signed by: Sid Gomez MD  Date:    05/04/2023  Time:    23:36           Narrative:    EXAMINATION:  CT THORACIC SPINE WITH CONTRAST; CT LUMBAR SPINE WITH CONTRAST    CLINICAL HISTORY:  Osteomyelitis, thoracic;diagnosed discitis of T7-T8;; Osteomyelitis, lumbar;    TECHNIQUE:  Low-dose axial images of the thoracic and lumbar spine were performed following the administration of 100 cc intravenous contrast.  Multiplanar reconstructions were  performed.    COMPARISON:  MRI thoracic spine 04/25/2023 and thoracic spine radiograph 09/15/2016.    FINDINGS:  Thoracic Spine:    Alignment: Grade 1 anterolisthesis of C7 on T1.    Vertebrae: Multiple flowing anterior osteophytes consistent with DISH.  Inferior T7 and superior T8 endplate irregularity, consistent with area of concern on outside hospital MRI for possible discitis/osteomyelitis.  No acute fracture.    Discs: Multilevel disc height loss most pronounced at T7-T8.    Degenerative findings: No high-grade spinal canal stenosis or neural foraminal narrowing.    Lumbar spine:    Alignment: Minimal grade 1 retrolisthesis of L4 on L5.    Vertebrae: Inferior L2, superior L3, and inferior L4 endplate irregularity, likely representing degenerative changes although osteomyelitis/discitis cannot be completely excluded.    Discs: Multilevel disc height loss most pronounced at L2-L3.  Vacuum disc phenomenon at L1-L2, L2-L3, and L3-L4, and L4-L5.    Degenerative findings: Circumferential disc bulge from L1-L2 through L5-S1 and multilevel facet arthropathy.  Findings result in mild L2-L3 moderate L3-L4, moderate L4-L5 and mild L5-S1 spinal canal stenosis.  Additionally there is mild left L1-L2 moderate bilateral L2-L3, moderate bilateral L3-L4 mild bilateral L4-L5 and mild bilateral L5-S1 neural foraminal narrowing.  Epidural fatty proliferation throughout the lumbar spine.  Degenerative changes lower lumbar facet joints.    Upper SI joints/sacrum: Unremarkable.    Soft tissue: No organized fluid collection within the paraspinal muscles.  Few scattered calcific foci within the subcutaneous fat.  Scattered vascular calcifications.  Nonspecific bilateral perinephric fat stranding small hiatal hernia.  Multi-vessel calcific atherosclerosis of the coronary arteries.  Calcification of the aortic valve annulus, mitral valve annulus, and aortic valve leaflets.  Ground-glass attenuation throughout the bilateral lung fields,  suggestive of possible infectious or inflammatory process.  Bilateral bandlike opacifications in the lung bases favored to represent scarring or subsegmental atelectasis.                                Follow Up Instructions:     Future Appointments   Date Time Provider Department Center   6/7/2023  1:00 PM Ever Polo MD [facility] NE[facility]   8/8/2023  2:00 PM Alysa Kinney PA-C [facility] NEURO[address] [place] [address]       Discharge Instructions:  Discharge Procedure Orders   Ambulatory referral/consult to Neurosurgery   Standing Status: Future   Referral Priority: Routine Referral Type: Consultation   Referral Reason: Specialty Services Required   Requested Specialty: Neurosurgery   Number of Visits Requested: 1         Total time spent on discharge 40  minutes     Zi Rowell MD  Attending Staff Physician  [facility]

## 2023-05-09 NOTE — PLAN OF CARE
[facility] - Neurosurgery (Hospital)  Discharge Final Note    Primary Care Provider: ANNA Thurston MD    Expected Discharge Date: 5/9/2023    Patient to be discharged to [facility].  Care deferred to [facility].  [facility] to provide wheelchair transportation.    Nurse to call report to [phone], [address] nurse.  Wheelchair requested for 11:00 which is not a guaranteed arrival time.    Future Appointments   Date Time Provider Department Center   6/7/2023  1:00 PM Ever Polo MD [facility] NEUROSU [facility]   8/8/2023  2:00 PM Alysa Kinney PA-C [facility] NEUR[facility]        Final Discharge Note (most recent)       Final Note - 05/09/23 0930          Final Note    Assessment Type Final Discharge Note     Anticipated Discharge Disposition Skilled Nursing Facility        Post-Acute Status    Post-Acute Authorization Placement     Post-Acute Placement Status Set-up Complete/Auth obtained     Discharge Delays None known at this time                     Important Message from Medicare

## 2023-06-02 ENCOUNTER — TELEPHONE (OUTPATIENT)
Dept: NEUROSURGERY | Facility: CLINIC | Age: 78
End: 2023-06-02
Payer: MEDICARE

## 2023-06-02 DIAGNOSIS — M54.9 BACK PAIN, UNSPECIFIED BACK LOCATION, UNSPECIFIED BACK PAIN LATERALITY, UNSPECIFIED CHRONICITY: Primary | ICD-10-CM

## 2023-06-05 ENCOUNTER — OFFICE VISIT (OUTPATIENT)
Dept: INTERNAL MEDICINE | Facility: CLINIC | Age: 78
End: 2023-06-05
Payer: MEDICARE

## 2023-06-05 VITALS
WEIGHT: 231.5 LBS | SYSTOLIC BLOOD PRESSURE: 138 MMHG | TEMPERATURE: 98 F | HEIGHT: 70 IN | HEART RATE: 52 BPM | DIASTOLIC BLOOD PRESSURE: 82 MMHG | RESPIRATION RATE: 16 BRPM | BODY MASS INDEX: 33.14 KG/M2 | OXYGEN SATURATION: 96 %

## 2023-06-05 DIAGNOSIS — E03.9 HYPOTHYROIDISM, UNSPECIFIED TYPE: ICD-10-CM

## 2023-06-05 DIAGNOSIS — F41.9 ANXIETY: ICD-10-CM

## 2023-06-05 DIAGNOSIS — M17.0 PRIMARY OSTEOARTHRITIS OF BOTH KNEES: ICD-10-CM

## 2023-06-05 DIAGNOSIS — M48.00 SPINAL STENOSIS, UNSPECIFIED SPINAL REGION: Primary | ICD-10-CM

## 2023-06-05 DIAGNOSIS — E78.5 HYPERLIPIDEMIA, UNSPECIFIED HYPERLIPIDEMIA TYPE: ICD-10-CM

## 2023-06-05 DIAGNOSIS — I10 ESSENTIAL HYPERTENSION: Chronic | ICD-10-CM

## 2023-06-05 DIAGNOSIS — I70.0 AORTIC ATHEROSCLEROSIS: ICD-10-CM

## 2023-06-05 DIAGNOSIS — M54.16 LUMBAR RADICULOPATHY: ICD-10-CM

## 2023-06-05 DIAGNOSIS — M51.36 DDD (DEGENERATIVE DISC DISEASE), LUMBAR: ICD-10-CM

## 2023-06-05 PROBLEM — R53.81 DEBILITY: Status: ACTIVE | Noted: 2023-05-10

## 2023-06-05 PROCEDURE — 99215 OFFICE O/P EST HI 40 MIN: CPT | Mod: PBBFAC,PO | Performed by: NURSE PRACTITIONER

## 2023-06-05 PROCEDURE — 99214 OFFICE O/P EST MOD 30 MIN: CPT | Mod: S$PBB,,, | Performed by: NURSE PRACTITIONER

## 2023-06-05 PROCEDURE — 99999 PR PBB SHADOW E&M-EST. PATIENT-LVL V: CPT | Mod: PBBFAC,,, | Performed by: NURSE PRACTITIONER

## 2023-06-05 PROCEDURE — 99999 PR PBB SHADOW E&M-EST. PATIENT-LVL V: ICD-10-PCS | Mod: PBBFAC,,, | Performed by: NURSE PRACTITIONER

## 2023-06-05 PROCEDURE — 99214 PR OFFICE/OUTPT VISIT, EST, LEVL IV, 30-39 MIN: ICD-10-PCS | Mod: S$PBB,,, | Performed by: NURSE PRACTITIONER

## 2023-06-05 RX ORDER — NEBIVOLOL 10 MG/1
10 TABLET ORAL DAILY
Qty: 90 TABLET | Refills: 1 | Status: SHIPPED | OUTPATIENT
Start: 2023-06-05 | End: 2024-04-01

## 2023-06-05 RX ORDER — GABAPENTIN 300 MG/1
300 CAPSULE ORAL 3 TIMES DAILY
Qty: 270 CAPSULE | Refills: 1 | Status: SHIPPED | OUTPATIENT
Start: 2023-06-05 | End: 2023-10-30

## 2023-06-05 RX ORDER — LEVOTHYROXINE SODIUM 25 UG/1
25 TABLET ORAL
Qty: 90 TABLET | Refills: 1 | Status: SHIPPED | OUTPATIENT
Start: 2023-06-05 | End: 2023-11-21

## 2023-06-05 RX ORDER — LOSARTAN POTASSIUM 100 MG/1
100 TABLET ORAL DAILY
Qty: 90 TABLET | Refills: 1 | Status: SHIPPED | OUTPATIENT
Start: 2023-06-05 | End: 2023-12-11

## 2023-06-05 RX ORDER — PRAVASTATIN SODIUM 20 MG/1
20 TABLET ORAL NIGHTLY
Qty: 90 TABLET | Refills: 1 | Status: SHIPPED | OUTPATIENT
Start: 2023-06-05 | End: 2023-06-19

## 2023-06-05 RX ORDER — LIDOCAINE 50 MG/G
1 PATCH TOPICAL DAILY
Qty: 30 PATCH | Refills: 0 | Status: SHIPPED | OUTPATIENT
Start: 2023-06-05

## 2023-06-05 RX ORDER — TRAMADOL HYDROCHLORIDE 50 MG/1
50 TABLET ORAL EVERY 6 HOURS PRN
COMMUNITY
Start: 2023-05-17 | End: 2023-06-16 | Stop reason: SDUPTHER

## 2023-06-05 RX ORDER — TIZANIDINE 2 MG/1
2 TABLET ORAL EVERY 8 HOURS PRN
Qty: 90 TABLET | Refills: 1 | Status: SHIPPED | OUTPATIENT
Start: 2023-06-05 | End: 2023-09-08

## 2023-06-05 RX ORDER — HYDRALAZINE HYDROCHLORIDE 50 MG/1
50 TABLET, FILM COATED ORAL EVERY 8 HOURS
Qty: 270 TABLET | Refills: 1 | Status: SHIPPED | OUTPATIENT
Start: 2023-06-05 | End: 2024-06-04

## 2023-06-05 RX ORDER — SERTRALINE HYDROCHLORIDE 50 MG/1
50 TABLET, FILM COATED ORAL DAILY
Qty: 90 TABLET | Refills: 1 | Status: SHIPPED | OUTPATIENT
Start: 2023-06-05 | End: 2024-03-04

## 2023-06-05 RX ORDER — DICLOFENAC SODIUM 10 MG/G
GEL TOPICAL
Qty: 100 G | Refills: 2 | Status: SHIPPED | OUTPATIENT
Start: 2023-06-05

## 2023-06-07 ENCOUNTER — OFFICE VISIT (OUTPATIENT)
Dept: NEUROSURGERY | Facility: CLINIC | Age: 78
End: 2023-06-07
Payer: MEDICARE

## 2023-06-07 VITALS — BODY MASS INDEX: 33.07 KG/M2 | HEIGHT: 70 IN | WEIGHT: 231 LBS

## 2023-06-07 DIAGNOSIS — M48.062 LUMBAR STENOSIS WITH NEUROGENIC CLAUDICATION: Primary | ICD-10-CM

## 2023-06-07 DIAGNOSIS — M47.14 THORACIC SPONDYLOSIS WITH MYELOPATHY: ICD-10-CM

## 2023-06-07 PROCEDURE — 99999 PR PBB SHADOW E&M-EST. PATIENT-LVL III: ICD-10-PCS | Mod: PBBFAC,,, | Performed by: NEUROLOGICAL SURGERY

## 2023-06-07 PROCEDURE — 99214 PR OFFICE/OUTPT VISIT, EST, LEVL IV, 30-39 MIN: ICD-10-PCS | Mod: S$PBB,,, | Performed by: NEUROLOGICAL SURGERY

## 2023-06-07 PROCEDURE — 99214 OFFICE O/P EST MOD 30 MIN: CPT | Mod: S$PBB,,, | Performed by: NEUROLOGICAL SURGERY

## 2023-06-07 PROCEDURE — 99999 PR PBB SHADOW E&M-EST. PATIENT-LVL III: CPT | Mod: PBBFAC,,, | Performed by: NEUROLOGICAL SURGERY

## 2023-06-07 PROCEDURE — 99213 OFFICE O/P EST LOW 20 MIN: CPT | Mod: PBBFAC,PN | Performed by: NEUROLOGICAL SURGERY

## 2023-06-07 NOTE — PROGRESS NOTES
NEUROSURGICAL PROGRESS NOTE    DATE OF SERVICE:  06/07/2023    ATTENDING PHYSICIAN:  Ever Polo MD    SUBJECTIVE:    INTERIM HISTORY:    This is a very pleasant 77 y.o. male, who presented to the emergency room about a month ago while I was on call for acute onset of difficulty walking and recent fall.  He reports that he has been having trouble walking for more than 1 year.  More recently the walking difficulty has become worse and he fell several times.  He is unable to stand for long period of time.  When he stands for long period of time started having back pain and right hip pain.  The pain can radiates towards the knee area.  During his hospitalization thoracic, cervical and lumbar spine MRI were done.  There was a concern for possible diskitis but after consulting with IR we decided not proceed with a biopsy.  Patient did not have any significant enhancement or inflammatory markers elevation.  Since his discharge he has been doing physical therapy.  His gait has improved.  He still has difficulty with his balance and walking for long period of time.  Denies having upper extremity weakness or numbness.  Not dropping things frequently.  Does not report sphincter dysfunction symptoms              PAST MEDICAL HISTORY:  Active Ambulatory Problems     Diagnosis Date Noted    Chronic midline low back pain without sciatica 11/14/2018    Essential hypertension     Hyperlipidemia     Acquired hypothyroidism     BPH with urinary obstruction 02/06/2020    Elevated prostate specific antigen (PSA) 03/22/2021    Anxiety 03/15/2022    Diplopia 07/03/2018    Obesity (BMI 30.0-34.9) 03/16/2022    Insomnia 03/16/2022    Primary osteoarthritis of both knees 03/16/2022    Spastic gait 05/03/2023    Progressive weakness 05/05/2023    Debility 05/10/2023    Aortic atherosclerosis 06/05/2023     Resolved Ambulatory Problems     Diagnosis Date Noted    Acute medial meniscal tear, right, initial encounter 05/21/2018    Decreased  ROM of lumbar spine 11/14/2018    Impaired mobility and activities of daily living 11/14/2018    Sleep apnea     Colon cancer screening 08/11/2020    Back pain 05/04/2023     Past Medical History:   Diagnosis Date    Allergy     Arthritis     BPH (benign prostatic hyperplasia)     Cataract     Hypertension     SOB (shortness of breath)        PAST SURGICAL HISTORY:  Past Surgical History:   Procedure Laterality Date    CATARACT EXTRACTION, BILATERAL Bilateral     COLONOSCOPY N/A 08/11/2020    Procedure: COLONOSCOPY;  Surgeon: LISBETH Ríos MD;  Location: [address];  Service: Endoscopy;  Laterality: N/A;  covid test 8/8 [place]    dental implant Left     EYE SURGERY      KNEE ARTHROSCOPY Bilateral     uvula removal         SOCIAL HISTORY:   Social History     Socioeconomic History    Marital status:    Tobacco Use    Smoking status: Never    Smokeless tobacco: Never   Substance and Sexual Activity    Alcohol use: Yes     Alcohol/week: 15.0 standard drinks     Types: 12 Standard drinks or equivalent, 3 Shots of liquor per week     Comment: Rum and coke 2-3 drinks NIGHTLY    Drug use: No    Sexual activity: Not Currently     Partners: Female     Social Determinants of Health     Financial Resource Strain: Low Risk     Difficulty of Paying Living Expenses: Not hard at all   Food Insecurity: No Food Insecurity    Worried About Running Out of Food in the Last Year: Never true    Ran Out of Food in the Last Year: Never true   Transportation Needs: No Transportation Needs    Lack of Transportation (Medical): No    Lack of Transportation (Non-Medical): No   Physical Activity: Inactive    Days of Exercise per Week: 0 days    Minutes of Exercise per Session: 0 min   Stress: Stress Concern Present    Feeling of Stress : To some extent   Social Connections: Socially Integrated    Frequency of Communication with Friends and Family: More than three times a week    Frequency of Social Gatherings with Friends and  Family: Once a week    Attends Muslim Services: 1 to 4 times per year    Active Member of Clubs or Organizations: No    Attends Club or Organization Meetings: 1 to 4 times per year    Marital Status:    Housing Stability: Low Risk     Unable to Pay for Housing in the Last Year: No    Number of Places Lived in the Last Year: 1    Unstable Housing in the Last Year: No       FAMILY HISTORY:  Family History   Problem Relation Age of Onset    Diabetes Mother     Hypertension Mother     Arthritis Mother        CURRENTS MEDICATIONS:  Current Outpatient Medications on File Prior to Visit   Medication Sig Dispense Refill    acetaminophen (TYLENOL) 500 MG tablet Take 2 tablets (1,000 mg total) by mouth every 8 (eight) hours. 60 tablet 0    diclofenac sodium (VOLTAREN) 1 % Gel APPLY TOPICALLY TO THE AFFECTED AREA THREE TIMES DAILY AS NEEDED FOR JOINT PAIN 100 g 2    fluticasone propionate (FLONASE) 50 mcg/actuation nasal spray 1 spray by Each Nostril route daily as needed.      gabapentin (NEURONTIN) 300 MG capsule Take 1 capsule (300 mg total) by mouth 3 (three) times daily. 270 capsule 1    hydrALAZINE (APRESOLINE) 50 MG tablet Take 1 tablet (50 mg total) by mouth every 8 (eight) hours. 270 tablet 1    levothyroxine (SYNTHROID) 25 MCG tablet Take 1 tablet (25 mcg total) by mouth before breakfast. 90 tablet 1    LIDOcaine (LIDODERM) 5 % Place 1 patch onto the skin once daily. Remove & Discard patch within 12 hours or as directed by MD 30 patch 0    losartan (COZAAR) 100 MG tablet Take 1 tablet (100 mg total) by mouth once daily. 90 tablet 1    multivitamin capsule Take 1 capsule by mouth once daily.      nebivoloL (BYSTOLIC) 10 MG Tab Take 1 tablet (10 mg total) by mouth once daily. 90 tablet 1    pravastatin (PRAVACHOL) 20 MG tablet Take 1 tablet (20 mg total) by mouth every evening. 90 tablet 1    sertraline (ZOLOFT) 50 MG tablet Take 1 tablet (50 mg total) by mouth once daily. 90 tablet 1    tiZANidine (ZANAFLEX)  2 MG tablet Take 1 tablet (2 mg total) by mouth every 8 (eight) hours as needed (Back/Knee/Hip Pain). 90 tablet 1    traMADoL (ULTRAM) 50 mg tablet Take 50 mg by mouth every 6 (six) hours as needed.       No current facility-administered medications on file prior to visit.       ALLERGIES:  Review of patient's allergies indicates:   Allergen Reactions    Doxycycline Diarrhea     itching    Ezetimibe-simvastatin      Muscle cramps    Rosuvastatin      Muscle cramps    Sertraline Other (See Comments)     jittery    Simvastatin      Muscle cramps    Adhesive Rash       REVIEW OF SYSTEMS:  Review of Systems   Constitutional:  Negative for diaphoresis, fever and weight loss.   Respiratory:  Negative for shortness of breath.    Cardiovascular:  Negative for chest pain.   Gastrointestinal:  Negative for blood in stool.   Genitourinary:  Negative for hematuria.   Endo/Heme/Allergies:  Does not bruise/bleed easily.   All other systems reviewed and are negative.      OBJECTIVE:    PHYSICAL EXAMINATION:   There were no vitals filed for this visit.    Physical Exam:  Vitals reviewed.    Constitutional: He appears well-developed and well-nourished.     Eyes: Pupils are equal, round, and reactive to light. Conjunctivae and EOM are normal.     Cardiovascular: Normal distal pulses and no edema.     Abdominal: Soft.     Skin: Skin displays no rash on trunk and no rash on extremities. Skin displays no lesions on trunk and no lesions on extremities.     Psych/Behavior: He is alert. He is oriented to person, place, and time. He has a normal mood and affect.     Musculoskeletal:        Neck: Range of motion is limited.     Neurological:        DTRs: Tricep reflexes are 0 on the right side and 0 on the left side. Bicep reflexes are 0 on the right side and 0 on the left side. Brachioradialis reflexes are 0 on the right side and 0 on the left side. Patellar reflexes are 0 on the right side and 0 on the left side. Achilles reflexes are 0 on  the right side and 0 on the left side.     Back Exam     Muscle Strength   Right Quadriceps:  5/5   Left Quadriceps:  5/5   Right Hamstrings:  5/5   Left Hamstrings:  5/5               Neurologic Exam     Mental Status   Oriented to person, place, and time.   Speech: speech is normal   Level of consciousness: alert    Cranial Nerves   Cranial nerves II through XII intact.     CN III, IV, VI   Pupils are equal, round, and reactive to light.  Extraocular motions are normal.     Motor Exam   Muscle bulk: normal  Overall muscle tone: normal    Strength   Right deltoid: 5/5  Left deltoid: 5/5  Right biceps: 5/5  Left biceps: 5/5  Right triceps: 5/5  Left triceps: 5/5  Right wrist flexion: 5/5  Left wrist flexion: 5/5  Right wrist extension: 5/5  Left wrist extension: 5/5  Right interossei: 5/5  Left interossei: 5/5  Right iliopsoas: 5/5  Left iliopsoas: 5/5  Right quadriceps: 5/5  Left quadriceps: 5/5  Right hamstrin/5  Left hamstrin/5  Right anterior tibial: 5/5  Left anterior tibial: 5/5  Right posterior tibial: 5/5  Left posterior tibial: 5/5  Right peroneal: 5/5  Left peroneal: 5/5  Right gastroc: 5/5  Left gastroc: 5/5    Sensory Exam   Light touch normal.   Pinprick normal.     Gait, Coordination, and Reflexes     Gait  Gait: spastic    Coordination   Finger to nose coordination: normal    Reflexes   Right brachioradialis: 0  Left brachioradialis: 0  Right biceps: 0  Left biceps: 0  Right triceps: 0  Left triceps: 0  Right patellar: 0  Left patellar: 0  Right achilles: 0  Left achilles: 0  Right plantar: normal  Left plantar: normal  Right Castro: absent  Left Castro: absent  Right ankle clonus: absent  Left ankle clonus: absent      DIAGNOSTIC DATA:  I personally interpreted the following imaging:   Thoracic spine MRI and see shows T7-8 degeneration with disc disease and surrounding endplate osteolysis, moderate T7-8 stenosis with mild cord compression, questionable intramedullary signal change  Lumbar  spine MRI shows moderate-to-severe stenosis at L3-4 and L4-5  Cervical spine MRI shows, C4-5 and C5-6 moderate stenosis, anterior cord compression at C5-6 and C6-7    ASSESMENT:  This is a 77 y.o. male with     Problem List Items Addressed This Visit    None  Visit Diagnoses       Lumbar stenosis with neurogenic claudication    -  Primary    Thoracic spondylosis with myelopathy                  PLAN:  Continue physical therapy.  In order to prevent worsening myelopathy and to treat his neurogenic claudication symptoms I recommended at T7-8, L3-4 and L4-5 laminectomy, medial facetectomy, foraminotomy.  All questions answered.  The goals of the surgery is to prevent worsening myelopathy, improve the patient's ability to walk, improve the radicular leg pain.  Patient is considering this treatment option.  He has a significant spastic gait in his balance has significantly worsened over the last year    I explained the natural history of the disease and all treatment options.     We have discussed the risks of surgery including death, coma, bleeding, infection, failure of surgery, CSF leak, nerve root injury, spinal cord injury, ureter injury, weakness, paralysis, peripheral neuropathy, need for reoperation. Patient understands the risks and would like to proceed with surgery.    The patient has increase perioperative risks because of these comorbidities:  Essential hypertension.         Ever Polo MD  Cell:[phone]

## 2023-06-15 ENCOUNTER — PATIENT MESSAGE (OUTPATIENT)
Dept: INTERNAL MEDICINE | Facility: CLINIC | Age: 78
End: 2023-06-15
Payer: MEDICARE

## 2023-06-15 DIAGNOSIS — M54.16 LUMBAR RADICULOPATHY: ICD-10-CM

## 2023-06-15 DIAGNOSIS — M51.36 DDD (DEGENERATIVE DISC DISEASE), LUMBAR: ICD-10-CM

## 2023-06-15 DIAGNOSIS — M48.00 SPINAL STENOSIS, UNSPECIFIED SPINAL REGION: Primary | ICD-10-CM

## 2023-06-16 ENCOUNTER — PATIENT MESSAGE (OUTPATIENT)
Dept: INTERNAL MEDICINE | Facility: CLINIC | Age: 78
End: 2023-06-16
Payer: MEDICARE

## 2023-06-16 RX ORDER — TRAMADOL HYDROCHLORIDE 50 MG/1
50 TABLET ORAL EVERY 6 HOURS PRN
Qty: 28 TABLET | Refills: 0 | Status: SHIPPED | OUTPATIENT
Start: 2023-06-16 | End: 2023-07-25 | Stop reason: SDUPTHER

## 2023-06-19 ENCOUNTER — OFFICE VISIT (OUTPATIENT)
Dept: CARDIOLOGY | Facility: CLINIC | Age: 78
End: 2023-06-19
Payer: MEDICARE

## 2023-06-19 VITALS
DIASTOLIC BLOOD PRESSURE: 64 MMHG | HEIGHT: 70 IN | SYSTOLIC BLOOD PRESSURE: 112 MMHG | HEART RATE: 87 BPM | BODY MASS INDEX: 33.17 KG/M2 | WEIGHT: 231.69 LBS

## 2023-06-19 DIAGNOSIS — E78.5 HYPERLIPIDEMIA, UNSPECIFIED HYPERLIPIDEMIA TYPE: Chronic | ICD-10-CM

## 2023-06-19 DIAGNOSIS — I25.10 CORONARY ARTERY DISEASE INVOLVING NATIVE CORONARY ARTERY OF NATIVE HEART WITHOUT ANGINA PECTORIS: Primary | ICD-10-CM

## 2023-06-19 DIAGNOSIS — I10 ESSENTIAL HYPERTENSION: Chronic | ICD-10-CM

## 2023-06-19 DIAGNOSIS — I70.0 AORTIC ATHEROSCLEROSIS: ICD-10-CM

## 2023-06-19 PROCEDURE — 99999 PR PBB SHADOW E&M-EST. PATIENT-LVL III: CPT | Mod: PBBFAC,,, | Performed by: INTERNAL MEDICINE

## 2023-06-19 PROCEDURE — 99213 OFFICE O/P EST LOW 20 MIN: CPT | Mod: PBBFAC | Performed by: INTERNAL MEDICINE

## 2023-06-19 PROCEDURE — 99204 OFFICE O/P NEW MOD 45 MIN: CPT | Mod: S$PBB,,, | Performed by: INTERNAL MEDICINE

## 2023-06-19 PROCEDURE — 99999 PR PBB SHADOW E&M-EST. PATIENT-LVL III: ICD-10-PCS | Mod: PBBFAC,,, | Performed by: INTERNAL MEDICINE

## 2023-06-19 PROCEDURE — 99204 PR OFFICE/OUTPT VISIT, NEW, LEVL IV, 45-59 MIN: ICD-10-PCS | Mod: S$PBB,,, | Performed by: INTERNAL MEDICINE

## 2023-06-19 RX ORDER — PRAVASTATIN SODIUM 20 MG/1
20 TABLET ORAL NIGHTLY
Qty: 90 TABLET | Refills: 3 | Status: SHIPPED | OUTPATIENT
Start: 2023-06-19 | End: 2024-03-15 | Stop reason: SDUPTHER

## 2023-06-19 NOTE — PROGRESS NOTES
HISTORY:    76 yo M w a h/o CAD on CT, hypertension, hyperlipidemia, and DDD presenting for initial evaluation by me.    Patient recently admitted for neuro issues including B LE weakness, gait instability and balance issues.    No issues with CP, SOB, or SU.    Ambulating with a walker at this time. Limited by neuro issues.    The patient denies any previous history of myocardial infarction, stroke, congestive heart failure, or cardiomyopathy.    Tolerates losartan 100x1, nebivilol 10x1, hydralazine 50x3, and pravastatin 20x1.    PHYSICAL EXAM:    Vitals:    06/19/23 1059   BP: 112/64   Pulse: 87       NAD, A+Ox3.  No jvd, no bruit.  RRR nml s1,s2. 2/6 ADONIS at RUSB.  CTA B no wheezes or crackles.  No edema.    LABS/STUDIES (imaging reviewed during clinic visit):    May 2023 CBC and CMP nml. TSH nml. 2021  and HDL 36. A1c nml.  ECG May 2023 SR PACs. No Qs/Sts.  NST [facility] 2022 Nml LVEF w no e/o ischemia.  CT Chest [facility] 2022 AV and coronary artery calcification. Paraesophageal hernia.       ASSESSMENT & PLAN:    1. Coronary artery disease involving native coronary artery of native heart without angina pectoris    2. Aortic atherosclerosis    3. Essential hypertension    4. Hyperlipidemia, unspecified hyperlipidemia type        Orders Placed This Encounter    Echo    pravastatin (PRAVACHOL) 20 MG tablet        Patient with stable ischemic heart disease evidenced by CT coronary calcification. No clinical events or symptoms. Nml NST '22. RF modification.    Bps controlled on losartan 100x1, nebivilol 10x1, and hydralazine 50x3.     On pravastatin 20x1. Pt has had issues with simvastatin, rosuvastatin, and ezetimibe. Will follow-up lipi panel in the future.      Asa therapy is reasonable, although given gait instability and fall okay to defer.      Follow up in about 6 months (around 12/19/2023).      Johanne Nice MD

## 2023-06-21 ENCOUNTER — PATIENT MESSAGE (OUTPATIENT)
Dept: INTERNAL MEDICINE | Facility: CLINIC | Age: 78
End: 2023-06-21
Payer: MEDICARE

## 2023-06-28 ENCOUNTER — HOSPITAL ENCOUNTER (OUTPATIENT)
Dept: RADIOLOGY | Facility: HOSPITAL | Age: 78
Discharge: HOME | End: 2023-06-28
Attending: NEUROLOGICAL SURGERY
Payer: MEDICARE

## 2023-06-28 DIAGNOSIS — M54.9 BACK PAIN, UNSPECIFIED BACK LOCATION, UNSPECIFIED BACK PAIN LATERALITY, UNSPECIFIED CHRONICITY: ICD-10-CM

## 2023-06-28 PROCEDURE — 72082 X-RAY EXAM ENTIRE SPI 2/3 VW: CPT | Mod: 26,,, | Performed by: RADIOLOGY

## 2023-06-28 PROCEDURE — 72082 XR SCOLIOSIS COMPLETE: ICD-10-PCS | Mod: 26,,, | Performed by: RADIOLOGY

## 2023-06-28 PROCEDURE — 72082 X-RAY EXAM ENTIRE SPI 2/3 VW: CPT | Mod: TC,FY

## 2023-06-28 PROCEDURE — 72110 XR LUMBAR SPINE AP AND LAT WITH FLEX/EXT: ICD-10-PCS | Mod: 26,,, | Performed by: RADIOLOGY

## 2023-06-28 PROCEDURE — 72110 X-RAY EXAM L-2 SPINE 4/>VWS: CPT | Mod: 26,,, | Performed by: RADIOLOGY

## 2023-06-28 PROCEDURE — 72110 X-RAY EXAM L-2 SPINE 4/>VWS: CPT | Mod: 59,TC,FY

## 2023-07-12 ENCOUNTER — TELEPHONE (OUTPATIENT)
Dept: FAMILY MEDICINE | Facility: CLINIC | Age: 78
End: 2023-07-12
Payer: MEDICARE

## 2023-07-14 ENCOUNTER — OFFICE VISIT (OUTPATIENT)
Dept: INTERNAL MEDICINE | Facility: CLINIC | Age: 78
End: 2023-07-14
Payer: MEDICARE

## 2023-07-14 VITALS
HEIGHT: 70 IN | DIASTOLIC BLOOD PRESSURE: 74 MMHG | HEART RATE: 70 BPM | RESPIRATION RATE: 18 BRPM | SYSTOLIC BLOOD PRESSURE: 116 MMHG | TEMPERATURE: 98 F | BODY MASS INDEX: 33.17 KG/M2 | WEIGHT: 231.69 LBS | OXYGEN SATURATION: 96 %

## 2023-07-14 DIAGNOSIS — I10 ESSENTIAL HYPERTENSION: Chronic | ICD-10-CM

## 2023-07-14 DIAGNOSIS — M54.9 CHRONIC BACK PAIN, UNSPECIFIED BACK LOCATION, UNSPECIFIED BACK PAIN LATERALITY: ICD-10-CM

## 2023-07-14 DIAGNOSIS — M47.14 THORACIC MYELOPATHY: ICD-10-CM

## 2023-07-14 DIAGNOSIS — G89.29 CHRONIC BACK PAIN, UNSPECIFIED BACK LOCATION, UNSPECIFIED BACK PAIN LATERALITY: ICD-10-CM

## 2023-07-14 DIAGNOSIS — R26.9 GAIT DIFFICULTY: ICD-10-CM

## 2023-07-14 DIAGNOSIS — M48.062 SPINAL STENOSIS OF LUMBAR REGION WITH NEUROGENIC CLAUDICATION: Primary | ICD-10-CM

## 2023-07-14 PROCEDURE — 99999 PR PBB SHADOW E&M-EST. PATIENT-LVL V: CPT | Mod: PBBFAC,,, | Performed by: INTERNAL MEDICINE

## 2023-07-14 PROCEDURE — 99214 PR OFFICE/OUTPT VISIT, EST, LEVL IV, 30-39 MIN: ICD-10-PCS | Mod: S$PBB,,, | Performed by: INTERNAL MEDICINE

## 2023-07-14 PROCEDURE — 99215 OFFICE O/P EST HI 40 MIN: CPT | Mod: PBBFAC,PO | Performed by: INTERNAL MEDICINE

## 2023-07-14 PROCEDURE — 99214 OFFICE O/P EST MOD 30 MIN: CPT | Mod: S$PBB,,, | Performed by: INTERNAL MEDICINE

## 2023-07-14 PROCEDURE — 99999 PR PBB SHADOW E&M-EST. PATIENT-LVL V: ICD-10-PCS | Mod: PBBFAC,,, | Performed by: INTERNAL MEDICINE

## 2023-07-14 NOTE — PROGRESS NOTES
History of present illness:   77-year-old gentleman in today following up on a couple chronic issues.  Patient has lumbar and thoracic myelopathy due to degenerative disease spinal stenosis.  See neuro surgery here and elsewhere.  Contemplating recommended surgery.  It has impacted his gait.  He was getting physical therapy and would like to continue such as an outpatient.  He is using Tylenol fairly regularly and also intermittent p.r.n. tramadol for pain .  Denies any recent cough chest pain shortness of breath palpitations syncope.  No bowel or urinary incontinence.      Current medications:  Medications all noted reviewed.      Review of systems:   Constitutional:  No fever no chills.    HEENT:  No hoarseness no dysphagia.    Cardiovascular:  No chest pain or palpitations and no syncope.    Respiratory:  No cough shortness of breath.    Neurologic.  Continued balance issues  .  Using walker      Impression:   Thoracic and lumbar spinal myelopathy symptoms with gait disturbance.    Hypertension controlled.    Hypothyroidism on replacement therapy      Plan:  Will reorder outpatient physical therapy.    Agreed to supply him with p.r.n. tramadol as needed for now.  He may continue acetaminophen as well.    Return to clinic 3-4 months

## 2023-07-24 ENCOUNTER — PATIENT MESSAGE (OUTPATIENT)
Dept: INTERNAL MEDICINE | Facility: CLINIC | Age: 78
End: 2023-07-24
Payer: MEDICARE

## 2023-07-24 DIAGNOSIS — M54.16 LUMBAR RADICULOPATHY: ICD-10-CM

## 2023-07-24 DIAGNOSIS — M48.00 SPINAL STENOSIS, UNSPECIFIED SPINAL REGION: ICD-10-CM

## 2023-07-24 DIAGNOSIS — M51.36 DDD (DEGENERATIVE DISC DISEASE), LUMBAR: ICD-10-CM

## 2023-07-25 RX ORDER — TRAMADOL HYDROCHLORIDE 50 MG/1
50 TABLET ORAL EVERY 6 HOURS PRN
Qty: 28 TABLET | Refills: 0 | Status: SHIPPED | OUTPATIENT
Start: 2023-07-25 | End: 2023-08-12 | Stop reason: SDUPTHER

## 2023-08-12 DIAGNOSIS — M54.16 LUMBAR RADICULOPATHY: ICD-10-CM

## 2023-08-12 DIAGNOSIS — M48.00 SPINAL STENOSIS, UNSPECIFIED SPINAL REGION: ICD-10-CM

## 2023-08-12 DIAGNOSIS — M51.36 DDD (DEGENERATIVE DISC DISEASE), LUMBAR: ICD-10-CM

## 2023-08-14 RX ORDER — TRAMADOL HYDROCHLORIDE 50 MG/1
50 TABLET ORAL EVERY 6 HOURS PRN
Qty: 28 TABLET | Refills: 0 | Status: SHIPPED | OUTPATIENT
Start: 2023-08-14 | End: 2023-09-27 | Stop reason: SDUPTHER

## 2023-09-06 DIAGNOSIS — M54.16 LUMBAR RADICULOPATHY: ICD-10-CM

## 2023-09-06 DIAGNOSIS — M51.36 DDD (DEGENERATIVE DISC DISEASE), LUMBAR: ICD-10-CM

## 2023-09-06 DIAGNOSIS — M48.00 SPINAL STENOSIS, UNSPECIFIED SPINAL REGION: ICD-10-CM

## 2023-09-08 RX ORDER — TIZANIDINE 2 MG/1
TABLET ORAL
Qty: 30 TABLET | Refills: 1 | Status: SHIPPED | OUTPATIENT
Start: 2023-09-08 | End: 2023-11-09

## 2023-09-27 ENCOUNTER — PATIENT MESSAGE (OUTPATIENT)
Dept: INTERNAL MEDICINE | Facility: CLINIC | Age: 78
End: 2023-09-27
Payer: MEDICARE

## 2023-09-27 DIAGNOSIS — M51.36 DDD (DEGENERATIVE DISC DISEASE), LUMBAR: ICD-10-CM

## 2023-09-27 DIAGNOSIS — M48.00 SPINAL STENOSIS, UNSPECIFIED SPINAL REGION: ICD-10-CM

## 2023-09-27 DIAGNOSIS — M54.16 LUMBAR RADICULOPATHY: ICD-10-CM

## 2023-09-27 RX ORDER — TRAMADOL HYDROCHLORIDE 50 MG/1
50 TABLET ORAL EVERY 6 HOURS PRN
Qty: 28 TABLET | Refills: 0 | Status: SHIPPED | OUTPATIENT
Start: 2023-09-27 | End: 2023-10-30

## 2023-10-06 ENCOUNTER — PATIENT MESSAGE (OUTPATIENT)
Dept: OPTOMETRY | Facility: CLINIC | Age: 78
End: 2023-10-06
Payer: MEDICARE

## 2023-10-20 ENCOUNTER — PATIENT MESSAGE (OUTPATIENT)
Dept: INTERNAL MEDICINE | Facility: CLINIC | Age: 78
End: 2023-10-20
Payer: MEDICARE

## 2023-10-20 NOTE — TELEPHONE ENCOUNTER
He would need to come in to be seen and evaluated before we can make any changes to his medication. His symptoms might not be related to mediation or his BP.

## 2023-10-30 ENCOUNTER — OFFICE VISIT (OUTPATIENT)
Dept: INTERNAL MEDICINE | Facility: CLINIC | Age: 78
End: 2023-10-30
Payer: MEDICARE

## 2023-10-30 VITALS
RESPIRATION RATE: 12 BRPM | SYSTOLIC BLOOD PRESSURE: 136 MMHG | HEART RATE: 58 BPM | HEIGHT: 70 IN | BODY MASS INDEX: 32.48 KG/M2 | OXYGEN SATURATION: 96 % | WEIGHT: 226.88 LBS | DIASTOLIC BLOOD PRESSURE: 74 MMHG | TEMPERATURE: 98 F

## 2023-10-30 DIAGNOSIS — M48.00 SPINAL STENOSIS, UNSPECIFIED SPINAL REGION: ICD-10-CM

## 2023-10-30 DIAGNOSIS — N13.8 BPH WITH URINARY OBSTRUCTION: ICD-10-CM

## 2023-10-30 DIAGNOSIS — I10 ESSENTIAL HYPERTENSION: Primary | Chronic | ICD-10-CM

## 2023-10-30 DIAGNOSIS — M51.36 DDD (DEGENERATIVE DISC DISEASE), LUMBAR: ICD-10-CM

## 2023-10-30 DIAGNOSIS — M54.16 LUMBAR RADICULOPATHY: ICD-10-CM

## 2023-10-30 DIAGNOSIS — N40.1 BPH WITH URINARY OBSTRUCTION: ICD-10-CM

## 2023-10-30 DIAGNOSIS — Z12.11 ENCOUNTER FOR COLORECTAL CANCER SCREENING: ICD-10-CM

## 2023-10-30 DIAGNOSIS — Z12.12 ENCOUNTER FOR COLORECTAL CANCER SCREENING: ICD-10-CM

## 2023-10-30 PROCEDURE — 99999PBSHW FLU VACCINE - QUADRIVALENT - ADJUVANTED: ICD-10-PCS | Mod: PBBFAC,,,

## 2023-10-30 PROCEDURE — G0008 ADMIN INFLUENZA VIRUS VAC: HCPCS | Mod: PBBFAC,PO

## 2023-10-30 PROCEDURE — 99999 PR PBB SHADOW E&M-EST. PATIENT-LVL IV: CPT | Mod: PBBFAC,,, | Performed by: NURSE PRACTITIONER

## 2023-10-30 PROCEDURE — 99214 OFFICE O/P EST MOD 30 MIN: CPT | Mod: PBBFAC,PO | Performed by: NURSE PRACTITIONER

## 2023-10-30 PROCEDURE — 99999PBSHW FLU VACCINE - QUADRIVALENT - ADJUVANTED: Mod: PBBFAC,,,

## 2023-10-30 PROCEDURE — 99214 OFFICE O/P EST MOD 30 MIN: CPT | Mod: S$PBB,,, | Performed by: NURSE PRACTITIONER

## 2023-10-30 PROCEDURE — 99214 PR OFFICE/OUTPT VISIT, EST, LEVL IV, 30-39 MIN: ICD-10-PCS | Mod: S$PBB,,, | Performed by: NURSE PRACTITIONER

## 2023-10-30 PROCEDURE — 99999 PR PBB SHADOW E&M-EST. PATIENT-LVL IV: ICD-10-PCS | Mod: PBBFAC,,, | Performed by: NURSE PRACTITIONER

## 2023-10-30 RX ORDER — GABAPENTIN 300 MG/1
300 CAPSULE ORAL NIGHTLY
Qty: 270 CAPSULE | Refills: 1
Start: 2023-10-30 | End: 2024-10-29

## 2023-10-30 NOTE — PROGRESS NOTES
Subjective:       Patient ID: Julius Baker is a 77 y.o. male.    Chief Complaint: Advice Only (Med Consult; pt state felt dizzy and had a fall while taking out the trash at night. Pt feel once of the medications is the cause ), Abdominal Pain (Pt c/o abdominal pain on lower abd when coughing ), and Urinary Frequency (X4-5 months)    Patient is a 77 y.o. male who traditionally follows with ANTONELLA Thurston MD presenting today for:    Medication Discussion  Patient suffered a fall back in May and has not had any further falls. He has completed PT and continues to have back pain with sciatica down right leg.     Complains of lower abdominal pain with coughing occurring x 2-3 weeks. States pain is sporadic and cannot identify any trauma.     Continue to have urinary frequency.     Review of patient's allergies indicates:   Allergen Reactions    Doxycycline Diarrhea     itching    Ezetimibe-simvastatin      Muscle cramps    Rosuvastatin      Muscle cramps    Sertraline Other (See Comments)     jittery    Simvastatin      Muscle cramps    Adhesive Rash       Medication List with Changes/Refills   Current Medications    ACETAMINOPHEN (TYLENOL) 500 MG TABLET    Take 2 tablets (1,000 mg total) by mouth every 8 (eight) hours.    DICLOFENAC SODIUM (VOLTAREN) 1 % GEL    APPLY TOPICALLY TO THE AFFECTED AREA THREE TIMES DAILY AS NEEDED FOR JOINT PAIN    FLUTICASONE PROPIONATE (FLONASE) 50 MCG/ACTUATION NASAL SPRAY    1 spray by Each Nostril route daily as needed.    HYDRALAZINE (APRESOLINE) 50 MG TABLET    Take 1 tablet (50 mg total) by mouth every 8 (eight) hours.    LEVOTHYROXINE (SYNTHROID) 25 MCG TABLET    Take 1 tablet (25 mcg total) by mouth before breakfast.    LIDOCAINE (LIDODERM) 5 %    Place 1 patch onto the skin once daily. Remove & Discard patch within 12 hours or as directed by MD    LOSARTAN (COZAAR) 100 MG TABLET    Take 1 tablet (100 mg total) by mouth once daily.    MULTIVITAMIN CAPSULE    Take 1  "capsule by mouth once daily.    NEBIVOLOL (BYSTOLIC) 10 MG TAB    Take 1 tablet (10 mg total) by mouth once daily.    PRAVASTATIN (PRAVACHOL) 20 MG TABLET    Take 1 tablet (20 mg total) by mouth every evening.    SERTRALINE (ZOLOFT) 50 MG TABLET    Take 1 tablet (50 mg total) by mouth once daily.    TIZANIDINE (ZANAFLEX) 2 MG TABLET    TAKE 1 TABLET(2 MG) BY MOUTH EVERY 8 HOURS AS NEEDED FOR BACK AND LEG PAIN   Changed and/or Refilled Medications    Modified Medication Previous Medication    GABAPENTIN (NEURONTIN) 300 MG CAPSULE gabapentin (NEURONTIN) 300 MG capsule       Take 1 capsule (300 mg total) by mouth every evening.    Take 1 capsule (300 mg total) by mouth 3 (three) times daily.   Discontinued Medications    TRAMADOL (ULTRAM) 50 MG TABLET    Take 1 tablet (50 mg total) by mouth every 6 (six) hours as needed for Pain.     Medical, social and surgical history has been reviewed with the patient.     Review of Systems   Constitutional:  Negative for chills and fever.   Respiratory:  Negative for cough and shortness of breath.    Cardiovascular:  Negative for chest pain.   Gastrointestinal:  Positive for abdominal pain and diarrhea.   Genitourinary:  Positive for frequency.   Musculoskeletal:  Positive for back pain.   Neurological:  Negative for dizziness and headaches.        Objective:   /74 (BP Location: Right arm, Patient Position: Sitting, BP Method: Medium (Manual))   Pulse (!) 58   Temp 98.1 °F (36.7 °C) (Temporal)   Resp 12   Ht 5' 10" (1.778 m)   Wt 102.9 kg (226 lb 13.7 oz)   SpO2 96%   BMI 32.55 kg/m²     Physical Exam  Vitals reviewed.   Constitutional:       Appearance: Normal appearance.   HENT:      Head: Normocephalic and atraumatic.   Cardiovascular:      Rate and Rhythm: Normal rate and regular rhythm.      Heart sounds: Normal heart sounds. No murmur heard.  Pulmonary:      Effort: Pulmonary effort is normal.      Breath sounds: Normal breath sounds. No wheezing.   Abdominal:    "   General: Bowel sounds are normal.      Palpations: Abdomen is soft.      Tenderness: There is no abdominal tenderness.      Hernia: No hernia is present.   Skin:     General: Skin is warm and dry.   Neurological:      Mental Status: He is alert and oriented to person, place, and time.         I reviewed and independently interpreted the labs and imaging below:  Last Labs:  Glucose   Date Value Ref Range Status   05/09/2023 81 70 - 110 mg/dL Final   05/07/2023 92 70 - 110 mg/dL Final     BUN   Date Value Ref Range Status   05/09/2023 22 8 - 23 mg/dL Final   05/07/2023 13 8 - 23 mg/dL Final     Creatinine   Date Value Ref Range Status   05/09/2023 1.0 0.5 - 1.4 mg/dL Final   05/07/2023 0.7 0.5 - 1.4 mg/dL Final     Cholesterol   Date Value Ref Range Status   11/10/2021 168 120 - 199 mg/dL Final     Comment:     The National Cholesterol Education Program (NCEP) has set the  following guidelines (reference ranges) for Cholesterol:  Optimal.....................<200 mg/dL  Borderline High.............200-239 mg/dL  High........................> or = 240 mg/dL     11/03/2020 225 (H) 120 - 199 mg/dL Final     Comment:     The National Cholesterol Education Program (NCEP) has set the  following guidelines (reference ranges) for Cholesterol:  Optimal.....................<200 mg/dL  Borderline High.............200-239 mg/dL  High........................> or = 240 mg/dL       Hemoglobin A1C   Date Value Ref Range Status   11/10/2021 5.5 4.0 - 5.6 % Final     Comment:     ADA Screening Guidelines:  5.7-6.4%  Consistent with prediabetes  >or=6.5%  Consistent with diabetes    High levels of fetal hemoglobin interfere with the HbA1C  assay. Heterozygous hemoglobin variants (HbS, HgC, etc)do  not significantly interfere with this assay.   However, presence of multiple variants may affect accuracy.     10/28/2019 5.4 4.0 - 5.6 % Final     Comment:     ADA Screening Guidelines:  5.7-6.4%  Consistent with prediabetes  >or=6.5%   Consistent with diabetes  High levels of fetal hemoglobin interfere with the HbA1C  assay. Heterozygous hemoglobin variants (HbS, HgC, etc)do  not significantly interfere with this assay.   However, presence of multiple variants may affect accuracy.       Hemoglobin   Date Value Ref Range Status   05/09/2023 14.9 14.0 - 18.0 g/dL Final   05/07/2023 14.5 14.0 - 18.0 g/dL Final     Hematocrit   Date Value Ref Range Status   05/09/2023 42.8 40.0 - 54.0 % Final   05/07/2023 45.1 40.0 - 54.0 % Final       Assessment and Plan:     1. Essential hypertension    Chronic, stable, continue current medications    2. BPH with urinary obstruction    Chronic, stable, not currently interested in medication     3. Spinal stenosis, unspecified spinal region  4. Lumbar radiculopathy  5. DDD (degenerative disc disease), lumbar    Chronic, stable, continue current medication but only once per day.     - gabapentin (NEURONTIN) 300 MG capsule; Take 1 capsule (300 mg total) by mouth every evening.  Dispense: 270 capsule; Refill: 1    6. Encounter for colorectal cancer screening    - Ambulatory referral/consult to Endo Procedure ; Future

## 2023-11-03 ENCOUNTER — HOSPITAL ENCOUNTER (OUTPATIENT)
Dept: RADIOLOGY | Facility: HOSPITAL | Age: 78
Discharge: HOME | End: 2023-11-03
Attending: NURSE PRACTITIONER
Payer: MEDICARE

## 2023-11-03 DIAGNOSIS — R19.01 RIGHT UPPER QUADRANT ABDOMINAL SWELLING: ICD-10-CM

## 2023-11-03 PROCEDURE — 76705 ECHO EXAM OF ABDOMEN: CPT | Mod: TC

## 2023-11-03 PROCEDURE — 76705 ECHO EXAM OF ABDOMEN: CPT | Mod: 26,76,, | Performed by: RADIOLOGY

## 2023-11-03 PROCEDURE — 76705 US ABDOMEN LIMITED: ICD-10-PCS | Mod: 26,76,, | Performed by: RADIOLOGY

## 2023-11-03 PROCEDURE — 76705 ECHO EXAM OF ABDOMEN: CPT | Mod: 26,,, | Performed by: RADIOLOGY

## 2023-11-03 PROCEDURE — 76705 US SOFT TISSUE, ABDOMEN: ICD-10-PCS | Mod: 26,,, | Performed by: RADIOLOGY

## 2023-11-06 ENCOUNTER — TELEPHONE (OUTPATIENT)
Dept: INTERNAL MEDICINE | Facility: CLINIC | Age: 78
End: 2023-11-06
Payer: MEDICARE

## 2023-11-06 ENCOUNTER — PATIENT MESSAGE (OUTPATIENT)
Dept: INTERNAL MEDICINE | Facility: CLINIC | Age: 78
End: 2023-11-06
Payer: MEDICARE

## 2023-11-06 DIAGNOSIS — K76.9 LIVER LESION: Primary | ICD-10-CM

## 2023-11-06 DIAGNOSIS — K76.0 HEPATIC STEATOSIS: ICD-10-CM

## 2023-11-06 NOTE — TELEPHONE ENCOUNTER
Called and Lt.voice message regarding pt. Results. Stated he can give a call back are view result note on mychart.

## 2023-11-07 ENCOUNTER — PATIENT MESSAGE (OUTPATIENT)
Dept: INTERNAL MEDICINE | Facility: CLINIC | Age: 78
End: 2023-11-07
Payer: MEDICARE

## 2023-11-07 NOTE — TELEPHONE ENCOUNTER
Pt have some questions in regards to Liver test results      How serious is the lesion that has been identified in my liver? What can be done to mitigate the lesion?      Is waiting for three months to do the next test too long?

## 2023-11-09 DIAGNOSIS — M48.00 SPINAL STENOSIS, UNSPECIFIED SPINAL REGION: ICD-10-CM

## 2023-11-09 DIAGNOSIS — M54.16 LUMBAR RADICULOPATHY: ICD-10-CM

## 2023-11-09 DIAGNOSIS — M51.36 DDD (DEGENERATIVE DISC DISEASE), LUMBAR: ICD-10-CM

## 2023-11-09 RX ORDER — TIZANIDINE 2 MG/1
TABLET ORAL
Qty: 30 TABLET | Refills: 1 | Status: SHIPPED | OUTPATIENT
Start: 2023-11-09 | End: 2023-12-11

## 2023-11-10 ENCOUNTER — PATIENT MESSAGE (OUTPATIENT)
Dept: INTERNAL MEDICINE | Facility: CLINIC | Age: 78
End: 2023-11-10
Payer: MEDICARE

## 2023-11-20 DIAGNOSIS — E03.9 HYPOTHYROIDISM, UNSPECIFIED TYPE: ICD-10-CM

## 2023-11-21 RX ORDER — LEVOTHYROXINE SODIUM 25 UG/1
25 TABLET ORAL
Qty: 90 TABLET | Refills: 1 | Status: SHIPPED | OUTPATIENT
Start: 2023-11-21

## 2023-11-21 NOTE — TELEPHONE ENCOUNTER
Refill Routing Note   Medication(s) are not appropriate for processing by [facility] for the following reason(s):        Non-participating provider    [facility] action(s):  Route               Appointments  past 12m or future 3m with PCP    Date Provider   Last Visit   7/14/2023 ANTONELLA Thurston MD   Next Visit   Visit date not found ANTONELLA Thurston MD   ED visits in past 90 days: 0        Note composed:9:47 AM 11/21/2023

## 2023-12-08 DIAGNOSIS — M48.00 SPINAL STENOSIS, UNSPECIFIED SPINAL REGION: ICD-10-CM

## 2023-12-08 DIAGNOSIS — M54.16 LUMBAR RADICULOPATHY: ICD-10-CM

## 2023-12-08 DIAGNOSIS — M51.36 DDD (DEGENERATIVE DISC DISEASE), LUMBAR: ICD-10-CM

## 2023-12-11 DIAGNOSIS — I10 ESSENTIAL HYPERTENSION: Chronic | ICD-10-CM

## 2023-12-11 RX ORDER — TIZANIDINE 2 MG/1
TABLET ORAL
Qty: 90 TABLET | Refills: 1 | Status: SHIPPED | OUTPATIENT
Start: 2023-12-11 | End: 2024-04-01

## 2023-12-11 RX ORDER — LOSARTAN POTASSIUM 100 MG/1
100 TABLET ORAL
Qty: 90 TABLET | Refills: 3 | Status: SHIPPED | OUTPATIENT
Start: 2023-12-11

## 2023-12-21 ENCOUNTER — PATIENT MESSAGE (OUTPATIENT)
Dept: INTERNAL MEDICINE | Facility: CLINIC | Age: 78
End: 2023-12-21
Payer: MEDICARE

## 2023-12-22 NOTE — TELEPHONE ENCOUNTER
FYI -    Patient Note: I want to make sure you are aware of additional meds that I'm taking in addition to the ones we reviewed a month ago.  Levofloxacin 500mg,Benzonatate and a cough medicine.

## 2024-02-21 DIAGNOSIS — M51.36 DDD (DEGENERATIVE DISC DISEASE), LUMBAR: ICD-10-CM

## 2024-02-21 DIAGNOSIS — M54.16 LUMBAR RADICULOPATHY: ICD-10-CM

## 2024-02-21 DIAGNOSIS — M48.00 SPINAL STENOSIS, UNSPECIFIED SPINAL REGION: ICD-10-CM

## 2024-02-21 RX ORDER — TRAMADOL HYDROCHLORIDE 50 MG/1
50 TABLET ORAL EVERY 6 HOURS PRN
Qty: 28 TABLET | Refills: 0 | Status: SHIPPED | OUTPATIENT
Start: 2024-02-21

## 2024-03-01 ENCOUNTER — PATIENT MESSAGE (OUTPATIENT)
Dept: INTERNAL MEDICINE | Facility: CLINIC | Age: 79
End: 2024-03-01
Payer: MEDICARE

## 2024-03-02 DIAGNOSIS — F41.9 ANXIETY: ICD-10-CM

## 2024-03-04 RX ORDER — SERTRALINE HYDROCHLORIDE 50 MG/1
50 TABLET, FILM COATED ORAL
Qty: 90 TABLET | Refills: 1 | Status: SHIPPED | OUTPATIENT
Start: 2024-03-04

## 2024-03-07 ENCOUNTER — PATIENT MESSAGE (OUTPATIENT)
Dept: INTERNAL MEDICINE | Facility: CLINIC | Age: 79
End: 2024-03-07
Payer: MEDICARE

## 2024-03-15 ENCOUNTER — TELEPHONE (OUTPATIENT)
Dept: INTERNAL MEDICINE | Facility: CLINIC | Age: 79
End: 2024-03-15
Payer: MEDICARE

## 2024-03-15 ENCOUNTER — PATIENT MESSAGE (OUTPATIENT)
Dept: INTERNAL MEDICINE | Facility: CLINIC | Age: 79
End: 2024-03-15
Payer: MEDICARE

## 2024-03-15 DIAGNOSIS — E78.5 HYPERLIPIDEMIA, UNSPECIFIED HYPERLIPIDEMIA TYPE: Chronic | ICD-10-CM

## 2024-03-15 RX ORDER — PRAVASTATIN SODIUM 20 MG/1
20 TABLET ORAL NIGHTLY
Qty: 90 TABLET | Refills: 3 | Status: SHIPPED | OUTPATIENT
Start: 2024-03-15

## 2024-03-15 NOTE — TELEPHONE ENCOUNTER
Refill Routing Note   Medication(s) are not appropriate for processing by [facility] for the following reason(s):        Non-participating provider    [facility] action(s):  Route               Appointments  past 12m or future 3m with PCP    Date Provider   Last Visit   10/30/2023 Emely Howard NP   Next Visit   3/21/2024 Emely Howard NP   ED visits in past 90 days: 0        Note composed:11:20 AM 03/15/2024

## 2024-03-15 NOTE — TELEPHONE ENCOUNTER
----- Message from Aneudy Mitchell sent at 3/15/2024 11:25 AM CDT -----  Contact: [phone]  Patient is returning a phone call.  Who left a message for the patient: lane   Does patient know what this is regarding:   refill   Would you like a call back, or a response through your [facility] portal?:   call back   Comments:

## 2024-03-15 NOTE — TELEPHONE ENCOUNTER
Pt request Rx refill for Pravastatin    Pt has upcoming appt on 3/21 for follow up    Pt will discuss establishing care with a provider at visit.

## 2024-03-20 NOTE — PROGRESS NOTES
Subjective:       Patient ID: Julius Baker is a 78 y.o. male.    Chief Complaint: No chief complaint on file.      Patient is a 78 y.o. male who traditionally follows with No, Primary Doctor presenting today for ***    Review of patient's allergies indicates:   Allergen Reactions    Doxycycline Diarrhea     itching    Ezetimibe-simvastatin      Muscle cramps    Rosuvastatin      Muscle cramps    Sertraline Other (See Comments)     jittery    Simvastatin      Muscle cramps    Adhesive Rash       Medication List with Changes/Refills   Current Medications    ACETAMINOPHEN (TYLENOL) 500 MG TABLET    Take 2 tablets (1,000 mg total) by mouth every 8 (eight) hours.    DICLOFENAC SODIUM (VOLTAREN) 1 % GEL    APPLY TOPICALLY TO THE AFFECTED AREA THREE TIMES DAILY AS NEEDED FOR JOINT PAIN    FLUTICASONE PROPIONATE (FLONASE) 50 MCG/ACTUATION NASAL SPRAY    1 spray by Each Nostril route daily as needed.    GABAPENTIN (NEURONTIN) 300 MG CAPSULE    Take 1 capsule (300 mg total) by mouth every evening.    HYDRALAZINE (APRESOLINE) 50 MG TABLET    Take 1 tablet (50 mg total) by mouth every 8 (eight) hours.    LEVOTHYROXINE (SYNTHROID) 25 MCG TABLET    TAKE 1 TABLET(25 MCG) BY MOUTH BEFORE BREAKFAST    LIDOCAINE (LIDODERM) 5 %    Place 1 patch onto the skin once daily. Remove & Discard patch within 12 hours or as directed by MD    LOSARTAN (COZAAR) 100 MG TABLET    TAKE 1 TABLET(100 MG) BY MOUTH EVERY DAY    MULTIVITAMIN CAPSULE    Take 1 capsule by mouth once daily.    NEBIVOLOL (BYSTOLIC) 10 MG TAB    Take 1 tablet (10 mg total) by mouth once daily.    PRAVASTATIN (PRAVACHOL) 20 MG TABLET    Take 1 tablet (20 mg total) by mouth every evening.    PRAVASTATIN (PRAVACHOL) 20 MG TABLET    Take 1 tablet (20 mg total) by mouth every evening.    SERTRALINE (ZOLOFT) 50 MG TABLET    TAKE 1 TABLET(50 MG) BY MOUTH EVERY DAY    TIZANIDINE (ZANAFLEX) 2 MG TABLET    TAKE 1 TABLET BY MOUTH EVERY 8 HOURS AS NEEDED FOR BACK, KNEE, HIP PAIN  "   TRAMADOL (ULTRAM) 50 MG TABLET    TAKE 1 TABLET BY MOUTH EVERY 6 HOURS AS NEEDED FOR PAIN.       Health Maintenance    MMG  PAP   Colonoscopy  Occult Blood/Cologuard  Flu Vaccine  Pneumonia 23 Vaccine  Pneumonia 13 Vaccine  Tetanus/Tdap  Zoster Vaccine  Hepatitis C Screen   HIV Screen   PSA  Eye Exam  Foot Exam  Microalbumin    {Smoking Hx:65431}    Patient ambulates on {HIS/HER:20545} own {WITH-WITHOUT:60715} an assistive device.   Does {HIS/HER:06188} have issues with balance, falls or walking?  {Blank single:99530::"Yes","No"}    On average, how many days per week do you do moderate to strenuous exercise:  (like a brisk walk or jog; this does not include your job/work)  {Blank single:35008::"0","1-3","3-5","5-6","7"}   On average, how many minutes do you exercise at this level each day? {Blank single:18127::"0","30","45","60","90"}  We encourage you to start exercising if you do not already, continue at your current level or increase your level of activity.     Do you eat fruits and vegetable every day?  {Blank single:21357::"Yes","No","Often, but not daily"}    Have you ever been a victim of threats, physical hurting, or do you not feel safe at home?  {Blank single:13900::"Yes","No"}    Do you have any issues with leaking of urine? {Blank single:48512::"Yes","No"}  Have you ever considered medication? {Blank single:23886::"Yes","No"}    Do you go to the dentist regularly? {Blank single:03838::"Yes","No"}  When was you last exam:     Do you go to the eye doctor regularly? {Blank single:40313::"Yes","No"}  When was your last exam:    Do you regularly have hearing exams? {Blank single:08136::"Yes","No"}  When was your last exam:    Have you often been bothered by feeling down, depressed, or hopeless?  {Blank single:76388::"Not at all","several days","more than half the days","nearly every day"}    Medical, social and surgical history has been reviewed with the patient.     Review of Systems   ROS     Objective: "   There were no vitals taken for this visit.      Physical Exam    I reviewed and independently interpreted the labs and imaging below:  Last Labs:  Glucose   Date Value Ref Range Status   05/09/2023 81 70 - 110 mg/dL Final   05/07/2023 92 70 - 110 mg/dL Final     BUN   Date Value Ref Range Status   05/09/2023 22 8 - 23 mg/dL Final   05/07/2023 13 8 - 23 mg/dL Final     Creatinine   Date Value Ref Range Status   05/09/2023 1.0 0.5 - 1.4 mg/dL Final   05/07/2023 0.7 0.5 - 1.4 mg/dL Final     Cholesterol   Date Value Ref Range Status   11/10/2021 168 120 - 199 mg/dL Final     Comment:     The National Cholesterol Education Program (NCEP) has set the  following guidelines (reference ranges) for Cholesterol:  Optimal.....................<200 mg/dL  Borderline High.............200-239 mg/dL  High........................> or = 240 mg/dL     11/03/2020 225 (H) 120 - 199 mg/dL Final     Comment:     The National Cholesterol Education Program (NCEP) has set the  following guidelines (reference ranges) for Cholesterol:  Optimal.....................<200 mg/dL  Borderline High.............200-239 mg/dL  High........................> or = 240 mg/dL       Hemoglobin A1C   Date Value Ref Range Status   11/10/2021 5.5 4.0 - 5.6 % Final     Comment:     ADA Screening Guidelines:  5.7-6.4%  Consistent with prediabetes  >or=6.5%  Consistent with diabetes    High levels of fetal hemoglobin interfere with the HbA1C  assay. Heterozygous hemoglobin variants (HbS, HgC, etc)do  not significantly interfere with this assay.   However, presence of multiple variants may affect accuracy.     10/28/2019 5.4 4.0 - 5.6 % Final     Comment:     ADA Screening Guidelines:  5.7-6.4%  Consistent with prediabetes  >or=6.5%  Consistent with diabetes  High levels of fetal hemoglobin interfere with the HbA1C  assay. Heterozygous hemoglobin variants (HbS, HgC, etc)do  not significantly interfere with this assay.   However, presence of multiple variants may  affect accuracy.       Hemoglobin   Date Value Ref Range Status   05/09/2023 14.9 14.0 - 18.0 g/dL Final   05/07/2023 14.5 14.0 - 18.0 g/dL Final     Hematocrit   Date Value Ref Range Status   05/09/2023 42.8 40.0 - 54.0 % Final   05/07/2023 45.1 40.0 - 54.0 % Final       Assessment and Plan:     There are no diagnoses linked to this encounter.

## 2024-03-21 ENCOUNTER — LAB VISIT (OUTPATIENT)
Dept: LAB | Facility: HOSPITAL | Age: 79
End: 2024-03-21
Payer: MEDICARE

## 2024-03-21 ENCOUNTER — OFFICE VISIT (OUTPATIENT)
Dept: INTERNAL MEDICINE | Facility: CLINIC | Age: 79
End: 2024-03-21
Payer: MEDICARE

## 2024-03-21 VITALS
WEIGHT: 220 LBS | DIASTOLIC BLOOD PRESSURE: 78 MMHG | OXYGEN SATURATION: 99 % | BODY MASS INDEX: 31.5 KG/M2 | TEMPERATURE: 97 F | HEIGHT: 70 IN | SYSTOLIC BLOOD PRESSURE: 150 MMHG | HEART RATE: 58 BPM | RESPIRATION RATE: 16 BRPM

## 2024-03-21 DIAGNOSIS — E78.5 HYPERLIPIDEMIA, UNSPECIFIED HYPERLIPIDEMIA TYPE: ICD-10-CM

## 2024-03-21 DIAGNOSIS — E03.9 ACQUIRED HYPOTHYROIDISM: Chronic | ICD-10-CM

## 2024-03-21 DIAGNOSIS — I10 ESSENTIAL HYPERTENSION: Chronic | ICD-10-CM

## 2024-03-21 DIAGNOSIS — Q55.20 TESTICULAR ANOMALY: Primary | ICD-10-CM

## 2024-03-21 DIAGNOSIS — E03.9 HYPOTHYROIDISM, UNSPECIFIED TYPE: ICD-10-CM

## 2024-03-21 DIAGNOSIS — E78.5 HYPERLIPIDEMIA, UNSPECIFIED HYPERLIPIDEMIA TYPE: Chronic | ICD-10-CM

## 2024-03-21 DIAGNOSIS — E66.9 OBESITY (BMI 30.0-34.9): ICD-10-CM

## 2024-03-21 LAB
ALBUMIN SERPL BCP-MCNC: 4.1 G/DL (ref 3.5–5.2)
ALP SERPL-CCNC: 83 U/L (ref 55–135)
ALT SERPL W/O P-5'-P-CCNC: 18 U/L (ref 10–44)
ANION GAP SERPL CALC-SCNC: 9 MMOL/L (ref 8–16)
AST SERPL-CCNC: 24 U/L (ref 10–40)
BASOPHILS # BLD AUTO: 0.07 K/UL (ref 0–0.2)
BASOPHILS NFR BLD: 0.7 % (ref 0–1.9)
BILIRUB SERPL-MCNC: 1.3 MG/DL (ref 0.1–1)
BUN SERPL-MCNC: 15 MG/DL (ref 8–23)
CALCIUM SERPL-MCNC: 9.9 MG/DL (ref 8.7–10.5)
CHLORIDE SERPL-SCNC: 107 MMOL/L (ref 95–110)
CHOLEST SERPL-MCNC: 195 MG/DL (ref 120–199)
CHOLEST/HDLC SERPL: 4 {RATIO} (ref 2–5)
CO2 SERPL-SCNC: 25 MMOL/L (ref 23–29)
CREAT SERPL-MCNC: 1.1 MG/DL (ref 0.5–1.4)
DIFFERENTIAL METHOD BLD: ABNORMAL
EOSINOPHIL # BLD AUTO: 0.2 K/UL (ref 0–0.5)
EOSINOPHIL NFR BLD: 2.3 % (ref 0–8)
ERYTHROCYTE [DISTWIDTH] IN BLOOD BY AUTOMATED COUNT: 15.6 % (ref 11.5–14.5)
EST. GFR  (NO RACE VARIABLE): >60 ML/MIN/1.73 M^2
GLUCOSE SERPL-MCNC: 79 MG/DL (ref 70–110)
HCT VFR BLD AUTO: 44.6 % (ref 40–54)
HDLC SERPL-MCNC: 49 MG/DL (ref 40–75)
HDLC SERPL: 25.1 % (ref 20–50)
HGB BLD-MCNC: 13.9 G/DL (ref 14–18)
IMM GRANULOCYTES # BLD AUTO: 0.09 K/UL (ref 0–0.04)
IMM GRANULOCYTES NFR BLD AUTO: 0.9 % (ref 0–0.5)
LDLC SERPL CALC-MCNC: 112.6 MG/DL (ref 63–159)
LYMPHOCYTES # BLD AUTO: 1.9 K/UL (ref 1–4.8)
LYMPHOCYTES NFR BLD: 19.7 % (ref 18–48)
MCH RBC QN AUTO: 29.3 PG (ref 27–31)
MCHC RBC AUTO-ENTMCNC: 31.2 G/DL (ref 32–36)
MCV RBC AUTO: 94 FL (ref 82–98)
MONOCYTES # BLD AUTO: 0.9 K/UL (ref 0.3–1)
MONOCYTES NFR BLD: 9 % (ref 4–15)
NEUTROPHILS # BLD AUTO: 6.5 K/UL (ref 1.8–7.7)
NEUTROPHILS NFR BLD: 67.4 % (ref 38–73)
NONHDLC SERPL-MCNC: 146 MG/DL
NRBC BLD-RTO: 0 /100 WBC
PLATELET # BLD AUTO: 370 K/UL (ref 150–450)
PMV BLD AUTO: 10.4 FL (ref 9.2–12.9)
POTASSIUM SERPL-SCNC: 4.4 MMOL/L (ref 3.5–5.1)
PROT SERPL-MCNC: 7.3 G/DL (ref 6–8.4)
RBC # BLD AUTO: 4.74 M/UL (ref 4.6–6.2)
SODIUM SERPL-SCNC: 141 MMOL/L (ref 136–145)
TRIGL SERPL-MCNC: 167 MG/DL (ref 30–150)
WBC # BLD AUTO: 9.68 K/UL (ref 3.9–12.7)

## 2024-03-21 PROCEDURE — 99214 OFFICE O/P EST MOD 30 MIN: CPT | Mod: PBBFAC,PO | Performed by: NURSE PRACTITIONER

## 2024-03-21 PROCEDURE — 80053 COMPREHEN METABOLIC PANEL: CPT | Performed by: NURSE PRACTITIONER

## 2024-03-21 PROCEDURE — 36415 COLL VENOUS BLD VENIPUNCTURE: CPT | Mod: PO | Performed by: NURSE PRACTITIONER

## 2024-03-21 PROCEDURE — 85025 COMPLETE CBC W/AUTO DIFF WBC: CPT | Performed by: NURSE PRACTITIONER

## 2024-03-21 PROCEDURE — 80061 LIPID PANEL: CPT | Performed by: NURSE PRACTITIONER

## 2024-03-21 PROCEDURE — 99999 PR PBB SHADOW E&M-EST. PATIENT-LVL IV: CPT | Mod: PBBFAC,,, | Performed by: NURSE PRACTITIONER

## 2024-03-21 PROCEDURE — 84443 ASSAY THYROID STIM HORMONE: CPT | Performed by: NURSE PRACTITIONER

## 2024-03-21 PROCEDURE — 99214 OFFICE O/P EST MOD 30 MIN: CPT | Mod: S$PBB,,, | Performed by: NURSE PRACTITIONER

## 2024-03-21 NOTE — PROGRESS NOTES
Subjective:       Patient ID: Julius Baker is a 78 y.o. male.    Chief Complaint: Follow-up    Patient is a 78 y.o. male who traditionally follows with No, Primary Doctor presenting today for 6 month f/u.    BP elevated- 162/72, repeat 150/78; pt reports checking BP at home but not recently; past readings were 118-120/70-80. Pt reports forgetting to take his mid-dose of Hydralazine sometimes and forgot to take his Bystolic for the past few days. Reports daytime sleepiness and needing to nap in the morning after taking his medication. Gabapentin dose is 300mg TID, pt instructed to try 400mg BID and notify if fatigue has improved and pain is tolerable. Pt questions if it is okay that his left testicle goes up into his groin but will come back down into his scrotum; this happens at random times but always comes back down; denies pain or discomfort associated with it. No other questions or concerns today.    Review of patient's allergies indicates:   Allergen Reactions    Doxycycline Diarrhea     itching    Ezetimibe-simvastatin      Muscle cramps    Hydrocodone Other (See Comments)    Rosuvastatin      Muscle cramps    Sertraline Other (See Comments)     jittery    Simvastatin      Muscle cramps    Adhesive Rash       Medication List with Changes/Refills   Current Medications    ACETAMINOPHEN (TYLENOL) 500 MG TABLET    Take 2 tablets (1,000 mg total) by mouth every 8 (eight) hours.    DICLOFENAC SODIUM (VOLTAREN) 1 % GEL    APPLY TOPICALLY TO THE AFFECTED AREA THREE TIMES DAILY AS NEEDED FOR JOINT PAIN    FLUTICASONE PROPIONATE (FLONASE) 50 MCG/ACTUATION NASAL SPRAY    1 spray by Each Nostril route daily as needed.    GABAPENTIN (NEURONTIN) 300 MG CAPSULE    Take 1 capsule (300 mg total) by mouth every evening.    HYDRALAZINE (APRESOLINE) 50 MG TABLET    Take 1 tablet (50 mg total) by mouth every 8 (eight) hours.    LEVOTHYROXINE (SYNTHROID) 25 MCG TABLET    TAKE 1 TABLET(25 MCG) BY MOUTH BEFORE BREAKFAST     "LIDOCAINE (LIDODERM) 5 %    Place 1 patch onto the skin once daily. Remove & Discard patch within 12 hours or as directed by MD    LOSARTAN (COZAAR) 100 MG TABLET    TAKE 1 TABLET(100 MG) BY MOUTH EVERY DAY    MULTIVITAMIN CAPSULE    Take 1 capsule by mouth once daily.    NEBIVOLOL (BYSTOLIC) 10 MG TAB    Take 1 tablet (10 mg total) by mouth once daily.    PRAVASTATIN (PRAVACHOL) 20 MG TABLET    Take 1 tablet (20 mg total) by mouth every evening.    PRAVASTATIN (PRAVACHOL) 20 MG TABLET    Take 1 tablet (20 mg total) by mouth every evening.    SERTRALINE (ZOLOFT) 50 MG TABLET    TAKE 1 TABLET(50 MG) BY MOUTH EVERY DAY    TIZANIDINE (ZANAFLEX) 2 MG TABLET    TAKE 1 TABLET BY MOUTH EVERY 8 HOURS AS NEEDED FOR BACK, KNEE, HIP PAIN    TRAMADOL (ULTRAM) 50 MG TABLET    TAKE 1 TABLET BY MOUTH EVERY 6 HOURS AS NEEDED FOR PAIN.       Health Maintenance    Colonoscopy- 8/11/2020  Flu Vaccine- 10/30/2023  Pneumonia 13 Vaccine- 6/30/2015  Tetanus/Tdap- 6/30/2014, 2/22/2013  Zoster Vaccine- 7/6/2021  Hepatitis C Screen- 5/4/2023   HIV Screen- 5/4/2023   PSA- 7/30/2020      Patient ambulates on his own with an assistive device.   Does he have issues with balance, falls or walking?  Yes    Do you have any issues with leaking of urine? No    Medical, social and surgical history has been reviewed with the patient.     Review of Systems   Constitutional:  Positive for fatigue.   Genitourinary:  Negative for bladder incontinence, decreased urine volume, difficulty urinating, dysuria, scrotal swelling and testicular pain.   Musculoskeletal:  Positive for back pain and gait problem.      Objective:   BP (!) 150/78   Pulse (!) 58   Temp 97.4 °F (36.3 °C) (Temporal)   Resp 16   Ht 5' 10" (1.778 m)   Wt 99.8 kg (220 lb 0.3 oz)   SpO2 99%   BMI 31.57 kg/m²     Physical Exam  Vitals reviewed.   Constitutional:       Appearance: Normal appearance.   Cardiovascular:      Rate and Rhythm: Normal rate and regular rhythm.      Heart " sounds: Normal heart sounds.   Pulmonary:      Effort: Pulmonary effort is normal.      Breath sounds: Normal breath sounds.   Neurological:      Mental Status: He is alert and oriented to person, place, and time.   Psychiatric:         Mood and Affect: Mood normal.         Behavior: Behavior normal.       I reviewed and independently interpreted the labs and imaging below:  Last Labs:  Glucose   Date Value Ref Range Status   03/21/2024 79 70 - 110 mg/dL Final   05/09/2023 81 70 - 110 mg/dL Final     BUN   Date Value Ref Range Status   03/21/2024 15 8 - 23 mg/dL Final   05/09/2023 22 8 - 23 mg/dL Final     Creatinine   Date Value Ref Range Status   03/21/2024 1.1 0.5 - 1.4 mg/dL Final   05/09/2023 1.0 0.5 - 1.4 mg/dL Final     Cholesterol   Date Value Ref Range Status   03/21/2024 195 120 - 199 mg/dL Final     Comment:     The National Cholesterol Education Program (NCEP) has set the  following guidelines (reference ranges) for Cholesterol:  Optimal.....................<200 mg/dL  Borderline High.............200-239 mg/dL  High........................> or = 240 mg/dL     11/10/2021 168 120 - 199 mg/dL Final     Comment:     The National Cholesterol Education Program (NCEP) has set the  following guidelines (reference ranges) for Cholesterol:  Optimal.....................<200 mg/dL  Borderline High.............200-239 mg/dL  High........................> or = 240 mg/dL       Hemoglobin A1C   Date Value Ref Range Status   11/10/2021 5.5 4.0 - 5.6 % Final     Comment:     ADA Screening Guidelines:  5.7-6.4%  Consistent with prediabetes  >or=6.5%  Consistent with diabetes    High levels of fetal hemoglobin interfere with the HbA1C  assay. Heterozygous hemoglobin variants (HbS, HgC, etc)do  not significantly interfere with this assay.   However, presence of multiple variants may affect accuracy.     10/28/2019 5.4 4.0 - 5.6 % Final     Comment:     ADA Screening Guidelines:  5.7-6.4%  Consistent with  prediabetes  >or=6.5%  Consistent with diabetes  High levels of fetal hemoglobin interfere with the HbA1C  assay. Heterozygous hemoglobin variants (HbS, HgC, etc)do  not significantly interfere with this assay.   However, presence of multiple variants may affect accuracy.       Hemoglobin   Date Value Ref Range Status   03/21/2024 13.9 (L) 14.0 - 18.0 g/dL Final   05/09/2023 14.9 14.0 - 18.0 g/dL Final     Hematocrit   Date Value Ref Range Status   03/21/2024 44.6 40.0 - 54.0 % Final   05/09/2023 42.8 40.0 - 54.0 % Final       Assessment and Plan:     1. Testicular anomaly    Patient advised of symptoms that would warrant immediate follow up.   Offered referral to Urology but patient declined at this time.     2. Essential hypertension    Chronic, stable, continue current medications    3. Hyperlipidemia, unspecified hyperlipidemia type    Chronic, stable, continue current medication    4. Acquired hypothyroidism    Chronic, stable, continue current medication     5. Obesity (BMI 30.0-34.9)    Chronic, stable      As the co-signing provider I have reviewed the students documentation and am responsible for the treatment, assessment, and plan.

## 2024-03-22 LAB — TSH SERPL DL<=0.005 MIU/L-ACNC: 1.68 UIU/ML (ref 0.4–4)

## 2024-03-30 DIAGNOSIS — M51.36 DDD (DEGENERATIVE DISC DISEASE), LUMBAR: ICD-10-CM

## 2024-03-30 DIAGNOSIS — M48.00 SPINAL STENOSIS, UNSPECIFIED SPINAL REGION: ICD-10-CM

## 2024-03-30 DIAGNOSIS — M54.16 LUMBAR RADICULOPATHY: ICD-10-CM

## 2024-03-31 DIAGNOSIS — I10 ESSENTIAL HYPERTENSION: Chronic | ICD-10-CM

## 2024-04-01 RX ORDER — NEBIVOLOL 10 MG/1
10 TABLET ORAL
Qty: 90 TABLET | Refills: 1 | Status: SHIPPED | OUTPATIENT
Start: 2024-04-01

## 2024-04-01 RX ORDER — TIZANIDINE 2 MG/1
TABLET ORAL
Qty: 90 TABLET | Refills: 1 | Status: SHIPPED | OUTPATIENT
Start: 2024-04-01

## 2024-04-11 ENCOUNTER — TELEPHONE (OUTPATIENT)
Dept: INTERNAL MEDICINE | Facility: CLINIC | Age: 79
End: 2024-04-11
Payer: MEDICARE

## 2024-04-11 NOTE — TELEPHONE ENCOUNTER
US of liver was scheduled for today at [facility] for noon -- they do not do ultrasounds in the lab. I am not sure what he is asking for....

## 2024-04-11 NOTE — TELEPHONE ENCOUNTER
Appointment sent  to referral team to reschedule his ultra sound and patient declined rescheduling at this time because he to busy .  He will call us when her ready

## 2024-04-11 NOTE — TELEPHONE ENCOUNTER
Explain to patient that order were in the system and was unsure why they were unable to access them . Apologized to him for the inconvenience he went through on today

## 2024-04-11 NOTE — TELEPHONE ENCOUNTER
----- Message from Genoveva Hernandez sent at 4/11/2024 11:42 AM CDT -----  Contact: [phone]  Patient states please Re-submit orders for US of Liver : lab states they do not have the orders. Patient is at the lab now. Please call and advise.    Thank you and have a great day.

## 2024-04-21 ENCOUNTER — PATIENT MESSAGE (OUTPATIENT)
Dept: ADMINISTRATIVE | Facility: OTHER | Age: 79
End: 2024-04-21
Payer: MEDICARE

## 2024-05-09 ENCOUNTER — TELEPHONE (OUTPATIENT)
Dept: INTERNAL MEDICINE | Facility: CLINIC | Age: 79
End: 2024-05-09
Payer: MEDICARE

## 2024-05-09 DIAGNOSIS — K76.9 LIVER LESION: Primary | ICD-10-CM

## 2024-05-09 DIAGNOSIS — K76.0 HEPATIC STEATOSIS: ICD-10-CM

## 2024-05-09 NOTE — TELEPHONE ENCOUNTER
Pt at wife's appointment with Dr. Norman and requesting order for US of Abdomen Limited Liver (pt no showed)     Order placed.

## 2024-05-24 ENCOUNTER — HOSPITAL ENCOUNTER (OUTPATIENT)
Dept: RADIOLOGY | Facility: HOSPITAL | Age: 79
Discharge: HOME | End: 2024-05-24
Attending: NURSE PRACTITIONER
Payer: MEDICARE

## 2024-05-24 DIAGNOSIS — K76.0 HEPATIC STEATOSIS: ICD-10-CM

## 2024-05-24 DIAGNOSIS — K76.9 LIVER LESION: ICD-10-CM

## 2024-05-24 PROCEDURE — 76705 ECHO EXAM OF ABDOMEN: CPT | Mod: TC

## 2024-05-24 PROCEDURE — 76705 ECHO EXAM OF ABDOMEN: CPT | Mod: 26,,, | Performed by: STUDENT IN AN ORGANIZED HEALTH CARE EDUCATION/TRAINING PROGRAM

## 2024-05-28 ENCOUNTER — PATIENT MESSAGE (OUTPATIENT)
Dept: INTERNAL MEDICINE | Facility: CLINIC | Age: 79
End: 2024-05-28
Payer: MEDICARE

## 2024-05-28 NOTE — TELEPHONE ENCOUNTER
Pt would like to know how he should fix his fatty liver issue and should he have concerns about the lesions on the liver

## 2024-05-28 NOTE — TELEPHONE ENCOUNTER
The patient is wanting to know about the lesion on his liver? Is there anything to do?    Please advise.

## 2024-06-03 DIAGNOSIS — E03.9 HYPOTHYROIDISM, UNSPECIFIED TYPE: ICD-10-CM

## 2024-06-04 RX ORDER — LEVOTHYROXINE SODIUM 25 UG/1
25 TABLET ORAL
Qty: 90 TABLET | Refills: 1 | Status: SHIPPED | OUTPATIENT
Start: 2024-06-04

## 2024-06-07 ENCOUNTER — PATIENT MESSAGE (OUTPATIENT)
Dept: INTERNAL MEDICINE | Facility: CLINIC | Age: 79
End: 2024-06-07
Payer: MEDICARE

## 2024-06-07 ENCOUNTER — OFFICE VISIT (OUTPATIENT)
Dept: UROLOGY | Facility: CLINIC | Age: 79
End: 2024-06-07
Payer: MEDICARE

## 2024-06-07 VITALS
SYSTOLIC BLOOD PRESSURE: 147 MMHG | HEIGHT: 70 IN | HEART RATE: 55 BPM | DIASTOLIC BLOOD PRESSURE: 69 MMHG | WEIGHT: 222.75 LBS | BODY MASS INDEX: 31.89 KG/M2

## 2024-06-07 DIAGNOSIS — M54.16 LUMBAR RADICULOPATHY: ICD-10-CM

## 2024-06-07 DIAGNOSIS — N50.89 TESTICULAR SWELLING: ICD-10-CM

## 2024-06-07 DIAGNOSIS — Q55.20 TESTICULAR ANOMALY: Primary | ICD-10-CM

## 2024-06-07 DIAGNOSIS — Q55.20 TESTICULAR ANOMALY: ICD-10-CM

## 2024-06-07 DIAGNOSIS — M48.00 SPINAL STENOSIS, UNSPECIFIED SPINAL REGION: ICD-10-CM

## 2024-06-07 DIAGNOSIS — M51.36 DDD (DEGENERATIVE DISC DISEASE), LUMBAR: ICD-10-CM

## 2024-06-07 PROCEDURE — 99215 OFFICE O/P EST HI 40 MIN: CPT | Mod: PBBFAC,PO

## 2024-06-07 PROCEDURE — 99999 PR PBB SHADOW E&M-EST. PATIENT-LVL V: CPT | Mod: PBBFAC,,,

## 2024-06-07 PROCEDURE — 99212 OFFICE O/P EST SF 10 MIN: CPT | Mod: S$PBB,,,

## 2024-06-07 RX ORDER — GABAPENTIN 300 MG/1
300 CAPSULE ORAL 3 TIMES DAILY
Qty: 270 CAPSULE | Refills: 1 | Status: SHIPPED | OUTPATIENT
Start: 2024-06-07

## 2024-06-07 NOTE — PATIENT INSTRUCTIONS
Conservative/behavioral management for testicular pain: NSAIDs (Ibuprofen 400mg three times daily as needed), supportive underwear, cold compresses 15-20 minutes at a time 2-3 times per day, elevate the testicles.

## 2024-06-07 NOTE — TELEPHONE ENCOUNTER
Pt c/o testicular swelling and states he walked a great deal the previous day. He has applied ice and the swelling has decreased but asking for advice.

## 2024-06-10 ENCOUNTER — HOSPITAL ENCOUNTER (OUTPATIENT)
Dept: RADIOLOGY | Facility: HOSPITAL | Age: 79
Discharge: HOME | End: 2024-06-10
Payer: MEDICARE

## 2024-06-10 DIAGNOSIS — N50.89 TESTICULAR SWELLING: ICD-10-CM

## 2024-06-10 PROCEDURE — 76870 US EXAM SCROTUM: CPT | Mod: TC

## 2024-06-10 PROCEDURE — 76870 US EXAM SCROTUM: CPT | Mod: 26,,, | Performed by: RADIOLOGY

## 2024-07-05 DIAGNOSIS — I10 ESSENTIAL HYPERTENSION: Chronic | ICD-10-CM

## 2024-07-05 RX ORDER — HYDRALAZINE HYDROCHLORIDE 50 MG/1
TABLET, FILM COATED ORAL
Qty: 270 TABLET | Refills: 1 | Status: SHIPPED | OUTPATIENT
Start: 2024-07-05

## 2024-07-08 ENCOUNTER — OFFICE VISIT (OUTPATIENT)
Dept: UROLOGY | Facility: CLINIC | Age: 79
End: 2024-07-08
Payer: MEDICARE

## 2024-07-08 VITALS
DIASTOLIC BLOOD PRESSURE: 65 MMHG | BODY MASS INDEX: 31.45 KG/M2 | WEIGHT: 219.69 LBS | HEART RATE: 61 BPM | SYSTOLIC BLOOD PRESSURE: 108 MMHG | HEIGHT: 70 IN

## 2024-07-08 DIAGNOSIS — N44.2 TESTICULAR CYST: Primary | ICD-10-CM

## 2024-07-08 PROCEDURE — 99213 OFFICE O/P EST LOW 20 MIN: CPT | Mod: PBBFAC,PO

## 2024-07-08 PROCEDURE — 99212 OFFICE O/P EST SF 10 MIN: CPT | Mod: S$PBB,,,

## 2024-07-08 PROCEDURE — 99999 PR PBB SHADOW E&M-EST. PATIENT-LVL III: CPT | Mod: PBBFAC,,,

## 2024-07-08 NOTE — PROGRESS NOTES
Subjective:       Patient ID: Julius Baker is a 78 y.o. male.    Chief Complaint: swelling of testicles     This is a 78 y.o.  male patient that is new to me.  The patient was referred to me by Dr. Howard for testicular swelling.   Patient reports testicle goes up into his groin but will come back down into his scrotum; this happens at random times but always comes back down.  Reports that he has been having swelling in his testicles for the past three days. Yesterday he was sitting at the car shop for a couple of hours and when he woke up he had swollen testicles, he applied ice and the swelling has gone down. Denies pain to the testicles, LUTS, hematuria, dysuria.  Scrotal US 6/7/24: 0.4cm cyst to left testicle. Bilateral small hydroceles. Multiple right epididymal cyst measuring up to 8mm.  7/8/24: Today patient reports that the swelling to bilateral testicles has gotten better. Reports that he is wearing good supportive underwear, elevating his testicles while sitting and applying warm/cold compresses.        LAST PSA  Lab Results   Component Value Date    PSA 4.7 (H) 02/03/2020    PSADIAG 6.0 (H) 07/30/2020       Lab Results   Component Value Date    CREATININE 1.1 03/21/2024       ---  PMH/PSH/Medications/Allergies/Social history reviewed and as in chart.    Review of Systems   Constitutional:  Negative for activity change, chills and fever.   Respiratory:  Negative for shortness of breath.    Cardiovascular:  Negative for chest pain and palpitations.   Gastrointestinal:  Negative for abdominal pain and constipation.   Genitourinary:  Positive for scrotal swelling. Negative for difficulty urinating, dysuria, flank pain, frequency, hematuria, penile pain, penile swelling, testicular pain and urgency.   Neurological:  Negative for dizziness and light-headedness.       Objective:      Physical Exam  HENT:      Head: Normocephalic.   Pulmonary:      Effort: Pulmonary effort is normal.   Abdominal:       General: Abdomen is flat.      Palpations: Abdomen is soft.   Genitourinary:     Testes:         Right: Swelling present. Tenderness not present.         Left: Swelling present. Tenderness not present.      Comments: Bilateral testicles swollen, left larger than the right, possible hydrocele.  Musculoskeletal:         General: Normal range of motion.      Cervical back: Normal range of motion.   Skin:     General: Skin is warm and dry.   Neurological:      Mental Status: He is alert and oriented to person, place, and time.         Assessment:     Problem Noted   Testicular Swelling 6/7/2024       Plan:     Reviewed scrotal US and assured patient that there is no concerning findings.   Continue conservative/behavioral management for testicular swelling/pain PRN: NSAIDs (Ibuprofen 200-400mg three times daily as needed), supportive underwear, cold compresses/warm compresses, elevate the testicles.   Advised to follow-up if symptoms occur again or pain occurs.     JINNY Paredes    I spent a total of 25 minutes on the day of the visit.This includes face to face time and non-face to face time preparing to see the patient (eg, review of tests), obtaining and/or reviewing separately obtained history, documenting clinical information in the electronic or other health record, independently interpreting results and communicating results to the patient/family/caregiver, or care coordinator.

## 2024-08-18 DIAGNOSIS — F41.9 ANXIETY: ICD-10-CM

## 2024-08-19 RX ORDER — SERTRALINE HYDROCHLORIDE 50 MG/1
50 TABLET, FILM COATED ORAL
Qty: 90 TABLET | Refills: 1 | Status: SHIPPED | OUTPATIENT
Start: 2024-08-19

## 2024-08-19 NOTE — TELEPHONE ENCOUNTER
The patient was called regarding an appointment to establish care.  The patient refused at this time stating that he does not have a need to see someone.    The patient encouraged to established care. The patient will call at a later time.

## 2024-09-01 ENCOUNTER — PATIENT MESSAGE (OUTPATIENT)
Dept: INTERNAL MEDICINE | Facility: CLINIC | Age: 79
End: 2024-09-01
Payer: MEDICARE

## 2024-09-09 DIAGNOSIS — E78.5 HYPERLIPIDEMIA, UNSPECIFIED HYPERLIPIDEMIA TYPE: Chronic | ICD-10-CM

## 2024-09-10 RX ORDER — PRAVASTATIN SODIUM 20 MG/1
20 TABLET ORAL NIGHTLY
Qty: 90 TABLET | Refills: 3 | Status: SHIPPED | OUTPATIENT
Start: 2024-09-10

## 2024-10-08 DIAGNOSIS — I10 ESSENTIAL HYPERTENSION: Chronic | ICD-10-CM

## 2024-10-09 RX ORDER — NEBIVOLOL 10 MG/1
10 TABLET ORAL
Qty: 90 TABLET | Refills: 1 | Status: SHIPPED | OUTPATIENT
Start: 2024-10-09

## 2024-10-09 NOTE — TELEPHONE ENCOUNTER
Refill Routing Note   Medication(s) are not appropriate for processing by [facility] for the following reason(s):        Non-participating provider    [facility] action(s):  Route               Appointments  past 12m or future 3m with PCP    Date Provider   Last Visit   3/21/2024 Emely Howard NP   Next Visit   Visit date not found Emely Howard NP   ED visits in past 90 days: 0        Note composed:8:25 PM 10/08/2024

## 2024-10-28 DIAGNOSIS — M54.16 LUMBAR RADICULOPATHY: ICD-10-CM

## 2024-10-28 DIAGNOSIS — M48.00 SPINAL STENOSIS, UNSPECIFIED SPINAL REGION: ICD-10-CM

## 2024-10-28 DIAGNOSIS — M51.369 DDD (DEGENERATIVE DISC DISEASE), LUMBAR: ICD-10-CM

## 2024-10-28 RX ORDER — TIZANIDINE 2 MG/1
TABLET ORAL
Qty: 90 TABLET | Refills: 1 | OUTPATIENT
Start: 2024-10-28

## 2024-10-29 ENCOUNTER — TELEPHONE (OUTPATIENT)
Dept: INTERNAL MEDICINE | Facility: CLINIC | Age: 79
End: 2024-10-29
Payer: MEDICARE

## 2024-11-16 DIAGNOSIS — F41.9 ANXIETY: ICD-10-CM

## 2024-11-18 RX ORDER — SERTRALINE HYDROCHLORIDE 50 MG/1
50 TABLET, FILM COATED ORAL
Qty: 90 TABLET | Refills: 1 | Status: SHIPPED | OUTPATIENT
Start: 2024-11-18

## 2024-12-19 DIAGNOSIS — E03.9 HYPOTHYROIDISM, UNSPECIFIED TYPE: ICD-10-CM

## 2024-12-21 RX ORDER — LEVOTHYROXINE SODIUM 25 UG/1
25 TABLET ORAL
Qty: 90 TABLET | Refills: 0 | Status: SHIPPED | OUTPATIENT
Start: 2024-12-21

## 2024-12-29 DIAGNOSIS — M48.00 SPINAL STENOSIS, UNSPECIFIED SPINAL REGION: ICD-10-CM

## 2024-12-29 DIAGNOSIS — M51.369 DDD (DEGENERATIVE DISC DISEASE), LUMBAR: ICD-10-CM

## 2024-12-29 DIAGNOSIS — M54.16 LUMBAR RADICULOPATHY: ICD-10-CM

## 2024-12-30 RX ORDER — GABAPENTIN 300 MG/1
300 CAPSULE ORAL 3 TIMES DAILY
Qty: 270 CAPSULE | Refills: 1 | Status: SHIPPED | OUTPATIENT
Start: 2024-12-30

## 2025-01-03 DIAGNOSIS — M51.369 DDD (DEGENERATIVE DISC DISEASE), LUMBAR: ICD-10-CM

## 2025-01-03 DIAGNOSIS — M48.00 SPINAL STENOSIS, UNSPECIFIED SPINAL REGION: ICD-10-CM

## 2025-01-03 DIAGNOSIS — M54.16 LUMBAR RADICULOPATHY: ICD-10-CM

## 2025-01-04 DIAGNOSIS — I10 ESSENTIAL HYPERTENSION: Chronic | ICD-10-CM

## 2025-01-06 DIAGNOSIS — I10 ESSENTIAL HYPERTENSION: Chronic | ICD-10-CM

## 2025-01-06 RX ORDER — HYDRALAZINE HYDROCHLORIDE 50 MG/1
TABLET, FILM COATED ORAL
Qty: 90 TABLET | Refills: 0 | Status: SHIPPED | OUTPATIENT
Start: 2025-01-06

## 2025-01-06 RX ORDER — LOSARTAN POTASSIUM 100 MG/1
100 TABLET ORAL
Qty: 90 TABLET | Refills: 3 | OUTPATIENT
Start: 2025-01-06

## 2025-01-06 RX ORDER — TIZANIDINE 2 MG/1
TABLET ORAL
Qty: 30 TABLET | Refills: 0 | Status: SHIPPED | OUTPATIENT
Start: 2025-01-06

## 2025-01-06 NOTE — TELEPHONE ENCOUNTER
Refill Routing Note   Medication(s) are not appropriate for processing by [facility] for the following reason(s):        Outside of protocol  Non-participating provider    [facility] action(s):  Route             Appointments  past 12m or future 3m with PCP    Date Provider   Last Visit   7/14/2023 ANTONELLA Thurston MD   Next Visit   Visit date not found ANTONELLA Thurston MD   ED visits in past 90 days: 0        Note composed:10:14 AM 01/06/2025

## 2025-01-06 NOTE — TELEPHONE ENCOUNTER
LOV: 3/21/24  No Upcoming OV  LF: 10/10/24    - 11/15/24: pt informed needs to establish care with a physician, note not read    - spoke with pt; pt to call front office to est care with a physician

## 2025-01-27 ENCOUNTER — OFFICE VISIT (OUTPATIENT)
Dept: INTERNAL MEDICINE | Facility: CLINIC | Age: 80
End: 2025-01-27
Payer: MEDICARE

## 2025-01-27 VITALS
BODY MASS INDEX: 31.25 KG/M2 | TEMPERATURE: 98 F | HEIGHT: 70 IN | HEART RATE: 60 BPM | RESPIRATION RATE: 16 BRPM | SYSTOLIC BLOOD PRESSURE: 106 MMHG | OXYGEN SATURATION: 97 % | WEIGHT: 218.25 LBS | DIASTOLIC BLOOD PRESSURE: 66 MMHG

## 2025-01-27 DIAGNOSIS — M46.94 UNSPECIFIED INFLAMMATORY SPONDYLOPATHY, THORACIC REGION: ICD-10-CM

## 2025-01-27 DIAGNOSIS — Z12.11 ENCOUNTER FOR COLORECTAL CANCER SCREENING: ICD-10-CM

## 2025-01-27 DIAGNOSIS — M51.369 DDD (DEGENERATIVE DISC DISEASE), LUMBAR: ICD-10-CM

## 2025-01-27 DIAGNOSIS — F41.9 ANXIETY: ICD-10-CM

## 2025-01-27 DIAGNOSIS — I70.0 AORTIC ATHEROSCLEROSIS: ICD-10-CM

## 2025-01-27 DIAGNOSIS — Z12.12 ENCOUNTER FOR COLORECTAL CANCER SCREENING: ICD-10-CM

## 2025-01-27 DIAGNOSIS — Z23 NEED FOR VACCINATION: ICD-10-CM

## 2025-01-27 DIAGNOSIS — I10 ESSENTIAL HYPERTENSION: Chronic | ICD-10-CM

## 2025-01-27 DIAGNOSIS — E78.5 HYPERLIPIDEMIA, UNSPECIFIED HYPERLIPIDEMIA TYPE: Chronic | ICD-10-CM

## 2025-01-27 DIAGNOSIS — E03.9 HYPOTHYROIDISM, UNSPECIFIED TYPE: Primary | ICD-10-CM

## 2025-01-27 DIAGNOSIS — M54.16 LUMBAR RADICULOPATHY: ICD-10-CM

## 2025-01-27 PROCEDURE — 99214 OFFICE O/P EST MOD 30 MIN: CPT | Mod: PBBFAC,PO | Performed by: NURSE PRACTITIONER

## 2025-01-27 PROCEDURE — 99214 OFFICE O/P EST MOD 30 MIN: CPT | Mod: S$PBB,,, | Performed by: NURSE PRACTITIONER

## 2025-01-27 PROCEDURE — 90677 PCV20 VACCINE IM: CPT | Mod: PBBFAC,PO

## 2025-01-27 PROCEDURE — 99999PBSHW PR PBB SHADOW TECHNICAL ONLY FILED TO HB: Mod: PBBFAC,,,

## 2025-01-27 PROCEDURE — G0009 ADMIN PNEUMOCOCCAL VACCINE: HCPCS | Mod: PBBFAC,PO

## 2025-01-27 PROCEDURE — 99999 PR PBB SHADOW E&M-EST. PATIENT-LVL IV: CPT | Mod: PBBFAC,,, | Performed by: NURSE PRACTITIONER

## 2025-01-27 RX ORDER — HYDRALAZINE HYDROCHLORIDE 50 MG/1
50 TABLET, FILM COATED ORAL EVERY 8 HOURS
Qty: 270 TABLET | Refills: 1 | Status: SHIPPED | OUTPATIENT
Start: 2025-01-27

## 2025-01-27 RX ORDER — SERTRALINE HYDROCHLORIDE 50 MG/1
50 TABLET, FILM COATED ORAL DAILY
Qty: 90 TABLET | Refills: 1 | Status: SHIPPED | OUTPATIENT
Start: 2025-01-27

## 2025-01-27 RX ORDER — TIZANIDINE 2 MG/1
2 TABLET ORAL EVERY 8 HOURS PRN
Qty: 90 TABLET | Refills: 1 | Status: SHIPPED | OUTPATIENT
Start: 2025-01-27

## 2025-01-27 RX ORDER — NEBIVOLOL 10 MG/1
10 TABLET ORAL DAILY
Qty: 90 TABLET | Refills: 1 | Status: SHIPPED | OUTPATIENT
Start: 2025-01-27

## 2025-01-27 RX ORDER — LEVOTHYROXINE SODIUM 25 UG/1
25 TABLET ORAL
Qty: 90 TABLET | Refills: 1 | Status: SHIPPED | OUTPATIENT
Start: 2025-01-27

## 2025-01-27 RX ORDER — LOSARTAN POTASSIUM 100 MG/1
100 TABLET ORAL DAILY
Qty: 90 TABLET | Refills: 3 | Status: SHIPPED | OUTPATIENT
Start: 2025-01-27

## 2025-01-27 RX ADMIN — PNEUMOCOCCAL 20-VALENT CONJUGATE VACCINE 0.5 ML
2.2; 2.2; 2.2; 2.2; 2.2; 2.2; 2.2; 2.2; 2.2; 2.2; 2.2; 2.2; 2.2; 2.2; 2.2; 2.2; 4.4; 2.2; 2.2; 2.2 INJECTION, SUSPENSION INTRAMUSCULAR at 11:01

## 2025-01-27 NOTE — PROGRESS NOTES
Primary Care Provider Appointment   SHARED NOTE: Chino Montenegro (Student NP)    Subjective:      Patient ID: Julius Baker is a 79 y.o. male with past medical history of HTN, Sciatica, hypothyroidism and anxiety.     Chief Complaint: Follow-up (Need to EC)    Prior to this visit, patient's last encounter with PCP was Visit date not found.    Pt denies shortness of breath or chest pain, main concern today is would like to review 2pm doses of medications d/t forgetting dose.The  patient states that by the time he remembers to take the 2 pm dose of his medication, it is usually closer to 5 pm. Provider reviewed medication for possible scheduling alternatives. Patient encouraged to take mid day blood pressure d/t one of the 2 pm medications being Hydralazine. The patient's in office BP today is 106/66. Patient opted for a FIT test, patient will be sent home with test today.      DM  - Last A1c on 11/10/2021 was 5.5    HTN  - Currently taking: Hydralazine, Bystolic and Losartan  - Patient does not take BP at home. Patient was encouraged to begin checking at home.    - Today, BP is: 106/66    HLD  - Currently taking: pravastatin  - Last lipid panel was on 3/21/24  The 10-year ASCVD risk score (Shelley DK, et al., 2019) is: 26.7%    Values used to calculate the score:      Age: 79 years      Sex: Male      Is Non- : No      Diabetic: No      Tobacco smoker: No      Systolic Blood Pressure: 106 mmHg      Is BP treated: Yes      HDL Cholesterol: 49 mg/dL      Total Cholesterol: 195 mg/dL         Specialty notes (if they see heme/onc, GI, ortho, ophth, derm, etc...) neurosurgery  - Last seen on 12/30/24, by Dr. Otoole  - Current treatment plan for this problem is physical therapy     Care Gaps:  - Shingles  -RSV    FU in March to establish care with PCP and follow up on labs      4Ms for Medical Decision-Making in Older Adults    Last Completed EAWV: 3/15/2022    MEDICATIONS:  High Risk  Medications:  Total Active Medications: 4  gabapentin - 300 MG  sertraline - 50 MG  tiZANidine - 2 MG  traMADoL - 50 mg    MOBILITY:  Activities of Daily Living:      3/15/2022     3:11 PM   Activities of Daily Living   Ambulation Independent   Dressing Independent   Transfers Independent   Toileting Continent of bladder;Continent of bowel   Feeding Independent   Cleaning home/Chores Independent   Telephone use Independent   Shopping Independent   Paying bills Independent   Taking meds Independent     Fall Risk:      1/27/2025    10:30 AM 7/8/2024     2:00 PM 6/7/2024     2:00 PM   Fall Risk Assessment - Outpatient   Mobility Status Ambulatory Ambulatory w/ assistance Ambulatory w/ assistance   Number of falls 0     Identified as fall risk False       Disability Status:      6/1/2022    11:05 AM   Disability Status   Are you deaf or do you have serious difficulty hearing? N   Are you blind or do you have serious difficulty seeing, even when wearing glasses? N   Because of a physical, mental, or emotional condition, do you have serious difficulty concentrating, remembering, or making decisions? N   Do you have serious difficulty walking or climbing stairs? N   Do you have difficulty dressing or bathing? N   Because of a physical, mental, or emotional condition, do you have difficulty doing errands alone such as visiting a doctor's office or shopping? N     Nutrition Screening:      3/15/2022     3:14 PM   Nutrition Screening   Has food intake declined over the past three months due to loss of appetite, digestive problems, chewing or swallowing difficulties? No decrease in food intake   Involuntary weight loss during the last 3 months? No weight loss   Mobility? Goes out   Has the patient suffered psychological stress or acute disease in the past three months? Yes   Neuropsychological problems? No psychological problems   Body Mass Index (BMI)?  BMI 23 or greater   Screening Score 12    Screening Score: 0-7  Malnourished, 8-11 At Risk, 12-14 Normal  Get Up and Go:      3/15/2022     3:12 PM   Get Up and Go   Trial 1 11 seconds     Whisper Test:      3/15/2022     3:10 PM   Whisper Test   Whisper Test Normal           MENTATION:   Has Dementia Dx: No  Has Anxiety Dx: Yes    Depression Patient Health Questionnaire:      1/27/2025    10:35 AM   Depression Patient Health Questionnaire   Over the last two weeks how often have you been bothered by little interest or pleasure in doing things Not at all   Over the last two weeks how often have you been bothered by feeling down, depressed or hopeless Not at all   PHQ-2 Total Score 0     Cognitive Function Screening:      3/15/2022     3:13 PM   Cognitive Function Screening   Clock Drawing Test 2   Mini-Cog 3 Minute Recall 1   Cognitive Function Screening 3     Cognitive Function Screening Total - Less than 4 = Abnormal,  Greater than or equal to 4 = Normal        WHAT MATTERS MOST:  Advance Care Planning   ACP Status:   Patient has had an ACP conversation  Living Will: No  Power of : No  [place]: No    What is most important right now is to focus on remaining as independent as possible                     Social History     Socioeconomic History    Marital status:    Tobacco Use    Smoking status: Never    Smokeless tobacco: Never   Substance and Sexual Activity    Alcohol use: Yes     Alcohol/week: 15.0 standard drinks of alcohol     Types: 12 Standard drinks or equivalent, 3 Shots of liquor per week     Comment: Rum and coke 2-3 drinks NIGHTLY    Drug use: No    Sexual activity: Not Currently     Partners: Female     Social Drivers of Health     Financial Resource Strain: Low Risk  (3/14/2024)    Overall Financial Resource Strain (CARDIA)     Difficulty of Paying Living Expenses: Not hard at all   Food Insecurity: No Food Insecurity (3/14/2024)    Hunger Vital Sign     Worried About Running Out of Food in the Last Year: Never true     Ran Out of Food in the Last  "Year: Never true   Transportation Needs: No Transportation Needs (3/14/2024)    PRAPARE - Transportation     Lack of Transportation (Medical): No     Lack of Transportation (Non-Medical): No   Physical Activity: Insufficiently Active (3/14/2024)    Exercise Vital Sign     Days of Exercise per Week: 2 days     Minutes of Exercise per Session: 60 min   Stress: Stress Concern Present (3/14/2024)    [employer] - Occupational Stress Questionnaire     Feeling of Stress : To some extent   Housing Stability: Low Risk  (3/14/2024)    Housing Stability Vital Sign     Unable to Pay for Housing in the Last Year: No     Number of Places Lived in the Last Year: 1     Unstable Housing in the Last Year: No       Review of Systems   Constitutional:  Positive for fatigue. Negative for activity change, appetite change, chills, diaphoresis, fever and unexpected weight change.   HENT: Negative.     Eyes: Negative.  Negative for discharge.   Respiratory: Negative.     Cardiovascular: Negative.    Gastrointestinal: Negative.    Endocrine: Negative.    Genitourinary: Negative.    Musculoskeletal:  Positive for back pain.   Skin: Negative.    Allergic/Immunologic: Negative.    Neurological: Negative.    Hematological: Negative.    Psychiatric/Behavioral: Negative.       Objective:   /66 (BP Location: Left arm, Patient Position: Sitting)   Pulse 60   Temp 97.5 °F (36.4 °C) (Temporal)   Resp 16   Ht 5' 10" (1.778 m)   Wt 99 kg (218 lb 4.1 oz)   SpO2 97%   BMI 31.32 kg/m²     Physical Exam  HENT:      Head: Normocephalic.   Cardiovascular:      Rate and Rhythm: Normal rate and regular rhythm.   Pulmonary:      Effort: Pulmonary effort is normal.   Musculoskeletal:      Cervical back: Normal range of motion.   Skin:     General: Skin is warm and dry.   Neurological:      Mental Status: He is alert and oriented to person, place, and time.           Lab Results   Component Value Date    WBC 9.68 " 03/21/2024    HGB 13.9 (L) 03/21/2024    HCT 44.6 03/21/2024     03/21/2024    CHOL 195 03/21/2024    TRIG 167 (H) 03/21/2024    HDL 49 03/21/2024    ALT 18 03/21/2024    AST 24 03/21/2024     03/21/2024    K 4.4 03/21/2024     03/21/2024    CREATININE 1.1 03/21/2024    BUN 15 03/21/2024    CO2 25 03/21/2024    TSH 1.681 03/21/2024    PSA 4.7 (H) 02/03/2020    HGBA1C 5.5 11/10/2021       Current Outpatient Medications on File Prior to Visit   Medication Sig Dispense Refill    acetaminophen (TYLENOL) 500 MG tablet Take 2 tablets (1,000 mg total) by mouth every 8 (eight) hours. 60 tablet 0    diclofenac sodium (VOLTAREN) 1 % Gel APPLY TOPICALLY TO THE AFFECTED AREA THREE TIMES DAILY AS NEEDED FOR JOINT PAIN 100 g 2    fluticasone propionate (FLONASE) 50 mcg/actuation nasal spray 1 spray by Each Nostril route daily as needed.      gabapentin (NEURONTIN) 300 MG capsule TAKE 1 CAPSULE(300 MG) BY MOUTH THREE TIMES DAILY 270 capsule 1    LIDOcaine (LIDODERM) 5 % Place 1 patch onto the skin once daily. Remove & Discard patch within 12 hours or as directed by MD 30 patch 0    multivitamin capsule Take 1 capsule by mouth once daily.      pravastatin (PRAVACHOL) 20 MG tablet TAKE 1 TABLET(20 MG) BY MOUTH EVERY EVENING 90 tablet 3    traMADoL (ULTRAM) 50 mg tablet TAKE 1 TABLET BY MOUTH EVERY 6 HOURS AS NEEDED FOR PAIN. 28 tablet 0    [DISCONTINUED] hydrALAZINE (APRESOLINE) 50 MG tablet TAKE 1 TABLET(50 MG) BY MOUTH EVERY 8 HOURS 90 tablet 0    [DISCONTINUED] levothyroxine (SYNTHROID) 25 MCG tablet TAKE 1 TABLET(25 MCG) BY MOUTH BEFORE BREAKFAST 90 tablet 0    [DISCONTINUED] losartan (COZAAR) 100 MG tablet TAKE 1 TABLET(100 MG) BY MOUTH EVERY DAY 90 tablet 3    [DISCONTINUED] nebivoloL (BYSTOLIC) 10 MG Tab TAKE 1 TABLET(10 MG) BY MOUTH EVERY DAY 90 tablet 1    [DISCONTINUED] sertraline (ZOLOFT) 50 MG tablet TAKE 1 TABLET(50 MG) BY MOUTH EVERY DAY 90 tablet 1    [DISCONTINUED] tiZANidine (ZANAFLEX) 2 MG tablet  TAKE 1 TABLET BY MOUTH EVERY 8 HOURS AS NEEDED FOR BACK OR HIP PAIN 30 tablet 0    [DISCONTINUED] pravastatin (PRAVACHOL) 20 MG tablet Take 1 tablet (20 mg total) by mouth every evening. 90 tablet 3    [DISCONTINUED] pravastatin (PRAVACHOL) 20 MG tablet Take 1 tablet (20 mg total) by mouth every evening. 90 tablet 3     No current facility-administered medications on file prior to visit.         Assessment:   79 y.o. male with multiple co-morbid illnesses here to follow-up with PCP and continue work-up of chronic issues    Plan:     1. Hypothyroidism, unspecified type    Chronic, stable, continue current medication    -     TSH; Future; Expected date: 01/27/2025  -     levothyroxine (SYNTHROID) 25 MCG tablet; Take 1 tablet (25 mcg total) by mouth before breakfast.  Dispense: 90 tablet; Refill: 1    2. Essential hypertension    Chronic, stable, continue current medications. Encouraged to monitor BP at home and to set alarm to remind him to take midday dose of BP meds - if unable to still get dose in then will change meds to BID dosing.     -     CBC Auto Differential; Future; Expected date: 01/27/2025  -     Comprehensive Metabolic Panel; Future; Expected date: 01/27/2025  -     nebivoloL (BYSTOLIC) 10 MG Tab; Take 1 tablet (10 mg total) by mouth once daily.  Dispense: 90 tablet; Refill: 1  -     losartan (COZAAR) 100 MG tablet; Take 1 tablet (100 mg total) by mouth once daily.  Dispense: 90 tablet; Refill: 3  -     hydrALAZINE (APRESOLINE) 50 MG tablet; Take 1 tablet (50 mg total) by mouth every 8 (eight) hours.  Dispense: 270 tablet; Refill: 1    3. Aortic atherosclerosis  4. Hyperlipidemia, unspecified hyperlipidemia type    Chronic, stable, continue current medication    -     Lipid Panel; Future; Expected date: 01/27/2025    5. Anxiety    Chronic, stable, continue current medication    -     sertraline (ZOLOFT) 50 MG tablet; Take 1 tablet (50 mg total) by mouth once daily.  Dispense: 90 tablet; Refill: 1    6.  Lumbar radiculopathy  7. DDD (degenerative disc disease), lumbar  8. Unspecified inflammatory spondylopathy, thoracic region    Chronic, stable, continue current medications and PT  Patient to set alarm to remember mid day dose of Gabapentin may decreased to BID dosing instead of TID dosing but advised should pain return would need midday dose.      -     tiZANidine (ZANAFLEX) 2 MG tablet; Take 1 tablet (2 mg total) by mouth every 8 (eight) hours as needed.  Dispense: 90 tablet; Refill: 1    9. Need for vaccination  -     pneumoc 20-wanda conj-dip cr(PF) (PREVNAR-20 (PF)) injection Syrg 0.5 mL    10. Encounter for colorectal cancer screening  -     Fecal Immunochemical Test (iFOBT); Future; Expected date: 01/27/2025         Health Maintenance         Date Due Completion Date    Pneumococcal Vaccines (Age 50+) (2 of 2 - PPSV23) 08/25/2015 6/30/2015    RSV Vaccine (Age 60+ and Pregnant patients) (1 - 1-dose 75+ series) Never done ---    Shingles Vaccine (2 of 2) 08/31/2021 7/6/2021    Colonoscopy 08/11/2023 8/11/2020    TETANUS VACCINE 06/30/2024 6/30/2014    COVID-19 Vaccine (6 - 2024-25 season) 09/01/2024 10/6/2022    Lipid Panel 03/21/2029 3/21/2024            Future Appointments   Date Time Provider Department Center   2/27/2025 12:00 PM Roz Jameson DO [facility] [place]   3/20/2025 10:00 AM LAB, [place] [facility] [place]   3/26/2025  2:00 PM Rell Ferrell MD [facility] [place]         No follow-ups on file. Total clinical care time was 20 min        As the co-signing provider I have reviewed the students documentation and am responsible for the treatment, assessment, and plan.

## 2025-02-25 ENCOUNTER — PATIENT MESSAGE (OUTPATIENT)
Dept: SPORTS MEDICINE | Facility: CLINIC | Age: 80
End: 2025-02-25
Payer: MEDICARE

## 2025-02-25 DIAGNOSIS — M25.552 BILATERAL HIP PAIN: Primary | ICD-10-CM

## 2025-02-25 DIAGNOSIS — M25.551 BILATERAL HIP PAIN: Primary | ICD-10-CM

## 2025-02-27 ENCOUNTER — HOSPITAL ENCOUNTER (OUTPATIENT)
Dept: RADIOLOGY | Facility: HOSPITAL | Age: 80
Discharge: HOME | End: 2025-02-27
Attending: NEUROMUSCULOSKELETAL MEDICINE & OMM
Payer: MEDICARE

## 2025-02-27 ENCOUNTER — OFFICE VISIT (OUTPATIENT)
Dept: SPORTS MEDICINE | Facility: CLINIC | Age: 80
End: 2025-02-27
Payer: MEDICARE

## 2025-02-27 VITALS — SYSTOLIC BLOOD PRESSURE: 114 MMHG | DIASTOLIC BLOOD PRESSURE: 73 MMHG | HEART RATE: 45 BPM

## 2025-02-27 DIAGNOSIS — M16.0 PRIMARY OSTEOARTHRITIS OF HIPS, BILATERAL: ICD-10-CM

## 2025-02-27 DIAGNOSIS — M79.10 MYALGIA: ICD-10-CM

## 2025-02-27 DIAGNOSIS — M47.816 LUMBAR FACET ARTHROPATHY: Primary | ICD-10-CM

## 2025-02-27 DIAGNOSIS — M99.02 SOMATIC DYSFUNCTION OF THORACIC REGION: ICD-10-CM

## 2025-02-27 DIAGNOSIS — M99.05 SOMATIC DYSFUNCTION OF PELVIC REGION: ICD-10-CM

## 2025-02-27 DIAGNOSIS — M99.04 SACRAL REGION SOMATIC DYSFUNCTION: ICD-10-CM

## 2025-02-27 DIAGNOSIS — M25.551 BILATERAL HIP PAIN: ICD-10-CM

## 2025-02-27 DIAGNOSIS — M25.552 BILATERAL HIP PAIN: ICD-10-CM

## 2025-02-27 DIAGNOSIS — M99.03 SOMATIC DYSFUNCTION OF LUMBAR REGION: ICD-10-CM

## 2025-02-27 DIAGNOSIS — M47.816 LUMBAR SPONDYLOSIS: ICD-10-CM

## 2025-02-27 DIAGNOSIS — M99.08 SOMATIC DYSFUNCTION OF RIB CAGE REGION: ICD-10-CM

## 2025-02-27 PROCEDURE — 73521 X-RAY EXAM HIPS BI 2 VIEWS: CPT | Mod: 26,,, | Performed by: RADIOLOGY

## 2025-02-27 PROCEDURE — 73521 X-RAY EXAM HIPS BI 2 VIEWS: CPT | Mod: TC,PO

## 2025-02-27 PROCEDURE — 98927 OSTEOPATH MANJ 5-6 REGIONS: CPT | Mod: PBBFAC,PO | Performed by: NEUROMUSCULOSKELETAL MEDICINE & OMM

## 2025-02-27 PROCEDURE — 99999 PR PBB SHADOW E&M-EST. PATIENT-LVL III: CPT | Mod: PBBFAC,,, | Performed by: NEUROMUSCULOSKELETAL MEDICINE & OMM

## 2025-02-27 PROCEDURE — 99213 OFFICE O/P EST LOW 20 MIN: CPT | Mod: PBBFAC,25,PO | Performed by: NEUROMUSCULOSKELETAL MEDICINE & OMM

## 2025-02-27 NOTE — PROGRESS NOTES
Subjective:     Julius Aragon Black     Chief Complaint   Patient presents with    Left Hip - Pain    Right Hip - Pain     HPI    Julius is a 79 y.o. male coming in today for bilateral hip pain that began about 2 year(s) ago, referred by self. Pt. describes the pain as a 4/10 achy pain that does radiate. Pt localizes the pain to the lower back with radiation to both legs.There was a fall/injury/ or trauma associated with the onset of symptoms. Pt reports he had a fall on concrete about 2 years ago and has had lower back pain since that time. He attends PT at [facility] twice per week with good relief. The pain is better with exercise and worse with nothing. Pt sees Dr. Nicolas Otoole and had an ALLY about 3-4 months ago with good relief. Pt was in the [employer] and treated with OMT during his service. Pt. Denies any other musculoskeletal complaints at this time.     Joint instability? no  Mechanical locking/clicking? no  Affecting ADL's? yes  Affecting sleep? yes    Occupation: retired    Review of Systems   Constitutional:  Negative for chills and fever.   Musculoskeletal:  Positive for back pain, joint pain and myalgias. Negative for falls and neck pain.   Neurological:  Negative for dizziness, tingling, focal weakness, weakness and headaches.     PAST MEDICAL HISTORY:   Past Medical History:   Diagnosis Date    Acquired hypothyroidism     Allergy     Arthritis     BPH (benign prostatic hyperplasia)     Cataract     Hyperlipidemia     Hypertension     Sleep apnea     wears CPAP-DOES NOT KNOW SETTING    SOB (shortness of breath)     feels allergy related     PAST SURGICAL HISTORY:   Past Surgical History:   Procedure Laterality Date    CATARACT EXTRACTION, BILATERAL Bilateral     COLONOSCOPY N/A 08/11/2020    Procedure: COLONOSCOPY;  Surgeon: LISBETH Ríos MD;  Location: [address];  Service: Endoscopy;  Laterality: N/A;  covid test 8/8 [place]    dental implant Left     EYE SURGERY      KNEE ARTHROSCOPY  Bilateral     uvula removal       FAMILY HISTORY:   Family History   Problem Relation Name Age of Onset    Diabetes Mother      Hypertension Mother      Arthritis Mother       SOCIAL HISTORY: Social History[1]    MEDICATIONS: Current Medications[2]    ALLERGIES:   Review of patient's allergies indicates:   Allergen Reactions    Doxycycline Diarrhea     itching    Ezetimibe-simvastatin      Muscle cramps    Hydrocodone Other (See Comments)    Rosuvastatin      Muscle cramps    Sertraline Other (See Comments)     jittery    Simvastatin      Muscle cramps    Adhesive Rash     IMAGIN. MRI ordered due to low back pain taken 24.   2. MRI report reviewed personally by me and then directly with patient. Images unavailable due to outside MRI  3. FINDINGS: There is no significant interval change since the prior exam.  The alignment and vertebral body heights are maintained.    There is severe multilevel degenerative loss of disc space height and signal from L1-2 through L3-4.    There are degenerative endplate changes at L1-2 left of midline.  There are degenerative endplate changes, with marrow edema to the right of midline at L3-4.    Multilevel degenerative endplate Schmorl's nodes are demonstrated.    There is prominent epidural fat noted from L2 through S1, which does narrow the thecal sac.    The conus medullaris terminates at the normal level, and is normal in signal intensity.    L1-2: There is a mild broad-based disc bulge with extension into the left neural foramina, and mild bilateral facet arthropathy. Findings result in mild central spinal stenosis, and moderately severe left neural foraminal narrowing.    L2-3: There is disc space narrowing, a broad-based disc bulge with extension into bilateral neural foramina, and moderate bilateral facet arthropathy. In addition there is prominent epidural fat. Findings result in moderate central spinal stenosis, and moderately severe bilateral neural foraminal  narrowing.    L3-4: There is an asymmetric right lateral disc bulge with extension into the right neural foramina, and moderately severe bilateral facet arthropathy. Right lateral degenerative endplate changes with marrow edema are demonstrated. There is moderately severe bilateral facet arthropathy. Findings result in severe central spinal stenosis and severe bilateral neural foraminal narrowing, right worse than left.    L4-5: There is disc space narrowing, a broad-based disc bulge, and moderately severe bilateral facet arthropathy. Findings result in severe central spinal stenosis and moderately severe bilateral neural foraminal narrowing.    L5-S1: There is a mild disc bulge and moderately severe bilateral facet arthropathy. No significant central spinal stenosis, nor neural foraminal narrowing.       4. IMPRESSION: Severe multilevel degenerative disc disease and facet arthropathy from L1-2 through L4-5.   Asymmetric right lateral degenerative endplate changes with marrow edema at L3-4, and severe right neural foraminal narrowing.   L3-4 and L4-5 severe central spinal stenosis.   Multilevel neural foraminal narrowing, as above.   Prominent epidural fat from L2 through S1.     Objective:   VITAL SIGNS: /73 (Patient Position: Sitting)   Pulse (!) 45    General    Nursing note and vitals reviewed.  Constitutional: He is oriented to person, place, and time. He appears well-developed and well-nourished.   HENT:   Head: Normocephalic and atraumatic.   no nasal discharge, no external ear redness or discharge   Eyes:   EOM is full and smooth  No eye redness or discharge   Neck: Neck supple. No tracheal deviation present.   Cardiovascular:  Normal rate.            2+ Radial pulse bilaterally  2+ Dorsalis Pedis pulse bilaterally  No LE edema appreciated   Pulmonary/Chest: Effort normal. No respiratory distress.   Abdominal: He exhibits no distension.   No rigidity   Neurological: He is alert and oriented to person,  place, and time. He exhibits normal muscle tone. Coordination normal.   See details below   Psychiatric: He has a normal mood and affect. His behavior is normal.           MUSCULOSKELETAL EXAM  HIP: bilateral HIP  The affected hip is compared to the contralateral hip.    Observation:    There is no edema, erythema, or ecchymosis in the lumbosacral region.   There is no Trendelenburg sign on either side  No obvious pelvic obliquity while standing.    No thoracolumbar scoliosis observed.    No midline skin abnormalities.  No atrophy noted in the lower limbs.  Gait: Non-antalgic with Neutral ankle mechanics and Neutral medial arch, increased R hip external rotation    ROM (* = with pain):  Passive hip flexion to 120° on left and 120° on right  Passive hip internal rotation to 30° on left and 30° on right* (+ low back pain)  Passive hip external rotation to 35° on left and 35° on right   Passive hip abduction to 45° on left and 45° on right    Tenderness To Palpation:  No tenderness at the ASIS, AIIS, PSIS, PIIS, iliac crest, pubic bones, ischial tuberosity.  + tenderness throughout the right lumbar spine, iliolumbar region, or posterior pelvis.  No tenderness throughout the sacrum or piriformis  No tenderness over the greater trochanteric bursa or greater/lesser trochanters.  No tenderness at the glut attachments on the greater trochanter  No tenderness over proximal IT band or hip flexor musculature.    Strength Testing (* = with pain):  Hip flexion - 5/5 on left and 5/5 on right  Hip extension - 5/5 on left and 5/5 on right  Hip adduction - 5/5 on left and 5/5 on right  Hip abduction - 5/5 on left and 5/5 on right  Knee flexion - 5/5 on left and 5/5 on right  Knee extension - 5/5 on left and 5/5 on right    Special Tests:  Standing Trendelenburg test - negative    Seated straight leg raise - negative  Supine straight leg raise - negative  Hamstring flexibility symmetric    Log roll - negative  TERRA - negative  FADIR  - positive for left knee pain  Scour test - negative  No pain with posterior hip capsule compression    ASIS compression test - negative  SI drawer test - negative   Thigh thrust test - negative     Piriformis test (Bonnet's) - negative  Ely's test - negative  Quadriceps flexibility symmetric.  Gayatri test - negative  Placido compression test - negative    Fulcrum test - negative    + right sided low back pain with lumbar facet loading    Neurovascular Exam:  2+ femoral, DP, and PT pulses BL.  No skin changes, no abnormal hair distribution.  Sensation intact to light touch throughout the obturator and medial/lateral/posterior femoral cutaneous nerves.  Capillary refill intact to <2 seconds in all lower limb digits.    TART (Tissue texture abnormality, Asymmetry,  Restriction of motion and/or Tenderness) changes:     Thoracic Spine   T1 Neutral   T2 Neutral   T3 Neutral   T4 Neutral   T5 Neutral   T6 Neutral   T7 Neutral   T8 Neutral   T9 Neutral   T10 Neutral   T11 NS-left,R-right   T12 NS-left,R-right     Rib cage: R11 external torsion on right     Lumbar Spine   L1 NS-left,R-right   L2 NS-left,R-right   L3 Neutral   L4 FRS RIGHT   L5 FRS RIGHT     Pelvis:  Innominate:Right anterior rotation  Pubic bone:Right inferior pubic shear    Sacrum:Left on Right sacral torsion    Key   F= Flexed   E = Extended   R = Rotated   S = Sidebent   TTA = tissue texture abnormality     IMAGIN. X-ray ordered due to bilateral hip pain. (AP pelvis and frogleg lateral bilateral views) taken today.   2. X-ray images were reviewed personally by me and then directly with patient.  3. FINDINGS: X-ray images obtained demonstrate mild to moderate bilateral hip DJD changes with mild joint space narrowing.  Partially visualized lower lumbar spine spondylosis also noted.  No acute fracture or dislocation appreciated.  4. IMPRESSION:  See above.     Assessment:      Encounter Diagnoses   Name Primary?    Lumbar facet arthropathy Yes    Lumbar  spondylosis     Primary osteoarthritis of hips, bilateral     Myalgia     Somatic dysfunction of lumbar region     Sacral region somatic dysfunction     Somatic dysfunction of pelvic region     Somatic dysfunction of thoracic region     Somatic dysfunction of rib cage region         Plan:   1. Chronic low back pain with underlying lumbar spondylosis and associated facet arthropathy symptoms.  No clear lumbar radicular symptoms appreciated on physical exam today.  Underlying bilateral hip DJD changes seem to be less significant of a factor in patient's pain on exam today.  - OMT performed today to address associated biomechanical restrictions  and HEP started.   - continue gabapentin 300 mg t.i.d., as prescribed  - recommend follow-up with Neurosurgery (Dr. Otoole) for continued management of low back pain  - recommend continued formal physical therapy for increase pelvic stability and strengthening  - discussed option of diagnostic/therapeutic  hip intra-articular CSI, if patient begins to have more deep, achy groin pain to determine how much his underlying hip OA is contributing to his symptoms, however given relatively benign hip exam today, I recommend holding off on this for now.  - MRI images of lumbar spine taken 8/20/24 showed  Severe multilevel degenerative disc disease and facet arthropathy from L1-2 through L4-5.   Asymmetric right lateral degenerative endplate changes with marrow edema at L3-4, and severe right neural foraminal narrowing.   L3-4 and L4-5 severe central spinal stenosis.   Multilevel neural foraminal narrowing, as above.   Prominent epidural fat from L2 through S1. Report personally reviewed with patient.  -  X-ray images of bilateral hip taken today (AP pelvis and frogleg lateral bilateral views) showed mild to moderate bilateral hip DJD changes with mild joint space narrowing.  Partially visualized lower lumbar spine spondylosis also noted. Images were personally reviewed with  patient.    2. OMT 5-6 regions. Oral consent obtained.  Reviewed benefits and potential side effects.   - OMT indicated today due to signs and symptoms as well as local and remote somatic dysfunction findings and their related neurokinetic, lymphatic, fascial and/or arteriovenous body connections.   - OMT techniques used: Myofascial Release, Muscle Energy, and Still's Technique   - Treatment was tolerated well. Improvement noted in segmental mobility post-treatment in dysfunctional regions. There were no adverse events and no complications immediately following treatment.     3. Pt. Given the following HEP:  A) Seated anterior pelvic tilt exercise: rotate pelvis forward to sit on ischial tuberosities while maintaining neutral shoulder positioning. Repeat frequently throughout the day.   B) Quadratus lumborum self-stretch on all fours: hold stretch for 30 seconds, repeating 2-3 times on each side. Do stretch twice daily. Hand-out also given.   C) continue home exercise program from formal PT. Emphasized importance of maintaining HEP after completion of PT.     [address] HOME EXERCISE PROGRAM (HEP):  The patient was taught a homegoing physical therapy regimen as described above. The patient demonstrated understanding of the exercises and proper technique of their execution. This interaction took 15 minutes.     4. Follow-up as needed if pain deteriorates or new issue arises    5. Patient agreeable to today's plan and all questions were answered    This note is dictated using the M*Modal Fluency Direct word recognition program. There are word recognition mistakes that are occasionally missed on review.                 [1]   Social History  Socioeconomic History    Marital status:    Tobacco Use    Smoking status: Never    Smokeless tobacco: Never   Substance and Sexual Activity    Alcohol use: Yes     Alcohol/week: 15.0 standard drinks of alcohol     Types: 12 Standard drinks or equivalent, 3 Shots of liquor per  week     Comment: Rum and coke 2-3 drinks NIGHTLY    Drug use: No    Sexual activity: Not Currently     Partners: Female     Social Drivers of Health     Financial Resource Strain: Low Risk  (3/14/2024)    Overall Financial Resource Strain (CARDIA)     Difficulty of Paying Living Expenses: Not hard at all   Food Insecurity: No Food Insecurity (3/14/2024)    Hunger Vital Sign     Worried About Running Out of Food in the Last Year: Never true     Ran Out of Food in the Last Year: Never true   Transportation Needs: No Transportation Needs (3/14/2024)    PRAPARE - Transportation     Lack of Transportation (Medical): No     Lack of Transportation (Non-Medical): No   Physical Activity: Insufficiently Active (3/14/2024)    Exercise Vital Sign     Days of Exercise per Week: 2 days     Minutes of Exercise per Session: 60 min   Stress: Stress Concern Present (3/14/2024)    [employer] - Occupational Stress Questionnaire     Feeling of Stress : To some extent   Housing Stability: Low Risk  (3/14/2024)    Housing Stability Vital Sign     Unable to Pay for Housing in the Last Year: No     Number of Places Lived in the Last Year: 1     Unstable Housing in the Last Year: No   [2]   Current Outpatient Medications:     acetaminophen (TYLENOL) 500 MG tablet, Take 2 tablets (1,000 mg total) by mouth every 8 (eight) hours., Disp: 60 tablet, Rfl: 0    diclofenac sodium (VOLTAREN) 1 % Gel, APPLY TOPICALLY TO THE AFFECTED AREA THREE TIMES DAILY AS NEEDED FOR JOINT PAIN, Disp: 100 g, Rfl: 2    fluticasone propionate (FLONASE) 50 mcg/actuation nasal spray, 1 spray by Each Nostril route daily as needed., Disp: , Rfl:     gabapentin (NEURONTIN) 300 MG capsule, TAKE 1 CAPSULE(300 MG) BY MOUTH THREE TIMES DAILY, Disp: 270 capsule, Rfl: 1    hydrALAZINE (APRESOLINE) 50 MG tablet, Take 1 tablet (50 mg total) by mouth every 8 (eight) hours., Disp: 270 tablet, Rfl: 1    levothyroxine (SYNTHROID) 25 MCG tablet, Take 1  tablet (25 mcg total) by mouth before breakfast., Disp: 90 tablet, Rfl: 1    losartan (COZAAR) 100 MG tablet, Take 1 tablet (100 mg total) by mouth once daily., Disp: 90 tablet, Rfl: 3    multivitamin capsule, Take 1 capsule by mouth once daily., Disp: , Rfl:     nebivoloL (BYSTOLIC) 10 MG Tab, Take 1 tablet (10 mg total) by mouth once daily., Disp: 90 tablet, Rfl: 1    pravastatin (PRAVACHOL) 20 MG tablet, TAKE 1 TABLET(20 MG) BY MOUTH EVERY EVENING, Disp: 90 tablet, Rfl: 3    sertraline (ZOLOFT) 50 MG tablet, Take 1 tablet (50 mg total) by mouth once daily., Disp: 90 tablet, Rfl: 1    tiZANidine (ZANAFLEX) 2 MG tablet, Take 1 tablet (2 mg total) by mouth every 8 (eight) hours as needed., Disp: 90 tablet, Rfl: 1    LIDOcaine (LIDODERM) 5 %, Place 1 patch onto the skin once daily. Remove & Discard patch within 12 hours or as directed by MD (Patient not taking: Reported on 2/27/2025), Disp: 30 patch, Rfl: 0    traMADoL (ULTRAM) 50 mg tablet, TAKE 1 TABLET BY MOUTH EVERY 6 HOURS AS NEEDED FOR PAIN. (Patient not taking: Reported on 2/27/2025), Disp: 28 tablet, Rfl: 0

## 2025-02-28 DIAGNOSIS — M51.369 DDD (DEGENERATIVE DISC DISEASE), LUMBAR: ICD-10-CM

## 2025-02-28 DIAGNOSIS — M54.16 LUMBAR RADICULOPATHY: ICD-10-CM

## 2025-02-28 NOTE — TELEPHONE ENCOUNTER
Refill Routing Note   Medication(s) are not appropriate for processing by [facility] for the following reason(s):        Outside of protocol  Non-participating provider  Responsible provider unclear    [facility] action(s):  Route               Appointments  past 12m or future 3m with PCP    Date Provider   Last Visit   1/27/2025 Emely Howard NP   Next Visit   Visit date not found Emely Howard NP   ED visits in past 90 days: 0        Note composed:9:42 AM 02/28/2025

## 2025-03-01 RX ORDER — TIZANIDINE 2 MG/1
TABLET ORAL
Qty: 90 TABLET | Refills: 1 | Status: SHIPPED | OUTPATIENT
Start: 2025-03-01

## 2025-03-20 ENCOUNTER — LAB VISIT (OUTPATIENT)
Dept: LAB | Facility: HOSPITAL | Age: 80
End: 2025-03-20
Attending: NURSE PRACTITIONER
Payer: MEDICARE

## 2025-03-20 DIAGNOSIS — E03.9 HYPOTHYROIDISM, UNSPECIFIED TYPE: ICD-10-CM

## 2025-03-20 DIAGNOSIS — E78.5 HYPERLIPIDEMIA, UNSPECIFIED HYPERLIPIDEMIA TYPE: Chronic | ICD-10-CM

## 2025-03-20 DIAGNOSIS — I10 ESSENTIAL HYPERTENSION: Chronic | ICD-10-CM

## 2025-03-20 LAB
ALBUMIN SERPL BCP-MCNC: 3.8 G/DL (ref 3.5–5.2)
ALP SERPL-CCNC: 71 U/L (ref 40–150)
ALT SERPL W/O P-5'-P-CCNC: 19 U/L (ref 10–44)
ANION GAP SERPL CALC-SCNC: 12 MMOL/L (ref 8–16)
AST SERPL-CCNC: 22 U/L (ref 10–40)
BASOPHILS # BLD AUTO: 0.05 K/UL (ref 0–0.2)
BASOPHILS NFR BLD: 0.6 % (ref 0–1.9)
BILIRUB SERPL-MCNC: 1.2 MG/DL (ref 0.1–1)
BUN SERPL-MCNC: 16 MG/DL (ref 8–23)
CALCIUM SERPL-MCNC: 9.4 MG/DL (ref 8.7–10.5)
CHLORIDE SERPL-SCNC: 104 MMOL/L (ref 95–110)
CHOLEST SERPL-MCNC: 199 MG/DL (ref 120–199)
CHOLEST/HDLC SERPL: 4.3 {RATIO} (ref 2–5)
CO2 SERPL-SCNC: 21 MMOL/L (ref 23–29)
CREAT SERPL-MCNC: 0.8 MG/DL (ref 0.5–1.4)
DIFFERENTIAL METHOD BLD: ABNORMAL
EOSINOPHIL # BLD AUTO: 0.4 K/UL (ref 0–0.5)
EOSINOPHIL NFR BLD: 4.1 % (ref 0–8)
ERYTHROCYTE [DISTWIDTH] IN BLOOD BY AUTOMATED COUNT: 13.3 % (ref 11.5–14.5)
EST. GFR  (NO RACE VARIABLE): >60 ML/MIN/1.73 M^2
GLUCOSE SERPL-MCNC: 86 MG/DL (ref 70–110)
HCT VFR BLD AUTO: 44.5 % (ref 40–54)
HDLC SERPL-MCNC: 46 MG/DL (ref 40–75)
HDLC SERPL: 23.1 % (ref 20–50)
HGB BLD-MCNC: 15 G/DL (ref 14–18)
IMM GRANULOCYTES # BLD AUTO: 0.05 K/UL (ref 0–0.04)
IMM GRANULOCYTES NFR BLD AUTO: 0.6 % (ref 0–0.5)
LDLC SERPL CALC-MCNC: 110.6 MG/DL (ref 63–159)
LYMPHOCYTES # BLD AUTO: 1.8 K/UL (ref 1–4.8)
LYMPHOCYTES NFR BLD: 21.2 % (ref 18–48)
MCH RBC QN AUTO: 31.3 PG (ref 27–31)
MCHC RBC AUTO-ENTMCNC: 33.7 G/DL (ref 32–36)
MCV RBC AUTO: 93 FL (ref 82–98)
MONOCYTES # BLD AUTO: 0.9 K/UL (ref 0.3–1)
MONOCYTES NFR BLD: 10.4 % (ref 4–15)
NEUTROPHILS # BLD AUTO: 5.4 K/UL (ref 1.8–7.7)
NEUTROPHILS NFR BLD: 63.1 % (ref 38–73)
NONHDLC SERPL-MCNC: 153 MG/DL
NRBC BLD-RTO: 0 /100 WBC
PLATELET # BLD AUTO: 319 K/UL (ref 150–450)
PMV BLD AUTO: 10.1 FL (ref 9.2–12.9)
POTASSIUM SERPL-SCNC: 4.2 MMOL/L (ref 3.5–5.1)
PROT SERPL-MCNC: 6.6 G/DL (ref 6–8.4)
RBC # BLD AUTO: 4.8 M/UL (ref 4.6–6.2)
SODIUM SERPL-SCNC: 137 MMOL/L (ref 136–145)
TRIGL SERPL-MCNC: 212 MG/DL (ref 30–150)
TSH SERPL DL<=0.005 MIU/L-ACNC: 2.13 UIU/ML (ref 0.4–4)
WBC # BLD AUTO: 8.59 K/UL (ref 3.9–12.7)

## 2025-03-20 PROCEDURE — 36415 COLL VENOUS BLD VENIPUNCTURE: CPT | Mod: PO | Performed by: NURSE PRACTITIONER

## 2025-03-20 PROCEDURE — 85025 COMPLETE CBC W/AUTO DIFF WBC: CPT | Performed by: NURSE PRACTITIONER

## 2025-03-20 PROCEDURE — 80061 LIPID PANEL: CPT | Performed by: NURSE PRACTITIONER

## 2025-03-20 PROCEDURE — 84443 ASSAY THYROID STIM HORMONE: CPT | Performed by: NURSE PRACTITIONER

## 2025-03-20 PROCEDURE — 80053 COMPREHEN METABOLIC PANEL: CPT | Performed by: NURSE PRACTITIONER

## 2025-03-21 ENCOUNTER — RESULTS FOLLOW-UP (OUTPATIENT)
Dept: INTERNAL MEDICINE | Facility: CLINIC | Age: 80
End: 2025-03-21

## 2025-03-24 ENCOUNTER — PATIENT MESSAGE (OUTPATIENT)
Dept: INTERNAL MEDICINE | Facility: CLINIC | Age: 80
End: 2025-03-24
Payer: MEDICARE

## 2025-03-26 ENCOUNTER — OFFICE VISIT (OUTPATIENT)
Dept: INTERNAL MEDICINE | Facility: CLINIC | Age: 80
End: 2025-03-26
Payer: MEDICARE

## 2025-03-26 VITALS
DIASTOLIC BLOOD PRESSURE: 76 MMHG | HEART RATE: 55 BPM | RESPIRATION RATE: 18 BRPM | WEIGHT: 219.56 LBS | TEMPERATURE: 98 F | SYSTOLIC BLOOD PRESSURE: 138 MMHG | HEIGHT: 70 IN | OXYGEN SATURATION: 96 % | BODY MASS INDEX: 31.43 KG/M2

## 2025-03-26 DIAGNOSIS — E78.5 HYPERLIPIDEMIA, UNSPECIFIED HYPERLIPIDEMIA TYPE: Chronic | ICD-10-CM

## 2025-03-26 DIAGNOSIS — M48.00 SPINAL STENOSIS, UNSPECIFIED SPINAL REGION: ICD-10-CM

## 2025-03-26 DIAGNOSIS — Z00.00 PHYSICAL EXAM, ANNUAL: Primary | ICD-10-CM

## 2025-03-26 DIAGNOSIS — G95.9 DISEASE OF SPINAL CORD, UNSPECIFIED: ICD-10-CM

## 2025-03-26 DIAGNOSIS — E03.9 HYPOTHYROIDISM, UNSPECIFIED TYPE: ICD-10-CM

## 2025-03-26 DIAGNOSIS — Z12.11 SCREENING FOR COLORECTAL CANCER: ICD-10-CM

## 2025-03-26 DIAGNOSIS — Z12.12 SCREENING FOR COLORECTAL CANCER: ICD-10-CM

## 2025-03-26 DIAGNOSIS — M54.16 LUMBAR RADICULOPATHY: ICD-10-CM

## 2025-03-26 DIAGNOSIS — M51.369 DDD (DEGENERATIVE DISC DISEASE), LUMBAR: ICD-10-CM

## 2025-03-26 DIAGNOSIS — I10 ESSENTIAL HYPERTENSION: Chronic | ICD-10-CM

## 2025-03-26 PROCEDURE — 99999 PR PBB SHADOW E&M-EST. PATIENT-LVL III: CPT | Mod: PBBFAC,,, | Performed by: STUDENT IN AN ORGANIZED HEALTH CARE EDUCATION/TRAINING PROGRAM

## 2025-03-26 PROCEDURE — 99213 OFFICE O/P EST LOW 20 MIN: CPT | Mod: PBBFAC,PO | Performed by: STUDENT IN AN ORGANIZED HEALTH CARE EDUCATION/TRAINING PROGRAM

## 2025-03-26 RX ORDER — LEVOTHYROXINE SODIUM 25 UG/1
25 TABLET ORAL
Qty: 90 TABLET | Refills: 3 | Status: SHIPPED | OUTPATIENT
Start: 2025-03-26

## 2025-03-26 RX ORDER — NEBIVOLOL 10 MG/1
10 TABLET ORAL DAILY
Qty: 90 TABLET | Refills: 3 | Status: SHIPPED | OUTPATIENT
Start: 2025-03-26

## 2025-03-26 RX ORDER — PRAVASTATIN SODIUM 20 MG/1
20 TABLET ORAL NIGHTLY
Qty: 90 TABLET | Refills: 3 | Status: SHIPPED | OUTPATIENT
Start: 2025-03-26

## 2025-03-26 RX ORDER — HYDRALAZINE HYDROCHLORIDE 50 MG/1
50 TABLET, FILM COATED ORAL EVERY 8 HOURS
Qty: 270 TABLET | Refills: 1 | Status: SHIPPED | OUTPATIENT
Start: 2025-03-26

## 2025-03-26 RX ORDER — GABAPENTIN 300 MG/1
300 CAPSULE ORAL 3 TIMES DAILY
Qty: 270 CAPSULE | Refills: 1 | Status: SHIPPED | OUTPATIENT
Start: 2025-03-26

## 2025-03-26 RX ORDER — LOSARTAN POTASSIUM 100 MG/1
100 TABLET ORAL DAILY
Qty: 90 TABLET | Refills: 3 | Status: SHIPPED | OUTPATIENT
Start: 2025-03-26

## 2025-03-26 RX ORDER — TIZANIDINE 2 MG/1
2 TABLET ORAL EVERY 8 HOURS PRN
Qty: 90 TABLET | Refills: 1 | Status: SHIPPED | OUTPATIENT
Start: 2025-03-26

## 2025-03-26 NOTE — PROGRESS NOTES
Subjective:    The following note was written in combination with deep-scribe software and dictation (to which understanding and consent was verbally expressed); please excuse any transcription and/or dictation errors.      Chief Complaint: Establish Care and Medication Refill    HPI  Mr. Julius Baker is a 79 y.o. man with hypertension (losartan 100 & Bystolic 10), hyperlipidemia (pravastatin 20; intolerant of high-intensity statin), musculoskeletal pain (seldomly use tramadol, tizanidine 2 t.i.d. p.r.n., and gabapentin 300 t.i.d. p.r.n.), and hypothyroidism (levothyroxine 25) presenting as a new patient to establish primary care.      Annual screening labs:   -order/interpreted by a colleague prior to this appointment and reassuring      Family, social, surgical Hx reviewed     Health Maintenance:  Due for colorectal cancer screening and routine vaccinations    Past Medical History:   Diagnosis Date    Acquired hypothyroidism     Allergy     Arthritis     BPH (benign prostatic hyperplasia)     Cataract     Hyperlipidemia     Hypertension     Sleep apnea     wears CPAP-DOES NOT KNOW SETTING    SOB (shortness of breath)     feels allergy related     Past Surgical History:   Procedure Laterality Date    CATARACT EXTRACTION, BILATERAL Bilateral     COLONOSCOPY N/A 08/11/2020    Procedure: COLONOSCOPY;  Surgeon: LISBETH Ríos MD;  Location: [address]);  Service: Endoscopy;  Laterality: N/A;  covid test 8/8 [place]    dental implant Left     EYE SURGERY      KNEE ARTHROSCOPY Bilateral     uvula removal       Family History   Problem Relation Name Age of Onset    Diabetes Mother      Hypertension Mother      Arthritis Mother       Social History[1]  Review of patient's allergies indicates:   Allergen Reactions    Doxycycline Diarrhea     itching    Ezetimibe-simvastatin      Muscle cramps    Hydrocodone Other (See Comments)    Rosuvastatin      Muscle cramps    Sertraline Other (See Comments)     jittery     Simvastatin      Muscle cramps    Adhesive Rash     [facility] HJoanie Baker had no medications administered during this visit.   Review of Systems   Constitutional:  Negative for appetite change, chills and fever.   HENT: Negative.     Respiratory:  Negative for cough, chest tightness and shortness of breath.    Cardiovascular:  Negative for chest pain, palpitations and leg swelling.   Gastrointestinal:  Negative for abdominal distention, abdominal pain, blood in stool, constipation, diarrhea, nausea and vomiting.   Endocrine: Negative.    Genitourinary:  Negative for difficulty urinating, dysuria, frequency and hematuria.   Musculoskeletal: Negative.    Integumentary:  Negative.   Neurological: Negative.    Psychiatric/Behavioral: Negative.           Objective:      Vitals:    03/26/25 1354   BP: 138/76   Pulse: (!) 55   Resp: 18   Temp: 98 °F (36.7 °C)      Physical Exam  Vitals reviewed.   Constitutional:       General: He is not in acute distress.     Appearance: Normal appearance.   HENT:      Head: Normocephalic and atraumatic.      Comments: Facial features are symmetric      Nose: Nose normal. No congestion or rhinorrhea.      Mouth/Throat:      Mouth: Mucous membranes are moist.      Pharynx: Oropharynx is clear. No oropharyngeal exudate or posterior oropharyngeal erythema.   Eyes:      General: No scleral icterus.     Extraocular Movements: Extraocular movements intact.      Conjunctiva/sclera: Conjunctivae normal.   Cardiovascular:      Rate and Rhythm: Normal rate and regular rhythm.      Pulses: Normal pulses.      Heart sounds: Normal heart sounds.   Pulmonary:      Effort: Pulmonary effort is normal. No respiratory distress.      Breath sounds: Normal breath sounds.   Musculoskeletal:         General: No deformity or signs of injury. Normal range of motion.      Cervical back: Normal range of motion.   Skin:     General: Skin is warm and dry.      Findings: No rash.   Neurological:      General: No focal  deficit present.      Mental Status: He is alert and oriented to person, place, and time. Mental status is at baseline.   Psychiatric:         Mood and Affect: Mood normal.         Behavior: Behavior normal.         Thought Content: Thought content normal.       Medications Ordered Prior to Encounter[2]      Assessment:       1. Physical exam, annual    2. Screening for colorectal cancer    3. Disease of spinal cord, unspecified    4. Lumbar radiculopathy    5. DDD (degenerative disc disease), lumbar    6. Hyperlipidemia, unspecified hyperlipidemia type    7. Essential hypertension    8. Hypothyroidism, unspecified type    9. Spinal stenosis, unspecified spinal region        Plan:       Physical exam, annual   - Primary care assumed    Screening for colorectal cancer  -     Cologuard Screening (Multitarget Stool DNA); Future; Expected date: 03/26/2025    Disease of spinal cord, unspecified  Lumbar radiculopathy  DDD (degenerative disc disease), lumbar  Spinal stenosis, unspecified spinal region  -     tiZANidine (ZANAFLEX) 2 MG tablet; Take 1 tablet (2 mg total) by mouth every 8 (eight) hours as needed (muscle spasm).  Dispense: 90 tablet; Refill: 1  -     gabapentin (NEURONTIN) 300 MG capsule; Take 1 capsule (300 mg total) by mouth 3 (three) times daily.  Dispense: 270 capsule; Refill: 1    Hyperlipidemia, unspecified hyperlipidemia type  -     pravastatin (PRAVACHOL) 20 MG tablet; Take 1 tablet (20 mg total) by mouth every evening.  Dispense: 90 tablet; Refill: 3    Essential hypertension  -     nebivoloL (BYSTOLIC) 10 MG Tab; Take 1 tablet (10 mg total) by mouth once daily.  Dispense: 90 tablet; Refill: 3  -     losartan (COZAAR) 100 MG tablet; Take 1 tablet (100 mg total) by mouth once daily.  Dispense: 90 tablet; Refill: 3  -     hydrALAZINE (APRESOLINE) 50 MG tablet; Take 1 tablet (50 mg total) by mouth every 8 (eight) hours.  Dispense: 270 tablet; Refill: 1   - normotensive; no changes made      Hypothyroidism, unspecified type  -     levothyroxine (SYNTHROID) 25 MCG tablet; Take 1 tablet (25 mcg total) by mouth before breakfast.  Dispense: 90 tablet; Refill: 3   - dose is appropriate per most recent TSH                       [1]   Social History  Socioeconomic History    Marital status:    Tobacco Use    Smoking status: Never    Smokeless tobacco: Never   Substance and Sexual Activity    Alcohol use: Yes     Alcohol/week: 15.0 standard drinks of alcohol     Types: 12 Standard drinks or equivalent, 3 Shots of liquor per week     Comment: Rum and coke 2-3 drinks NIGHTLY    Drug use: No    Sexual activity: Not Currently     Partners: Female     Social Drivers of Health     Financial Resource Strain: Low Risk  (3/14/2024)    Overall Financial Resource Strain (CARDIA)     Difficulty of Paying Living Expenses: Not hard at all   Food Insecurity: No Food Insecurity (3/14/2024)    Hunger Vital Sign     Worried About Running Out of Food in the Last Year: Never true     Ran Out of Food in the Last Year: Never true   Transportation Needs: No Transportation Needs (3/14/2024)    PRAPARE - Transportation     Lack of Transportation (Medical): No     Lack of Transportation (Non-Medical): No   Physical Activity: Insufficiently Active (3/14/2024)    Exercise Vital Sign     Days of Exercise per Week: 2 days     Minutes of Exercise per Session: 60 min   Stress: Stress Concern Present (3/14/2024)    [employer] - Occupational Stress Questionnaire     Feeling of Stress : To some extent   Housing Stability: Low Risk  (3/14/2024)    Housing Stability Vital Sign     Unable to Pay for Housing in the Last Year: No     Number of Places Lived in the Last Year: 1     Unstable Housing in the Last Year: No   [2]   Current Outpatient Medications on File Prior to Visit   Medication Sig Dispense Refill    acetaminophen (TYLENOL) 500 MG tablet Take 2 tablets (1,000 mg total) by mouth every 8 (eight)  hours. 60 tablet 0    diclofenac sodium (VOLTAREN) 1 % Gel APPLY TOPICALLY TO THE AFFECTED AREA THREE TIMES DAILY AS NEEDED FOR JOINT PAIN 100 g 2    fluticasone propionate (FLONASE) 50 mcg/actuation nasal spray 1 spray by Each Nostril route daily as needed.      multivitamin capsule Take 1 capsule by mouth once daily.      [DISCONTINUED] gabapentin (NEURONTIN) 300 MG capsule TAKE 1 CAPSULE(300 MG) BY MOUTH THREE TIMES DAILY 270 capsule 1    [DISCONTINUED] hydrALAZINE (APRESOLINE) 50 MG tablet Take 1 tablet (50 mg total) by mouth every 8 (eight) hours. 270 tablet 1    [DISCONTINUED] levothyroxine (SYNTHROID) 25 MCG tablet Take 1 tablet (25 mcg total) by mouth before breakfast. 90 tablet 1    [DISCONTINUED] losartan (COZAAR) 100 MG tablet Take 1 tablet (100 mg total) by mouth once daily. 90 tablet 3    [DISCONTINUED] nebivoloL (BYSTOLIC) 10 MG Tab Take 1 tablet (10 mg total) by mouth once daily. 90 tablet 1    [DISCONTINUED] pravastatin (PRAVACHOL) 20 MG tablet TAKE 1 TABLET(20 MG) BY MOUTH EVERY EVENING 90 tablet 3    [DISCONTINUED] sertraline (ZOLOFT) 50 MG tablet Take 1 tablet (50 mg total) by mouth once daily. 90 tablet 1    [DISCONTINUED] tiZANidine (ZANAFLEX) 2 MG tablet TAKE 1 TABLET(2 MG) BY MOUTH EVERY 8 HOURS AS NEEDED 90 tablet 1    LIDOcaine (LIDODERM) 5 % Place 1 patch onto the skin once daily. Remove & Discard patch within 12 hours or as directed by MD (Patient not taking: Reported on 3/26/2025) 30 patch 0    traMADoL (ULTRAM) 50 mg tablet TAKE 1 TABLET BY MOUTH EVERY 6 HOURS AS NEEDED FOR PAIN. (Patient not taking: Reported on 3/26/2025) 28 tablet 0     No current facility-administered medications on file prior to visit.

## 2025-04-25 ENCOUNTER — RESULTS FOLLOW-UP (OUTPATIENT)
Dept: INTERNAL MEDICINE | Facility: CLINIC | Age: 80
End: 2025-04-25

## 2025-04-30 ENCOUNTER — PATIENT MESSAGE (OUTPATIENT)
Dept: INTERNAL MEDICINE | Facility: CLINIC | Age: 80
End: 2025-04-30
Payer: MEDICARE

## 2025-05-26 ENCOUNTER — PATIENT MESSAGE (OUTPATIENT)
Dept: INTERNAL MEDICINE | Facility: CLINIC | Age: 80
End: 2025-05-26
Payer: MEDICARE

## 2025-05-26 DIAGNOSIS — M17.0 PRIMARY OSTEOARTHRITIS OF BOTH KNEES: ICD-10-CM

## 2025-05-26 DIAGNOSIS — Z00.00 ENCOUNTER FOR MEDICARE ANNUAL WELLNESS EXAM: ICD-10-CM

## 2025-05-27 RX ORDER — DICLOFENAC SODIUM 10 MG/G
GEL TOPICAL
Qty: 100 G | Refills: 2 | Status: SHIPPED | OUTPATIENT
Start: 2025-05-27

## 2025-05-27 NOTE — TELEPHONE ENCOUNTER
No care due was identified.  [facility] Embedded Care Due Messages. Reference number: [id number].   5/27/2025 12:41:24 PM CDT

## 2025-05-31 ENCOUNTER — PATIENT MESSAGE (OUTPATIENT)
Dept: INTERNAL MEDICINE | Facility: CLINIC | Age: 80
End: 2025-05-31
Payer: MEDICARE

## 2025-05-31 DIAGNOSIS — M17.0 PRIMARY OSTEOARTHRITIS OF BOTH KNEES: ICD-10-CM

## 2025-06-06 RX ORDER — DICLOFENAC SODIUM 10 MG/G
GEL TOPICAL
Qty: 100 G | Refills: 2 | Status: SHIPPED | OUTPATIENT
Start: 2025-06-06

## 2025-06-13 ENCOUNTER — PATIENT MESSAGE (OUTPATIENT)
Dept: INTERNAL MEDICINE | Facility: CLINIC | Age: 80
End: 2025-06-13
Payer: MEDICARE

## 2025-06-13 DIAGNOSIS — E78.5 HYPERLIPIDEMIA, UNSPECIFIED HYPERLIPIDEMIA TYPE: Chronic | ICD-10-CM

## 2025-06-13 RX ORDER — PRAVASTATIN SODIUM 20 MG/1
20 TABLET ORAL NIGHTLY
Qty: 90 TABLET | Refills: 3 | OUTPATIENT
Start: 2025-06-13

## 2025-06-13 NOTE — TELEPHONE ENCOUNTER
Contacted pt, no answer lvm. Pt has prescription refills pending. Pt needs to refer to pharmacy for refills.

## 2025-06-13 NOTE — TELEPHONE ENCOUNTER
LOV 03/26/25  LRF   pravastatin (PRAVACHOL) 20 MG tablet 90 tablet 3 3/26/2025 -- No   Sig - Route: Take 1 tablet (20 mg total) by mouth every evening. - Oral   Sent to pharmacy as: pravastatin (PRAVACHOL) 20 MG tablet   Class: Normal   Order: [id number]   Date/Time Signed: 3/26/2025 14:26       E-Prescribing Status: Receipt confirmed by pharmacy (3/26/2025  2:26 PM CDT)      Pt has medications pending.

## 2025-06-25 DIAGNOSIS — F41.9 ANXIETY: Primary | ICD-10-CM

## 2025-06-25 RX ORDER — SERTRALINE HYDROCHLORIDE 50 MG/1
50 TABLET, FILM COATED ORAL DAILY
Qty: 90 TABLET | Refills: 1 | Status: SHIPPED | OUTPATIENT
Start: 2025-06-25 | End: 2026-06-25

## 2025-07-14 ENCOUNTER — PATIENT MESSAGE (OUTPATIENT)
Dept: INTERNAL MEDICINE | Facility: CLINIC | Age: 80
End: 2025-07-14
Payer: MEDICARE

## 2025-08-14 ENCOUNTER — OFFICE VISIT (OUTPATIENT)
Dept: URGENT CARE | Facility: CLINIC | Age: 80
End: 2025-08-14
Payer: MEDICARE

## 2025-08-14 ENCOUNTER — HOSPITAL ENCOUNTER (INPATIENT)
Facility: HOSPITAL | Age: 80
LOS: 2 days | Discharge: HOME | DRG: 322 | End: 2025-08-16
Attending: EMERGENCY MEDICINE | Admitting: HOSPITALIST
Payer: MEDICARE

## 2025-08-14 ENCOUNTER — PATIENT OUTREACH (OUTPATIENT)
Facility: OTHER | Age: 80
End: 2025-08-14
Payer: MEDICARE

## 2025-08-14 ENCOUNTER — OCHSNER VIRTUAL EMERGENCY DEPARTMENT (OUTPATIENT)
Facility: CLINIC | Age: 80
End: 2025-08-14
Payer: MEDICARE

## 2025-08-14 VITALS
RESPIRATION RATE: 14 BRPM | DIASTOLIC BLOOD PRESSURE: 103 MMHG | HEIGHT: 70 IN | WEIGHT: 216.06 LBS | BODY MASS INDEX: 30.93 KG/M2 | SYSTOLIC BLOOD PRESSURE: 193 MMHG | HEART RATE: 105 BPM | OXYGEN SATURATION: 94 % | TEMPERATURE: 98 F

## 2025-08-14 DIAGNOSIS — I20.0 UNSTABLE ANGINA: Primary | ICD-10-CM

## 2025-08-14 DIAGNOSIS — R06.02 SOB (SHORTNESS OF BREATH): ICD-10-CM

## 2025-08-14 DIAGNOSIS — R07.89 CHEST TIGHTNESS: Primary | ICD-10-CM

## 2025-08-14 DIAGNOSIS — R94.31 ABNORMAL EKG: ICD-10-CM

## 2025-08-14 DIAGNOSIS — R07.9 CHEST PAIN: ICD-10-CM

## 2025-08-14 DIAGNOSIS — R07.9 CHEST PAIN, UNSPECIFIED TYPE: Primary | ICD-10-CM

## 2025-08-14 DIAGNOSIS — I45.10 RBBB: ICD-10-CM

## 2025-08-14 PROBLEM — I25.110 CORONARY ARTERY DISEASE INVOLVING NATIVE CORONARY ARTERY OF NATIVE HEART WITH UNSTABLE ANGINA PECTORIS: Status: ACTIVE | Noted: 2023-06-19

## 2025-08-14 LAB
ABSOLUTE EOSINOPHIL (OHS): 0 K/UL
ABSOLUTE MONOCYTE (OHS): 0.88 K/UL (ref 0.3–1)
ABSOLUTE NEUTROPHIL COUNT (OHS): 14.92 K/UL (ref 1.8–7.7)
ALBUMIN SERPL BCP-MCNC: 4.1 G/DL (ref 3.5–5.2)
ALP SERPL-CCNC: 61 UNIT/L (ref 40–150)
ALT SERPL W/O P-5'-P-CCNC: 16 UNIT/L (ref 10–44)
ANION GAP (OHS): 10 MMOL/L (ref 8–16)
AST SERPL-CCNC: 26 UNIT/L (ref 11–45)
BASOPHILS # BLD AUTO: 0.02 K/UL
BASOPHILS NFR BLD AUTO: 0.1 %
BILIRUB SERPL-MCNC: 1.3 MG/DL (ref 0.1–1)
BUN SERPL-MCNC: 23 MG/DL (ref 8–23)
CALCIUM SERPL-MCNC: 9.5 MG/DL (ref 8.7–10.5)
CHLORIDE SERPL-SCNC: 102 MMOL/L (ref 95–110)
CO2 SERPL-SCNC: 23 MMOL/L (ref 23–29)
CREAT SERPL-MCNC: 0.9 MG/DL (ref 0.5–1.4)
ERYTHROCYTE [DISTWIDTH] IN BLOOD BY AUTOMATED COUNT: 12.9 % (ref 11.5–14.5)
GFR SERPLBLD CREATININE-BSD FMLA CKD-EPI: >60 ML/MIN/1.73/M2
GLUCOSE SERPL-MCNC: 181 MG/DL (ref 70–110)
HCT VFR BLD AUTO: 40.3 % (ref 40–54)
HGB BLD-MCNC: 13.8 GM/DL (ref 14–18)
HOLD SPECIMEN: NORMAL
IMM GRANULOCYTES # BLD AUTO: 0.12 K/UL (ref 0–0.04)
IMM GRANULOCYTES NFR BLD AUTO: 0.7 % (ref 0–0.5)
LYMPHOCYTES # BLD AUTO: 0.6 K/UL (ref 1–4.8)
MCH RBC QN AUTO: 29.8 PG (ref 27–31)
MCHC RBC AUTO-ENTMCNC: 34.2 G/DL (ref 32–36)
MCV RBC AUTO: 87 FL (ref 82–98)
NUCLEATED RBC (/100WBC) (OHS): 0 /100 WBC
PLATELET # BLD AUTO: 335 K/UL (ref 150–450)
PMV BLD AUTO: 9.4 FL (ref 9.2–12.9)
POC ACTIVATED CLOTTING TIME K: 268 SEC (ref 74–137)
POCT GLUCOSE: 124 MG/DL (ref 70–110)
POTASSIUM SERPL-SCNC: 3.9 MMOL/L (ref 3.5–5.1)
PROT SERPL-MCNC: 7 GM/DL (ref 6–8.4)
RBC # BLD AUTO: 4.63 M/UL (ref 4.6–6.2)
RELATIVE EOSINOPHIL (OHS): 0 %
RELATIVE LYMPHOCYTE (OHS): 3.6 % (ref 18–48)
RELATIVE MONOCYTE (OHS): 5.3 % (ref 4–15)
RELATIVE NEUTROPHIL (OHS): 90.3 % (ref 38–73)
SAMPLE: ABNORMAL
SODIUM SERPL-SCNC: 135 MMOL/L (ref 136–145)
TROPONIN I SERPL HS-MCNC: 21 NG/L
TROPONIN I SERPL HS-MCNC: 26 NG/L
TROPONIN I SERPL HS-MCNC: 66 NG/L
WBC # BLD AUTO: 16.54 K/UL (ref 3.9–12.7)

## 2025-08-14 PROCEDURE — C1725 CATH, TRANSLUMIN NON-LASER: HCPCS | Performed by: INTERNAL MEDICINE

## 2025-08-14 PROCEDURE — C1887 CATHETER, GUIDING: HCPCS | Performed by: INTERNAL MEDICINE

## 2025-08-14 PROCEDURE — 36415 COLL VENOUS BLD VENIPUNCTURE: CPT

## 2025-08-14 PROCEDURE — 25500020 PHARM REV CODE 255: Performed by: INTERNAL MEDICINE

## 2025-08-14 PROCEDURE — 11000001 HC ACUTE MED/SURG PRIVATE ROOM

## 2025-08-14 PROCEDURE — 25000003 PHARM REV CODE 250

## 2025-08-14 PROCEDURE — 93005 ELECTROCARDIOGRAM TRACING: CPT

## 2025-08-14 PROCEDURE — 63600175 PHARM REV CODE 636 W HCPCS: Performed by: INTERNAL MEDICINE

## 2025-08-14 PROCEDURE — 84484 ASSAY OF TROPONIN QUANT: CPT

## 2025-08-14 PROCEDURE — 4A023N7 MEASUREMENT OF CARDIAC SAMPLING AND PRESSURE, LEFT HEART, PERCUTANEOUS APPROACH: ICD-10-PCS | Performed by: INTERNAL MEDICINE

## 2025-08-14 PROCEDURE — 94761 N-INVAS EAR/PLS OXIMETRY MLT: CPT

## 2025-08-14 PROCEDURE — C1894 INTRO/SHEATH, NON-LASER: HCPCS | Performed by: INTERNAL MEDICINE

## 2025-08-14 PROCEDURE — C1753 CATH, INTRAVAS ULTRASOUND: HCPCS | Performed by: INTERNAL MEDICINE

## 2025-08-14 PROCEDURE — B211YZZ FLUOROSCOPY OF MULTIPLE CORONARY ARTERIES USING OTHER CONTRAST: ICD-10-PCS | Performed by: INTERNAL MEDICINE

## 2025-08-14 PROCEDURE — 82040 ASSAY OF SERUM ALBUMIN: CPT | Performed by: EMERGENCY MEDICINE

## 2025-08-14 PROCEDURE — 27201423 OPTIME MED/SURG SUP & DEVICES STERILE SUPPLY: Performed by: INTERNAL MEDICINE

## 2025-08-14 PROCEDURE — 99153 MOD SED SAME PHYS/QHP EA: CPT | Performed by: INTERNAL MEDICINE

## 2025-08-14 PROCEDURE — 85347 COAGULATION TIME ACTIVATED: CPT | Performed by: INTERNAL MEDICINE

## 2025-08-14 PROCEDURE — 027034Z DILATION OF CORONARY ARTERY, ONE ARTERY WITH DRUG-ELUTING INTRALUMINAL DEVICE, PERCUTANEOUS APPROACH: ICD-10-PCS | Performed by: INTERNAL MEDICINE

## 2025-08-14 PROCEDURE — 84484 ASSAY OF TROPONIN QUANT: CPT | Performed by: EMERGENCY MEDICINE

## 2025-08-14 PROCEDURE — 25000003 PHARM REV CODE 250: Performed by: INTERNAL MEDICINE

## 2025-08-14 PROCEDURE — 99152 MOD SED SAME PHYS/QHP 5/>YRS: CPT | Performed by: INTERNAL MEDICINE

## 2025-08-14 PROCEDURE — 99285 EMERGENCY DEPT VISIT HI MDM: CPT | Mod: 25

## 2025-08-14 PROCEDURE — C1876 STENT, NON-COA/NON-COV W/DEL: HCPCS | Performed by: INTERNAL MEDICINE

## 2025-08-14 PROCEDURE — B240ZZ3 ULTRASONOGRAPHY OF SINGLE CORONARY ARTERY, INTRAVASCULAR: ICD-10-PCS | Performed by: INTERNAL MEDICINE

## 2025-08-14 PROCEDURE — C1769 GUIDE WIRE: HCPCS | Performed by: INTERNAL MEDICINE

## 2025-08-14 PROCEDURE — 99223 1ST HOSP IP/OBS HIGH 75: CPT | Mod: ,,, | Performed by: NURSE PRACTITIONER

## 2025-08-14 PROCEDURE — 85025 COMPLETE CBC W/AUTO DIFF WBC: CPT | Performed by: EMERGENCY MEDICINE

## 2025-08-14 DEVICE — IMPLANTABLE DEVICE: Type: IMPLANTABLE DEVICE | Site: HEART | Status: FUNCTIONAL

## 2025-08-14 RX ORDER — TALC
6 POWDER (GRAM) TOPICAL NIGHTLY PRN
Status: DISCONTINUED | OUTPATIENT
Start: 2025-08-14 | End: 2025-08-16 | Stop reason: HOSPADM

## 2025-08-14 RX ORDER — ACETAMINOPHEN 500 MG
1000 TABLET ORAL EVERY 8 HOURS
Status: DISCONTINUED | OUTPATIENT
Start: 2025-08-14 | End: 2025-08-14

## 2025-08-14 RX ORDER — CLOPIDOGREL BISULFATE 300 MG/1
TABLET, FILM COATED ORAL
Status: DISCONTINUED | OUTPATIENT
Start: 2025-08-14 | End: 2025-08-14 | Stop reason: HOSPADM

## 2025-08-14 RX ORDER — ASPIRIN 81 MG/1
81 TABLET ORAL DAILY
Status: DISCONTINUED | OUTPATIENT
Start: 2025-08-15 | End: 2025-08-16 | Stop reason: HOSPADM

## 2025-08-14 RX ORDER — GABAPENTIN 300 MG/1
300 CAPSULE ORAL 3 TIMES DAILY
Status: DISCONTINUED | OUTPATIENT
Start: 2025-08-14 | End: 2025-08-16 | Stop reason: HOSPADM

## 2025-08-14 RX ORDER — ASPIRIN 325 MG
325 TABLET ORAL ONCE
Status: DISCONTINUED | OUTPATIENT
Start: 2025-08-14 | End: 2025-08-14 | Stop reason: HOSPADM

## 2025-08-14 RX ORDER — IODIXANOL 320 MG/ML
INJECTION, SOLUTION INTRAVASCULAR
Status: DISCONTINUED | OUTPATIENT
Start: 2025-08-14 | End: 2025-08-14 | Stop reason: HOSPADM

## 2025-08-14 RX ORDER — NALOXONE HCL 0.4 MG/ML
0.02 VIAL (ML) INJECTION
Status: DISCONTINUED | OUTPATIENT
Start: 2025-08-14 | End: 2025-08-16 | Stop reason: HOSPADM

## 2025-08-14 RX ORDER — SODIUM CHLORIDE 9 MG/ML
INJECTION, SOLUTION INTRAVENOUS CONTINUOUS
Status: ACTIVE | OUTPATIENT
Start: 2025-08-14 | End: 2025-08-14

## 2025-08-14 RX ORDER — GLUCAGON 1 MG
1 KIT INJECTION
Status: DISCONTINUED | OUTPATIENT
Start: 2025-08-14 | End: 2025-08-16 | Stop reason: HOSPADM

## 2025-08-14 RX ORDER — NICARDIPINE HYDROCHLORIDE 2.5 MG/ML
INJECTION INTRAVENOUS
Status: DISCONTINUED | OUTPATIENT
Start: 2025-08-14 | End: 2025-08-14 | Stop reason: HOSPADM

## 2025-08-14 RX ORDER — METOPROLOL SUCCINATE 50 MG/1
100 TABLET, EXTENDED RELEASE ORAL DAILY
Status: DISCONTINUED | OUTPATIENT
Start: 2025-08-15 | End: 2025-08-16 | Stop reason: HOSPADM

## 2025-08-14 RX ORDER — MIDAZOLAM HYDROCHLORIDE 1 MG/ML
INJECTION INTRAMUSCULAR; INTRAVENOUS
Status: DISCONTINUED | OUTPATIENT
Start: 2025-08-14 | End: 2025-08-14 | Stop reason: HOSPADM

## 2025-08-14 RX ORDER — LEVOTHYROXINE SODIUM 25 UG/1
25 TABLET ORAL
Status: DISCONTINUED | OUTPATIENT
Start: 2025-08-15 | End: 2025-08-16 | Stop reason: HOSPADM

## 2025-08-14 RX ORDER — LABETALOL HYDROCHLORIDE 5 MG/ML
INJECTION, SOLUTION INTRAVENOUS
Status: DISCONTINUED | OUTPATIENT
Start: 2025-08-14 | End: 2025-08-14 | Stop reason: HOSPADM

## 2025-08-14 RX ORDER — HEPARIN SODIUM 200 [USP'U]/100ML
INJECTION, SOLUTION INTRAVENOUS
Status: DISCONTINUED | OUTPATIENT
Start: 2025-08-14 | End: 2025-08-16 | Stop reason: HOSPADM

## 2025-08-14 RX ORDER — SODIUM CHLORIDE 0.9 % (FLUSH) 0.9 %
5 SYRINGE (ML) INJECTION
Status: DISCONTINUED | OUTPATIENT
Start: 2025-08-14 | End: 2025-08-16 | Stop reason: HOSPADM

## 2025-08-14 RX ORDER — VERAPAMIL HYDROCHLORIDE 2.5 MG/ML
INJECTION INTRAVENOUS
Status: DISCONTINUED | OUTPATIENT
Start: 2025-08-14 | End: 2025-08-14 | Stop reason: HOSPADM

## 2025-08-14 RX ORDER — CLOPIDOGREL BISULFATE 75 MG/1
75 TABLET ORAL DAILY
Status: DISCONTINUED | OUTPATIENT
Start: 2025-08-15 | End: 2025-08-16 | Stop reason: HOSPADM

## 2025-08-14 RX ORDER — ONDANSETRON HYDROCHLORIDE 2 MG/ML
4 INJECTION, SOLUTION INTRAVENOUS EVERY 8 HOURS PRN
Status: DISCONTINUED | OUTPATIENT
Start: 2025-08-14 | End: 2025-08-16 | Stop reason: HOSPADM

## 2025-08-14 RX ORDER — LIDOCAINE HYDROCHLORIDE 10 MG/ML
INJECTION, SOLUTION INFILTRATION; PERINEURAL
Status: DISCONTINUED | OUTPATIENT
Start: 2025-08-14 | End: 2025-08-14 | Stop reason: HOSPADM

## 2025-08-14 RX ORDER — LOSARTAN POTASSIUM 50 MG/1
100 TABLET ORAL DAILY
Status: DISCONTINUED | OUTPATIENT
Start: 2025-08-15 | End: 2025-08-16 | Stop reason: HOSPADM

## 2025-08-14 RX ORDER — FENTANYL CITRATE 50 UG/ML
INJECTION, SOLUTION INTRAMUSCULAR; INTRAVENOUS
Status: DISCONTINUED | OUTPATIENT
Start: 2025-08-14 | End: 2025-08-14 | Stop reason: HOSPADM

## 2025-08-14 RX ORDER — ACETAMINOPHEN 325 MG/1
650 TABLET ORAL EVERY 4 HOURS PRN
Status: DISCONTINUED | OUTPATIENT
Start: 2025-08-14 | End: 2025-08-16 | Stop reason: HOSPADM

## 2025-08-14 RX ORDER — HYDRALAZINE HYDROCHLORIDE 25 MG/1
50 TABLET, FILM COATED ORAL EVERY 8 HOURS
Status: DISCONTINUED | OUTPATIENT
Start: 2025-08-14 | End: 2025-08-16 | Stop reason: HOSPADM

## 2025-08-14 RX ORDER — IBUPROFEN 200 MG
16 TABLET ORAL
Status: DISCONTINUED | OUTPATIENT
Start: 2025-08-14 | End: 2025-08-16 | Stop reason: HOSPADM

## 2025-08-14 RX ORDER — AMOXICILLIN 250 MG
1 CAPSULE ORAL 2 TIMES DAILY PRN
Status: DISCONTINUED | OUTPATIENT
Start: 2025-08-14 | End: 2025-08-16 | Stop reason: HOSPADM

## 2025-08-14 RX ORDER — HEPARIN SODIUM 1000 [USP'U]/ML
INJECTION, SOLUTION INTRAVENOUS; SUBCUTANEOUS
Status: DISCONTINUED | OUTPATIENT
Start: 2025-08-14 | End: 2025-08-14 | Stop reason: HOSPADM

## 2025-08-14 RX ADMIN — Medication 325 MG: at 10:08

## 2025-08-14 RX ADMIN — Medication 6 MG: at 11:08

## 2025-08-14 RX ADMIN — GABAPENTIN 300 MG: 300 CAPSULE ORAL at 09:08

## 2025-08-14 RX ADMIN — HYDRALAZINE HYDROCHLORIDE 50 MG: 25 TABLET ORAL at 09:08

## 2025-08-15 ENCOUNTER — TELEPHONE (OUTPATIENT)
Dept: CARDIOLOGY | Facility: CLINIC | Age: 80
End: 2025-08-15
Payer: MEDICARE

## 2025-08-15 VITALS
HEIGHT: 70 IN | RESPIRATION RATE: 17 BRPM | SYSTOLIC BLOOD PRESSURE: 138 MMHG | TEMPERATURE: 98 F | WEIGHT: 210 LBS | BODY MASS INDEX: 30.06 KG/M2 | DIASTOLIC BLOOD PRESSURE: 73 MMHG | HEART RATE: 58 BPM | OXYGEN SATURATION: 95 %

## 2025-08-15 LAB
ABSOLUTE EOSINOPHIL (OHS): 0.02 K/UL
ABSOLUTE MONOCYTE (OHS): 1.23 K/UL (ref 0.3–1)
ABSOLUTE NEUTROPHIL COUNT (OHS): 13.29 K/UL (ref 1.8–7.7)
ALBUMIN SERPL BCP-MCNC: 4.3 G/DL (ref 3.5–5.2)
ALP SERPL-CCNC: 61 UNIT/L (ref 40–150)
ALT SERPL W/O P-5'-P-CCNC: 18 UNIT/L (ref 10–44)
ANION GAP (OHS): 9 MMOL/L (ref 8–16)
AORTIC SIZE INDEX (SOV): 1.8 CM/M2
AORTIC SIZE INDEX: 1.4 CM/M2
APICAL FOUR CHAMBER EJECTION FRACTION: 80 %
APICAL TWO CHAMBER EJECTION FRACTION: 69 %
ASCENDING AORTA: 3 CM
AST SERPL-CCNC: 28 UNIT/L (ref 11–45)
AV INDEX (PROSTH): 0.44
AV MEAN GRADIENT: 22 MMHG
AV PEAK GRADIENT: 44 MMHG
AV VALVE AREA BY VELOCITY RATIO: 1.2 CM²
AV VALVE AREA: 1.5 CM²
AV VELOCITY RATIO: 0.33
BASOPHILS # BLD AUTO: 0.05 K/UL
BASOPHILS NFR BLD AUTO: 0.3 %
BILIRUB SERPL-MCNC: 1.4 MG/DL (ref 0.1–1)
BSA FOR ECHO PROCEDURE: 2.17 M2
BUN SERPL-MCNC: 19 MG/DL (ref 8–23)
CALCIUM SERPL-MCNC: 9.9 MG/DL (ref 8.7–10.5)
CHLORIDE SERPL-SCNC: 105 MMOL/L (ref 95–110)
CHOLEST SERPL-MCNC: 186 MG/DL (ref 120–199)
CHOLEST/HDLC SERPL: 4.1 {RATIO} (ref 2–5)
CO2 SERPL-SCNC: 26 MMOL/L (ref 23–29)
CREAT SERPL-MCNC: 0.9 MG/DL (ref 0.5–1.4)
CV ECHO LV RWT: 0.43 CM
DOP CALC AO PEAK VEL: 3.3 M/S
DOP CALC AO VTI: 66.5 CM
DOP CALC LVOT AREA: 3.5 CM2
DOP CALC LVOT DIAMETER: 2.1 CM
DOP CALC LVOT PEAK VEL: 1.1 M/S
DOP CALC MV VTI: 23.9 CM
DOP CALCLVOT PEAK VEL VTI: 29.4 CM
E WAVE DECELERATION TIME: 206 MSEC
E/A RATIO: 1.17
E/E' RATIO: 12 M/S
ECHO LV POSTERIOR WALL: 1 CM (ref 0.6–1.1)
ERYTHROCYTE [DISTWIDTH] IN BLOOD BY AUTOMATED COUNT: 13.2 % (ref 11.5–14.5)
FRACTIONAL SHORTENING: 44.7 % (ref 28–44)
GFR SERPLBLD CREATININE-BSD FMLA CKD-EPI: >60 ML/MIN/1.73/M2
GLUCOSE SERPL-MCNC: 85 MG/DL (ref 70–110)
HCT VFR BLD AUTO: 44.5 % (ref 40–54)
HDLC SERPL-MCNC: 45 MG/DL (ref 40–75)
HDLC SERPL: 24.2 % (ref 20–50)
HGB BLD-MCNC: 15 GM/DL (ref 14–18)
IMM GRANULOCYTES # BLD AUTO: 0.11 K/UL (ref 0–0.04)
IMM GRANULOCYTES NFR BLD AUTO: 0.7 % (ref 0–0.5)
INTERVENTRICULAR SEPTUM: 1.4 CM (ref 0.6–1.1)
IVC DIAMETER: 1.46 CM
LDLC SERPL CALC-MCNC: 117.6 MG/DL (ref 63–159)
LEFT ATRIUM AREA SYSTOLIC (APICAL 2 CHAMBER): 25.58 CM2
LEFT ATRIUM AREA SYSTOLIC (APICAL 4 CHAMBER): 16.54 CM2
LEFT ATRIUM VOLUME INDEX MOD: 33 ML/M2
LEFT ATRIUM VOLUME MOD: 71 ML
LEFT INTERNAL DIMENSION IN SYSTOLE: 2.6 CM (ref 2.1–4)
LEFT VENTRICLE DIASTOLIC VOLUME INDEX: 47.42 ML/M2
LEFT VENTRICLE DIASTOLIC VOLUME: 101 ML
LEFT VENTRICLE END DIASTOLIC VOLUME APICAL 2 CHAMBER: 56.81 ML
LEFT VENTRICLE END DIASTOLIC VOLUME APICAL 4 CHAMBER INDEX BSA: 36.12 ML/M2
LEFT VENTRICLE END DIASTOLIC VOLUME APICAL 4 CHAMBER: 76.93 ML
LEFT VENTRICLE END SYSTOLIC VOLUME APICAL 2 CHAMBER: 87.66 ML
LEFT VENTRICLE END SYSTOLIC VOLUME APICAL 4 CHAMBER: 45.77 ML
LEFT VENTRICLE MASS INDEX: 99.5 G/M2
LEFT VENTRICLE SYSTOLIC VOLUME INDEX: 11.7 ML/M2
LEFT VENTRICLE SYSTOLIC VOLUME: 25 ML
LEFT VENTRICULAR INTERNAL DIMENSION IN DIASTOLE: 4.7 CM (ref 3.5–6)
LEFT VENTRICULAR MASS: 212 G
LV LATERAL E/E' RATIO: 12.5 M/S
LV SEPTAL E/E' RATIO: 10.7 M/S
LVED V (TEICH): 100.81 ML
LVES V (TEICH): 25.41 ML
LVOT MG: 2.53 MMHG
LVOT MV: 0.74 CM/S
LYMPHOCYTES # BLD AUTO: 1.47 K/UL (ref 1–4.8)
Lab: 1.7 CM/M
Lab: 2.1 CM/M
MAGNESIUM SERPL-MCNC: 1.9 MG/DL (ref 1.6–2.6)
MCH RBC QN AUTO: 29.9 PG (ref 27–31)
MCHC RBC AUTO-ENTMCNC: 33.7 G/DL (ref 32–36)
MCV RBC AUTO: 89 FL (ref 82–98)
MV MEAN GRADIENT: 1 MMHG
MV PEAK A VEL: 0.64 M/S
MV PEAK E VEL: 0.75 M/S
MV PEAK GRADIENT: 2 MMHG
MV VALVE AREA BY CONTINUITY EQUATION: 4.26 CM2
NONHDLC SERPL-MCNC: 141 MG/DL
NUCLEATED RBC (/100WBC) (OHS): 0 /100 WBC
OHS CV CPX PATIENT HEIGHT IN: 70
OHS CV RV/LV RATIO: 1.02 CM
OHS LV EJECTION FRACTION SIMPSONS BIPLANE MOD: 76 %
OHS QRS DURATION: 144 MS
OHS QRS DURATION: 146 MS
OHS QRS DURATION: 148 MS
OHS QTC CALCULATION: 486 MS
OHS QTC CALCULATION: 503 MS
OHS QTC CALCULATION: 510 MS
PHOSPHATE SERPL-MCNC: 3.1 MG/DL (ref 2.7–4.5)
PISA TR MAX VEL: 3.2 M/S
PLATELET # BLD AUTO: 370 K/UL (ref 150–450)
PMV BLD AUTO: 9.7 FL (ref 9.2–12.9)
POCT GLUCOSE: 148 MG/DL (ref 70–110)
POCT GLUCOSE: 85 MG/DL (ref 70–110)
POTASSIUM SERPL-SCNC: 4.2 MMOL/L (ref 3.5–5.1)
PROT SERPL-MCNC: 7.3 GM/DL (ref 6–8.4)
PV PEAK GRADIENT: 5 MMHG
PV PEAK VELOCITY: 1.09 M/S
RA PRESSURE ESTIMATED: 3 MMHG
RA VOL SYS: 73.39 ML
RBC # BLD AUTO: 5.01 M/UL (ref 4.6–6.2)
RELATIVE EOSINOPHIL (OHS): 0.1 %
RELATIVE LYMPHOCYTE (OHS): 9.1 % (ref 18–48)
RELATIVE MONOCYTE (OHS): 7.6 % (ref 4–15)
RELATIVE NEUTROPHIL (OHS): 82.2 % (ref 38–73)
RIGHT ATRIAL AREA: 18 CM2
RIGHT ATRIUM END SYSTOLIC VOLUME APICAL 4 CHAMBER INDEX BSA: 29.03 ML/M2
RIGHT ATRIUM VOLUME AREA LENGTH APICAL 4 CHAMBER: 61.83 ML
RIGHT VENTRICLE DIASTOLIC BASEL DIMENSION: 4.8 CM
RV TB RVSP: 6 MMHG
RV TISSUE DOPPLER FREE WALL SYSTOLIC VELOCITY 1 (APICAL 4 CHAMBER VIEW): 18.35 CM/S
SINUS: 3.8 CM
SODIUM SERPL-SCNC: 140 MMOL/L (ref 136–145)
STJ: 3.1 CM
TDI LATERAL: 0.06 M/S
TDI SEPTAL: 0.07 M/S
TDI: 0.07 M/S
TR MAX PG: 41 MMHG
TRICUSPID ANNULAR PLANE SYSTOLIC EXCURSION: 1.9 CM
TRIGL SERPL-MCNC: 117 MG/DL (ref 30–150)
TV REST PULMONARY ARTERY PRESSURE: 44 MMHG
WBC # BLD AUTO: 16.17 K/UL (ref 3.9–12.7)
Z-SCORE OF LEFT VENTRICULAR DIMENSION IN END DIASTOLE: -3.68
Z-SCORE OF LEFT VENTRICULAR DIMENSION IN END SYSTOLE: -3.68

## 2025-08-15 PROCEDURE — 84100 ASSAY OF PHOSPHORUS: CPT

## 2025-08-15 PROCEDURE — 99233 SBSQ HOSP IP/OBS HIGH 50: CPT | Mod: 25,,, | Performed by: NURSE PRACTITIONER

## 2025-08-15 PROCEDURE — 36415 COLL VENOUS BLD VENIPUNCTURE: CPT | Performed by: NURSE PRACTITIONER

## 2025-08-15 PROCEDURE — 36415 COLL VENOUS BLD VENIPUNCTURE: CPT

## 2025-08-15 PROCEDURE — 25000003 PHARM REV CODE 250: Performed by: NURSE PRACTITIONER

## 2025-08-15 PROCEDURE — 25500020 PHARM REV CODE 255: Performed by: INTERNAL MEDICINE

## 2025-08-15 PROCEDURE — 25000003 PHARM REV CODE 250: Performed by: INTERNAL MEDICINE

## 2025-08-15 PROCEDURE — 94761 N-INVAS EAR/PLS OXIMETRY MLT: CPT

## 2025-08-15 PROCEDURE — 82465 ASSAY BLD/SERUM CHOLESTEROL: CPT | Performed by: NURSE PRACTITIONER

## 2025-08-15 PROCEDURE — 83735 ASSAY OF MAGNESIUM: CPT

## 2025-08-15 PROCEDURE — 82040 ASSAY OF SERUM ALBUMIN: CPT

## 2025-08-15 PROCEDURE — 25000003 PHARM REV CODE 250

## 2025-08-15 PROCEDURE — 85025 COMPLETE CBC W/AUTO DIFF WBC: CPT

## 2025-08-15 PROCEDURE — 21400001 HC TELEMETRY ROOM

## 2025-08-15 PROCEDURE — 99900035 HC TECH TIME PER 15 MIN (STAT)

## 2025-08-15 RX ORDER — PRAVASTATIN SODIUM 20 MG/1
20 TABLET ORAL NIGHTLY
Status: DISCONTINUED | OUTPATIENT
Start: 2025-08-15 | End: 2025-08-16 | Stop reason: HOSPADM

## 2025-08-15 RX ORDER — EZETIMIBE 10 MG/1
10 TABLET ORAL NIGHTLY
Status: DISCONTINUED | OUTPATIENT
Start: 2025-08-15 | End: 2025-08-16 | Stop reason: HOSPADM

## 2025-08-15 RX ORDER — NEBIVOLOL 10 MG/1
10 TABLET ORAL DAILY
Qty: 30 TABLET | Refills: 0 | Status: SHIPPED | OUTPATIENT
Start: 2025-08-15 | End: 2025-08-18 | Stop reason: ALTCHOICE

## 2025-08-15 RX ORDER — SPIRONOLACTONE 25 MG/1
25 TABLET ORAL DAILY
Status: DISCONTINUED | OUTPATIENT
Start: 2025-08-15 | End: 2025-08-16 | Stop reason: HOSPADM

## 2025-08-15 RX ORDER — EZETIMIBE 10 MG/1
10 TABLET ORAL NIGHTLY
Qty: 30 TABLET | Refills: 0 | Status: SHIPPED | OUTPATIENT
Start: 2025-08-15 | End: 2025-08-15 | Stop reason: HOSPADM

## 2025-08-15 RX ORDER — CLOPIDOGREL BISULFATE 75 MG/1
75 TABLET ORAL DAILY
Qty: 30 TABLET | Refills: 0 | Status: SHIPPED | OUTPATIENT
Start: 2025-08-16 | End: 2025-08-15

## 2025-08-15 RX ORDER — METOPROLOL SUCCINATE 100 MG/1
100 TABLET, EXTENDED RELEASE ORAL DAILY
Qty: 30 TABLET | Refills: 0 | Status: SHIPPED | OUTPATIENT
Start: 2025-08-16 | End: 2025-08-15 | Stop reason: HOSPADM

## 2025-08-15 RX ORDER — SPIRONOLACTONE 25 MG/1
25 TABLET ORAL DAILY
Qty: 30 TABLET | Refills: 0 | Status: SHIPPED | OUTPATIENT
Start: 2025-08-15 | End: 2025-08-15

## 2025-08-15 RX ORDER — ASPIRIN 81 MG/1
81 TABLET ORAL DAILY
Qty: 30 TABLET | Refills: 0 | Status: SHIPPED | OUTPATIENT
Start: 2025-08-16 | End: 2025-09-15

## 2025-08-15 RX ORDER — ASPIRIN 81 MG/1
81 TABLET ORAL DAILY
Qty: 30 TABLET | Refills: 0 | Status: SHIPPED | OUTPATIENT
Start: 2025-08-16 | End: 2025-08-15

## 2025-08-15 RX ORDER — CLOPIDOGREL BISULFATE 75 MG/1
75 TABLET ORAL DAILY
Qty: 30 TABLET | Refills: 0 | Status: SHIPPED | OUTPATIENT
Start: 2025-08-16 | End: 2025-09-15

## 2025-08-15 RX ORDER — LEVOTHYROXINE SODIUM 25 UG/1
25 TABLET ORAL
Qty: 30 TABLET | Refills: 11 | Status: SHIPPED | OUTPATIENT
Start: 2025-08-16 | End: 2026-08-16

## 2025-08-15 RX ORDER — SPIRONOLACTONE 25 MG/1
25 TABLET ORAL DAILY
Qty: 30 TABLET | Refills: 0 | Status: SHIPPED | OUTPATIENT
Start: 2025-08-15 | End: 2025-09-14

## 2025-08-15 RX ADMIN — HUMAN ALBUMIN MICROSPHERES AND PERFLUTREN 0.11 MG: 10; .22 INJECTION, SOLUTION INTRAVENOUS at 10:08

## 2025-08-15 RX ADMIN — ASPIRIN 81 MG: 81 TABLET, COATED ORAL at 08:08

## 2025-08-15 RX ADMIN — LEVOTHYROXINE SODIUM 25 MCG: 0.03 TABLET ORAL at 06:08

## 2025-08-15 RX ADMIN — METOPROLOL SUCCINATE 100 MG: 50 TABLET, EXTENDED RELEASE ORAL at 08:08

## 2025-08-15 RX ADMIN — GABAPENTIN 300 MG: 300 CAPSULE ORAL at 08:08

## 2025-08-15 RX ADMIN — HYDRALAZINE HYDROCHLORIDE 50 MG: 25 TABLET ORAL at 02:08

## 2025-08-15 RX ADMIN — CLOPIDOGREL 75 MG: 75 TABLET ORAL at 08:08

## 2025-08-15 RX ADMIN — SPIRONOLACTONE 25 MG: 25 TABLET, FILM COATED ORAL at 11:08

## 2025-08-15 RX ADMIN — LOSARTAN POTASSIUM 100 MG: 50 TABLET, FILM COATED ORAL at 08:08

## 2025-08-15 RX ADMIN — LOSARTAN POTASSIUM 100 MG: 50 TABLET, FILM COATED ORAL at 12:08

## 2025-08-15 RX ADMIN — HYDRALAZINE HYDROCHLORIDE 50 MG: 25 TABLET ORAL at 06:08

## 2025-08-18 ENCOUNTER — TELEPHONE (OUTPATIENT)
Dept: CARDIOLOGY | Facility: CLINIC | Age: 80
End: 2025-08-18
Payer: MEDICARE

## 2025-08-18 ENCOUNTER — TELEPHONE (OUTPATIENT)
Dept: ADMINISTRATIVE | Facility: CLINIC | Age: 80
End: 2025-08-18
Payer: MEDICARE

## 2025-08-18 ENCOUNTER — TELEPHONE (OUTPATIENT)
Dept: PRIMARY CARE CLINIC | Facility: CLINIC | Age: 80
End: 2025-08-18
Payer: MEDICARE

## 2025-08-18 LAB — OHS CV CPX PATIENT HEIGHT IN: 70

## 2025-08-18 RX ORDER — METOPROLOL SUCCINATE 100 MG/1
100 TABLET, EXTENDED RELEASE ORAL DAILY
Qty: 90 TABLET | Refills: 3 | Status: SHIPPED | OUTPATIENT
Start: 2025-08-18 | End: 2026-08-18

## 2025-08-18 RX ORDER — EZETIMIBE 10 MG/1
10 TABLET ORAL DAILY
Qty: 90 TABLET | Refills: 3 | Status: SHIPPED | OUTPATIENT
Start: 2025-08-18 | End: 2026-08-18

## 2025-08-19 ENCOUNTER — PATIENT OUTREACH (OUTPATIENT)
Dept: ADMINISTRATIVE | Facility: CLINIC | Age: 80
End: 2025-08-19
Payer: MEDICARE

## 2025-08-19 ENCOUNTER — TELEPHONE (OUTPATIENT)
Dept: CARDIOLOGY | Facility: CLINIC | Age: 80
End: 2025-08-19
Payer: MEDICARE

## 2025-08-21 ENCOUNTER — TELEPHONE (OUTPATIENT)
Dept: ADMINISTRATIVE | Facility: CLINIC | Age: 80
End: 2025-08-21
Payer: MEDICARE

## 2025-08-21 ENCOUNTER — RESULTS FOLLOW-UP (OUTPATIENT)
Dept: INTERNAL MEDICINE | Facility: CLINIC | Age: 80
End: 2025-08-21
Payer: MEDICARE

## 2025-08-23 ENCOUNTER — TELEPHONE (OUTPATIENT)
Dept: ADMINISTRATIVE | Facility: CLINIC | Age: 80
End: 2025-08-23
Payer: MEDICARE

## 2025-08-27 ENCOUNTER — OFFICE VISIT (OUTPATIENT)
Dept: PRIMARY CARE CLINIC | Facility: CLINIC | Age: 80
End: 2025-08-27
Payer: MEDICARE

## 2025-08-27 VITALS
DIASTOLIC BLOOD PRESSURE: 56 MMHG | BODY MASS INDEX: 29.54 KG/M2 | HEART RATE: 47 BPM | HEIGHT: 70 IN | SYSTOLIC BLOOD PRESSURE: 82 MMHG | OXYGEN SATURATION: 95 % | WEIGHT: 206.38 LBS

## 2025-08-27 DIAGNOSIS — I10 ESSENTIAL HYPERTENSION: Chronic | ICD-10-CM

## 2025-08-27 DIAGNOSIS — R00.1 BRADYCARDIA: ICD-10-CM

## 2025-08-27 DIAGNOSIS — I20.0 UNSTABLE ANGINA: Primary | ICD-10-CM

## 2025-08-27 DIAGNOSIS — E78.5 HYPERLIPIDEMIA, UNSPECIFIED HYPERLIPIDEMIA TYPE: Chronic | ICD-10-CM

## 2025-08-27 LAB
OHS QRS DURATION: 118 MS
OHS QTC CALCULATION: 394 MS

## 2025-08-27 PROCEDURE — 93005 ELECTROCARDIOGRAM TRACING: CPT | Mod: PBBFAC,PO | Performed by: INTERNAL MEDICINE

## 2025-08-27 PROCEDURE — 99213 OFFICE O/P EST LOW 20 MIN: CPT | Mod: PBBFAC,PO | Performed by: INTERNAL MEDICINE

## 2025-08-27 PROCEDURE — 99999 PR PBB SHADOW E&M-EST. PATIENT-LVL III: CPT | Mod: PBBFAC,,, | Performed by: INTERNAL MEDICINE

## 2025-08-27 PROCEDURE — 93010 ELECTROCARDIOGRAM REPORT: CPT | Mod: S$PBB,,, | Performed by: INTERNAL MEDICINE

## 2025-08-27 RX ORDER — LOSARTAN POTASSIUM 100 MG/1
100 TABLET ORAL DAILY
Qty: 90 TABLET | Refills: 3 | Status: SHIPPED | OUTPATIENT
Start: 2025-08-27 | End: 2025-08-29

## 2025-08-27 RX ORDER — CLOPIDOGREL BISULFATE 75 MG/1
75 TABLET ORAL DAILY
Qty: 90 TABLET | Refills: 3 | Status: SHIPPED | OUTPATIENT
Start: 2025-08-27

## 2025-08-27 RX ORDER — METOPROLOL SUCCINATE 100 MG/1
100 TABLET, EXTENDED RELEASE ORAL DAILY
Qty: 90 TABLET | Refills: 3 | Status: SHIPPED | OUTPATIENT
Start: 2025-08-27 | End: 2025-08-27

## 2025-08-27 RX ORDER — ASPIRIN 81 MG/1
81 TABLET ORAL DAILY
Qty: 90 TABLET | Refills: 3 | Status: SHIPPED | OUTPATIENT
Start: 2025-08-27

## 2025-08-27 RX ORDER — METOPROLOL SUCCINATE 50 MG/1
50 TABLET, EXTENDED RELEASE ORAL DAILY
Qty: 90 TABLET | Refills: 3 | Status: SHIPPED | OUTPATIENT
Start: 2025-08-27 | End: 2026-08-27

## 2025-08-27 RX ORDER — LEVOTHYROXINE SODIUM 25 UG/1
25 TABLET ORAL
Qty: 90 TABLET | Refills: 3 | Status: SHIPPED | OUTPATIENT
Start: 2025-08-27 | End: 2026-08-27

## 2025-08-27 RX ORDER — PRAVASTATIN SODIUM 20 MG/1
20 TABLET ORAL NIGHTLY
Qty: 90 TABLET | Refills: 3 | Status: SHIPPED | OUTPATIENT
Start: 2025-08-27

## 2025-08-27 RX ORDER — HYDRALAZINE HYDROCHLORIDE 50 MG/1
50 TABLET, FILM COATED ORAL EVERY 8 HOURS
Qty: 270 TABLET | Refills: 3 | Status: CANCELLED | OUTPATIENT
Start: 2025-08-27

## 2025-08-27 RX ORDER — SPIRONOLACTONE 25 MG/1
25 TABLET ORAL DAILY
Qty: 90 TABLET | Refills: 3 | Status: SHIPPED | OUTPATIENT
Start: 2025-08-27

## 2025-08-29 DIAGNOSIS — I10 ESSENTIAL HYPERTENSION: Chronic | ICD-10-CM

## 2025-08-29 RX ORDER — LOSARTAN POTASSIUM 100 MG/1
50 TABLET ORAL DAILY
Qty: 90 TABLET | Refills: 3 | Status: SHIPPED | OUTPATIENT
Start: 2025-08-29

## 2025-09-02 ENCOUNTER — OFFICE VISIT (OUTPATIENT)
Dept: PRIMARY CARE CLINIC | Facility: CLINIC | Age: 80
End: 2025-09-02
Payer: MEDICARE

## 2025-09-02 ENCOUNTER — LAB VISIT (OUTPATIENT)
Dept: LAB | Facility: HOSPITAL | Age: 80
End: 2025-09-02
Attending: INTERNAL MEDICINE
Payer: MEDICARE

## 2025-09-02 VITALS
WEIGHT: 204.56 LBS | TEMPERATURE: 97 F | OXYGEN SATURATION: 98 % | BODY MASS INDEX: 29.36 KG/M2 | SYSTOLIC BLOOD PRESSURE: 101 MMHG | HEART RATE: 55 BPM | DIASTOLIC BLOOD PRESSURE: 66 MMHG

## 2025-09-02 DIAGNOSIS — I20.0 UNSTABLE ANGINA: Primary | ICD-10-CM

## 2025-09-02 DIAGNOSIS — R00.1 BRADYCARDIA: ICD-10-CM

## 2025-09-02 DIAGNOSIS — R35.1 NOCTURIA: ICD-10-CM

## 2025-09-02 DIAGNOSIS — I10 ESSENTIAL HYPERTENSION: Chronic | ICD-10-CM

## 2025-09-02 DIAGNOSIS — I95.9 HYPOTENSION, UNSPECIFIED HYPOTENSION TYPE: ICD-10-CM

## 2025-09-02 LAB
ABSOLUTE EOSINOPHIL (OHS): 0.27 K/UL
ABSOLUTE MONOCYTE (OHS): 0.92 K/UL (ref 0.3–1)
ABSOLUTE NEUTROPHIL COUNT (OHS): 6.06 K/UL (ref 1.8–7.7)
ALBUMIN SERPL BCP-MCNC: 4.4 G/DL (ref 3.5–5.2)
ALP SERPL-CCNC: 60 UNIT/L (ref 40–150)
ALT SERPL W/O P-5'-P-CCNC: 56 UNIT/L (ref 10–44)
ANION GAP (OHS): 7 MMOL/L (ref 8–16)
AST SERPL-CCNC: 41 UNIT/L (ref 11–45)
BACTERIA #/AREA URNS AUTO: ABNORMAL /HPF
BASOPHILS # BLD AUTO: 0.08 K/UL
BASOPHILS NFR BLD AUTO: 0.9 %
BILIRUB SERPL-MCNC: 1.2 MG/DL (ref 0.1–1)
BILIRUB UR QL STRIP.AUTO: NEGATIVE
BUN SERPL-MCNC: 30 MG/DL (ref 8–23)
CALCIUM SERPL-MCNC: 9.7 MG/DL (ref 8.7–10.5)
CHLORIDE SERPL-SCNC: 105 MMOL/L (ref 95–110)
CLARITY UR: ABNORMAL
CO2 SERPL-SCNC: 23 MMOL/L (ref 23–29)
COLOR UR AUTO: YELLOW
CREAT SERPL-MCNC: 1.1 MG/DL (ref 0.5–1.4)
ERYTHROCYTE [DISTWIDTH] IN BLOOD BY AUTOMATED COUNT: 12.9 % (ref 11.5–14.5)
GFR SERPLBLD CREATININE-BSD FMLA CKD-EPI: >60 ML/MIN/1.73/M2
GLUCOSE SERPL-MCNC: 80 MG/DL (ref 70–110)
GLUCOSE UR QL STRIP: NEGATIVE
HCT VFR BLD AUTO: 44.6 % (ref 40–54)
HGB BLD-MCNC: 14.9 GM/DL (ref 14–18)
HGB UR QL STRIP: NEGATIVE
HYALINE CASTS UR QL AUTO: 0 /LPF (ref 0–1)
IMM GRANULOCYTES # BLD AUTO: 0.04 K/UL (ref 0–0.04)
IMM GRANULOCYTES NFR BLD AUTO: 0.4 % (ref 0–0.5)
KETONES UR QL STRIP: NEGATIVE
LEUKOCYTE ESTERASE UR QL STRIP: ABNORMAL
LYMPHOCYTES # BLD AUTO: 1.68 K/UL (ref 1–4.8)
MCH RBC QN AUTO: 29.5 PG (ref 27–31)
MCHC RBC AUTO-ENTMCNC: 33.4 G/DL (ref 32–36)
MCV RBC AUTO: 88 FL (ref 82–98)
MICROSCOPIC COMMENT: ABNORMAL
NITRITE UR QL STRIP: POSITIVE
NUCLEATED RBC (/100WBC) (OHS): 0 /100 WBC
PH UR STRIP: 5 [PH]
PLATELET # BLD AUTO: 377 K/UL (ref 150–450)
PMV BLD AUTO: 10.4 FL (ref 9.2–12.9)
POTASSIUM SERPL-SCNC: 5.6 MMOL/L (ref 3.5–5.1)
PROT SERPL-MCNC: 7.2 GM/DL (ref 6–8.4)
PROT UR QL STRIP: NEGATIVE
RBC # BLD AUTO: 5.05 M/UL (ref 4.6–6.2)
RBC #/AREA URNS AUTO: 6 /HPF (ref 0–4)
RELATIVE EOSINOPHIL (OHS): 3 %
RELATIVE LYMPHOCYTE (OHS): 18.6 % (ref 18–48)
RELATIVE MONOCYTE (OHS): 10.2 % (ref 4–15)
RELATIVE NEUTROPHIL (OHS): 66.9 % (ref 38–73)
SODIUM SERPL-SCNC: 135 MMOL/L (ref 136–145)
SP GR UR STRIP: 1.02
SQUAMOUS #/AREA URNS AUTO: 11 /HPF
UROBILINOGEN UR STRIP-ACNC: NEGATIVE EU/DL
WBC # BLD AUTO: 9.05 K/UL (ref 3.9–12.7)
WBC #/AREA URNS AUTO: 30 /HPF (ref 0–5)
YEAST UR QL AUTO: ABNORMAL /HPF

## 2025-09-02 PROCEDURE — 99999 PR PBB SHADOW E&M-EST. PATIENT-LVL III: CPT | Mod: PBBFAC,,, | Performed by: INTERNAL MEDICINE

## 2025-09-02 PROCEDURE — 36415 COLL VENOUS BLD VENIPUNCTURE: CPT

## 2025-09-02 PROCEDURE — 87086 URINE CULTURE/COLONY COUNT: CPT

## 2025-09-02 PROCEDURE — 80053 COMPREHEN METABOLIC PANEL: CPT

## 2025-09-02 PROCEDURE — 81003 URINALYSIS AUTO W/O SCOPE: CPT

## 2025-09-02 PROCEDURE — 99214 OFFICE O/P EST MOD 30 MIN: CPT | Mod: S$PBB,,, | Performed by: INTERNAL MEDICINE

## 2025-09-02 PROCEDURE — 99213 OFFICE O/P EST LOW 20 MIN: CPT | Mod: PBBFAC,PO | Performed by: INTERNAL MEDICINE

## 2025-09-02 PROCEDURE — 85025 COMPLETE CBC W/AUTO DIFF WBC: CPT

## 2025-09-04 ENCOUNTER — OCHSNER VIRTUAL EMERGENCY DEPARTMENT (OUTPATIENT)
Facility: CLINIC | Age: 80
End: 2025-09-04
Payer: MEDICARE

## 2025-09-04 ENCOUNTER — TELEPHONE (OUTPATIENT)
Dept: PRIMARY CARE CLINIC | Facility: CLINIC | Age: 80
End: 2025-09-04
Payer: MEDICARE

## 2025-09-04 ENCOUNTER — PATIENT OUTREACH (OUTPATIENT)
Facility: OTHER | Age: 80
End: 2025-09-04
Payer: MEDICARE

## 2025-09-04 DIAGNOSIS — E87.5 HYPERKALEMIA: Primary | ICD-10-CM

## 2025-09-04 LAB — BACTERIA UR CULT: ABNORMAL

## 2025-09-05 ENCOUNTER — PATIENT OUTREACH (OUTPATIENT)
Facility: OTHER | Age: 80
End: 2025-09-05
Payer: MEDICARE

## 2025-09-05 DIAGNOSIS — B96.20 E. COLI UTI (URINARY TRACT INFECTION): Primary | ICD-10-CM

## 2025-09-05 DIAGNOSIS — N39.0 E. COLI UTI (URINARY TRACT INFECTION): Primary | ICD-10-CM

## 2025-09-05 RX ORDER — NITROFURANTOIN 25; 75 MG/1; MG/1
100 CAPSULE ORAL 2 TIMES DAILY
Qty: 10 CAPSULE | Refills: 0 | Status: SHIPPED | OUTPATIENT
Start: 2025-09-05 | End: 2025-09-10

## (undated) DEVICE — CATH NC EMERGE MR 4X12MM

## (undated) DEVICE — CONTRAST VISIPAQUE 150ML

## (undated) DEVICE — TOURNIQUET SB QC DP 34X4IN

## (undated) DEVICE — HEMOSTAT VASC BAND REG 24CM

## (undated) DEVICE — MAT SUCTION PUDDLEVAC ORANGE

## (undated) DEVICE — SEE MEDLINE ITEM 146231

## (undated) DEVICE — GUIDEWIRE SION BLUE STR 190CM

## (undated) DEVICE — BLADE GATOR 4.2

## (undated) DEVICE — CATH EAGLE EYE PLATINUM

## (undated) DEVICE — SOL LACTATED RINGERS 4000

## (undated) DEVICE — COVER PROBE US 5.5X58L NON LTX

## (undated) DEVICE — SEE MEDLINE ITEM 146298

## (undated) DEVICE — SET IRR URLGY 2LINE UNIV SPIKE

## (undated) DEVICE — KIT LEFT HEART MANIFOLD CUSTOM

## (undated) DEVICE — GAUZE SPONGE 4X4 12PLY

## (undated) DEVICE — CATH EMERGE MR 12 X 2.50

## (undated) DEVICE — DRESSING SPONGE 16PLY 4X4 NS

## (undated) DEVICE — SOL LR IRR 1000ML PB

## (undated) DEVICE — GLOVE BIOGEL SKINSENSE PI 8.5

## (undated) DEVICE — SYR SLIP TIP 1CC

## (undated) DEVICE — PAD DEFIB CADENCE ADULT R2

## (undated) DEVICE — DRAPE ANGIO BRACH 38X44IN

## (undated) DEVICE — VALVE GUARDIAN II HEMOSTASIS

## (undated) DEVICE — GUIDEWIRE EMERALD .035IN 260CM

## (undated) DEVICE — GLOVE BIOGEL SKINSENSE PI 7.0

## (undated) DEVICE — Device

## (undated) DEVICE — INFLATOR ENCORE 26 BLLN INFL

## (undated) DEVICE — CATH EMERGE MR 20 X 3.00

## (undated) DEVICE — SPIKE SHORT LG BORE 1-WAY 2IN

## (undated) DEVICE — GLOVE BIOGEL SKINSENSE PI 7.5

## (undated) DEVICE — SEE MEDLINE ITEM 152523

## (undated) DEVICE — CATH OPTITORQUE TIGER 5F 100CM

## (undated) DEVICE — PADDING CAST SPECIALIST 6X4YD

## (undated) DEVICE — UNDERGLOVES BIOGEL PI SIZE 7.5

## (undated) DEVICE — ALCOHOL 70% ETHYL 16OZ.

## (undated) DEVICE — UNDERGLOVE BIOGEL PI SZ 6.5 LF

## (undated) DEVICE — APPLICATOR CHLORAPREP ORN 26ML

## (undated) DEVICE — CATH GUIDE LINER  V3 6F

## (undated) DEVICE — DRESSING XEROFORM FOIL PK 1X8

## (undated) DEVICE — SUT 4.0 ETHILON

## (undated) DEVICE — CATH NC EMERGE MR 4.50X12MM

## (undated) DEVICE — UNDERGLOVES BIOGEL PI SIZE 8.5

## (undated) DEVICE — GUIDE LAUNCHER 6FR JR 4.0

## (undated) DEVICE — BANDAGE ACE ELASTIC 6"

## (undated) DEVICE — PADS RADI PERIPHERAL SHIELD